# Patient Record
Sex: MALE | Race: WHITE | NOT HISPANIC OR LATINO | Employment: UNEMPLOYED | URBAN - METROPOLITAN AREA
[De-identification: names, ages, dates, MRNs, and addresses within clinical notes are randomized per-mention and may not be internally consistent; named-entity substitution may affect disease eponyms.]

---

## 2017-01-04 ENCOUNTER — GENERIC CONVERSION - ENCOUNTER (OUTPATIENT)
Dept: OTHER | Facility: OTHER | Age: 57
End: 2017-01-04

## 2017-01-05 ENCOUNTER — GENERIC CONVERSION - ENCOUNTER (OUTPATIENT)
Dept: OTHER | Facility: OTHER | Age: 57
End: 2017-01-05

## 2017-01-17 ENCOUNTER — ALLSCRIPTS OFFICE VISIT (OUTPATIENT)
Dept: OTHER | Facility: OTHER | Age: 57
End: 2017-01-17

## 2017-01-30 ENCOUNTER — ALLSCRIPTS OFFICE VISIT (OUTPATIENT)
Dept: OTHER | Facility: OTHER | Age: 57
End: 2017-01-30

## 2017-02-08 ENCOUNTER — ALLSCRIPTS OFFICE VISIT (OUTPATIENT)
Dept: OTHER | Facility: OTHER | Age: 57
End: 2017-02-08

## 2017-02-28 ENCOUNTER — GENERIC CONVERSION - ENCOUNTER (OUTPATIENT)
Dept: OTHER | Facility: OTHER | Age: 57
End: 2017-02-28

## 2017-03-08 ENCOUNTER — GENERIC CONVERSION - ENCOUNTER (OUTPATIENT)
Dept: OTHER | Facility: OTHER | Age: 57
End: 2017-03-08

## 2017-03-08 PROBLEM — M47.816 SPONDYLOSIS OF LUMBAR REGION WITHOUT MYELOPATHY OR RADICULOPATHY: Status: ACTIVE | Noted: 2017-03-08

## 2017-04-17 ENCOUNTER — GENERIC CONVERSION - ENCOUNTER (OUTPATIENT)
Dept: OTHER | Facility: OTHER | Age: 57
End: 2017-04-17

## 2017-06-02 ENCOUNTER — GENERIC CONVERSION - ENCOUNTER (OUTPATIENT)
Dept: OTHER | Facility: OTHER | Age: 57
End: 2017-06-02

## 2017-06-05 ENCOUNTER — GENERIC CONVERSION - ENCOUNTER (OUTPATIENT)
Dept: OTHER | Facility: OTHER | Age: 57
End: 2017-06-05

## 2017-06-07 LAB
BASOPHILS # BLD AUTO: 0 %
BASOPHILS # BLD AUTO: 0 X10E3/UL (ref 0–0.2)
DEPRECATED RDW RBC AUTO: 14.3 % (ref 12.3–15.4)
EOSINOPHIL # BLD AUTO: 0.2 X10E3/UL (ref 0–0.4)
EOSINOPHIL # BLD AUTO: 3 %
HCT VFR BLD AUTO: 43.9 % (ref 37.5–51)
HGB BLD-MCNC: 15.4 G/DL (ref 12.6–17.7)
IMM.GRANULOCYTES (CD4/8) (HISTORICAL): 0 X10E3/UL (ref 0–0.1)
IMM.GRANULOCYTES (CD4/8) (HISTORICAL): 1 %
LYMPHOCYTES # BLD AUTO: 1 X10E3/UL (ref 0.7–3.1)
LYMPHOCYTES # BLD AUTO: 17 %
MCH RBC QN AUTO: 29.2 PG (ref 26.6–33)
MCHC RBC AUTO-ENTMCNC: 35.1 G/DL (ref 31.5–35.7)
MCV RBC AUTO: 83 FL (ref 79–97)
MONOCYTES # BLD AUTO: 0.4 X10E3/UL (ref 0.1–0.9)
MONOCYTES (HISTORICAL): 7 %
NEUTROPHILS # BLD AUTO: 4.4 X10E3/UL (ref 1.4–7)
NEUTROPHILS # BLD AUTO: 72 %
PLATELET # BLD AUTO: 183 X10E3/UL (ref 150–379)
RBC (HISTORICAL): 5.27 X10E6/UL (ref 4.14–5.8)
WBC # BLD AUTO: 6.1 X10E3/UL (ref 3.4–10.8)

## 2017-06-08 ENCOUNTER — ALLSCRIPTS OFFICE VISIT (OUTPATIENT)
Dept: OTHER | Facility: OTHER | Age: 57
End: 2017-06-08

## 2017-06-08 LAB
A/G RATIO (HISTORICAL): 2.4 (ref 1.2–2.2)
ALBUMIN SERPL BCP-MCNC: 4.6 G/DL (ref 3.5–5.5)
ALP SERPL-CCNC: 73 IU/L (ref 39–117)
ALT SERPL W P-5'-P-CCNC: 24 IU/L (ref 0–44)
AST SERPL W P-5'-P-CCNC: 20 IU/L (ref 0–40)
BILIRUB SERPL-MCNC: 0.6 MG/DL (ref 0–1.2)
BUN SERPL-MCNC: 12 MG/DL (ref 6–24)
BUN/CREA RATIO (HISTORICAL): 13 (ref 9–20)
CALCIUM SERPL-MCNC: 9.3 MG/DL (ref 8.7–10.2)
CHLORIDE SERPL-SCNC: 98 MMOL/L (ref 96–106)
CHOLEST SERPL-MCNC: 184 MG/DL (ref 100–199)
CHOLEST/HDLC SERPL: 3.2 RATIO UNITS (ref 0–5)
CO2 SERPL-SCNC: 26 MMOL/L (ref 18–29)
CREAT SERPL-MCNC: 0.93 MG/DL (ref 0.76–1.27)
EGFR AFRICAN AMERICAN (HISTORICAL): 106 ML/MIN/1.73
EGFR-AMERICAN CALC (HISTORICAL): 91 ML/MIN/1.73
GLUCOSE SERPL-MCNC: 110 MG/DL (ref 65–99)
HDLC SERPL-MCNC: 58 MG/DL
LDLC SERPL CALC-MCNC: 114 MG/DL (ref 0–99)
POTASSIUM SERPL-SCNC: 4.5 MMOL/L (ref 3.5–5.2)
SODIUM SERPL-SCNC: 138 MMOL/L (ref 134–144)
TOT. GLOBULIN, SERUM (HISTORICAL): 1.9 G/DL (ref 1.5–4.5)
TOTAL PROTEIN (HISTORICAL): 6.5 G/DL (ref 6–8.5)
TRIGL SERPL-MCNC: 62 MG/DL (ref 0–149)
VLDLC SERPL CALC-MCNC: 12 MG/DL (ref 5–40)

## 2017-06-09 ENCOUNTER — ALLSCRIPTS OFFICE VISIT (OUTPATIENT)
Dept: OTHER | Facility: OTHER | Age: 57
End: 2017-06-09

## 2017-06-19 ENCOUNTER — ALLSCRIPTS OFFICE VISIT (OUTPATIENT)
Dept: OTHER | Facility: OTHER | Age: 57
End: 2017-06-19

## 2017-10-02 ENCOUNTER — GENERIC CONVERSION - ENCOUNTER (OUTPATIENT)
Dept: OTHER | Facility: OTHER | Age: 57
End: 2017-10-02

## 2017-10-26 ENCOUNTER — ALLSCRIPTS OFFICE VISIT (OUTPATIENT)
Dept: OTHER | Facility: OTHER | Age: 57
End: 2017-10-26

## 2017-10-27 NOTE — PROGRESS NOTES
Assessment  1  Chronic pain syndrome (338 4) (G89 4)   2  Chronic low back pain (724 2,338 29) (M54 5,G89 29)   3  Lumbar disc herniation with radiculopathy (722 10) (M51 16)   4  Lumbar spondylosis (721 3) (M47 816)   5  Myofascial pain syndrome (729 1) (M79 1)    Plan  Chronic low back pain, Chronic pain syndrome, Lumbar disc herniation with  radiculopathy, Lumbar spondylosis, Myofascial pain syndrome    · Nerve Block Transforaminal Epidural Steroid Injection Lumbar; Status:Active; Requested  NRX:[id number];    Perform:[facility]; YJ:[id number];ZNWJFKP; For:Chronic low back pain, Chronic pain syndrome, Lumbar disc herniation with radiculopathy, Lumbar spondylosis, Myofascial pain syndrome; Ordered By:Altaf Buckley;   · Follow-up Visit in 4 Weeks Evaluation and Treatment  Follow-up  Status: Hold For -  Scheduling  Requested for: 19NEF0663   Ordered; For: Chronic low back pain, Chronic pain syndrome, Lumbar disc herniation with radiculopathy, Lumbar spondylosis, Myofascial pain syndrome; Ordered By: Philip Mccoy Performed:  Due: 20IDW2912    Discussion/Summary    My impressions and treatment recommendations were discussed in detail with the patient, who verbalized understanding and had no further questions  patient reported nearly 9 months worth of relief following the previous right L4 and right L5 transforaminal epidural steroid injection on December 28, 2016  He is complaining of low back pain with radiation into his right lower extremity what appears to be the right L4 and right L5 distribution currently  He would like to undergo repeat injection and since this pain is similar to what it had been recently, I felt a reasonable to repeat the right L4 and right L5 transforaminal epidural steroid injection at this time in the context of a lumbar disc herniation with radiculopathy  procedures, its risks, and benefits were explained in detail to the patient   Risks include but are not limited to bleeding, infection, hematoma formation, abscess formation, weakness, headache, failure the pain to improve, nerve irritation or damage, and potential worsening of the pain  The patient verbalized understanding and wished to proceed with the procedure  did ask the patient to continue gabapentin 600 mg 3 times daily since it is helpful for his overall pain  Side effects were reviewed with the patient  is planned with the patient in 4 weeks time or sooner as warranted  Discharge instructions were provided  I personally saw and examined the patient and I agree with the above discussed plan of care  The patient has the current Goals: Decreased pain and improved level of functioning  Patient is able to Self-Care  Educational resources provided: The patient was provided a handout which provided information regarding the procedure  Chief Complaint  1  Back Pain    History of Present Illness  Mr Jalen Andre is a pleasant 77-year-old  male who presents to [facility] for interval re-evaluation of the above-stated pain complaints  The patient has a past medical and chronic pain history as outlined in the impression section below  He was last seen on June 19, 2017 at which time he was prescribed gabapentin 600 mg 3 times daily  Of note, the patient last underwent a right L4 and right L5 transforaminal epidural steroid injection on December 28, 2016  today's office visit, the patient's pain score is 8/10 on the verbal numerical pain rating scale  The patient states that his pain is worse in the morning, evening, and night  His pain is constant in nature and he reports the quality of his pain as cramping, pressure like, and shooting  â He reports that the pain from when he had the December 28, 2016 right L4 and right L5 transforaminal epidural steroid injection performed has returned   He had 9 months worth of relief following the previous right L4 and right L5 transforaminal epidural steroid injection  He would like to undergo a repeat transforaminal epidural steroid injection at this time  He also reports that gabapentin 600 mg 3 times daily is giving him significant relief and he would like to continue this medication  than as stated above, the patient denies any interval changes in medications, medical condition, mental condition, symptoms, or allergies since the last office visit  Krissy Lwo presents with complaints of gradual onset of constant episodes of severe bilateral lower back pain, radiating to the right lower leg  On a scale of 1 to 10, the patient rates the pain as 8  Symptoms are not improved by NSAIDs  Symptoms are made worse by standing, sitting, bending and walking down stairs  Symptoms are worsening  Review of Systems    Constitutional: no fever,-- no recent weight gain-- and-- no recent weight loss  Eyes: no double vision-- and-- no blurry vision  Cardiovascular: no chest pain,-- no palpitations-- and-- no lower extremity edema  Respiratory: shortness of breath, but-- no wheezing  Musculoskeletal: difficulty walking,-- joint stiffness,-- pain in extremity Right hip-- and-- decreased range of motion, but-- no muscle weakness,-- no joint swelling-- and-- no limb swelling  Neurological: memory loss, but-- no dizziness,-- no difficulty swallowing,-- no loss of consciousness-- and-- no seizures  Gastrointestinal: no nausea,-- no vomiting,-- no constipation-- and-- no diarrhea  Genitourinary: no difficulty initiating urine stream,-- no genital pain-- and-- no frequent urination  Integumentary: no complaints of skin rash  Psychiatric: no depression  Endocrine: no excessive thirst,-- no adrenal disease,-- no hypothyroidism-- and-- no hyperthyroidism  Hematologic/Lymphatic: no tendency for easy bruising-- and-- no tendency for easy bleeding  ROS reviewed  Active Problems  1  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)   2   Adult-onset obesity (278 00) (E66 9)   3  Anxiety (300 00) (F41 9)   4  Blood glucose elevated (790 29) (R73 9)   5  BMI 29 0-29 9,adult (V85 25) (Z68 29)   6  BMI 30 0-30 9,adult (V85 30) (Z68 30)   7  BMI 31 0-31 9,adult (V85 31) (Z68 31)   8  Chronic low back pain (724 2,338 29) (M54 5,G89 29)   9  Chronic pain syndrome (338 4) (G89 4)   10  Colon cancer screening (V76 51) (Z12 11)   11  Elevated PSA (790 93) (R97 20)   12  Enlarged prostate on rectal examination (600 00) (N40 0)   13  Gastroesophageal reflux disease without esophagitis (530 81) (K21 9)   14  History of colon polyps (V12 72) (Z86 010)   15  Lumbar disc herniation with radiculopathy (722 10) (M51 16)   16  Lumbar herniated disc (722 10) (M51 26)   17  Lumbar spondylosis (721 3) (M47 816)   18  Myofascial pain syndrome (729 1) (M79 1)   19  Neck pain (723 1) (M54 2)   20  Need for influenza vaccination (V04 81) (Z23)   21  Overweight (278 02) (E66 3)   22  Papilledema (377 00) (H47 10)   23  Prostate mass (602 9) (N42 9)   24  Screening for lipid disorders (V77 91) (P19 368)    Past Medical History  1  History of Abdominal pain, suprapubic (789 09) (R10 2)   2  History of Anxiety (300 00) (F41 9)   3  History of Burning with urination (788 1) (R30 0)   4  History of esophageal reflux (V12 79) (Z87 19)   5  History of Internal hemorrhoids (455 0) (K64 8)    Surgical History  1  Denied: History Of Prior Surgery    Family History  Mother    1  Denied: Family history of Crohn's disease without complication, unspecified   gastrointestinal tract location   2  Family history of hypertension (V17 49) (Z82 49)   3  Denied: Family history of liver disease   4  Denied: Family history of Malignant neoplasm of colon, unspecified part of colon  Father    5  Denied: Family history of Crohn's disease without complication, unspecified   gastrointestinal tract location   6  Family history of cataracts (V19 19) (Z45 974)   7  Denied: Family history of liver disease   8   Family history of skin cancer (V16 8) (Z80 8)   9  Denied: Family history of Malignant neoplasm of colon, unspecified part of colon  Maternal Grandmother    10  Family history of malignant neoplasm of stomach (V16 0) (Z80 0)    Social History   · Former smoker (V15 82) (X22 072)   · Non drinker / no alcohol use    Current Meds   1  Aspirin EC 81 MG Oral Tablet Delayed Release; TAKE 1 TABLET DAILY; Therapy: 76Pes2495 to (Alverta Player)  Requested for: 27Hts0908; Last   Rx:09Aug2017 Ordered   2  Citalopram Hydrobromide 40 MG Oral Tablet; Take 1 tablet daily; Therapy: 83UJJ4710 to (Evaluate:26Nxk8251)  Requested for: 48XUE4237; Last   Rx:00Uad9588 Ordered   3  Flomax 0 4 MG Oral Capsule; take 1 capsule daily; Therapy: (Recorded:08Jun2017) to Recorded   4  Gabapentin 600 MG Oral Tablet; TAKE 1 TABLET 3 times daily; Therapy: 44VRG1748 to (WCEPSCKK:73FHP0041)  Requested for: 43Wtk8951; Last   Rx:54Iof2516 Ordered   5  Omeprazole 20 MG Oral Capsule Delayed Release; take 1 capsule by mouth once daily; Therapy: 34JYT3391 to (Evaluate:21Jun2017)  Requested for: 75EWG5634; Last   Rx:23Mar2017 Ordered   6  RaNITidine HCl - 300 MG Oral Tablet; TAKE 1 TABLET AT BEDTIME; Therapy: 82SJF7638 to (Evaluate:20Nov2017)  Requested for: 24Apr2017; Last   Rx:24Apr2017 Ordered   7  Timolol 0 5 % SOLN;   Therapy: (Recorded:01Mar2016) to Recorded    Allergies  1  Ibuprofen 200 TABS   2  Ibuprofen TABS    Vitals  Vital Signs    Recorded: 21YXW2282 08:06AM   Heart Rate 76   Systolic 058   Diastolic 66   Height 5 ft 10 in   Weight 219 lb    BMI Calculated 31 42   BSA Calculated 2 17   Pain Scale 8     Physical Exam    Constitutional   General appearance: Well developed, well nourished, alert, in no distress, non-toxic and no overt pain behavior  Eyes   Sclera: anicteric   HEENT   Hearing grossly intact  Pulmonary   Respiratory effort: Even and unlabored  Cardiovascular   Examination of extremities: No edema or pitting edema present  Skin   Skin and subcutaneous tissue: Normal without rashes or lesions, well hydrated  Psychiatric   Mood and affect: Mood and affect appropriate  Neurologic   Cranial nerves: Cranial nerves II-XII grossly intact  Musculoskeletal   Gait and station: Normal     Lumbar/Sacral Spine examination demonstrates Lumbosacral Spine:   Appearance: Normal  Spinal alignment exhibits normal lordosis  Tenderness: None except at lumbar spine  Lumbosacral Spine Sensory: intact to light touch and pinprick in the lower extremities  ROM Lumbosacral Spine: Lumbar facet loading is mildly positive bilaterally  Flexion was not restricted-- and-- was painless  Extension was not restricted-- and-- was painless  Left lateral flexion was not restricted-- and-- was painful  Right lateral flexion was not restricted-- and-- was painful  Rotation to the left was not restricted-- and-- was painless  Rotation to the right was not restricted-- and-- was painless  ROM Hips: Full   Foot and ankle strength was normal bilaterally  Knee strength was normal bilaterally  Hip strength was normal bilaterally        Signatures   Electronically signed by : YUNIOR Huff ; Oct 26 2017  8:24AM EST                       (Author)

## 2017-11-16 ENCOUNTER — HOSPITAL ENCOUNTER (OUTPATIENT)
Facility: AMBULARY SURGERY CENTER | Age: 57
Setting detail: OUTPATIENT SURGERY
Discharge: HOME | End: 2017-11-16
Attending: ANESTHESIOLOGY | Admitting: ANESTHESIOLOGY
Payer: COMMERCIAL

## 2017-11-16 ENCOUNTER — HOSPITAL ENCOUNTER (OUTPATIENT)
Dept: RADIOLOGY | Facility: HOSPITAL | Age: 57
Setting detail: OUTPATIENT SURGERY
Discharge: HOME | End: 2017-11-16
Payer: COMMERCIAL

## 2017-11-16 VITALS
DIASTOLIC BLOOD PRESSURE: 74 MMHG | RESPIRATION RATE: 18 BRPM | SYSTOLIC BLOOD PRESSURE: 133 MMHG | TEMPERATURE: 98.9 F | HEART RATE: 83 BPM | OXYGEN SATURATION: 97 %

## 2017-11-16 PROCEDURE — 72100 X-RAY EXAM L-S SPINE 2/3 VWS: CPT

## 2017-11-16 RX ORDER — LIDOCAINE WITH 8.4% SOD BICARB 0.9%(10ML)
SYRINGE (ML) INJECTION AS NEEDED
Status: DISCONTINUED | OUTPATIENT
Start: 2017-11-16 | End: 2017-11-16 | Stop reason: HOSPADM

## 2017-11-16 RX ORDER — BUPIVACAINE HYDROCHLORIDE 2.5 MG/ML
INJECTION, SOLUTION EPIDURAL; INFILTRATION; INTRACAUDAL AS NEEDED
Status: DISCONTINUED | OUTPATIENT
Start: 2017-11-16 | End: 2017-11-16 | Stop reason: HOSPADM

## 2017-11-16 RX ORDER — METHYLPREDNISOLONE ACETATE 80 MG/ML
INJECTION, SUSPENSION INTRA-ARTICULAR; INTRALESIONAL; INTRAMUSCULAR; SOFT TISSUE AS NEEDED
Status: DISCONTINUED | OUTPATIENT
Start: 2017-11-16 | End: 2017-11-16 | Stop reason: HOSPADM

## 2017-11-16 NOTE — OP NOTE
ATTENDING PHYSICIAN:  Monalisa Elizabeth MD     PROCEDURE:  1  Right L4 transforaminal epidural steroid injection under fluoroscopic guidance  2  Right L5 transforaminal epidural steroid injection under fluoroscopic guidance  PRE-PROCEDURE DIAGNOSIS:  Low back pain and right lower extremity radiculopathy  POST-PROCEDURE DIAGNOSIS:  Low back pain and right lower extremity radiculopathy  ANESTHESIA:  Local     ESTIMATED BLOOD LOSS:  Minimal     COMPLICATIONS:  None  LOCATION:  [facility]  CONSENT:  Today's procedure, its potential benefits as well as its risks and potential side effects were reviewed  Discussed risks of the procedure including bleeding, infection, nerve irritation or damage, reactions to the medications, weakness, headache, failure of the pain to improve, and potential worsening of the pain were explained to the patient who verbalized understanding and who wished to proceed  Written informed consent was thereby obtained  DESCRIPTION OF THE PROCEDURE:  After written informed consent was obtained, the patient was taken to the fluoroscopy suite and placed in the prone position  Anatomical landmarks were identified by way of fluoroscopy in multiple views  The skin of the lumbar region was prepped using antiseptic and draped in the usual sterile fashion  Strict aseptic technique was utilized  The skin and subcutaneous tissues at the needle entry site were infiltrated with a total of 5 mL of 1% preservative-free lidocaine using a 25-gauge 1-1/2-inch needle  22-gauge needles were then incrementally advanced under fluoroscopic guidance in the oblique view into the neural foramina as mentioned above  Proper placement into each of the neural foramen was confirmed with fluoroscopy in both the lateral and AP views   After negative aspiration for CSF or heme, contrast was injected, which delineated the nerve roots and the epidural space under fluoroscopy in the AP view  There was only a transient pressure paresthesia that resolved immediately upon injection  After negative aspiration, a 2 mL of a 4 mL injectate consisting of 3 mL of preservative-free 0 25% bupivacaine and 1 mL of Depo-Medrol 80 mg/mL was slowly injected into each of the needles as delineated above  The patient tolerated the procedure well and all needles were removed intact  Hemostasis was maintained  There were no apparent paresthesias or complications  The skin was wiped clean and a Band-Aid was placed as appropriate  The patient was monitored for an appropriate period of time and remained hemodynamically stable following the procedure  The patient was ultimately discharged to home with supervision in good condition and instructed to call the office in approximately 7-10 days with an update or sooner as warranted  Discharge instructions were provided  I was present for and participated in all key and critical portions of this procedure      Tayler Viramontes MD  11/16/2017  2:59 PM

## 2017-11-30 ENCOUNTER — GENERIC CONVERSION - ENCOUNTER (OUTPATIENT)
Dept: OTHER | Facility: OTHER | Age: 57
End: 2017-11-30

## 2017-12-14 ENCOUNTER — GENERIC CONVERSION - ENCOUNTER (OUTPATIENT)
Dept: OTHER | Facility: OTHER | Age: 57
End: 2017-12-14

## 2018-01-10 NOTE — RESULT NOTES
Message   Recorded as Task   Date: 01/04/2017 01:21 PM, Created By: Toni Adam   Task Name: Follow Up   Assigned To: [facility] [place] [facility],Team   Regarding Patient: Derinda Primrose, Status: Active   Comment:    Ida Londono - 04 Jan 2017 1:21 PM     TASK CREATED  L/m to return call  S/P RIGHT L4 AND L5 TFESI done 12/28/16  by Ida Moreno - 04 Jan 2017 1:41 PM     TASK EDITED  Patient l/m stating that he received 40-50%  relief from his rpocedure  His pain varies from 5-6 depending on activity  S/p RIGHT L4 AND L5 TFESI done 12/28/16   Altaf Buckley - 04 Jan 2017 2:17 PM     TASK REPLIED TO: Previously Assigned To [place]  Thanks          Signatures   Electronically signed by : Desiree Lambert, ; Jan 5 2017 11:02AM EST                       (Author)

## 2018-01-10 NOTE — PROGRESS NOTES
Assessment    1  Encounter for preventive health examination (V70 0) (Z00 00)   2  Adult-onset obesity (278 00) (E66 9)   3  Anxiety (300 00) (F41 9)   4  Gastroesophageal reflux disease without esophagitis (530 81) (K21 9)   5  BMI 31 0-31 9,adult (V85 31) (Z68 31)    Plan  Adult-onset obesity    · Begin or continue regular aerobic exercise  Gradually work up to at least {count1}  sessions of {dur1} of exercise a week ; Status:Complete;   Done: 94EKI9701   · You need to quit smoking ; Status:Complete;   Done: 79UUU2180    Discussion/Summary  Impression: health maintenance visit  Currently, he eats a poor diet and has an inadequate exercise regimen  Prostate cancer screening: the risks and benefits of prostate cancer screening were discussed, prostate cancer screening is current and Follows with Dr Javed Meek  PSA done recently according to patient  Testicular cancer screening: the risks and benefits of testicular cancer screening were discussed  Colorectal cancer screening: the risks and benefits of colorectal cancer screening were discussed and colorectal cancer screening is current  Screening lab work includes glucose and lipid profile  He was advised to be evaluated by a dentist  Advice and education were given regarding nutrition, aerobic exercise and weight loss  Patient discussion: discussed with the patient  Adult-onset Obesity: Strongly advised lifestyle modifications with better dietary choices as well as initiation of aerobic exercise at least 30 minutes a day, 3-5 times a week  Anxiety: Continue current regimen and follow up with family guidance  GERD: We will do a trial without PPI and just take the H2 blocker  Patient is in agreement with this plan  If patient was symptoms, he will re-start PPI  Spoke with GI, Dr Baron Daughters  He agrees with the plan also  The patient was counseled regarding risk factor reductions, impressions     Possible side effects of new medications were reviewed with the patient/guardian today  The treatment plan was reviewed with the patient/guardian  The patient/guardian understands and agrees with the treatment plan     Self Referrals: No      Chief Complaint  Annual physical      History of Present Illness  HM, Adult Male: The patient is being seen for a health maintenance evaluation  The last health maintenance visit was 1 year(s) ago  Social History: Household members include domestic partner  Work status: not currently employed  General Health: The patient's health since the last visit is described as fair  He does not have regular dental visits  The patient brushes occasionally, doesn't floss his teeth and Brushes teeth times a week  He complains of vision problems  Vision care includes wearing glasses  The patient complains of Reports decreased peripheral vision, was recently seen by ophthalmologist (Dr Alethea Montilla? In [place], [place])  He has hearing loss  Hearing is slightly decreased   left ear (chronic)  Lifestyle:  He does not have a healthy diet  Diet problems: too high in calories, insufficient in fruit, insufficient in vegetables, insufficient in protein, needs elimination of junk food, high in fat, high in sugar and Eats 2 dozens donuts in a week  He has weight concerns  Weight control issues: overweight  He does not exercise regularly  He uses tobacco  The patient is a former cigarette smoker and quit smoking cigarettes: 1997  He denies alcohol use  He denies drug use  Reproductive health:  the patient is not sexually active  Screening: cancer screening reviewed and current  HPI: 64year old male comes to the office for his well-visit  Patient sees Dr Gianna Lemus (Urologist) for his BPH  States that a biopsy was performed this year, and reports normal findings  Patient is concerned of a 20lb weight gain in the past year  Has an upcoming appointment with a nutritionist      For patient's depression, reports that it's well controlled   Has an upcoming appointment with family guidance  Review of Systems    Constitutional: no fever, not feeling poorly and no chills  Eyes: no eyesight problems  ENT: no sore throat and no nasal discharge  Cardiovascular: no chest pain and no palpitations  Respiratory: no shortness of breath and no cough  Gastrointestinal: no abdominal pain, no nausea, no vomiting, no constipation and no diarrhea  Musculoskeletal: myalgias and Chronic back pain  Integumentary: no rashes  Neurological: no headache, no numbness and no tingling  Psychiatric: anxiety and Treated with medication  Active Problems    1  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)   2  Adult-onset obesity (278 00) (E66 9)   3  Anxiety (300 00) (F41 9)   4  Blood glucose elevated (790 29) (R73 9)   5  BMI 29 0-29 9,adult (V85 25) (Z68 29)   6  BMI 30 0-30 9,adult (V85 30) (Z68 30)   7  Chronic low back pain (724 2,338 29) (M54 5,G89 29)   8  Chronic pain syndrome (338 4) (G89 4)   9  Colon cancer screening (V76 51) (Z12 11)   10  Elevated PSA (790 93) (R97 20)   11  Enlarged prostate on rectal examination (600 00) (N40 0)   12  Gastroesophageal reflux disease without esophagitis (530 81) (K21 9)   13  History of colon polyps (V12 72) (Z86 010)   14  Lumbar disc herniation with radiculopathy (722 10) (M51 16)   15  Lumbar herniated disc (722 10) (M51 26)   16  Lumbar spondylosis (721 3) (M47 816)   17  Myofascial pain syndrome (729 1) (M79 1)   18  Neck pain (723 1) (M54 2)   19  Need for influenza vaccination (V04 81) (Z23)   20  Overweight (278 02) (E66 3)   21  Papilledema (377 00) (H47 10)   22  Prostate mass (602 9) (N42 9)   23   Screening for lipid disorders (V77 91) (Z13 220)    Past Medical History    · History of Abdominal pain, suprapubic (789 09) (R10 2)   · History of Anxiety (300 00) (F41 9)   · History of Burning with urination (788 1) (R30 0)   · History of esophageal reflux (V12 79) (Z87 19)   · History of Internal hemorrhoids (455  0) (K64 8)    Surgical History    · Denied: History Of Prior Surgery    Family History  Mother    · Denied: Family history of Crohn's disease without complication, unspecified  gastrointestinal tract location   · Family history of hypertension (V17 49) (Z82 49)   · Denied: Family history of liver disease   · Denied: Family history of Malignant neoplasm of colon, unspecified part of colon  Father    · Denied: Family history of Crohn's disease without complication, unspecified  gastrointestinal tract location   · Family history of cataracts (V19 19) (Z83 518)   · Denied: Family history of liver disease   · Family history of skin cancer (V16 8) (Z80 8)   · Denied: Family history of Malignant neoplasm of colon, unspecified part of colon  Maternal Grandmother    · Family history of malignant neoplasm of stomach (V16 0) (Z80 0)    Social History    · Former smoker (V15 82) (E63 279)   · Non drinker / no alcohol use    Current Meds   1  Aspirin EC 81 MG Oral Tablet Delayed Release; TAKE 1 TABLET DAILY; Therapy: 89Ymz7271 to (Evaluate:48Pzn4308)  Requested for: 74Svc4063; Last   Rx:15Hcv7692 Ordered   2  Citalopram Hydrobromide 40 MG Oral Tablet; Take 1 tablet daily; Therapy: 00FNH3129 to (Evaluate:60Esz6600)  Requested for: 64PST5616; Last   Rx:79Ovi3763 Ordered   3  Flomax 0 4 MG Oral Capsule; take 1 capsule daily; Therapy: (Recorded:08Jun2017) to Recorded   4  Gabapentin 600 MG Oral Tablet; TAKE 1 TABLET 3 TIMES DAILY; Therapy: 56KSW2397 to (Josefina Larios)  Requested for: 93CUO6391; Last   Rx:05Jun2017 Ordered   5  Omeprazole 20 MG Oral Capsule Delayed Release; take 1 capsule by mouth once daily; Therapy: 96WPY6403 to (Evaluate:21Jun2017)  Requested for: 26EWK3431; Last   Rx:23Mar2017 Ordered   6  RaNITidine HCl - 300 MG Oral Tablet; TAKE 1 TABLET AT BEDTIME; Therapy: 64GST4901 to (Evaluate:20Nov2017)  Requested for: 66Jya6496; Last   Rx:24Apr2017 Ordered   7   Timolol 0 5 % SOLN;   Therapy: (Recorded:01Mar2016) to Recorded    Allergies    1  Ibuprofen 200 TABS   2  Ibuprofen TABS    Vitals   Recorded: 24OAS2911 02:51PM   Temperature 98 2 F   Heart Rate 85   Respiration 18   Systolic 280   Diastolic 68   Height 5 ft 10 in   Weight 220 lb 3 oz   BMI Calculated 31 59   BSA Calculated 2 17   O2 Saturation 98     Physical Exam    Constitutional   General appearance: Abnormal   obese  Head and Face   Head and face: Normal     Eyes   Conjunctiva and lids: No erythema, swelling or discharge  Pupils and irises: Equal, round, reactive to light  Ears, Nose, Mouth, and Throat   External inspection of ears and nose: Normal     Nasal mucosa, septum, and turbinates: Normal without edema or erythema  Lips, teeth, and gums: Normal, good dentition  Oropharynx: Normal with no erythema, edema, exudate or lesions  Pulmonary   Respiratory effort: No increased work of breathing or signs of respiratory distress  Auscultation of lungs: Clear to auscultation  Cardiovascular   Auscultation of heart: Normal rate and rhythm, normal S1 and S2, no murmurs  Abdomen   Abdomen: Non-tender, no masses  Musculoskeletal   Range of motion: Normal     Stability: Normal     Muscle strength/tone: Normal     Neurologic   Reflexes: 2+ and symmetric  Psychiatric   Orientation to person, place and time: Normal     Mood and affect: Normal        Results/Data  PHQ-9 Adult Depression Screening 68NWI7895 03:50PM User, [facility]     Test Name Result Flag Reference   PHQ-9 Adult Depression Score 14     Over the last two weeks, how often have you been bothered by any of the following problems?   Little interest or pleasure in doing things: More than half the days - 2  Feeling down, depressed, or hopeless: Several days - 1  Trouble falling or staying asleep, or sleeping too much: More than half the days - 2  Feeling tired or having little energy: More than half the days - 2  Poor appetite or over eating: More than half the days - 2  Feeling bad about yourself - or that you are a failure or have let yourself or your family down: Several days - 1  Trouble concentrating on things, such as reading the newspaper or watching television: More than half the days - 2  Moving or speaking so slowly that other people could have noticed  Or the opposite -  being so fidgety or restless that you have been moving around a lot more than usual: More than half the days - 2  Thoughts that you would be better off dead, or of hurting yourself in some way: Not at all - 0   PHQ-9 Adult Depression Screening Positive     PHQ-9 Difficulty Level Somewhat difficult     PHQ-9 Severity Moderate Depression         Health Management  Colon cancer screening   COLONOSCOPY; every 10 years; Last 10Atm0368; Next Due: 94Vry4541; Active    Future Appointments    Date/Time Provider Specialty Site   06/19/2017 11:30 AM YUNIOR Cage   Pain Management [address]     Signatures   Electronically signed by : YUNIOR Salter ; Jun 9 2017  4:08PM EST                       (Author)    Electronically signed by : YUNIOR Valladares ; Jul 25 2017 12:24PM EST

## 2018-01-10 NOTE — MISCELLANEOUS
Message   Recorded as Task   Date: 03/08/2017 08:09 AM, Created By: Emery Valencia   Task Name: Miscellaneous   Assigned To: Emery Valencia   Regarding Patient: Nicholas Montanez, Status: Active   CommentFlorecita Mata - 08 Mar 2017 8:09 AM     TASK CREATED  Pt called to cx procedure for today  He is not having any pain! Aurelio Montejo - 08 Mar 2017 9:28 AM     TASK REPLIED TO: Previously Assigned To [place]  Thanks  Active Problems    1  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)   2  Anxiety (300 00) (F41 9)   3  Blood glucose elevated (790 29) (R73 9)   4  BMI 29 0-29 9,adult (V85 25) (Z68 29)   5  BMI 30 0-30 9,adult (V85 30) (Z68 30)   6  Chronic low back pain (724 2,338 29) (M54 5,G89 29)   7  Chronic pain syndrome (338 4) (G89 4)   8  Colon cancer screening (V76 51) (Z12 11)   9  Elevated PSA (790 93) (R97 20)   10  Enlarged prostate on rectal examination (600 00) (N40 0)   11  Gastroesophageal reflux disease without esophagitis (530 81) (K21 9)   12  History of colon polyps (V12 72) (Z86 010)   13  Lumbar disc herniation with radiculopathy (722 10) (M51 16)   14  Lumbar herniated disc (722 10) (M51 26)   15  Lumbar spondylosis (721 3) (M47 816)   16  Myofascial pain syndrome (729 1) (M79 1)   17  Neck pain (723 1) (M54 2)   18  Need for influenza vaccination (V04 81) (Z23)   19  Overweight (278 02) (E66 3)   20  Papilledema (377 00) (H47 10)   21  Prostate mass (602 9) (N42 9)   22  Screening for lipid disorders (V77 91) (Z13 220)    Current Meds   1  Aspirin EC 81 MG Oral Tablet Delayed Release; TAKE 1 TABLET DAILY; Therapy: 63Gpw2548 to (Evaluate:20Yft1362)  Requested for: 82Bsi5135; Last   Rx:81Onb6953 Ordered   2  Citalopram Hydrobromide 40 MG Oral Tablet; TAKE 1 TABLET DAILY; Therapy: 73VRV1837 to (Evaluate:69Zhe6466)  Requested for: 83OLZ7153; Last   Rx:18Jan2017 Ordered   3  Flomax 0 4 MG Oral Capsule (Tamsulosin HCl); Therapy: (Jason Monroe) to Recorded   4  Gabapentin 600 MG Oral Tablet; TAKE 1 TABLET 3 TIMES DAILY; Therapy: 00RSS2793 to (Christiano Verdin)  Requested for: 35AEK4499; Last   Rx:37Ikg0267 Ordered   5  Omeprazole 20 MG Oral Capsule Delayed Release; take 1 capsule by mouth once daily; Therapy: 61JOJ2687 to (Evaluate:87Zhv5399)  Requested for: 51QYP6028; Last   Rx:93Siq9354 Ordered   6  RaNITidine HCl - 300 MG Oral Tablet; TAKE 1 TABLET AT BEDTIME; Therapy: 99YPL8484 to (Evaluate:19Apr2017)  Requested for: 56UPE5790; Last   Rx:70Nfe3852 Ordered   7  Timolol 0 5 % SOLN;   Therapy: (Recorded:01Mar2016) to Recorded    Allergies    1  Ibuprofen 200 TABS   2  Ibuprofen TABS    Signatures   Electronically signed by :  Pinky Camarillo, ; Mar  8 2017 10:59AM EST                       (Author)

## 2018-01-11 NOTE — MISCELLANEOUS
Message   Recorded as Task   Date: 06/05/2017 09:38 AM, Created By: Cliff Lomax   Task Name: Miscellaneous   Assigned To: [facility] Clinical,Team   Regarding Patient: Lesa Brothers, Status: Active   CommentJulian Jones - 05 Jun 2017 9:38 AM     TASK CREATED  Pt called for a refill for his Gabapentin 600mg 3 x a day  Pt has no refills left  Please call [phone]  [facility] - 05 Jun 2017 9:47 AM     TASK EDITED  Pt has an sovs 6/16 w/Dr Ina Shabazz - 05 Jun 2017 9:58 AM     TASK REPLIED TO: Previously Assigned To Haydee Jackson - 05 Jun 2017 10:35 AM     TASK EDITED  left detailed message, pt to call if any questions  Active Problems    1  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)   2  Adult-onset obesity (278 00) (E66 9)   3  Anxiety (300 00) (F41 9)   4  Blood glucose elevated (790 29) (R73 9)   5  BMI 29 0-29 9,adult (V85 25) (Z68 29)   6  BMI 30 0-30 9,adult (V85 30) (Z68 30)   7  Chronic low back pain (724 2,338 29) (M54 5,G89 29)   8  Chronic pain syndrome (338 4) (G89 4)   9  Colon cancer screening (V76 51) (Z12 11)   10  Elevated PSA (790 93) (R97 20)   11  Enlarged prostate on rectal examination (600 00) (N40 0)   12  Gastroesophageal reflux disease without esophagitis (530 81) (K21 9)   13  History of colon polyps (V12 72) (Z86 010)   14  Lumbar disc herniation with radiculopathy (722 10) (M51 16)   15  Lumbar herniated disc (722 10) (M51 26)   16  Lumbar spondylosis (721 3) (M47 816)   17  Myofascial pain syndrome (729 1) (M79 1)   18  Neck pain (723 1) (M54 2)   19  Need for influenza vaccination (V04 81) (Z23)   20  Overweight (278 02) (E66 3)   21  Papilledema (377 00) (H47 10)   22  Prostate mass (602 9) (N42 9)   23  Screening for lipid disorders (V77 91) (Z13 220)    Current Meds   1  Aspirin EC 81 MG Oral Tablet Delayed Release; TAKE 1 TABLET DAILY;    Therapy: 36Vxv4370 to (Evaluate:24Zvp5970)  Requested for: 99Fwn5275; Last   Rx:42Yuq2398 Ordered   2  Citalopram Hydrobromide 40 MG Oral Tablet; Take 1 tablet daily; Therapy: 73KCC9830 to (Evaluate:28Wwz4063)  Requested for: 36SXD0878; Last   Rx:90Agn3165 Ordered   3  Flomax 0 4 MG Oral Capsule (Tamsulosin HCl); Therapy: (Livia Recio) to Recorded   4  Gabapentin 600 MG Oral Tablet; TAKE 1 TABLET 3 TIMES DAILY; Therapy: 67ULF3861 to (Elizabeth Isidro)  Requested for: 65TJR4385; Last   Rx:05Jun2017 Ordered   5  Omeprazole 20 MG Oral Capsule Delayed Release; take 1 capsule by mouth once daily; Therapy: 58HGV1036 to (Evaluate:21Jun2017)  Requested for: 73XDN7922; Last   Rx:23Mar2017 Ordered   6  RaNITidine HCl - 300 MG Oral Tablet; TAKE 1 TABLET AT BEDTIME; Therapy: 84IMV6880 to (Evaluate:20Nov2017)  Requested for: 24Apr2017; Last   Rx:24Apr2017 Ordered   7  Timolol 0 5 % SOLN;   Therapy: (Recorded:01Mar2016) to Recorded    Allergies    1  Ibuprofen 200 TABS   2   Ibuprofen TABS    Signatures   Electronically signed by : Tadeo Hancock RN; Jun 5 2017 10:35AM EST                       (Author)

## 2018-01-11 NOTE — MISCELLANEOUS
Message   Recorded as Task   Date: 12/05/2016 10:51 AM, Created By: Donny Gonsalez   Task Name: Med Renewal Request   Ass[facility] To: [facility] [facility] [address] [facility] Clinical,Team   Regarding Patient: Keith Marino, Status: Active   Comment:    Vero Ryan - 05 Dec 2016 10:51 AM     TASK CREATED  Caller: Self; Renew Medication; [phone] (Home)  [place] 12/5/2016 @ [facility]  requesting refill of gabapentin 600 mg    Per pt, + improvement w/ increase in gabapentin  Noted anxiety and feeling restless - worst in the afternoon  Pt confirmed citalopram 20 mg q am 1-2 hrs after gabapentin  Pt expressed frustration with insurance issues delaying / prohibiting injection  Acknowledged pt's concerns  will d/w Dr James Arvizu and cb to advise  Confirmed Pharmacy per allscrirossy Wiley - 05 Dec 2016 11:06 AM     TASK REPLIED TO: Previously Assigned To [address]  Currently working on insurance authorization for procedure  I refilled the patient's gabapentin  Vero Ryan - 05 Dec 2016 11:47 AM     TASK EDITED  s/w pt, advised of above  pt verbalized understanding and appreciation  Active Problems    1  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)   2  Anxiety (300 00) (F41 9)   3  Blood glucose elevated (790 29) (R73 9)   4  BMI 29 0-29 9,adult (V85 25) (Z68 29)   5  Chronic low back pain (724 2,338 29) (M54 5,G89 29)   6  Chronic pain syndrome (338 4) (G89 4)   7  Colon cancer screening (V76 51) (Z12 11)   8  Dry nose (478 19) (J34 89)   9  Elevated PSA (790 93) (R97 20)   10  Encounter for routine history and physical exam for male (V70 0) (Z00 00)   11  Encounter to discuss test results (V65 49) (Z71 89)   12  Enlarged prostate on rectal examination (600 00) (N40 0)   13  Gastroesophageal reflux disease without esophagitis (530 81) (K21 9)   14  History of colon polyps (V12 72) (Z86 010)   15  Lumbar disc herniation with radiculopathy (722 10) (M51 16)   16  Lumbar herniated disc (722 10) (M51 26)   17   Myofascial pain syndrome (729 1) (M79 1)   18  Neck pain (723 1) (M54 2)   19  Overweight (278 02) (E66 3)   20  Papilledema (377 00) (H47 10)   21  Prostate mass (602 9) (N42 9)   22  Screening for lipid disorders (V77 91) (Z13 220)    Current Meds   1  Aspirin EC 81 MG Oral Tablet Delayed Release; TAKE 1 TABLET DAILY; Therapy: 53Qta0773 to (Evaluate:92Hkm7375)  Requested for: 32Vcg6103; Last   Rx:72Hoq1265 Ordered   2  Aspirin Low Dose 81 MG TABS; TAKE 1 TABLET DAILY; Therapy: 29SIK2797 to (Evaluate:24Feb2017)  Requested for: 15FXF8874; Last   Rx:01Mar2016 Ordered   3  Citalopram Hydrobromide 20 MG Oral Tablet; TAKE 1 TABLET DAILY; Therapy: 84JMA1587 to (Evaluate:21Feb2017)  Requested for: 72Nvo3308; Last   Rx:05Qes5209 Ordered   4  Flomax 0 4 MG Oral Capsule (Tamsulosin HCl); Therapy: ([facility]) to Recorded   5  Gabapentin 600 MG Oral Tablet; TAKE 1 TABLET 3 TIMES DAILY; Therapy: 18GNJ0752 to (Evaluate:04Jan2017)  Requested for: 00RQM4291; Last   Rx:42Zjj6049 Ordered   6  Omeprazole 20 MG Oral Capsule Delayed Release; take 1 capsule daily; Therapy: 28HPQ5227 to (Evaluate:01Jan2017)  Requested for: 89UHZ1003; Last   Rx:05Jun2016 Ordered   7  Omeprazole Magnesium 20 MG TBEC; TAKE 1 TABLET DAILY; Therapy: (Recorded:05Apr2016) to Recorded   8  RaNITidine HCl - 300 MG Oral Tablet; TAKE 1 TABLET AT BEDTIME; Therapy: 31JPI3925 to (Evaluate:19Apr2017)  Requested for: 59EOG8196; Last   Rx:24Zah9314 Ordered   9  Timolol 0 5 % SOLN;   Therapy: (Recorded:01Mar2016) to Recorded    Allergies    1  Ibuprofen 200 TABS   2   Ibuprofen TABS    Signatures   Electronically signed by : Kassie Anderson, ; Dec  5 2016 11:48AM EST                       (Author)

## 2018-01-12 VITALS
OXYGEN SATURATION: 98 % | SYSTOLIC BLOOD PRESSURE: 122 MMHG | WEIGHT: 205.56 LBS | HEART RATE: 84 BPM | BODY MASS INDEX: 29.43 KG/M2 | HEIGHT: 70 IN | DIASTOLIC BLOOD PRESSURE: 70 MMHG | RESPIRATION RATE: 18 BRPM

## 2018-01-12 VITALS
WEIGHT: 219.25 LBS | OXYGEN SATURATION: 98 % | BODY MASS INDEX: 31.39 KG/M2 | TEMPERATURE: 98.4 F | RESPIRATION RATE: 18 BRPM | SYSTOLIC BLOOD PRESSURE: 116 MMHG | HEIGHT: 70 IN | HEART RATE: 92 BPM | DIASTOLIC BLOOD PRESSURE: 78 MMHG

## 2018-01-12 NOTE — MISCELLANEOUS
Message   Recorded as Task   Date: 02/28/2017 11:27 AM, Created By: Francenia Sandhoff   Task Name: Med Renewal Request   Assigned To: [address] [facility] [facility] Clinical,Team   Regarding Patient: Gael Paredes, Status: Active   Comment:    Haydee Flowers - 28 Feb 2017 11:27 AM     TASK CREATED  Caller: Self; Renew Medication; [phone] (Home)  Pt lmom requesting a refill of gabapentin 600mg one tab tid to be [facility]  Pt has 2 days supply left  Haydee Flowers - 28 Feb 2017 11:28 AM     TASK EDITED   Malou Venegas - 28 Feb 2017 11:46 AM     TASK REPLIED TO: Previously Assigned To Malou Venegas  e-rx sent for the gabapentin   Rocio Girard - 28 Feb 2017 11:56 AM     TASK EDITED  Pt aware  Active Problems    1  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)   2  Anxiety (300 00) (F41 9)   3  Blood glucose elevated (790 29) (R73 9)   4  BMI 29 0-29 9,adult (V85 25) (Z68 29)   5  BMI 30 0-30 9,adult (V85 30) (Z68 30)   6  Chronic low back pain (724 2,338 29) (M54 5,G89 29)   7  Chronic pain syndrome (338 4) (G89 4)   8  Colon cancer screening (V76 51) (Z12 11)   9  Elevated PSA (790 93) (R97 20)   10  Enlarged prostate on rectal examination (600 00) (N40 0)   11  Gastroesophageal reflux disease without esophagitis (530 81) (K21 9)   12  History of colon polyps (V12 72) (Z86 010)   13  Lumbar disc herniation with radiculopathy (722 10) (M51 16)   14  Lumbar herniated disc (722 10) (M51 26)   15  Lumbar spondylosis (721 3) (M47 816)   16  Myofascial pain syndrome (729 1) (M79 1)   17  Neck pain (723 1) (M54 2)   18  Need for influenza vaccination (V04 81) (Z23)   19  Overweight (278 02) (E66 3)   20  Papilledema (377 00) (H47 10)   21  Prostate mass (602 9) (N42 9)   22  Screening for lipid disorders (V77 91) (Z13 220)    Current Meds   1  Aspirin EC 81 MG Oral Tablet Delayed Release; TAKE 1 TABLET DAILY;    Therapy: 18Ahz6084 to (Evaluate:52Kgd9506)  Requested for: 66Hsp2098; Last Rx: 85BZF2381 Ordered   2  Citalopram Hydrobromide 40 MG Oral Tablet; TAKE 1 TABLET DAILY; Therapy: 13KNF0877 to (Evaluate:78Qeu1215)  Requested for: 52UEM9763; Last   Rx:18Jan2017 Ordered   3  Flomax 0 4 MG Oral Capsule (Tamsulosin HCl); Therapy: (Cheyenne Francis) to Recorded   4  Gabapentin 600 MG Oral Tablet; TAKE 1 TABLET 3 TIMES DAILY; Therapy: 53OVF4723 to (Jerica Diaz)  Requested for: 58FBF0979; Last   Rx:21Kjg5595 Ordered   5  Omeprazole 20 MG Oral Capsule Delayed Release; take 1 capsule by mouth once daily; Therapy: 18CTH1287 to (Evaluate:19Unq6162)  Requested for: 03NZL3405; Last   Rx:82Unv3688 Ordered   6  RaNITidine HCl - 300 MG Oral Tablet; TAKE 1 TABLET AT BEDTIME; Therapy: 27RRE1605 to (Evaluate:19Apr2017)  Requested for: 94ATP7535; Last   Rx:54Tnt4590 Ordered   7  Timolol 0 5 % SOLN;   Therapy: (Recorded:01Mar2016) to Recorded    Allergies    1  Ibuprofen 200 TABS   2   Ibuprofen TABS    Signatures   Electronically signed by : Cynthia Pham, ; Feb 28 2017 11:57AM EST                       (Author)

## 2018-01-12 NOTE — RESULT NOTES
Message   Recorded as Task   Date: 10/02/2017 12:59 PM, Created By: Landry Dempsey   Task Name: Miscellaneous   Assigned To: [facility] [address] Clinical,Team   Regarding Patient: Nery Ivy, Status: Active   CommentFlora  - 02 Oct 2017 12:59 PM     TASK CREATED  received answering service message: Reason: Quique Lambert   Pt's Dr: Dari Cowan       For: OFFICE     2nd Call: NO        From: [employer]     Phone: [phone]   Ext:    Make/Cxl Appt: MAKE APPT        Pt Name: SAME         Pt : 1960   Date of Appt: ,    /  /   Time of Appt:     Message: ALT C# [phone] / NEEDS EMERGS APPT FOR SEVERE PAIN   Joycelyn Foley - 02 Oct 2017 2:41 PM     TASK EDITED  S/w the pt  and he stated on Saturday he experienced a sharp pain behind his right knee cap and it kind of shot up into his waist line  It was a pretty bad pain that lasted several minutes  But today he stated he does feel better with little pain  Looks like the pt  was last seen on   Encouraged him to make a f/u sovs c/ AS for a reeval  AS to be made awre and pt  will Cb to schedule  [facility] [place]  - 02 Oct 2017 3:11 PM     TASK REPLIED TO: Previously Assigned To [address]  If it was a one time event, just ask the patient to see if it reoccurs  I don't necessarily need to see him for this issue unless it bothers him again  Joycelyn Foley - 02 Oct 2017 3:51 PM     TASK EDITED  Pt  to make f/u sovs because of increasing s/s of pain          Signatures   Electronically signed by : Yecenia Moody, ; Oct  2 2017  3:51PM EST                       (Author)

## 2018-01-13 VITALS
WEIGHT: 220.19 LBS | BODY MASS INDEX: 31.52 KG/M2 | OXYGEN SATURATION: 98 % | SYSTOLIC BLOOD PRESSURE: 114 MMHG | DIASTOLIC BLOOD PRESSURE: 66 MMHG | HEIGHT: 70 IN | HEART RATE: 78 BPM | TEMPERATURE: 98 F

## 2018-01-13 VITALS
HEART RATE: 76 BPM | DIASTOLIC BLOOD PRESSURE: 66 MMHG | BODY MASS INDEX: 31.35 KG/M2 | SYSTOLIC BLOOD PRESSURE: 130 MMHG | WEIGHT: 219 LBS | HEIGHT: 70 IN

## 2018-01-13 NOTE — PROGRESS NOTES
Assessment    1  Colon cancer screening (V76 51) (Z12 11)   2  Encounter for routine history and physical exam for male (V70 0) (Z00 00)   3  Screening for lipid disorders (V77 91) (Z13 220)   4  Overweight (278 02) (E66 3)   5  Encounter for preventive health examination (V70 0) (Z00 00)    Plan   Colon cancer screening, FamHx: Family history of malignant neoplasm of stomach,  Gastroesophageal reflux disease without esophagitis, PMH: Internal hemorrhoids    · 1 - Storm Melton MD (Gastroenterology) Physician Referral  Consult  Status: Complete   Done: 60FSX8009  Care Summary provided  : Yes  Encounter for routine history and physical exam for male    · Aspirin Low Dose 81 MG Oral Tablet; TAKE 1 TABLET DAILY  Encounter for routine history and physical exam for male, Screening for lipid disorders    · (1) CBC/PLT/DIFF; Status:Active; Requested for:01Mar2016;    · (1) COMPREHENSIVE METABOLIC PANEL; Status:Active; Requested for:01Mar2016;    · (1) LIPID PANEL, FASTING; Status:Active; Requested for:01Mar2016; Overweight    · We recommend that you bring your body mass index down to 26 ; Status:Complete;    Done: 01SUG3138    1 SL NUTRITION COUNSELING SON OUTPATIENT REFERRAL MNT Physician Referral  Consult  Status: Need Information - Financial Authorization  Requested for: [id number]  Ordered; For: Overweight;  Ordered By: Natali Power  Performed:   Due: 17AWM8513     Discussion/Summary    Will send labs and for colonoscopy and f/u 1 month  Chief Complaint  CPE 55 yo needs referral for colonoscopy  pt states he has gerd      History of Present Illness  HPI: Mr Dee Mckeon is here to establish care  He has no current complaints  Review of Systems    Constitutional: no fever and no chills  Eyes: seeing ophthalmologist for increased ocular pressure, but as noted in HPI    ENT: no nasal discharge  Cardiovascular: no chest pain and no palpitations  Respiratory: no shortness of breath and no cough  Gastrointestinal: no abdominal pain, no nausea, no constipation, no diarrhea and no blood in stools  Genitourinary: no dysuria  Musculoskeletal: no arthralgias and no myalgias  Integumentary: no rashes and no skin lesions  Neurological: no headache, no numbness and no dizziness  Psychiatric: no anxiety and no depression  Past Medical History    · History of Internal hemorrhoids (455 0) (K64 8)    Surgical History    · Denied: History Of Prior Surgery    Family History    · Family history of hypertension (V17 49) (Z82 49)    · Family history of cataracts (V19 19) (Z83 518)   · Family history of skin cancer (V16 8) (Z80 8)    · Family history of malignant neoplasm of stomach (V16 0) (Z80 0)    Social History    · Former smoker (V15 82) (A44 127)    Current Meds   1  Omeprazole Magnesium 20 MG TBEC; take 2 tablet twice daily; Therapy: (Recorded:01Mar2016) to Recorded   2  Timolol 0 5 % SOLN;   Therapy: (Recorded:01Mar2016) to Recorded    Allergies    1  Ibuprofen 200 TABS    Vitals   Recorded: 40APD2959 12:58PM   Temperature 98 5 F   Heart Rate 99   Respiration 18   Systolic 399   Diastolic 88   Height 5 ft 10 in   Weight 207 lb    BMI Calculated 29 7   BSA Calculated 2 12   O2 Saturation 97     Physical Exam    Constitutional   General appearance: No acute distress, well appearing and well nourished  Head and Face   Head and face: Normal     Palpation of the face and sinuses: No sinus tenderness  Eyes   Conjunctiva and lids: No erythema, swelling or discharge  Pupils and irises: Equal, round, reactive to light  Ears, Nose, Mouth, and Throat   External inspection of ears and nose: Normal     Otoscopic examination: Tympanic membranes translucent with normal light reflex  Canals patent without erythema  Hearing: Normal     Nasal mucosa, septum, and turbinates: Normal without edema or erythema  Lips, teeth, and gums: Normal, good dentition      Oropharynx: Normal with no erythema, edema, exudate or lesions  Neck   Neck: Supple, symmetric, trachea midline, no masses  Thyroid: Normal, no thyromegaly  Pulmonary   Respiratory effort: No increased work of breathing or signs of respiratory distress  Auscultation of lungs: Clear to auscultation  Cardiovascular   Auscultation of heart: Normal rate and rhythm, normal S1 and S2, no murmurs  Pedal pulses: 2+ bilaterally  Peripheral vascular exam: Normal     Examination of extremities for edema and/or varicosities: Normal     Chest   Chest: Normal     Abdomen   Abdomen: Non-tender, no masses  Liver and spleen: No hepatomegaly or splenomegaly  Examination for hernias: No hernias appreciated  Anus, perineum, and rectum: Normal sphincter tone, no masses, no prolapse  Genitourinary   Scrotal contents: Abnormal     Penis: Normal, no lesions  Digital rectal exam of prostate: Normal size, no masses  Lymphatic   Palpation of lymph nodes in neck: No lymphadenopathy  Palpation of lymph nodes in axillae: No lymphadenopathy  Musculoskeletal   Gait and station: Normal     Inspection/palpation of digits and nails: Normal without clubbing or cyanosis  Inspection/palpation of joints, bones, and muscles: Normal     Range of motion: Normal     Stability: Normal     Muscle strength/tone: Normal     Skin   Skin and subcutaneous tissue: Normal without rashes or lesions  Palpation of skin and subcutaneous tissue: Normal turgor  Neurologic   Cranial nerves: Cranial nerves 2-12 intact  Reflexes: 2+ and symmetric  Sensation: No sensory loss      Psychiatric   Judgment and insight: Normal     Mood and affect: Normal        Results/Data  PHQ-2 Adult Depression Screening 17VAP1092 01:05PM User, [employer]     Test Name Result Flag Reference   PHQ-2 Adult Depression Score 0     Q1: 0, Q2: 0   PHQ-2 Adult Depression Screening Negative       eCalcs - Health Calculators 32OWB9591 01:05PM User, [employer]     Test Name Result Flag Reference SBIRT Screen - Tobacco Screening Result Negative         Attending Note  Attending Note: Attending Note: I discussed the case with the Resident and reviewed the Resident's note and I agree with the Resident management plan as it was presented to me  Future Appointments    Date/Time Provider Specialty Site   03/30/2016 01:20 PM YUNIOR Claudio  Gastroenterology Adult [address]     Signatures   Electronically signed by : YUNIOR Castro ; Mar  2 2016  2:51PM EST                       (Author)    Electronically signed by :  TIFFANIE Figueroa ; Mar  8 2016  8:44PM EST                       (Author)

## 2018-01-14 VITALS
OXYGEN SATURATION: 98 % | HEART RATE: 85 BPM | BODY MASS INDEX: 31.52 KG/M2 | TEMPERATURE: 98.2 F | RESPIRATION RATE: 18 BRPM | SYSTOLIC BLOOD PRESSURE: 110 MMHG | HEIGHT: 70 IN | WEIGHT: 220.19 LBS | DIASTOLIC BLOOD PRESSURE: 68 MMHG

## 2018-01-14 VITALS
OXYGEN SATURATION: 97 % | BODY MASS INDEX: 29.86 KG/M2 | SYSTOLIC BLOOD PRESSURE: 124 MMHG | HEART RATE: 76 BPM | TEMPERATURE: 97.6 F | WEIGHT: 208.56 LBS | HEIGHT: 70 IN | DIASTOLIC BLOOD PRESSURE: 70 MMHG

## 2018-01-14 VITALS
HEART RATE: 82 BPM | HEIGHT: 70 IN | WEIGHT: 212.56 LBS | OXYGEN SATURATION: 98 % | BODY MASS INDEX: 30.43 KG/M2 | DIASTOLIC BLOOD PRESSURE: 70 MMHG | SYSTOLIC BLOOD PRESSURE: 118 MMHG | RESPIRATION RATE: 18 BRPM

## 2018-01-16 NOTE — RESULT NOTES
Message   Recorded as Task   Date: 11/30/2017 08:24 AM, Created By: Nilo Ham   Task Name: Follow Up   Assigned To: [facility] [place] [facility],Team   Regarding Patient: Joseph Claudio, Status: Active   CommentLake Berger - 30 Nov 4738 1:32 AM     TASK CREATED  S/P RIGHT L4 L5 TFESI ON 11/16/2017 W/ DR BHATTI - NO F/U SCHEDULED   Elvia Canela - 30 Nov 5368 97:65 AM     TASK EDITED  Patient reports 100% relief on right side as in previous conversations  Patient is right by the office and will go in to make a f/u appointment to re eval the left sided pain  Gila Cierra - 30 Nov 2017 12:48 PM     TASK REPLIED TO: Previously Assigned To [place]  Thanks          Signatures   Electronically signed by : Rupali Aleman, ; Nov 30 2017  1:15PM EST                       (Author)

## 2018-01-16 NOTE — MISCELLANEOUS
Message   Recorded as Task   Date: 11/27/2017 03:32 PM, Created By: Chris Mcmanus   Task Name: Miscellaneous   Assigned To: [facility] clerical,Team   Regarding Patient: Caren Perry, Status: In Progress   Comment:    Caroline Oliveira - 27 Nov 2017 3:32 PM     TASK CREATED  Pt called stating he had a procedure 11 days ago and did not get a follow-up call to see how he is doing  He said he now has no pain on the right side  He said he had about 100% relief on right side  He is asking if the left side should be done now  Pls call pt at [phone]  Rodolfo Harkinsyshia - 27 Nov 2017 3:40 PM     TASK EDITED  Pt had R L4,L5 TFESI done on 11/16  Requesting left side injection  lmom to inquire about left side pain  Danial Friend - 27 Nov 2017 3:40 PM     TASK IN PROGRESS   Anastasia Lockett - 27 Nov 2017 3:44 PM     TASK EDITED  [address] Call center- patient returned call   c/b [phone]   Mile Harkins - 27 Nov 2017 3:55 PM     TASK EDITED  lmom  Phone room can put pt thru to [address] [place] [address] - 27 Nov 2017 3:55 PM     TASK IN [address] [place] [address] - 27 Nov 2017 4:08 PM     TASK EDITED  S/w pt who reports bad "tugging" on left side of back  States pain is left center and around hip  Does not shoot down leg  Pt wondering if an injection might be appropriate for left side  Please advise  [address] - 27 Nov 2017 4:19 PM     TASK REPLIED TO: Previously Assigned To [address]  Can we bring patient in to re-evaluate symptoms? 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)   2  Adult-onset obesity (278 00) (E66 9)   3  Anxiety (300 00) (F41 9)   4  Blood glucose elevated (790 29) (R73 9)   5  BMI 29 0-29 9,adult (V85 25) (Z68 29)   6  BMI 30 0-30 9,adult (V85 30) (Z68 30)   7  BMI 31 0-31 9,adult (V85 31) (Z68 31)   8  Chronic low back pain (724 2,338 29) (M54 5,G89 29)   9  Chronic pain syndrome (338 4) (G89 4)   10  Colon cancer screening (V76 51) (Z12 11)   11  Elevated PSA (790 93) (R97 20)   12  Enlarged prostate on rectal examination (600 00) (N40 0)   13  Gastroesophageal reflux disease without esophagitis (530 81) (K21 9)   14  History of colon polyps (V12 72) (Z86 010)   15  Lumbar disc herniation with radiculopathy (722 10) (M51 16)   16  Lumbar herniated disc (722 10) (M51 26)   17  Lumbar spondylosis (721 3) (M47 816)   18  Myofascial pain syndrome (729 1) (M79 1)   19  Neck pain (723 1) (M54 2)   20  Need for influenza vaccination (V04 81) (Z23)   21  Overweight (278 02) (E66 3)   22  Papilledema (377 00) (H47 10)   23  Prostate mass (602 9) (N42 9)   24  Screening for lipid disorders (V77 91) (Z13 220)    Current Meds   1  Aspirin EC 81 MG Oral Tablet Delayed Release; TAKE 1 TABLET DAILY; Therapy: 91Wps8486 to (Kristian West)  Requested for: 76Gar1185; Last   Rx:18Oqw9135 Ordered   2  Citalopram Hydrobromide 40 MG Oral Tablet; Take 1 tablet daily; Therapy: 68PJM8623 to (Evaluate:47Yfl8121)  Requested for: 91MWR6567; Last   Rx:19Phd2881 Ordered   3  Flomax 0 4 MG Oral Capsule (Tamsulosin HCl); take 1 capsule daily; Therapy: (Recorded:08Jun2017) to Recorded   4  Gabapentin 600 MG Oral Tablet; TAKE 1 TABLET 3 times daily; Therapy: 05FXH2542 to ([facility]:71LND8675)  Requested for: 67Swx0681; Last   Rx:56Dfc6838 Ordered   5  Omeprazole 20 MG Oral Capsule Delayed Release; take 1 capsule by mouth once daily; Therapy: 49YOZ2340 to (Evaluate:21Jun2017)  Requested for: 34PSH9145; Last   Rx:23Mar2017 Ordered   6  RaNITidine HCl - 300 MG Oral Tablet;  Take 1 tablet by mouth at bedtime; Therapy: 64TXE7774 to (Evaluate:04Jun2018)  Requested for: 05WAZ4526; Last   Rx:06Nov2017 Ordered   7  Timolol 0 5 % SOLN;   Therapy: (Recorded:01Mar2016) to Recorded    Allergies    1  Ibuprofen 200 TABS   2   Ibuprofen TABS    Signatures   Electronically signed by : Reagan Nolan, ; Nov 30 2017  3:53PM EST                       (Author)

## 2018-01-17 NOTE — MISCELLANEOUS
Message   Recorded as Task   Date: 09/21/2016 02:58 PM, Created By: Shiv Langley   Task Name: Med Renewal Request   Assigned To: [address]   Regarding Patient: Perfecto Pittman, Status: Active   Comment:    Haydee Flowers - 21 Sep 2016 2:58 PM     TASK CREATED  Caller: Self; Renew Medication; [phone] (Home)  Pt called requesting a refill of gabapentin 300mg tid [facility]  Pt also inquiring if he can  a copy of his CTScan results to take with him to social security, he currently has a claim and is trying to collect social security  [address] - 21 Sep 2016 3:24 PM     TASK REPLIED TO: Previously Assigned To [address] [facility], [facility],Team  Gabapentin sent to pharmacy  Please mail patient a copy of [facility] CT scan he is looking for  Haydee Flowers - 21 Sep 2016 3:34 PM     TASK EDITED            Pt aware of refill and a copy of the [place] will be mailed out today  Pt appreciative   Active Problems    1  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)   2  Anxiety (300 00) (F41 9)   3  Blood glucose elevated (790 29) (R73 9)   4  BMI 29 0-29 9,adult (V85 25) (Z68 29)   5  Chronic low back pain (724 2,338 29) (M54 5,G89 29)   6  Chronic pain syndrome (338 4) (G89 4)   7  Colon cancer screening (V76 51) (Z12 11)   8  Dry nose (478 19) (J34 89)   9  Elevated PSA (790 93) (R97 2)   10  Encounter for routine history and physical exam for male (V70 0) (Z00 00)   11  Encounter to discuss test results (V65 49) (Z71 89)   12  Enlarged prostate on rectal examination (600 00) (N40 0)   13  Gastroesophageal reflux disease without esophagitis (530 81) (K21 9)   14  History of colon polyps (V12 72) (Z86 010)   15  Lumbar disc herniation with radiculopathy (722 10) (M51 16)   16  Lumbar herniated disc (722 10) (M51 26)   17  Myofascial pain syndrome (729 1) (M79 1)   18  Neck pain (723 1) (M54 2)   19  Overweight (278 02) (E66 3)   20  Papilledema (377 00) (H47 10)   21  Prostate mass (602 9) (N42 9)   22  Screening for lipid disorders (V77 91) (Z44 220)    Current Meds   1  Aspirin EC 81 MG Oral Tablet Delayed Release; TAKE 1 TABLET DAILY; Therapy: 02Zwm9151 to (Evaluate:21Feb2017)  Requested for: 02Que4738; Last   Rx:64Ugv4342 Ordered   2  Aspirin Low Dose 81 MG TABS; TAKE 1 TABLET DAILY; Therapy: 51GOE0474 to (Evaluate:24Feb2017)  Requested for: 11ZIA9365; Last   Rx:01Mar2016 Ordered   3  Citalopram Hydrobromide 20 MG Oral Tablet; TAKE 1 TABLET DAILY; Therapy: 01KCW8677 to (Evaluate:21Feb2017)  Requested for: 83Lsl7713; Last   Rx:15Dxh4019 Ordered   4  Flomax 0 4 MG Oral Capsule (Tamsulosin HCl); Therapy: (Joseph Solders) to Recorded   5  Gabapentin 300 MG Oral Capsule; TAKE 1 CAPSULE 3 TIMES DAILY; Therapy: 42KPR8624 to (Evaluate:21Oct2016)  Requested for: 76OTD1288; Last   Rx:21Sep2016 Ordered   6  Omeprazole 20 MG Oral Capsule Delayed Release; take 1 capsule daily; Therapy: 70VJW2888 to (Evaluate:01Jan2017)  Requested for: 61MCZ0430; Last   Rx:05Jun2016 Ordered   7  Omeprazole Magnesium 20 MG TBEC; TAKE 1 TABLET DAILY; Therapy: (Recorded:05Apr2016) to Recorded   8  Ranitidine HCl - 300 MG Oral Tablet; TAKE 1 TABLET AT BEDTIME; Therapy: 12IUJ4560 to (Evaluate:19Apr2017)  Requested for: 21Sep2016; Last   Rx:21Sep2016; Status: ACTIVE - Retrospective By Protocol Authorization Ordered   9  Timolol 0 5 % SOLN;   Therapy: (Recorded:01Mar2016) to Recorded    Allergies    1  Ibuprofen 200 TABS   2   Ibuprofen TABS    Signatures   Electronically signed by : Jazmin Rhodes RN; Sep 21 2016  3:36PM EST                       (Author)

## 2018-01-17 NOTE — MISCELLANEOUS
Message   Recorded as Task   Date: 01/04/2017 10:59 AM, Created By: Tenisha Henry   Task Name: Med Renewal Request   Assigned To: [address] [facility] [facility] Clinical,Team   Regarding Patient: Jerry Maynard, Status: Active   Comment:    Haydee Flowers - 04 Jan 2017 10:59 AM     TASK CREATED  Caller: Self; Renew Medication; [phone] (Home)  Pt lmom requesting a refill of gabapentin 600mg tid [facility]  Haydee Flowers - 04 Jan 2017 11:01 AM     TASK EDITED   [address] - 04 Jan 2017 11:30 AM     TASK REPLIED [facility]: [facility]  Haydee Flowers - 04 Jan 2017 12:15 PM     TASK EDITED  Pt aware  Active Problems    1  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)   2  Anxiety (300 00) (F41 9)   3  Blood glucose elevated (790 29) (R73 9)   4  BMI 29 0-29 9,adult (V85 25) (Z68 29)   5  Chronic low back pain (724 2,338 29) (M54 5,G89 29)   6  Chronic pain syndrome (338 4) (G89 4)   7  Colon cancer screening (V76 51) (Z12 11)   8  Dry nose (478 19) (J34 89)   9  Elevated PSA (790 93) (R97 20)   10  Encounter for routine history and physical exam for male (V70 0) (Z00 00)   11  Encounter to discuss test results (V65 49) (Z71 89)   12  Enlarged prostate on rectal examination (600 00) (N40 0)   13  Gastroesophageal reflux disease without esophagitis (530 81) (K21 9)   14  History of colon polyps (V12 72) (Z86 010)   15  Lumbar disc herniation with radiculopathy (722 10) (M51 16)   16  Lumbar herniated disc (722 10) (M51 26)   17  Myofascial pain syndrome (729 1) (M79 1)   18  Neck pain (723 1) (M54 2)   19  Overweight (278 02) (E66 3)   20  Papilledema (377 00) (H47 10)   21  Prostate mass (602 9) (N42 9)   22  Screening for lipid disorders (V77 91) (Z13 220)    Current Meds   1  Aspirin EC 81 MG Oral Tablet Delayed Release; TAKE 1 TABLET DAILY; Therapy: 28Hls4309 to (Evaluate:02Khm8253)  Requested for: 14Hmn5316; Last   Rx:47Kny3516 Ordered   2   Aspirin Low Dose 81 MG TABS; TAKE 1 TABLET DAILY; Therapy: 56FHR6519 to (Evaluate:97Njh6279)  Requested for: 93CXR4327; Last   Rx:01Mar2016 Ordered   3  Citalopram Hydrobromide 20 MG Oral Tablet; TAKE 1 TABLET DAILY; Therapy: 43WDR2724 to (Evaluate:11Fnx8099)  Requested for: 36Dsl6429; Last   Rx:96Mfc6198 Ordered   4  Flomax 0 4 MG Oral Capsule (Tamsulosin HCl); Therapy: (Jesus Trejo) to Recorded   5  Gabapentin 600 MG Oral Tablet; TAKE 1 TABLET 3 TIMES DAILY; Therapy: 19PHY5531 to (Evaluate:66Fbo5957)  Requested for: 23LOF1038; Last   Rx:04Jan2017 Ordered   6  Omeprazole 20 MG Oral Capsule Delayed Release; take 1 capsule by mouth once daily; Therapy: 77BZW8015 to (Evaluate:48Nej4408)  Requested for: 40SDJ9043; Last   Rx:78Yjb2745 Ordered   7  Omeprazole Magnesium 20 MG TBEC; TAKE 1 TABLET DAILY; Therapy: (Recorded:05Apr2016) to Recorded   8  RaNITidine HCl - 300 MG Oral Tablet; TAKE 1 TABLET AT BEDTIME; Therapy: 88PVS4590 to (Evaluate:19Apr2017)  Requested for: 63OLA4178; Last   Rx:78Gox3226 Ordered   9  Timolol 0 5 % SOLN;   Therapy: (Recorded:01Mar2016) to Recorded    Allergies    1  Ibuprofen 200 TABS   2   Ibuprofen TABS    Signatures   Electronically signed by : Gopal Moody RN; Jan 4 2017 12:15PM EST                       (Author)

## 2018-01-18 NOTE — RESULT NOTES
Message   Please inform patient that biopsies were negative for H  pylori  Patient had a normal colonoscopy otherwise  Please put in for 10 year colonoscopy recall  Please have the patient follow up in the office as needed  Verified Results  (1) TISSUE EXAM 25Apr2016 11:18AM Fe Hoyos     Test Name Result Flag Reference   LAB AP CASE REPORT (Report)     Surgical Pathology Report             Case: [id number]                   Authorizing Provider: Helio Casanova MD      Collected:      04/25/2016 1118        Ordering Location:   [facility]  Received:      04/25/2016 [address] [place] [address]                                     Pathologist:      Melonie Holguin MD                                 Specimen:  Stomach, bxs stomach- gastritis   LAB AP FINAL DIAGNOSIS      A  Antrum (biopsy):  - Mild chronic inactive oxyntic and antral gastritis  - No H pylori identified on immunohistochemistry stain  - No intestinal metaplasia or dysplasia   LAB AP SURGICAL ADDITIONAL INFORMATION (Report)     These tests were developed and their performance characteristics   determined by [facility] or [facility]  They may not be cleared or approved by the [place]  Food and   Drug Administration  The FDA has determined that such clearance or   approval is not necessary  These tests are used for clinical purposes  They should not be regarded as investigational or for research  This   laboratory has been approved by CLIA 88, designated as a high-complexity   laboratory and is qualified to perform these tests  LAB AP GROSS DESCRIPTION (Report)     A  The specimen is received in formalin, labeled with the patient's name   and medical record number, and is designated biopsy stomach gastritis  The specimen consists of multiple tan red soft tissue fragments measuring   in aggregate 2 6 x 0 4 x 0 1 cm  Entirely submitted  One cassette      Note: The estimated total formalin fixation time based upon information   provided by the submitting clinician and the standard processing schedule   is 17 25 hours  MAC   LAB AP CLINICAL INFORMATION      EGD: 1  Esophagus and GEJ- normal GE junction no evidence of Tracey's esophagus no evidence of esophagitis  #2  Stomach- mild gastritis, biopsied  Normal retroflexion  #3   Duodenum- normal duodenal bulb and second portion     COLONOSCOPY 25Apr2016 12:00AM Marci Mingo     Test Name Result Flag Reference   Colonoscopy 04/25/2016        Summary / No summary entered :      No summary entered   Documents attached :      EPIC OP NOTE - Marci Aguila; Enc: 25Apr2016 - Appointment - Sathish Stoddard - (Gastroenterology Adult) (Additional Information Document)    Signatures   Electronically signed by : YUNIOR Forman ; May  9 2016  2:04PM EST                       (Author)

## 2018-01-18 NOTE — MISCELLANEOUS
Message   Recorded as Task   Date: 11/01/2016 01:10 PM, Created By: Daniel De Anda   Task Name: Follow Up   Assigned To: [facility] 14 [facility] Clinical,Team   Regarding Patient: Janine Deshpande, Status: Active   Comment:    Haydee Flowers - 01 Nov 2016 1:10 PM     TASK CREATED  Caller: Self; General Medical Question; [phone] (Home)  Pt lmom inquiring if he can take tylenol while taking gabapentin due to experiencing right hip pain  Returned pt's call, states that approx 3-4 days ago he started to experience right hip pain that radiates down his right leg and into right foot  Describes the pain as throbbing  Advised pt that he could take tylenol for the pain  Pt also scheduled to see Debi Brock on 11/2/16  Jonathan Garrison - 01 Nov 2016 1:35 PM     TASK REPLIED TO: Previously Assigned To [address] [place] [address] [facility]  Active Problems    1  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)   2  Anxiety (300 00) (F41 9)   3  Blood glucose elevated (790 29) (R73 9)   4  BMI 29 0-29 9,adult (V85 25) (Z68 29)   5  Chronic low back pain (724 2,338 29) (M54 5,G89 29)   6  Chronic pain syndrome (338 4) (G89 4)   7  Colon cancer screening (V76 51) (Z12 11)   8  Dry nose (478 19) (J34 89)   9  Elevated PSA (790 93) (R97 20)   10  Encounter for routine history and physical exam for male (V70 0) (Z00 00)   11  Encounter to discuss test results (V65 49) (Z71 89)   12  Enlarged prostate on rectal examination (600 00) (N40 0)   13  Gastroesophageal reflux disease without esophagitis (530 81) (K21 9)   14  History of colon polyps (V12 72) (Z86 010)   15  Lumbar disc herniation with radiculopathy (722 10) (M51 16)   16  Lumbar herniated disc (722 10) (M51 26)   17  Myofascial pain syndrome (729 1) (M79 1)   18  Neck pain (723 1) (M54 2)   19  Overweight (278 02) (E66 3)   20  Papilledema (377 00) (H47 10)   21  Prostate mass (602 9) (N42 9)   22  Screening for lipid disorders (V77 91) (Z13 220)    Current Meds   1  Aspirin EC 81 MG Oral Tablet Delayed Release; TAKE 1 TABLET DAILY; Therapy: 23Uax6570 to (Evaluate:87Mwu4115)  Requested for: 47Iwi5763; Last   Rx:07Svd5998 Ordered   2  Aspirin Low Dose 81 MG TABS; TAKE 1 TABLET DAILY; Therapy: 42DUU2413 to (Evaluate:81Qfq3899)  Requested for: 35XTL4259; Last   Rx:01Mar2016 Ordered   3  Citalopram Hydrobromide 20 MG Oral Tablet; TAKE 1 TABLET DAILY; Therapy: 50FXG1257 to (Evaluate:51Oob9084)  Requested for: 14Oqz1435; Last   Rx:59Ecz9283 Ordered   4  Flomax 0 4 MG Oral Capsule (Tamsulosin HCl); Therapy: (Quang Trinidad) to Recorded   5  Gabapentin 600 MG Oral Tablet; TAKE 1 TABLET 3 TIMES DAILY; Therapy: 68ZGD1899 to (Nohemy Ramsey)  Requested for: 39FPJ8987; Last   Rx:10Oct2016 Ordered   6  Omeprazole 20 MG Oral Capsule Delayed Release; take 1 capsule daily; Therapy: 48NHB6024 to (Evaluate:01Jan2017)  Requested for: 02TRI2589; Last   Rx:05Jun2016 Ordered   7  Omeprazole Magnesium 20 MG TBEC; TAKE 1 TABLET DAILY; Therapy: (Recorded:05Apr2016) to Recorded   8  RaNITidine HCl - 300 MG Oral Tablet; TAKE 1 TABLET AT BEDTIME; Therapy: 50MHN7751 to (Evaluate:19Apr2017)  Requested for: 21TAT1269; Last   Rx:33Rmb9538 Ordered   9  Timolol 0 5 % SOLN;   Therapy: (Recorded:01Mar2016) to Recorded    Allergies    1  Ibuprofen 200 TABS   2   Ibuprofen TABS    Signatures   Electronically signed by : Yehuda Sullivan RN; Nov 1 2016  2:36PM EST                       (Author)

## 2018-01-23 NOTE — MISCELLANEOUS
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

## 2018-03-08 DIAGNOSIS — K21.9 GASTROESOPHAGEAL REFLUX DISEASE WITHOUT ESOPHAGITIS: Primary | ICD-10-CM

## 2018-03-08 RX ORDER — OMEPRAZOLE 20 MG/1
CAPSULE, DELAYED RELEASE ORAL
Qty: 30 CAPSULE | Refills: 3 | Status: SHIPPED | OUTPATIENT
Start: 2018-03-08 | End: 2018-03-23 | Stop reason: SDUPTHER

## 2018-03-23 ENCOUNTER — OFFICE VISIT (OUTPATIENT)
Dept: FAMILY MEDICINE CLINIC | Facility: CLINIC | Age: 58
End: 2018-03-23
Payer: COMMERCIAL

## 2018-03-23 VITALS
TEMPERATURE: 98.6 F | SYSTOLIC BLOOD PRESSURE: 110 MMHG | BODY MASS INDEX: 30.8 KG/M2 | HEIGHT: 71 IN | WEIGHT: 220 LBS | OXYGEN SATURATION: 96 % | HEART RATE: 97 BPM | DIASTOLIC BLOOD PRESSURE: 78 MMHG

## 2018-03-23 DIAGNOSIS — K21.9 GASTROESOPHAGEAL REFLUX DISEASE WITHOUT ESOPHAGITIS: Primary | ICD-10-CM

## 2018-03-23 DIAGNOSIS — R41.3 MEMORY LOSS: ICD-10-CM

## 2018-03-23 DIAGNOSIS — E66.9 ADULT-ONSET OBESITY: ICD-10-CM

## 2018-03-23 DIAGNOSIS — H40.9 GLAUCOMA, UNSPECIFIED GLAUCOMA TYPE, UNSPECIFIED LATERALITY: ICD-10-CM

## 2018-03-23 DIAGNOSIS — M51.16 LUMBAR DISC HERNIATION WITH RADICULOPATHY: ICD-10-CM

## 2018-03-23 DIAGNOSIS — R53.82 CHRONIC FATIGUE: ICD-10-CM

## 2018-03-23 DIAGNOSIS — F41.9 ANXIETY: ICD-10-CM

## 2018-03-23 PROCEDURE — 99214 OFFICE O/P EST MOD 30 MIN: CPT | Performed by: FAMILY MEDICINE

## 2018-03-23 RX ORDER — OMEPRAZOLE 20 MG/1
20 CAPSULE, DELAYED RELEASE ORAL DAILY
Qty: 30 CAPSULE | Refills: 3 | Status: SHIPPED | OUTPATIENT
Start: 2018-03-23 | End: 2018-07-26 | Stop reason: SDUPTHER

## 2018-03-23 RX ORDER — GABAPENTIN 300 MG/1
600 CAPSULE ORAL 3 TIMES DAILY
Qty: 90 CAPSULE | Refills: 3 | Status: SHIPPED | OUTPATIENT
Start: 2018-03-23 | End: 2018-08-14

## 2018-03-23 RX ORDER — RANITIDINE 300 MG/1
300 TABLET ORAL
Qty: 30 TABLET | Refills: 3 | Status: SHIPPED | OUTPATIENT
Start: 2018-03-23 | End: 2018-07-26 | Stop reason: SDUPTHER

## 2018-03-23 RX ORDER — CITALOPRAM 10 MG/1
40 TABLET ORAL DAILY
Qty: 30 TABLET | Refills: 3 | Status: SHIPPED | OUTPATIENT
Start: 2018-03-23 | End: 2018-04-11 | Stop reason: SDUPTHER

## 2018-03-23 NOTE — PROGRESS NOTES
Assessment/Plan:     Diagnoses and all orders for this visit:    Gastroesophageal reflux disease without esophagitis  -     ranitidine (ZANTAC) 300 MG tablet; Take 1 tablet (300 mg total) by mouth daily at bedtime  -     omeprazole (PriLOSEC) 20 mg delayed release capsule; Take 1 capsule (20 mg total) by mouth daily    Adult-onset obesity  -     Lipid panel    Chronic fatigue  -     Basic metabolic panel  -     CBC and differential  -     TSH, 3rd generation with T4 reflex    Memory loss  -     Ambulatory referral to Neurology; Future    Glaucoma, unspecified glaucoma type, unspecified laterality    BMI 30 0-30 9,adult    Lumbar disc herniation with radiculopathy  -     gabapentin (NEURONTIN) 300 mg capsule; Take 2 capsules (600 mg total) by mouth 3 (three) times a day    Anxiety  -     citalopram (CeleXA) 10 mg tablet; Take 4 tablets (40 mg total) by mouth daily for 30 days          Neurology referral provided today for assessment of progressive memory loss and pain complaints of hands bilaterally  Phalen's and Durkan's test negative  Upper extremity exam wnl  I do no suspect a median nerve neuropathy  There was no midline cervical tenderness on exam either today  Subjective:      Patient ID: Talon Marcelo is a 62 y o  male  HPI    Zeke Naylor is a 62year old male that comes to the office to follow up on medication refills for GERD, BPH, Anxiety and has concerns of progressive memory loss  Glaucoma: Drops are helping  Taking twice a day  GERD: Taking omeprazole every morning and ranitidine at night  Tolerating well  Does help his symptoms  BPH: Taking tamsulosin  Helping his symptoms  Good urinary steam  Nocturia only once a night at 4:30-5am, used to be much more frequent  Reports short term memory loss believes it began a year ago  Believes it is getting progressively worse  Reports occasional headaches  Next eye appointment is pending  No loss in balance reported   Also get slight numbing pain in hands and drops items from his hands on occasion  Denies neck pain  Also complaining of fatigue, worsening in quality recently  The following portions of the patient's history were reviewed and updated as appropriate: allergies, current medications, past family history, past medical history, past social history, past surgical history and problem list     Review of Systems   Constitutional: Positive for fatigue  Negative for chills and fever  HENT: Negative for congestion, rhinorrhea and sore throat  Eyes: Negative for visual disturbance  Respiratory: Negative for cough, shortness of breath and wheezing  Cardiovascular: Negative for chest pain, palpitations and leg swelling  Gastrointestinal: Negative for abdominal pain, constipation, diarrhea, nausea and vomiting  Genitourinary: Negative for dysuria  Musculoskeletal: Positive for back pain (Chronic)  Negative for neck pain  Skin: Negative for rash  Neurological: Positive for numbness and headaches  Negative for weakness  Psychiatric/Behavioral: Positive for decreased concentration  Negative for confusion and suicidal ideas  Objective:      /78   Pulse 97   Temp 98 6 °F (37 °C) (Tympanic)   Ht 5' 11" (1 803 m)   Wt 99 8 kg (220 lb)   SpO2 96%   BMI 30 68 kg/m²          Physical Exam   Constitutional: He is oriented to person, place, and time  He appears well-developed and well-nourished  No distress  Obese   HENT:   Head: Normocephalic and atraumatic  Right Ear: External ear normal    Left Ear: External ear normal    Eyes: Conjunctivae and EOM are normal  Pupils are equal, round, and reactive to light  Right eye exhibits no discharge  Left eye exhibits no discharge  Neck: Neck supple  Cardiovascular: Normal rate, regular rhythm and normal heart sounds  No murmur heard  Pulmonary/Chest: Effort normal and breath sounds normal  No respiratory distress  He has no wheezes  Abdominal: Soft   Bowel sounds are normal  There is no tenderness  Musculoskeletal: Normal range of motion  He exhibits no edema or tenderness  No midline neck tenderness to palpation   Neurological: He is alert and oriented to person, place, and time  5/5 strength of upper arms bilaterally  Sensation intact and equal in upper extremities bilaterally to light touch  Normal  strength  Phalen's and Durkan's test negative bilaterally at the wrist  Normal range of motion of upper extremities bilaterally   Skin: Skin is warm and dry  He is not diaphoretic  Psychiatric: He has a normal mood and affect

## 2018-04-09 ENCOUNTER — OFFICE VISIT (OUTPATIENT)
Dept: PAIN MEDICINE | Facility: CLINIC | Age: 58
End: 2018-04-09
Payer: COMMERCIAL

## 2018-04-09 VITALS
HEART RATE: 81 BPM | SYSTOLIC BLOOD PRESSURE: 124 MMHG | RESPIRATION RATE: 18 BRPM | HEIGHT: 71 IN | DIASTOLIC BLOOD PRESSURE: 58 MMHG | WEIGHT: 219.2 LBS | BODY MASS INDEX: 30.69 KG/M2 | TEMPERATURE: 98.8 F

## 2018-04-09 DIAGNOSIS — G89.4 CHRONIC PAIN SYNDROME: Primary | ICD-10-CM

## 2018-04-09 DIAGNOSIS — M54.42 CHRONIC LEFT-SIDED LOW BACK PAIN WITH LEFT-SIDED SCIATICA: ICD-10-CM

## 2018-04-09 DIAGNOSIS — G89.29 CHRONIC LEFT-SIDED LOW BACK PAIN WITH LEFT-SIDED SCIATICA: ICD-10-CM

## 2018-04-09 DIAGNOSIS — M51.16 INTERVERTEBRAL DISC DISORDER WITH RADICULOPATHY OF LUMBAR REGION: ICD-10-CM

## 2018-04-09 PROCEDURE — 99214 OFFICE O/P EST MOD 30 MIN: CPT | Performed by: ANESTHESIOLOGY

## 2018-04-09 RX ORDER — CITALOPRAM 40 MG/1
TABLET ORAL
Refills: 0 | Status: ON HOLD | COMMUNITY
Start: 2018-04-04 | End: 2018-05-10 | Stop reason: DRUGHIGH

## 2018-04-09 NOTE — LETTER
April 9, 2018     [address]  [address]    Patient: Bhanu Phillips   YOB: 1960   Date of Visit: 4/9/2018       Dear Dr Nancy Castro: Thank you for referring Sandro Li to me for evaluation  Below are my notes for this consultation  If you have questions, please do not hesitate to call me  I look forward to following your patient along with you  Sincerely,        Minus MD Noah        CC: Kita Balderrama MD  Minus MD Noah  4/9/2018  4:26 PM  Sign at close encounter  Pain Medicine Follow-Up Note    Assessment:  1  Chronic pain syndrome    2  Chronic left-sided low back pain with left-sided sciatica    3  Intervertebral disc disorder with radiculopathy of lumbar region        Plan:  My impressions and treatment recommendations were discussed in detail with the patient who verbalized understanding and had no further questions  The given that the patient has signs and symptoms consistent with low back pain and left lower extremity radiculopathy in what appears to be the left L4 and left L5 distribution in the context of a intervertebral disc disorder, discussed the rationale of undergoing a left L4 and left L5 transforaminal epidural steroid injection since this could be potentially therapeutic  The procedures, its risks, and benefits were explained in detail to the patient  Risks include but are not limited to bleeding, infection, hematoma formation, abscess formation, weakness, headache, failure the pain to improve, nerve irritation or damage, and potential worsening of the pain  The patient verbalized understanding and wished to proceed with the procedure  The patient also reports that he has been having mental fogginess as well as short-term memory loss  He states that this started about 9 months ago  At that time, it appears that the citalopram was started around the time that the short-term memory loss was occurring    I did discuss with the patient that Citalopram may be causing his short term memory loss and to speak to his primary care physician about weaning off of it  The patient verbalized understanding and stated that he would trial coming off of the citalopram     If this Citalopram does not alleviate the side effects that he is experiencing, I did discuss with him that I could wean him off of the gabapentin to see if that is the culprit  The patient reports that he would like to trial coming off the Citalopram first     He also reports that he will be seeing a neurologist regarding his short-term memory loss  Follow-up is planned with the patient in 4 weeks time or sooner as warranted  Discharge instructions were provided  I personally saw and examined the patient and I agree with the above discussed plan of care  History of Present Illness:    Wang Cullen is a 62 y o  male who presents to [facility] for interval re-evaluation of the above stated pain complaints  The patient has a past medical and chronic pain history as outlined in the assessment section  He was last seen on November 16, 2017 at which time he underwent a right L4 and right L5 transforaminal epidural steroid injection  At today's office visit, the patient's pain score is 7/10 on the verbal numerical pain rating scale  The patient states that his pain is primarily left-sided and in the left lower extremity at today's visit  He states that is the same pain that was on the right side, but is now currently on the left  He states that his pain is worse in the morning and night and constant in nature  He reports the quality of his pain as pins and needles and pulling    The patient reports that he would like to undergo a left-sided injection at today's visit  He states that he would like the same injection that I performed on the right side to be performed on the left    It does appear that the patient has a central disc herniation that is likely causing the pain on both sides of his low back and bilateral lower extremities  Other than as stated above, the patient denies any interval changes in medications, medical condition, mental condition, symptoms, or allergies since the last office visit  Review of Systems:    Review of Systems   Respiratory: Positive for shortness of breath  Cardiovascular: Negative for chest pain  Gastrointestinal: Negative for constipation, diarrhea, nausea and vomiting  Musculoskeletal: Positive for gait problem (difficulty walking) and joint swelling (joint stiffness)  Negative for arthralgias and myalgias  Skin: Negative for rash  Neurological: Positive for dizziness  Negative for seizures and weakness  All other systems reviewed and are negative  Patient Active Problem List   Diagnosis    Chronic pain syndrome    Chronic low back pain    Intervertebral disc disorder with radiculopathy of lumbar region    Spondylosis of lumbar region without myelopathy or radiculopathy    Abnormal chromosomal analysis    Adult-onset obesity    Anxiety    Elevated PSA    Enlarged prostate on rectal examination    Gastroesophageal reflux disease without esophagitis    Lumbar disc herniation with radiculopathy    Myofascial pain syndrome    Chronic fatigue    Memory loss    Glaucoma       Past Medical History:   Diagnosis Date    Anxiety     Disc degeneration, lumbar     Esophageal reflux     GERD (gastroesophageal reflux disease)     Hiatal hernia     Increased pressure in the eye     both eyes; denies glaucoma     Internal hemorrhoids     Personal history of colonic polyps        Past Surgical History:   Procedure Laterality Date    COLONOSCOPY N/A 4/25/2016    Procedure: COLONOSCOPY;  Surgeon: Zainab Hernandez MD;  Location: [facility];   Service:     EPIDURAL BLOCK INJECTION      EPIDURAL BLOCK INJECTION Right 12/28/2016    Procedure: BLOCK / INJECTION EPIDURAL STEROID TRANSFORAMINAL  L4-5;  Surgeon: Maria Antonia Martini MD;  Location: [facility] MAIN [facility];  Service:     EPIDURAL BLOCK INJECTION Right 11/16/2017    Procedure: BLOCK / INJECTION EPIDURAL STEROID TRANSFORAMINAL L4-5 RIGHT;  Surgeon: Maria Antonia Martini MD;  Location: [facility] MAIN OR;  Service: Pain Management     ESOPHAGOGASTRODUODENOSCOPY N/A 4/25/2016    Procedure: ESOPHAGOGASTRODUODENOSCOPY (EGD); Surgeon: Adelia Akers MD;  Location: [facility]; Service:     NO PAST SURGERIES      denied history of prior surgery per allscripts    NOSE SURGERY         Family History   Problem Relation Age of Onset    Stomach cancer Maternal Grandmother     Hypertension Mother     Cataracts Father     Skin cancer Father        Social History     Occupational History    Not on file  Social History Main Topics    Smoking status: Former Smoker     Packs/day: 0 50     Years: 19 00     Types: Cigarettes     Quit date: 1998    Smokeless tobacco: Never Used    Alcohol use Yes      Comment: currently rare drinker--prior to 2011 was a daily drinker of 2 beers daily, non drinker/ no alcohol use    Drug use: Yes     Types: Marijuana, LSD, Hashish, Cocaine      Comment: prior to [address]; used lightly and recreationally on and off from [id number]    Sexual activity: Not on file         Current Outpatient Prescriptions:     aspirin 81 MG tablet, Take 81 mg by mouth daily  , Disp: , Rfl:     citalopram (CeleXA) 10 mg tablet, Take 4 tablets (40 mg total) by mouth daily for 30 days, Disp: 30 tablet, Rfl: 3    citalopram (CeleXA) 40 mg tablet, , Disp: , Rfl: 0    gabapentin (NEURONTIN) 300 mg capsule, Take 2 capsules (600 mg total) by mouth 3 (three) times a day, Disp: 90 capsule, Rfl: 3    Naproxen Sodium (ALEVE PO), Take 1 tablet by mouth daily  , Disp: , Rfl:     omeprazole (PriLOSEC) 20 mg delayed release capsule, Take 1 capsule (20 mg total) by mouth daily, Disp: 30 capsule, Rfl: 3    ranitidine (ZANTAC) 300 MG tablet, Take 1 tablet (300 mg total) by mouth daily at bedtime, Disp: 30 tablet, Rfl: 3    tamsulosin (FLOMAX) 0 4 mg, Take 0 4 mg by mouth daily with dinner, Disp: , Rfl:     timolol (BETIMOL) 0 5 % ophthalmic solution, Administer 1 drop to both eyes 2 (two) times a day , Disp: , Rfl:     Allergies   Allergen Reactions    Ibuprofen Other (See Comments)     Weight loss       Physical Exam:    /58 (BP Location: Left arm, Patient Position: Sitting, Cuff Size: Standard)   Pulse 81   Temp 98 8 °F (37 1 °C) (Oral)   Resp 18   Ht 5' 11" (1 803 m)   Wt 99 4 kg (219 lb 3 2 oz)   BMI 30 57 kg/m²      Constitutional:normal, well developed, well nourished, alert, in no distress and non-toxic and no overt pain behavior    Eyes:anicteric  HEENT:grossly intact  Neck:supple, symmetric, trachea midline and no masses   Pulmonary:even and unlabored  Cardiovascular:No edema or pitting edema present  Skin:Normal without rashes or lesions and well hydrated  Psychiatric:Mood and affect appropriate  Neurologic:Cranial Nerves II-XII grossly intact  Musculoskeletal:normal

## 2018-04-09 NOTE — PROGRESS NOTES
Pain Medicine Follow-Up Note    Assessment:  1  Chronic pain syndrome    2  Chronic left-sided low back pain with left-sided sciatica    3  Intervertebral disc disorder with radiculopathy of lumbar region        Plan:  My impressions and treatment recommendations were discussed in detail with the patient who verbalized understanding and had no further questions  The given that the patient has signs and symptoms consistent with low back pain and left lower extremity radiculopathy in what appears to be the left L4 and left L5 distribution in the context of a intervertebral disc disorder, discussed the rationale of undergoing a left L4 and left L5 transforaminal epidural steroid injection since this could be potentially therapeutic  The procedures, its risks, and benefits were explained in detail to the patient  Risks include but are not limited to bleeding, infection, hematoma formation, abscess formation, weakness, headache, failure the pain to improve, nerve irritation or damage, and potential worsening of the pain  The patient verbalized understanding and wished to proceed with the procedure  The patient also reports that he has been having mental fogginess as well as short-term memory loss  He states that this started about 9 months ago  At that time, it appears that the citalopram was started around the time that the short-term memory loss was occurring  I did discuss with the patient that Citalopram may be causing his short term memory loss and to speak to his primary care physician about weaning off of it  The patient verbalized understanding and stated that he would trial coming off of the citalopram     If this Citalopram does not alleviate the side effects that he is experiencing, I did discuss with him that I could wean him off of the gabapentin to see if that is the culprit    The patient reports that he would like to trial coming off the Citalopram first     He also reports that he will be seeing a neurologist regarding his short-term memory loss  Follow-up is planned with the patient in 4 weeks time or sooner as warranted  Discharge instructions were provided  I personally saw and examined the patient and I agree with the above discussed plan of care  History of Present Illness:    Albert Clark is a 62 y o  male who presents to [facility] for interval re-evaluation of the above stated pain complaints  The patient has a past medical and chronic pain history as outlined in the assessment section  He was last seen on November 16, 2017 at which time he underwent a right L4 and right L5 transforaminal epidural steroid injection  At today's office visit, the patient's pain score is 7/10 on the verbal numerical pain rating scale  The patient states that his pain is primarily left-sided and in the left lower extremity at today's visit  He states that is the same pain that was on the right side, but is now currently on the left  He states that his pain is worse in the morning and night and constant in nature  He reports the quality of his pain as pins and needles and pulling    The patient reports that he would like to undergo a left-sided injection at today's visit  He states that he would like the same injection that I performed on the right side to be performed on the left  It does appear that the patient has a central disc herniation that is likely causing the pain on both sides of his low back and bilateral lower extremities  Other than as stated above, the patient denies any interval changes in medications, medical condition, mental condition, symptoms, or allergies since the last office visit  Review of Systems:    Review of Systems   Respiratory: Positive for shortness of breath  Cardiovascular: Negative for chest pain  Gastrointestinal: Negative for constipation, diarrhea, nausea and vomiting     Musculoskeletal: Positive for gait problem (difficulty walking) and joint swelling (joint stiffness)  Negative for arthralgias and myalgias  Skin: Negative for rash  Neurological: Positive for dizziness  Negative for seizures and weakness  All other systems reviewed and are negative  Patient Active Problem List   Diagnosis    Chronic pain syndrome    Chronic low back pain    Intervertebral disc disorder with radiculopathy of lumbar region    Spondylosis of lumbar region without myelopathy or radiculopathy    Abnormal chromosomal analysis    Adult-onset obesity    Anxiety    Elevated PSA    Enlarged prostate on rectal examination    Gastroesophageal reflux disease without esophagitis    Lumbar disc herniation with radiculopathy    Myofascial pain syndrome    Chronic fatigue    Memory loss    Glaucoma       Past Medical History:   Diagnosis Date    Anxiety     Disc degeneration, lumbar     Esophageal reflux     GERD (gastroesophageal reflux disease)     Hiatal hernia     Increased pressure in the eye     both eyes; denies glaucoma     Internal hemorrhoids     Personal history of colonic polyps        Past Surgical History:   Procedure Laterality Date    COLONOSCOPY N/A 4/25/2016    Procedure: COLONOSCOPY;  Surgeon: Katherine Estrella MD;  Location: [facility] GI LAB; Service:     EPIDURAL BLOCK INJECTION      EPIDURAL BLOCK INJECTION Right 12/28/2016    Procedure: BLOCK / INJECTION EPIDURAL STEROID TRANSFORAMINAL  L4-5;  Surgeon: Jax Curry MD;  Location: [facility] MAIN OR;  Service:     EPIDURAL BLOCK INJECTION Right 11/16/2017    Procedure: BLOCK / INJECTION EPIDURAL STEROID TRANSFORAMINAL L4-5 RIGHT;  Surgeon: Jax Curry MD;  Location: [facility] MAIN OR;  Service: Pain Management     ESOPHAGOGASTRODUODENOSCOPY N/A 4/25/2016    Procedure: ESOPHAGOGASTRODUODENOSCOPY (EGD); Surgeon: Katherine Estrella MD;  Location: [facility] GI LAB;   Service:     NO PAST SURGERIES      denied history of prior surgery per allscripts    NOSE SURGERY Family History   Problem Relation Age of Onset    Stomach cancer Maternal Grandmother     Hypertension Mother     Cataracts Father     Skin cancer Father        Social History     Occupational History    Not on file  Social History Main Topics    Smoking status: Former Smoker     Packs/day: 0 50     Years: 19 00     Types: Cigarettes     Quit date: 1998    Smokeless tobacco: Never Used    Alcohol use Yes      Comment: currently rare drinker--prior to 2011 was a daily drinker of 2 beers daily, non drinker/ no alcohol use    Drug use: Yes     Types: Marijuana, LSD, Hashish, Cocaine      Comment: prior to [address] [place] [address]; used lightly and recreationally on and off from 1466-3028    Sexual activity: Not on file         Current Outpatient Prescriptions:     aspirin 81 MG tablet, Take 81 mg by mouth daily  , Disp: , Rfl:     citalopram (CeleXA) 10 mg tablet, Take 4 tablets (40 mg total) by mouth daily for 30 days, Disp: 30 tablet, Rfl: 3    citalopram (CeleXA) 40 mg tablet, , Disp: , Rfl: 0    gabapentin (NEURONTIN) 300 mg capsule, Take 2 capsules (600 mg total) by mouth 3 (three) times a day, Disp: 90 capsule, Rfl: 3    Naproxen Sodium (ALEVE PO), Take 1 tablet by mouth daily  , Disp: , Rfl:     omeprazole (PriLOSEC) 20 mg delayed release capsule, Take 1 capsule (20 mg total) by mouth daily, Disp: 30 capsule, Rfl: 3    ranitidine (ZANTAC) 300 MG tablet, Take 1 tablet (300 mg total) by mouth daily at bedtime, Disp: 30 tablet, Rfl: 3    tamsulosin (FLOMAX) 0 4 mg, Take 0 4 mg by mouth daily with dinner, Disp: , Rfl:     timolol (BETIMOL) 0 5 % ophthalmic solution, Administer 1 drop to both eyes 2 (two) times a day , Disp: , Rfl:     Allergies   Allergen Reactions    Ibuprofen Other (See Comments)     Weight loss       Physical Exam:    /58 (BP Location: Left arm, Patient Position: Sitting, Cuff Size: Standard)   Pulse 81   Temp 98 8 °F (37 1 °C) (Oral)   Resp 18   Ht 5' 11" (1 803 m)   Wt 99 4 kg (219 lb 3 2 oz)   BMI 30 57 kg/m²     Constitutional:normal, well developed, well nourished, alert, in no distress and non-toxic and no overt pain behavior    Eyes:anicteric  HEENT:grossly intact  Neck:supple, symmetric, trachea midline and no masses   Pulmonary:even and unlabored  Cardiovascular:No edema or pitting edema present  Skin:Normal without rashes or lesions and well hydrated  Psychiatric:Mood and affect appropriate  Neurologic:Cranial Nerves II-XII grossly intact  Musculoskeletal:normal

## 2018-04-10 ENCOUNTER — TELEPHONE (OUTPATIENT)
Dept: PAIN MEDICINE | Facility: CLINIC | Age: 58
End: 2018-04-10

## 2018-04-10 ENCOUNTER — TELEPHONE (OUTPATIENT)
Dept: FAMILY MEDICINE CLINIC | Facility: CLINIC | Age: 58
End: 2018-04-10

## 2018-04-10 PROBLEM — M54.50 LOW BACK PAIN: Status: ACTIVE | Noted: 2018-04-10

## 2018-04-10 PROBLEM — M51.16 NEURITIS OR RADICULITIS DUE TO RUPTURE OF LUMBAR INTERVERTEBRAL DISC: Status: ACTIVE | Noted: 2018-04-10

## 2018-04-10 NOTE — TELEPHONE ENCOUNTER
Scheduled pt for Lt L4 L5 Tfesi ([id number])  Went over pre-procedure instructions below:    Pt denies prescription blood thinners  Pt is taking aspirin  Nothing to eat or drink 1 hr prior to procedure  Need to arrange transportation  Proper clothing for procedure  If ill or put on antibiotics please call office to reschedule

## 2018-04-10 NOTE — TELEPHONE ENCOUNTER
Pt called again, said dr Mariel Ring wants to reduce the celexa and try to wean off bc thinks 40mg is too high and cauding side effects combined with gabapentin

## 2018-04-10 NOTE — TELEPHONE ENCOUNTER
Pt would like a call back,the specialist he saw would like pt to start reducing the med he takes for anxiety,citalopram

## 2018-04-11 DIAGNOSIS — F41.9 ANXIETY: Primary | ICD-10-CM

## 2018-04-11 RX ORDER — CITALOPRAM 10 MG/1
30 TABLET ORAL DAILY
Qty: 270 TABLET | Refills: 0 | Status: SHIPPED | OUTPATIENT
Start: 2018-04-11 | End: 2018-08-14

## 2018-04-11 NOTE — TELEPHONE ENCOUNTER
Patient called back  We will be tapering Celexa down by 10mg to assess for improvement in symptoms  At the same time, I advised Lance Restrepo that he needs full mental health evaluation, as recommended previously, especially since we will need medication adjustment

## 2018-04-20 ENCOUNTER — TELEPHONE (OUTPATIENT)
Dept: FAMILY MEDICINE CLINIC | Facility: CLINIC | Age: 58
End: 2018-04-20

## 2018-04-24 NOTE — TELEPHONE ENCOUNTER
PT CALLED TO SPEAK TO YOU ABOUT CITALOPRAM,IS TAKING 30 MG TO WEAN OFF,ASKS HOW LONG HE WILL TAKE THIS DOSE? WOULD LIKE A CALL BACK 
Tried calling Aby Medeiros again, still no answer  Third attempt completed  Closing task 
Tried calling Ant Bustamante again  No answer  Will try later 
Tried calling Paloma Duran back, there was no answer  If he calls back, can tell him to decrease a tab every week starting next week  He also should seek evaluation with [facility] as advised on prior appointment/phone call 
No

## 2018-05-07 ENCOUNTER — OFFICE VISIT (OUTPATIENT)
Dept: FAMILY MEDICINE CLINIC | Facility: CLINIC | Age: 58
End: 2018-05-07
Payer: COMMERCIAL

## 2018-05-07 VITALS
OXYGEN SATURATION: 98 % | BODY MASS INDEX: 30.66 KG/M2 | HEART RATE: 90 BPM | SYSTOLIC BLOOD PRESSURE: 124 MMHG | HEIGHT: 71 IN | WEIGHT: 219 LBS | TEMPERATURE: 97.6 F | DIASTOLIC BLOOD PRESSURE: 74 MMHG

## 2018-05-07 DIAGNOSIS — M54.42 CHRONIC LEFT-SIDED LOW BACK PAIN WITH LEFT-SIDED SCIATICA: ICD-10-CM

## 2018-05-07 DIAGNOSIS — F41.9 ANXIETY: Primary | ICD-10-CM

## 2018-05-07 DIAGNOSIS — G89.29 CHRONIC LEFT-SIDED LOW BACK PAIN WITH LEFT-SIDED SCIATICA: ICD-10-CM

## 2018-05-07 DIAGNOSIS — R59.0 LYMPHADENOPATHY OF RIGHT CERVICAL REGION: ICD-10-CM

## 2018-05-07 PROCEDURE — 99213 OFFICE O/P EST LOW 20 MIN: CPT | Performed by: FAMILY MEDICINE

## 2018-05-07 RX ORDER — TIMOLOL MALEATE 5 MG/ML
SOLUTION/ DROPS OPHTHALMIC
Refills: 0 | COMMUNITY
Start: 2018-04-16 | End: 2020-06-12 | Stop reason: SDUPTHER

## 2018-05-07 NOTE — PROGRESS NOTES
Assessment/Plan:     Diagnoses and all orders for this visit:    Anxiety    Lymphadenopathy of right cervical region    Chronic left-sided low back pain with left-sided sciatica    Other orders  -     timolol (TIMOPTIC) 0 5 % ophthalmic solution; INSTILL 1 DROP INTO BOTH EYES TWO TIMES A DAY        Cervical Adenopathy: Billy Mejía had mildly tender cervical adenopathy  His symptoms have improved over the course of two weeks  Recommended continued observation  Was counseled on signs and side effects to monitor for and to come back for re-evaluation if they appear/no improvement  Also recommended salt-water gargles  Anxiety, Mood Disorder: Strongly advised to follow up with [facility] services for full evaluation and adjustment of medications more tolerable with gabapentin as previously advised  He stated that he was busy previously and will do so shortly  In the meantime, he wants to continue Celexa 10mg daily, which I was agreeable to since his side effects have improved  Subjective:      Patient ID: Abbott Goodpasture is a 62 y o  male  HPI     Billy Mejía is a 62year old male that comes to the office to follow up on anxiety, "lump in throat "      Anxiety, Mood Disorder: We have been weaning Jerry down from Celexa due to potential medication interaction with Gabapentin  He will be undergoing epidural injections with Dr Braden Mckeon shortly  Gabapentin dosage may be reduced if treatment successful  In the meantime, Billy Mejía states that he wants to continue 10mg Celexa daily  He has not followed up with [facility] as requested on two prior occasions for further evaluation and medication adjustment  Denies suicidal or homicidal ideation  Lump in throat: Started two weeks ago, feels a lump in the right side of his neck, associated with some discomfort when swallowing and a sore throat  No currently cold-like symptoms  Further denies shortness of breath, change in voice   Hasn't tried anything for sore throat symptoms  The following portions of the patient's history were reviewed and updated as appropriate: allergies, current medications, past family history, past medical history, past social history, past surgical history and problem list     Review of Systems   Constitutional: Negative for chills and fever  HENT: Negative for congestion, ear pain, rhinorrhea and sore throat  Eyes: Negative for visual disturbance  Respiratory: Negative for cough and shortness of breath  Cardiovascular: Negative for chest pain and palpitations  Gastrointestinal: Negative for abdominal pain, constipation, diarrhea, nausea and vomiting  Genitourinary: Negative for dysuria  Skin: Negative for rash  Neurological: Negative for headaches  Hematological: Positive for adenopathy (swollen right side)  Objective:      /74   Pulse 90   Temp 97 6 °F (36 4 °C) (Tympanic)   Ht 5' 11" (1 803 m)   Wt 99 3 kg (219 lb)   SpO2 98%   BMI 30 54 kg/m²        Physical Exam   Constitutional: He is oriented to person, place, and time  He appears well-developed and well-nourished  No distress  HENT:   Head: Normocephalic and atraumatic  Right Ear: External ear normal    Left Ear: External ear normal    Eyes: Conjunctivae and EOM are normal  Pupils are equal, round, and reactive to light  Right eye exhibits no discharge  Left eye exhibits no discharge  Neck: Neck supple  Cardiovascular: Normal rate, regular rhythm and normal heart sounds  No murmur heard  Pulmonary/Chest: Effort normal and breath sounds normal  No respiratory distress  He has no wheezes  Abdominal: Soft  Bowel sounds are normal  There is no tenderness  Musculoskeletal: Normal range of motion  He exhibits no edema or tenderness  Lymphadenopathy:     He has cervical adenopathy (right side, mildly tender to palpation)  Neurological: He is alert and oriented to person, place, and time  Skin: Skin is warm and dry  He is not diaphoretic  Psychiatric: He has a normal mood and affect

## 2018-05-10 ENCOUNTER — HOSPITAL ENCOUNTER (OUTPATIENT)
Dept: RADIOLOGY | Facility: HOSPITAL | Age: 58
Setting detail: OUTPATIENT SURGERY
Discharge: HOME | End: 2018-05-10
Payer: COMMERCIAL

## 2018-05-10 ENCOUNTER — HOSPITAL ENCOUNTER (OUTPATIENT)
Facility: AMBULARY SURGERY CENTER | Age: 58
Setting detail: OUTPATIENT SURGERY
Discharge: HOME | End: 2018-05-10
Attending: ANESTHESIOLOGY | Admitting: ANESTHESIOLOGY
Payer: COMMERCIAL

## 2018-05-10 VITALS
OXYGEN SATURATION: 96 % | RESPIRATION RATE: 16 BRPM | DIASTOLIC BLOOD PRESSURE: 74 MMHG | SYSTOLIC BLOOD PRESSURE: 141 MMHG | HEART RATE: 80 BPM | TEMPERATURE: 97.6 F

## 2018-05-10 DIAGNOSIS — Z92.241 S/P EPIDURAL STEROID INJECTION: ICD-10-CM

## 2018-05-10 PROCEDURE — 72020 X-RAY EXAM OF SPINE 1 VIEW: CPT

## 2018-05-10 PROCEDURE — 64483 NJX AA&/STRD TFRM EPI L/S 1: CPT | Performed by: ANESTHESIOLOGY

## 2018-05-10 RX ORDER — BUPIVACAINE HYDROCHLORIDE 2.5 MG/ML
INJECTION, SOLUTION EPIDURAL; INFILTRATION; INTRACAUDAL AS NEEDED
Status: DISCONTINUED | OUTPATIENT
Start: 2018-05-10 | End: 2018-05-10 | Stop reason: HOSPADM

## 2018-05-10 RX ORDER — LIDOCAINE WITH 8.4% SOD BICARB 0.9%(10ML)
SYRINGE (ML) INJECTION AS NEEDED
Status: DISCONTINUED | OUTPATIENT
Start: 2018-05-10 | End: 2018-05-10 | Stop reason: HOSPADM

## 2018-05-10 RX ORDER — METHYLPREDNISOLONE ACETATE 80 MG/ML
INJECTION, SUSPENSION INTRA-ARTICULAR; INTRALESIONAL; INTRAMUSCULAR; SOFT TISSUE AS NEEDED
Status: DISCONTINUED | OUTPATIENT
Start: 2018-05-10 | End: 2018-05-10 | Stop reason: HOSPADM

## 2018-05-10 NOTE — H&P
History of Present Illness: The patient is a 62 y o  male who presents with complaints of low back pain and left lower extremity radiculopathy    Patient Active Problem List   Diagnosis    Chronic pain syndrome    Chronic low back pain    Intervertebral disc disorder with radiculopathy of lumbar region    Spondylosis of lumbar region without myelopathy or radiculopathy    Abnormal chromosomal analysis    Adult-onset obesity    Anxiety    Elevated PSA    Enlarged prostate on rectal examination    Gastroesophageal reflux disease without esophagitis    Lumbar disc herniation with radiculopathy    Myofascial pain syndrome    Chronic fatigue    Memory loss    Glaucoma    Neuritis or radiculitis due to rupture of lumbar intervertebral disc    Low back pain       Past Medical History:   Diagnosis Date    Anxiety     Disc degeneration, lumbar     Esophageal reflux     GERD (gastroesophageal reflux disease)     Hiatal hernia     Increased pressure in the eye     both eyes; denies glaucoma     Internal hemorrhoids     Personal history of colonic polyps        Past Surgical History:   Procedure Laterality Date    COLONOSCOPY N/A 4/25/2016    Procedure: COLONOSCOPY;  Surgeon: Deshawn Michael MD;  Location: [facility] GI LAB; Service:     EPIDURAL BLOCK INJECTION      EPIDURAL BLOCK INJECTION Right 12/28/2016    Procedure: BLOCK / INJECTION EPIDURAL STEROID TRANSFORAMINAL  L4-5;  Surgeon: Felton Garcia MD;  Location: [facility] MAIN OR;  Service:     EPIDURAL BLOCK INJECTION Right 11/16/2017    Procedure: BLOCK / INJECTION EPIDURAL STEROID TRANSFORAMINAL L4-5 RIGHT;  Surgeon: Felton Garcia MD;  Location: [facility] MAIN OR;  Service: Pain Management     ESOPHAGOGASTRODUODENOSCOPY N/A 4/25/2016    Procedure: ESOPHAGOGASTRODUODENOSCOPY (EGD); Surgeon: Deshawn Michael MD;  Location: [facility] GI LAB;   Service:     NO PAST SURGERIES      denied history of prior surgery per allscripts    NOSE SURGERY         No current facility-administered medications for this encounter  Allergies   Allergen Reactions    Ibuprofen Other (See Comments)     Weight loss       Physical Exam:   Vitals:    05/10/18 1323   BP: 131/70   Pulse: 91   Resp: 16   Temp: 97 6 °F (36 4 °C)   SpO2: 96%     General: Awake, Alert, Oriented x 3, Mood and affect appropriate  Respiratory: Respirations even and unlabored  Cardiovascular: Peripheral pulses intact; no edema  Musculoskeletal Exam:  Tenderness in the low back region    ASA Score:  2    Assessment:  Low back pain and left lower extremity radiculopathy    Plan:  Proceed with left L4 and L5 transforaminal epidural steroid injection

## 2018-05-10 NOTE — DISCHARGE INSTRUCTIONS
Epidural Steroid Injection   WHAT YOU NEED TO KNOW:   An epidural steroid injection (KATHLEEN) is a procedure to inject steroid medicine into the epidural space  The epidural space is between your spinal cord and vertebrae  Steroids reduce inflammation and fluid buildup in your spine that may be causing pain  You may be given pain medicine along with the steroids  ACTIVITY  · Do not drive or operate machinery today  · No strenuous activity today - bending, lifting, etc   · You may resume normal activites starting tomorrow - start slowly and as tolerated  · You may shower today, but no tub baths or hot tubs  · You may have numbness for several hours from the local anesthetic  Please use caution and common sense, especially with weight-bearing activities  CARE OF THE INJECTION SITE  · If you have soreness or pain, apply ice to the area today (20 minutes on/20 minutes off)  · Starting tomorrow, you may use warm, moist heat or ice if needed  · You may have an increase or change in your discomfort for 36-48 hours after your treatment  · Apply ice and continue with any pain medication you have been prescribed  · Notify [facility] if you have any of the following: redness, drainage, swelling, headache, stiff neck or fever above 100°F     SPECIAL INSTRUCTIONS  · Our office will contact you in approximately 7 days for a progress report  MEDICATIONS  · Continue to take all routine medications  · Our office may have instructed you to hold some medications  If you have a problem specifically related to your procedure, please call our office at [phone]  Problems not related to your procedure should be directed to your primary care physician

## 2018-05-10 NOTE — OP NOTE
ATTENDING PHYSICIAN:  Toñito Loya MD     PROCEDURE:  1  Left L4 transforaminal epidural steroid injection under fluoroscopic guidance  2  Left L5 transforaminal epidural steroid injection under fluoroscopic guidance  PRE-PROCEDURE DIAGNOSIS:  Low back pain and left lower extremity radiculopathy  POST-PROCEDURE DIAGNOSIS:  Low back pain and left lower extremity radiculopathy  ANESTHESIA:  Local     ESTIMATED BLOOD LOSS:  Minimal     COMPLICATIONS:  None  LOCATION:  [place] [facility]  CONSENT:  Today's procedure, its potential benefits as well as its risks and potential side effects were reviewed  Discussed risks of the procedure including bleeding, infection, nerve irritation or damage, reactions to the medications, weakness, headache, failure of the pain to improve, and potential worsening of the pain were explained to the patient who verbalized understanding and who wished to proceed  Written informed consent was thereby obtained  DESCRIPTION OF THE PROCEDURE:  After written informed consent was obtained, the patient was taken to the fluoroscopy suite and placed in the prone position  Anatomical landmarks were identified by way of fluoroscopy in multiple views  The skin of the lumbar region was prepped using antiseptic and draped in the usual sterile fashion  Strict aseptic technique was utilized  The skin and subcutaneous tissues at the needle entry site were infiltrated with a total of 5 mL of 1% preservative-free lidocaine using a 25-gauge 1-1/2-inch needle  22-gauge needles were then incrementally advanced under fluoroscopic guidance in the oblique view into the neural foramina as mentioned above  Proper placement into each of the neural foramen was confirmed with fluoroscopy in both the lateral and AP views  After negative aspiration for CSF or heme, contrast was injected, which delineated the nerve roots and the epidural space under fluoroscopy in the AP view  There was only a transient pressure paresthesia that resolved immediately upon injection  After negative aspiration, a 2 mL of a 4 mL injectate consisting of 3 mL of preservative-free 0 25% bupivacaine and 1 mL of Depo-Medrol 80 mg/mL was slowly injected into each of the needles as delineated above  The patient tolerated the procedure well and all needles were removed intact  Hemostasis was maintained  There were no apparent paresthesias or complications  The skin was wiped clean and a Band-Aid was placed as appropriate  The patient was monitored for an appropriate period of time and remained hemodynamically stable following the procedure  The patient was ultimately discharged to home with supervision in good condition and instructed to call the office in approximately 7-10 days with an update or sooner as warranted  Discharge instructions were provided  I was present for and participated in all key and critical portions of this procedure      Allie Reyes MD  5/10/2018  2:09 PM

## 2018-05-17 ENCOUNTER — TELEPHONE (OUTPATIENT)
Dept: PAIN MEDICINE | Facility: CLINIC | Age: 58
End: 2018-05-17

## 2018-05-17 NOTE — TELEPHONE ENCOUNTER
S/p Lt L4 L5 Tfesi on 5/10/18 by Dr Yecenia Moody at [address] procedure # ([id number]) no follow up scheduled  Lm on  w/ CB# needed % of relief and rate of pain from (0-10)

## 2018-05-19 ENCOUNTER — APPOINTMENT (EMERGENCY)
Dept: RADIOLOGY | Facility: HOSPITAL | Age: 58
End: 2018-05-19
Payer: COMMERCIAL

## 2018-05-19 ENCOUNTER — HOSPITAL ENCOUNTER (EMERGENCY)
Facility: HOSPITAL | Age: 58
Discharge: HOME | End: 2018-05-19
Attending: EMERGENCY MEDICINE | Admitting: EMERGENCY MEDICINE
Payer: COMMERCIAL

## 2018-05-19 VITALS
SYSTOLIC BLOOD PRESSURE: 146 MMHG | DIASTOLIC BLOOD PRESSURE: 86 MMHG | TEMPERATURE: 96.8 F | WEIGHT: 216 LBS | RESPIRATION RATE: 17 BRPM | OXYGEN SATURATION: 99 % | BODY MASS INDEX: 30.13 KG/M2 | HEART RATE: 70 BPM

## 2018-05-19 DIAGNOSIS — T50.905A MEDICATION ADVERSE EFFECT: ICD-10-CM

## 2018-05-19 DIAGNOSIS — R41.3 MEMORY IMPAIRMENT: Primary | ICD-10-CM

## 2018-05-19 LAB
ALBUMIN SERPL BCP-MCNC: 3.8 G/DL (ref 3.5–5)
ALP SERPL-CCNC: 88 U/L (ref 46–116)
ALT SERPL W P-5'-P-CCNC: 28 U/L (ref 12–78)
ANION GAP SERPL CALCULATED.3IONS-SCNC: 6 MMOL/L (ref 4–13)
APTT PPP: 25 SECONDS (ref 24–33)
AST SERPL W P-5'-P-CCNC: 13 U/L (ref 5–45)
BASOPHILS # BLD AUTO: 0.03 THOUSANDS/ΜL (ref 0–0.1)
BASOPHILS NFR BLD AUTO: 0 % (ref 0–1)
BILIRUB SERPL-MCNC: 0.5 MG/DL (ref 0.2–1)
BUN SERPL-MCNC: 12 MG/DL (ref 5–25)
CALCIUM SERPL-MCNC: 8.7 MG/DL (ref 8.3–10.1)
CHLORIDE SERPL-SCNC: 101 MMOL/L (ref 100–108)
CO2 SERPL-SCNC: 30 MMOL/L (ref 21–32)
CREAT SERPL-MCNC: 0.87 MG/DL (ref 0.6–1.3)
EOSINOPHIL # BLD AUTO: 0.08 THOUSAND/ΜL (ref 0–0.61)
EOSINOPHIL NFR BLD AUTO: 1 % (ref 0–6)
ERYTHROCYTE [DISTWIDTH] IN BLOOD BY AUTOMATED COUNT: 13.6 % (ref 11.6–15.1)
GFR SERPL CREATININE-BSD FRML MDRD: 96 ML/MIN/1.73SQ M
GLUCOSE SERPL-MCNC: 116 MG/DL (ref 65–140)
HCT VFR BLD AUTO: 44.1 % (ref 36.5–49.3)
HGB BLD-MCNC: 14.8 G/DL (ref 12–17)
IMM GRANULOCYTES # BLD AUTO: 0.04 THOUSAND/UL (ref 0–0.2)
IMM GRANULOCYTES NFR BLD AUTO: 0 % (ref 0–2)
INR PPP: 1 (ref 0.86–1.16)
LYMPHOCYTES # BLD AUTO: 0.98 THOUSANDS/ΜL (ref 0.6–4.47)
LYMPHOCYTES NFR BLD AUTO: 11 % (ref 14–44)
MCH RBC QN AUTO: 29.2 PG (ref 26.8–34.3)
MCHC RBC AUTO-ENTMCNC: 33.6 G/DL (ref 31.4–37.4)
MCV RBC AUTO: 87 FL (ref 82–98)
MONOCYTES # BLD AUTO: 0.6 THOUSAND/ΜL (ref 0.17–1.22)
MONOCYTES NFR BLD AUTO: 7 % (ref 4–12)
NEUTROPHILS # BLD AUTO: 7.16 THOUSANDS/ΜL (ref 1.85–7.62)
NEUTS SEG NFR BLD AUTO: 81 % (ref 43–75)
NRBC BLD AUTO-RTO: 0 /100 WBCS
PLATELET # BLD AUTO: 211 THOUSANDS/UL (ref 149–390)
PMV BLD AUTO: 9 FL (ref 8.9–12.7)
POTASSIUM SERPL-SCNC: 4.1 MMOL/L (ref 3.5–5.3)
PROT SERPL-MCNC: 7.1 G/DL (ref 6.4–8.2)
PROTHROMBIN TIME: 10.5 SECONDS (ref 9.4–11.7)
RBC # BLD AUTO: 5.07 MILLION/UL (ref 3.88–5.62)
SODIUM SERPL-SCNC: 137 MMOL/L (ref 136–145)
TROPONIN I SERPL-MCNC: <0.02 NG/ML
WBC # BLD AUTO: 8.89 THOUSAND/UL (ref 4.31–10.16)

## 2018-05-19 PROCEDURE — 96360 HYDRATION IV INFUSION INIT: CPT

## 2018-05-19 PROCEDURE — 96361 HYDRATE IV INFUSION ADD-ON: CPT

## 2018-05-19 PROCEDURE — 93005 ELECTROCARDIOGRAM TRACING: CPT

## 2018-05-19 PROCEDURE — 99285 EMERGENCY DEPT VISIT HI MDM: CPT

## 2018-05-19 PROCEDURE — 36415 COLL VENOUS BLD VENIPUNCTURE: CPT | Performed by: EMERGENCY MEDICINE

## 2018-05-19 PROCEDURE — 85610 PROTHROMBIN TIME: CPT | Performed by: EMERGENCY MEDICINE

## 2018-05-19 PROCEDURE — 85025 COMPLETE CBC W/AUTO DIFF WBC: CPT | Performed by: EMERGENCY MEDICINE

## 2018-05-19 PROCEDURE — 70450 CT HEAD/BRAIN W/O DYE: CPT

## 2018-05-19 PROCEDURE — 80053 COMPREHEN METABOLIC PANEL: CPT | Performed by: EMERGENCY MEDICINE

## 2018-05-19 PROCEDURE — 84484 ASSAY OF TROPONIN QUANT: CPT | Performed by: EMERGENCY MEDICINE

## 2018-05-19 PROCEDURE — 85730 THROMBOPLASTIN TIME PARTIAL: CPT | Performed by: EMERGENCY MEDICINE

## 2018-05-19 RX ADMIN — SODIUM CHLORIDE 1000 ML: 0.9 INJECTION, SOLUTION INTRAVENOUS at 12:23

## 2018-05-19 NOTE — ED PROVIDER NOTES
History  Chief Complaint   Patient presents with    Memory Loss     afraid he may have had a mini stroke, woke up this morning and could not remember what day it was or driving here  no motor or speech impairments  no droops     Patient has a history of chronic pain syndrome and anxiety  He recently has been having episodes of short term memory loss  He has been under [address], and he has been weaning both gabapentin and Celexa as a possible side effect of these meds  Patient states he was well when he 1st woke up this morning at his usual time but as he was preparing breakfast he realized he did not remember his ADLs, whether not he took a shower, which day it was, or even what he was preparing  He does not remember who called the ambulance nor the ride in  Patient denies any recent fever he has had no head injury and no intoxicants  He denies any weakness numbness and has no neurological deficits  He has a mild posterior headache  Prior to Admission Medications   Prescriptions Last Dose Informant Patient Reported? Taking?   aspirin 81 MG tablet   Yes No   Sig: Take 81 mg by mouth daily  citalopram (CeleXA) 10 mg tablet   No No   Sig: Take 3 tablets (30 mg total) by mouth daily for 90 days   gabapentin (NEURONTIN) 300 mg capsule   No No   Sig: Take 2 capsules (600 mg total) by mouth 3 (three) times a day   omeprazole (PriLOSEC) 20 mg delayed release capsule   No No   Sig: Take 1 capsule (20 mg total) by mouth daily   ranitidine (ZANTAC) 300 MG tablet   No No   Sig: Take 1 tablet (300 mg total) by mouth daily at bedtime   tamsulosin (FLOMAX) 0 4 mg  Self Yes No   Sig: Take 0 4 mg by mouth daily with dinner   timolol (BETIMOL) 0 5 % ophthalmic solution   Yes No   Sig: Administer 1 drop to both eyes 2 (two) times a day     timolol (TIMOPTIC) 0 5 % ophthalmic solution   Yes No   Sig: INSTILL 1 DROP INTO BOTH EYES TWO TIMES A DAY      Facility-Administered Medications: None       Past Medical History:   Diagnosis Date    Anxiety     Disc degeneration, lumbar     Esophageal reflux     GERD (gastroesophageal reflux disease)     Hiatal hernia     Increased pressure in the eye     both eyes; denies glaucoma     Internal hemorrhoids     Personal history of colonic polyps        Past Surgical History:   Procedure Laterality Date    COLONOSCOPY N/A 4/25/2016    Procedure: COLONOSCOPY;  Surgeon: Leatha Amos MD;  Location: [facility] GI LAB; Service:     EPIDURAL BLOCK INJECTION      EPIDURAL BLOCK INJECTION Right 12/28/2016    Procedure: BLOCK / INJECTION EPIDURAL STEROID TRANSFORAMINAL  L4-5;  Surgeon: Cesar Renee MD;  Location: [facility] MAIN OR;  Service:     EPIDURAL BLOCK INJECTION Right 11/16/2017    Procedure: BLOCK / INJECTION EPIDURAL STEROID TRANSFORAMINAL L4-5 RIGHT;  Surgeon: Cesar Renee MD;  Location: [facility] MAIN OR;  Service: Pain Management     EPIDURAL BLOCK INJECTION Left 5/10/2018    Procedure: Lt L4 L5 Tfesi (48924,46010); Surgeon: Cesar Renee MD;  Location: [facility] MAIN OR;  Service: Pain Management     ESOPHAGOGASTRODUODENOSCOPY N/A 4/25/2016    Procedure: ESOPHAGOGASTRODUODENOSCOPY (EGD); Surgeon: Leatha Amos MD;  Location: [facility] GI LAB; Service:     NO PAST SURGERIES      denied history of prior surgery per allscripts    NOSE SURGERY         Family History   Problem Relation Age of Onset    Stomach cancer Maternal Grandmother     Hypertension Mother     Cataracts Father     Skin cancer Father      I have reviewed and agree with the history as documented  Social History   Substance Use Topics    Smoking status: Former Smoker     Packs/day: 0 50     Years: 19 00     Types: Cigarettes     Quit date: 1998    Smokeless tobacco: Never Used    Alcohol use Yes      Comment: occ beer        Review of Systems   Constitutional: Negative for fever  HENT: Negative for congestion  Respiratory: Negative for cough and shortness of breath      Cardiovascular: Negative for chest pain  Gastrointestinal: Negative for abdominal pain and vomiting  Musculoskeletal: Positive for back pain  Negative for neck pain  Skin: Negative for rash and wound  Neurological: Positive for headaches  Negative for dizziness, tremors, seizures, syncope, speech difficulty, light-headedness and numbness  Hematological: Does not bruise/bleed easily  Psychiatric/Behavioral: Positive for confusion  Negative for hallucinations and sleep disturbance  The patient is nervous/anxious  All other systems reviewed and are negative  Physical Exam  Physical Exam   Constitutional: He appears well-developed and well-nourished  HENT:   Head: Normocephalic and atraumatic  Right Ear: External ear normal    Left Ear: External ear normal    Mouth/Throat: Oropharynx is clear and moist  No oropharyngeal exudate  Eyes: Conjunctivae and EOM are normal  Pupils are equal, round, and reactive to light  Neck: Normal range of motion  Neck supple  No JVD present  Cardiovascular: Normal rate, regular rhythm, normal heart sounds and intact distal pulses  Pulmonary/Chest: Effort normal and breath sounds normal  No respiratory distress  Abdominal: Soft  Bowel sounds are normal  There is no tenderness  Musculoskeletal: Normal range of motion  He exhibits no edema or tenderness  Lymphadenopathy:     He has no cervical adenopathy  Neurological: He is alert  He displays normal reflexes  No cranial nerve deficit or sensory deficit  He exhibits normal muscle tone  Coordination normal    Patient is not oriented to the day of the week nor the month  He thinks it is 2018  Patient knows the president and can subtract serial sevens mentally   Skin: Skin is warm and dry  Psychiatric: He has a normal mood and affect  His behavior is normal    Nursing note and vitals reviewed        Vital Signs  ED Triage Vitals [05/19/18 1142]   Temperature Pulse Respirations Blood Pressure SpO2   (!) 96 8 °F (36 °C) 79 (!) 24 146/86 99 %      Temp Source Heart Rate Source Patient Position - Orthostatic VS BP Location FiO2 (%)   Tympanic Monitor Sitting Right arm --      Pain Score       2           Vitals:    05/19/18 1142 05/19/18 1245 05/19/18 1300   BP: 146/86     Pulse: 79 72 70   Patient Position - Orthostatic VS: Sitting         Visual Acuity      ED Medications  Medications   sodium chloride 0 9 % bolus 1,000 mL (1,000 mL Intravenous New Bag 5/19/18 1223)       Diagnostic Studies  Results Reviewed     Procedure Component Value Units Date/Time    Troponin I [[id number]]  (Normal) Collected:  05/19/18 1219    Lab Status:  Final result Specimen:  Blood from Arm, Right Updated:  05/19/18 1439     Troponin I <0 02 ng/mL     Narrative:         [employer] Chemistry analyzer 99% cutoff is > 0 04 ng/mL in network labs    o cTnI 99% cutoff is useful only when applied to patients in the clinical setting of myocardial ischemia  o cTnI 99% cutoff should be interpreted in the context of clinical history, ECG findings and possibly cardiac imaging to establish correct diagnosis  o cTnI 99% cutoff may be suggestive but clearly not indicative of a coronary event without the clinical setting of myocardial ischemia      Comprehensive metabolic panel [[id number]] Collected:  05/19/18 1219    Lab Status:  Final result Specimen:  Blood from Arm, Right Updated:  05/19/18 1247     Sodium 137 mmol/L      Potassium 4 1 mmol/L      Chloride 101 mmol/L      CO2 30 mmol/L      Anion Gap 6 mmol/L      BUN 12 mg/dL      Creatinine 0 87 mg/dL      Glucose 116 mg/dL      Calcium 8 7 mg/dL      AST 13 U/L      ALT 28 U/L      Alkaline Phosphatase 88 U/L      Total Protein 7 1 g/dL      Albumin 3 8 g/dL      Total Bilirubin 0 50 mg/dL      eGFR 96 ml/min/1 73sq m     Narrative:         National Kidney Disease Education Program recommendations are as follows:  GFR calculation is accurate only with a steady state creatinine  Chronic Kidney disease less than 60 ml/min/1 73 sq  meters  Kidney failure less than 15 ml/min/1 73 sq  meters  Protime-INR [[id number]]  (Normal) Collected:  05/19/18 1219    Lab Status:  Final result Specimen:  Blood from Arm, Right Updated:  05/19/18 1244     Protime 10 5 seconds      INR 1 00    APTT [[id number]]  (Normal) Collected:  05/19/18 1219    Lab Status:  Final result Specimen:  Blood from Arm, Right Updated:  05/19/18 1244     PTT 25 seconds     CBC and differential [[id number]]  (Abnormal) Collected:  05/19/18 1219    Lab Status:  Final result Specimen:  Blood from Arm, Right Updated:  05/19/18 1231     WBC 8 89 Thousand/uL      RBC 5 07 Million/uL      Hemoglobin 14 8 g/dL      Hematocrit 44 1 %      MCV 87 fL      MCH 29 2 pg      MCHC 33 6 g/dL      RDW 13 6 %      MPV 9 0 fL      Platelets 686 Thousands/uL      nRBC 0 /100 WBCs      Neutrophils Relative 81 (H) %      Immat GRANS % 0 %      Lymphocytes Relative 11 (L) %      Monocytes Relative 7 %      Eosinophils Relative 1 %      Basophils Relative 0 %      Neutrophils Absolute 7 16 Thousands/µL      Immature Grans Absolute 0 04 Thousand/uL      Lymphocytes Absolute 0 98 Thousands/µL      Monocytes Absolute 0 60 Thousand/µL      Eosinophils Absolute 0 08 Thousand/µL      Basophils Absolute 0 03 Thousands/µL     UA w Reflex to Microscopic [[id number]]     Lab Status:  No result Specimen:  Urine     Rapid drug screen, urine [[id number]]     Lab Status:  No result Specimen:  Urine                  CT head without contrast   Final Result by Vita Mclaughlin MD (05/19 1316)      No acute intracranial abnormality                    Workstation performed: [id number]                    Procedures  Procedures       Phone Contacts  ED Phone Contact    ED Course                               MDM  CritCare Time    Disposition  Final diagnoses:   Memory impairment   Medication adverse effect     Time reflects when diagnosis was documented in both MDM as applicable and the Disposition within this note     Time User Action Codes Description Comment    5/19/2018  3:04 PM Nitish Citizen Add [R41 3] Memory impairment     5/19/2018  3:04 PM Nitish Citizen Add [T88  7XXA] Medication adverse effect       ED Disposition     ED Disposition Condition Comment    Discharge  Bhanu Phillips discharge to home/self care  Condition at discharge: Stable        Follow-up Information     Follow up With Specialties Details Why Contact Info    Clemente Tijerina MD Family Medicine Schedule an appointment as soon as possible for a visit in 1 day  [address]   [phone]            Patient's Medications   Discharge Prescriptions    No medications on file     No discharge procedures on file      ED Provider  Electronically Signed by           Kellee Nath MD  05/19/18 3217

## 2018-05-19 NOTE — DISCHARGE INSTRUCTIONS
Amnesia   AMBULATORY CARE:   Amnesia  is a loss of memory  You may not be able to remember information or experiences from the recent or distant past  You may not be able to learn and remember new information  Amnesia may occur for only a short time, such as after a concussion or use of certain medicines  Amnesia may last for a long time or be permanent, such as after a severe brain injury  Contact your healthcare provider if:   · Your amnesia gets worse  · You develop new symptoms  · You have questions or concerns about your condition or care  Treatment:  You may need treatment for any conditions that have caused your amnesia  Your healthcare provider may recommend memory training to improve your memory or learn new ways of remembering things  You may need to learn ways of coping with amnesia if it is permanent  You may need to use tools to keep track of daily activities and remind you of events  You may also need help from others to do daily activities  Follow up with your healthcare provider as directed: Your healthcare provider may refer you to a psychologist or neuropsychologist  Write down your questions so you remember to ask them during your visits  © 2017 [address] Information is for End User's use only and may not be sold, redistributed or otherwise used for commercial purposes  All illustrations and images included in CareNotes® are the copyrighted property of A D A M [employer] Inc  or Seth Alonso  The above information is an  only  It is not intended as medical advice for individual conditions or treatments  Talk to your doctor, nurse or pharmacist before following any medical regimen to see if it is safe and effective for you

## 2018-05-19 NOTE — ED NOTES
Pt taken to CT scan  Pt unable to tell me his age or current month  sts he has pain in L knee upon raising L leg on holding for 5 secs  Pt also sts he has pain in R side of head "like a headache"  Pt keeps repeating "I don't remember what happened this mornining, driving here" pt does not remember having the ekg or doing the neuro exam with Dr Juanita Barrios  Pt able to tell me where he is, who the president is and his   Upon return to room, pt placed on cardiac monitor        Kathia Lopez RN  [phone]

## 2018-05-20 ENCOUNTER — TELEPHONE (OUTPATIENT)
Dept: PAIN MEDICINE | Facility: CLINIC | Age: 58
End: 2018-05-20

## 2018-05-21 ENCOUNTER — TELEPHONE (OUTPATIENT)
Dept: PAIN MEDICINE | Facility: CLINIC | Age: 58
End: 2018-05-21

## 2018-05-21 NOTE — TELEPHONE ENCOUNTER
Aware  Thanks  Please follow up with patient today to see how he is doing  May need to continue weaning off of the gabapentin

## 2018-05-21 NOTE — TELEPHONE ENCOUNTER
Called 5/19/18  Pt seen in ED that day with amnesia  Negative w/u for CVA  ED physician advised pt to wean off of gabapentin which he is taking 600mg tid  Pt would like to wean off and instructions given to pt and significant other to wean down to 600mg bid for this weekend and not to stop abruptly as this could cause significant side effects including withdrawal seizures  Advised that your office will follow up with pt Monday for further weaning instructions

## 2018-05-21 NOTE — TELEPHONE ENCOUNTER
I would like him to wean completely off of the gabapentin  He should be taking gabapentin 600 mg BID for the next 3 days and then gabapentin 600 mg daily for the following 3 days before stopping the gabapentin entirely  Please inform his PCP that I am weaning him off the gabapentin as well if he wants to stop the celexa weaning to see whether the gabapentin or the celexa was causing his symptoms

## 2018-05-21 NOTE — TELEPHONE ENCOUNTER
Spoke with pt  He states that he spoke with the nurse today in regards to the episode he had over the weekend  Pt states that that from 745am until 3pm Saturday 5/19/18 he does not remember what happened to him  That the er stated that he was having a reaction to the gabapentin  Pt states that his pain level is 0/10

## 2018-05-21 NOTE — TELEPHONE ENCOUNTER
S/W patient and wife, states that he has been doing a little better over the weekend, but feels very tired  Pt states he cannot remember anything that happened over the weekend such as driving to the ER, talking with the doctors in the ER or the scripture his  read  Pt states "I feel like I am in a box, I can hear what is going on around me, but I can't understand what they are saying"  Pt states he did take the Gabapentin 600 mg on Saturday/Sunday  Pt states, "I am still taking Celexa 10 mg as instructed to me at my office visit on 5/7/18 with Neva Meyer MD"  They have been weaning down my Celexa due to a possible interaction with Gabapentin  Advised will make AS aware and will call back with recommendations

## 2018-05-21 NOTE — TELEPHONE ENCOUNTER
S/W patient reviewed instructions on how to wean off  gabapentin  Pt verbalized understanding  This message is also being forwarded to MD Ghassan Smyth calling Dr Amber Costa office 3 times, no answer  Dr Adriana Beyer, please review Dr Russell Arroyo note on 5/21/18 regarding weaning off gabapentin

## 2018-05-21 NOTE — TELEPHONE ENCOUNTER
Late Entry:     Message # [id number] 2018 05:10p [RW]   TO: [facility] Name: Pavan Oh   PHONE: [phone]   ----------------------------------------------------------------------   Dr:MAGY   Pt Name:LEAH WOODY   :1960   Emerg?:Y   Msg:NEEDS ON CALL FOR SHORT TERM AMNESIA FROM HIS MEDS       (Message Delivered)   ------------ F O L L O W - U P------------- :   2018 05:14p RW   CONNECTED TO ROGELIO CELL

## 2018-05-22 ENCOUNTER — VBI (OUTPATIENT)
Dept: FAMILY MEDICINE CLINIC | Facility: CLINIC | Age: 58
End: 2018-05-22

## 2018-05-22 LAB
ATRIAL RATE: 78 BPM
P AXIS: 62 DEGREES
PR INTERVAL: 144 MS
QRS AXIS: 31 DEGREES
QRSD INTERVAL: 88 MS
QT INTERVAL: 358 MS
QTC INTERVAL: 408 MS
T WAVE AXIS: 47 DEGREES
VENTRICULAR RATE: 78 BPM

## 2018-05-22 PROCEDURE — 93010 ELECTROCARDIOGRAM REPORT: CPT | Performed by: INTERNAL MEDICINE

## 2018-05-22 NOTE — TELEPHONE ENCOUNTER
Will keep Jerry on the 10mg Celexa daily as he has mental health issues until he can seek full evaluation and treatment adjustment from a mental health provider such as [facility], as discussed in detail with Samy Key at my previous appointment  We did also discuss at that time to continue Celexa at that dose even if Gabapentin as getting weaned  Thanks

## 2018-05-22 NOTE — TELEPHONE ENCOUNTER
Pt was seen in [address] on 5/19/18  CC: Memory Loss  DX: Memory impairment; medication adverse effect  Left Message  Informed Pt with on call dr, office hours, and phone number

## 2018-05-24 ENCOUNTER — TELEPHONE (OUTPATIENT)
Dept: PAIN MEDICINE | Facility: MEDICAL CENTER | Age: 58
End: 2018-05-24

## 2018-05-24 NOTE — TELEPHONE ENCOUNTER
RN attempted to reach pt on home and mobile numbers with [employer] with c/b number office hours and location provided

## 2018-05-24 NOTE — TELEPHONE ENCOUNTER
Pt left message in voicemail this morning, asking if Dr Jaky Larson will be prescribing something different for the pain after he is completely weaned off the Gabapentin this weekend  Pt left a call back number of [phone]

## 2018-05-24 NOTE — TELEPHONE ENCOUNTER
I would like to wait and see if his mental status improves after he is off the gabapentin before prescribing anything else  If no change in the mental status, the gabapentin was not causing that effect and I can put him back on it since it is working significantly for his pain

## 2018-05-25 ENCOUNTER — TELEPHONE (OUTPATIENT)
Dept: FAMILY MEDICINE CLINIC | Facility: CLINIC | Age: 58
End: 2018-05-25

## 2018-05-25 NOTE — TELEPHONE ENCOUNTER
S/w the patient and he stated he continues to wean himself off of the Gabapentin and the celexa  By Sunday he stated he will be weaned off of both  He is afraid that both of those medications might be interacting with each other  He attempted to contact Dr Shirley Fowler but he is unavailable d/t being on vacation but the nurse at the office was made aware and he stated that the physician will email AS with this information  He stated  If he has  pain over the weekend then he will take aleve or tylenol  Just FYI  The patient also stated if he does hear back from the physician then he will notify you again

## 2018-05-25 NOTE — TELEPHONE ENCOUNTER
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 Pt stated "Put yourself in my situation "  Pt stated he is being weaned off to quick and "if they wean me off too quick I may have a seizure  Then I am coming after someone  He should be calling me back personally to explain this  My health is in your hands  What if the pain hits me big time?"  Pt stated he has been taking 1,800 mg of Gabapentin since Dec 2016 and is being weaned off too quickly  If something happens someone is getting sued  I am really perturbed  Have him give me a call back to explain it "  Pt apologized to RN  Please advise

## 2018-05-25 NOTE — TELEPHONE ENCOUNTER
As I told him before at his office visit, I am not sure whether the gabapentin was giving him the mental confusion or if it was the celexa  We would have to stop one completely in order to determine which one it was that was causing his mental issues  The gabapentin was planned on being stopped, so we are weaning him off of it  I am amenable to restarting the gabapentin at a lower dose (like 300 mg TID) to see if that works for him without the mental side effects  The other option is to trial duloxetine, but he will need to be completely off of the celexa to go on that particular medication

## 2018-05-25 NOTE — TELEPHONE ENCOUNTER
S/W pt  Advised pt of the same  Pt stated he is currently taking 10 mg of Celexa  Pt stated his PCP left him a message yesterday and he needs to call the PCP back today  Pt stated "I did what he wanted me to do  Why am I making this decision if I should stay on the gabapentin?"  Pt asking if there is an alternative to the Gabapentin? Dr Greta Jacques thought the Gabapentin did it  Did Dr Prakash Lockwood read Dr Christine Abreu note? I will go up to that office  He needs to call me when he has a few minutes  He is the doctor not me, let him make that decision "  Pt stated to the RN " I am not yelling at you  It is confusing me  I need to talk to him "  Please advise

## 2018-05-25 NOTE — TELEPHONE ENCOUNTER
Patient called back and left message yesterday at 3:10pm stating he was returning Shira's call   Patient's call back # is [phone]

## 2018-05-25 NOTE — TELEPHONE ENCOUNTER
Does he want to go back on the gabapentin? If so, I would have him go off the psychiatric medication that his PCP is prescribing

## 2018-05-25 NOTE — TELEPHONE ENCOUNTER
He is not the one making the decision  I am  If he is able to get off the celexa, I will switch him to duloxetine  If he is not, I am willing to restart him on gabapentin 300 mg TID  From what it sounds like, he is still having significant memory difficulties, so we still do not have the cause of his mental issues  I still would prefer him weaning off the gabapentin entirely for a few days to see if his memory issues subside or resolve

## 2018-05-25 NOTE — TELEPHONE ENCOUNTER
S/W pt  Advised pt of the same  Pt stated he is waiting to hear from his PCP about the celexa  Pt stated he is taking one gabapentin today and tomorrow and Saturday will be the last dose  Pt stated he is taking 10 mg of celexa today and tomorrow  Pt stated he doesn't remember anything for 7 hrs  Pt asked why he has to make the decision? Pt stated "I am frustrated "   Pt stated "I don't like what is going on here "  Pt disconnected the phone call

## 2018-05-25 NOTE — TELEPHONE ENCOUNTER
Pt called regarding weaning off celexa  He knows dr Oriana Benitez is not here  He wants someone else to talk to dr Greyson Marquez and figure out what he is supposed to be taking for his pain  See previous messages from angelica's office        **  Spoke with patient on the phone  Patient reports he is confused about which medications he should be taking  Patient took 600 gabapentin yesterday, and none today  States he took Celexa 10 milligrams today  Patient understands he needs to see Dr Pj Jeffries next week  Patient can stop taking his Celexa at this point as he was weaning off after altered mental status in the ED last week  He can take 300 milligrams gabapentin today, and stopped taking it fully tomorrow  He can use Tylenol, Aleve, or Advil over the weekend for pain until he sees Dr Lady Bonilla  Patient still appears somewhat confused as he stated he was 48years old today, when he is treat fully 62years old  Patient was speaking with friend in the background, who several times had clarify for him due to his memory issues  Patient needs to continue seeing Psychiatry as well  Patient was explained that Dr Daril Hodgkins will not be back until June 7th, and it would be best to continue seeing the same physician     Zackary Luis, DO

## 2018-05-29 RX ORDER — GABAPENTIN 600 MG/1
TABLET ORAL
Refills: 0 | COMMUNITY
Start: 2018-05-17 | End: 2018-08-14

## 2018-05-30 ENCOUNTER — OFFICE VISIT (OUTPATIENT)
Dept: NEUROLOGY | Facility: CLINIC | Age: 58
End: 2018-05-30
Payer: COMMERCIAL

## 2018-05-30 VITALS
HEART RATE: 75 BPM | SYSTOLIC BLOOD PRESSURE: 108 MMHG | HEIGHT: 71 IN | WEIGHT: 215 LBS | DIASTOLIC BLOOD PRESSURE: 72 MMHG | BODY MASS INDEX: 30.1 KG/M2

## 2018-05-30 DIAGNOSIS — R41.3 EPISODE OF MEMORY LOSS: ICD-10-CM

## 2018-05-30 DIAGNOSIS — R41.3 SHORT-TERM MEMORY LOSS: ICD-10-CM

## 2018-05-30 DIAGNOSIS — R41.3 MEMORY LOSS: ICD-10-CM

## 2018-05-30 DIAGNOSIS — G45.4 TGA (TRANSIENT GLOBAL AMNESIA): Primary | ICD-10-CM

## 2018-05-30 PROCEDURE — 99244 OFF/OP CNSLTJ NEW/EST MOD 40: CPT | Performed by: PSYCHIATRY & NEUROLOGY

## 2018-05-30 NOTE — PROGRESS NOTES
Outpatient Neurology History and Physical  Navid Mojica  [id number]  45 y o   1960          Consult: Yes    Tahira Horner MD      Chief Complaint   Patient presents with    Neurologic Problem     MEMORY ISSUES           History Obtained from: patient and friend (patient is caregiver of the friend)    HPI:     Mr Jarod Mesa is a 61 yo M with PMH of neuropathy, memory disturbances presents for further evaluation  He says that patient was placed on neurontin and it was increased to 600mg tid  It is helping with his pain  On May 19th, patient was argumentative, forgetful and then went into shower  After 20 min, he was walking in hallway, couldn't understand simple conversation  He then asked what day is it  He then kept repeating same thing over and over  He thought president was [employer]  He was not oriented to month or day  Memory disturbance lasted from 10:30 until 5pm  He kept persevering over same questions  He had CT brain which was negative  Lab work was essentially normal  When he got home, he felt pain around both ears  Patient was told that he's on too high of gabapentin dose and it may have caused loss of memory  Patient has been having short term memory loss for quite some time, close to a year  He easily forgets things being told to him  He may go shopping bringing home items that were not asked  He has been on celexa since 2016 as well  He is being tapered off of celexa but hasn't seemed to make much difference  He has h/o suffering from PTSD  There is no significant h/o dementia in his family  His maternal grandmother may have had dementia       Past Medical History:   Diagnosis Date    Amnesia memory loss     Anxiety     Chronic fatigue     Disc degeneration, lumbar     Esophageal reflux     GERD (gastroesophageal reflux disease)     Glaucoma     Hiatal hernia     Increased pressure in the eye     both eyes; denies glaucoma     Internal hemorrhoids     Lumbar disc herniation with radiculopathy     Memory loss     Personal history of colonic polyps                Current Outpatient Prescriptions on File Prior to Visit   Medication Sig Dispense Refill    aspirin 81 MG tablet Take 81 mg by mouth daily   omeprazole (PriLOSEC) 20 mg delayed release capsule Take 1 capsule (20 mg total) by mouth daily 30 capsule 3    ranitidine (ZANTAC) 300 MG tablet Take 1 tablet (300 mg total) by mouth daily at bedtime 30 tablet 3    tamsulosin (FLOMAX) 0 4 mg Take 0 4 mg by mouth daily with dinner      timolol (TIMOPTIC) 0 5 % ophthalmic solution INSTILL 1 DROP INTO BOTH EYES TWO TIMES A DAY  0    citalopram (CeleXA) 10 mg tablet Take 3 tablets (30 mg total) by mouth daily for 90 days 270 tablet 0    gabapentin (NEURONTIN) 300 mg capsule Take 2 capsules (600 mg total) by mouth 3 (three) times a day 90 capsule 3    timolol (BETIMOL) 0 5 % ophthalmic solution Administer 1 drop to both eyes 2 (two) times a day  No current facility-administered medications on file prior to visit  Allergies   Allergen Reactions    Gabapentin Other (See Comments)     OUT OF BODY PSYCHOSIS, MEMORY LOSS, AMNESIA    Other Cough     HAYFEVER, SEASONAL, ALFALFA         Family History   Problem Relation Age of Onset    Stomach cancer Maternal Grandmother     Hypertension Mother     Diabetes Mother     Cataracts Father     Skin cancer Father     Stroke Father      2X    Aneurysm Father     No Known Problems Sister     No Known Problems Brother                 Past Surgical History:   Procedure Laterality Date    COLONOSCOPY N/A 4/25/2016    Procedure: COLONOSCOPY;  Surgeon: Griselda Kaye MD;  Location: [facility] GI LAB;   Service:     DENTAL SURGERY      ALL TEETH PULLED    EPIDURAL BLOCK INJECTION      EPIDURAL BLOCK INJECTION Right 12/28/2016    Procedure: BLOCK / INJECTION EPIDURAL STEROID TRANSFORAMINAL  L4-5;  Surgeon: Raymond Sol MD;  Location: [facility] MAIN OR;  Service:    [address] [facility] INJECTION Right 11/16/2017    Procedure: BLOCK / INJECTION EPIDURAL STEROID TRANSFORAMINAL L4-5 RIGHT;  Surgeon: Abimael Kaufman MD;  Location: [place] [address] MAIN OR;  Service: Pain Management     EPIDURAL BLOCK INJECTION Left 5/10/2018    Procedure: Lt L4 L5 Tfesi (40543,75213); Surgeon: Abimael Kaufman MD;  Location: [facility] MAIN OR;  Service: Pain Management     ESOPHAGOGASTRODUODENOSCOPY N/A 4/25/2016    Procedure: ESOPHAGOGASTRODUODENOSCOPY (EGD); Surgeon: Lili Hernadez MD;  Location: [facility] GI LAB; Service:     NOSE SURGERY             Social History     Social History    Marital status: Single     Spouse name: N/A    Number of children: N/A    Years of education: N/A     Occupational History    Not on file       Social History Main Topics    Smoking status: Former Smoker     Packs/day: 0 50     Years: 19 00     Types: Cigarettes     Quit date: 1998    Smokeless tobacco: Never Used    Alcohol use Yes      Comment: occ beer    Drug use: No    Sexual activity: Not on file     Other Topics Concern    Not on file     Social History Narrative    No narrative on file       Review of Systems  Refer to positive review of systems in HPI  Constitutional- No fever  Eyes- No visual change  ENT- Hearing normal  CV- No chest pain  Resp- No Shortness of breath  GI- No diarrhea  - Bladder normal  MS- No Arthritis   Skin- No rash  Psych- No depression  Endo- No DM  Heme- No nodes    PHYSICAL EXAM:    Vitals:    05/30/18 1443   BP: 108/72   BP Location: Left arm   Patient Position: Sitting   Pulse: 75   Weight: 97 5 kg (215 lb)   Height: 5' 11" (1 803 m)         Appearance: No Acute Distress  Ophthalmoscopic: Disc Flat, Normal fundus  Carotid/Heart/Peripheral Vascular: No Bruits, RRR  Orientation: Awake, Alert, and Oriented x 3  Mental status:  Memory: Registation 3/3 Recall 2/3  Able to complete series of 7 all the way to 2  No difficulty spelling world backwards  MMSE: 27/30  Attention: Normal  Knowledge: Appropriate  Language: No aphasia  Speech: No dysarthria  Cranial Nerves:  2 No Visual Defect on Confrontation; Pupils round, equal, reactive to light  3,4,6 Extraocular Movements Intact; no nystagmus  5 Facial Sensation Intact  7 No facial asymmetry  8 Intact hearing  9,10 Palate symmetric, normal gag  11 Good shoulder shrug  12 Tongue Midline  Gait: Stable, No ataxia, can perform tandem walking  Coordination: No ataxia with finger to nose testing and heel to shin testing  Sensory: Intact, Symmetric to Pinprick, Light Touch, Vibration, and Joint Position  Muscle Tone: Normal  Muscle exam  Arm Right Left Leg Right Left   Deltoid 5/5 5/5 Iliopsoas 5/5 5/5   Biceps 5/5 5/5 Quads 5/5 5/5   Triceps 5/5 5/5 Hamstrings 5/5 5/5   Wrist Extension 5/5 5/5 Ankle Dorsi Flexion 5/5 5/5   Wrist Flexion 5/5 5/5 Ankle Plantar Flexion 5/5 5/5   Interossei 5/5 5/5 Ankle Eversion 5/5 5/5   APB 5/5 5/5 Ankle Inversion 5/5 5/5       Reflexes   RJ BJ TJ KJ AJ Plantars Rankin's   Right 2+ 2+ 2+ 2+ 2+ Downgoing Not present   Left 2+ 2+ 2+ 2+ 2+ Downgoing Not present         Personal review of              Assessment/Plan:     1  TGA (transient global amnesia)  MRI brain NeuroQuant wo and w contrast    Vitamin B12    Vitamin D 25 hydroxy    RPR    TSH, 3rd generation    Anti-microsomal antibody    Vitamin B12    Vitamin D 25 hydroxy    RPR    TSH, 3rd generation    Anti-microsomal antibody    EEG Awake and asleep    VAS carotid complete study   2  Memory loss  Ambulatory referral to Neurology    MRI brain NeuroQuant wo and w contrast    Vitamin B12    Vitamin D 25 hydroxy    RPR    TSH, 3rd generation    Anti-microsomal antibody    Vitamin B12    Vitamin D 25 hydroxy    RPR    TSH, 3rd generation    Anti-microsomal antibody    Ambulatory referral to Neuropsychology   3   Episode of memory loss  MRI brain NeuroQuant wo and w contrast    Vitamin B12    Vitamin D 25 hydroxy    RPR    TSH, 3rd generation    Anti-microsomal antibody    Vitamin B12    Vitamin D 25 hydroxy    RPR    TSH, 3rd generation    Anti-microsomal antibody    EEG Awake and asleep   4  Short-term memory loss  Ambulatory referral to Neuropsychology           Patient's clinical history is suggestive of TGA for the event that took place on May 19th  Will get MRI brain for further evaluation  Will get EEG as part of TGA work up  Will get carotid doppler   Will look for reversible causes of memory loss  Will refer him for neuropsych testing  I would advise at least lowering dose of gabapentin as it can affect memory  Counseling Documentation:  The patient and/or patient's family were  counseled regarding diagnostic results  Instructions for management,risk factor reductions,prognosis of disease were discussed  Patient and family were educated regarding impressions,risks and benefits of treatment options,importance of compliance with treatment  Total time of encounter: 60 min   More than 50% of time was spent in counseling and coordination of care of patient  [facility]  [facility] [address]  [address]

## 2018-06-05 ENCOUNTER — HOSPITAL ENCOUNTER (OUTPATIENT)
Dept: RADIOLOGY | Facility: HOSPITAL | Age: 58
Discharge: HOME | End: 2018-06-05
Attending: PSYCHIATRY & NEUROLOGY
Payer: COMMERCIAL

## 2018-06-05 DIAGNOSIS — G45.4 TGA (TRANSIENT GLOBAL AMNESIA): ICD-10-CM

## 2018-06-05 PROCEDURE — 93880 EXTRACRANIAL BILAT STUDY: CPT

## 2018-06-06 PROCEDURE — 93880 EXTRACRANIAL BILAT STUDY: CPT | Performed by: SURGERY

## 2018-06-11 ENCOUNTER — HOSPITAL ENCOUNTER (OUTPATIENT)
Dept: RADIOLOGY | Facility: HOSPITAL | Age: 58
Discharge: HOME | End: 2018-06-11
Attending: PSYCHIATRY & NEUROLOGY
Payer: COMMERCIAL

## 2018-06-11 DIAGNOSIS — G45.4 TGA (TRANSIENT GLOBAL AMNESIA): ICD-10-CM

## 2018-06-11 DIAGNOSIS — R41.3 MEMORY LOSS: ICD-10-CM

## 2018-06-11 DIAGNOSIS — R41.3 EPISODE OF MEMORY LOSS: ICD-10-CM

## 2018-06-11 PROCEDURE — A9585 GADOBUTROL INJECTION: HCPCS | Performed by: PSYCHIATRY & NEUROLOGY

## 2018-06-11 PROCEDURE — 70553 MRI BRAIN STEM W/O & W/DYE: CPT

## 2018-06-11 RX ADMIN — GADOBUTROL 10 ML: 604.72 INJECTION INTRAVENOUS at 15:47

## 2018-06-14 ENCOUNTER — DOCUMENTATION (OUTPATIENT)
Dept: NEUROLOGY | Facility: CLINIC | Age: 58
End: 2018-06-14

## 2018-06-14 NOTE — PROGRESS NOTES
I left a voicemail for the patient that I would call him if I found out about another location that is closer than [facility]  Otherwise, we will hold off for now until Tiffanie Hines advises that we can start sending patients to them again

## 2018-06-14 NOTE — PROGRESS NOTES
The patient is unable to get into [facility] for a neuropsych eval  I spoke to [facility] late yesterday afternoon and apparently the doctor that performs them is leaving so they are trying to recruit someone else and at least get the people that are already scheduled through September done  However, they are not taking any new cases, and the only other facility that she knows about is [facility] in [place]  Ant Larryadal does not want to travel that far

## 2018-06-14 NOTE — PROGRESS NOTES
Unfortunately we don't know of other options as she can only go to [facility]

## 2018-06-18 ENCOUNTER — OFFICE VISIT (OUTPATIENT)
Dept: PODIATRY | Facility: CLINIC | Age: 58
End: 2018-06-18
Payer: COMMERCIAL

## 2018-06-18 VITALS
HEIGHT: 71 IN | SYSTOLIC BLOOD PRESSURE: 122 MMHG | BODY MASS INDEX: 30.1 KG/M2 | WEIGHT: 215 LBS | DIASTOLIC BLOOD PRESSURE: 73 MMHG

## 2018-06-18 DIAGNOSIS — M20.42 HAMMER TOES OF BOTH FEET: ICD-10-CM

## 2018-06-18 DIAGNOSIS — M79.672 PAIN IN BOTH FEET: ICD-10-CM

## 2018-06-18 DIAGNOSIS — M79.671 PAIN IN BOTH FEET: ICD-10-CM

## 2018-06-18 DIAGNOSIS — Q66.52 CONGENITAL PES PLANUS OF LEFT FOOT: ICD-10-CM

## 2018-06-18 DIAGNOSIS — M20.41 HAMMER TOES OF BOTH FEET: ICD-10-CM

## 2018-06-18 DIAGNOSIS — M21.969 ACQUIRED DEFORMITY OF FOOT, UNSPECIFIED LATERALITY: Primary | ICD-10-CM

## 2018-06-18 DIAGNOSIS — Q66.51 CONGENITAL PES PLANUS OF RIGHT FOOT: ICD-10-CM

## 2018-06-18 PROCEDURE — L3000 FT INSERT UCB BERKELEY SHELL: HCPCS | Performed by: PODIATRIST

## 2018-06-18 PROCEDURE — 99242 OFF/OP CONSLTJ NEW/EST SF 20: CPT | Performed by: PODIATRIST

## 2018-06-18 NOTE — PROGRESS NOTES
Assessment/Plan:  Metatarsalgia  Hammertoe formation  Pes planus  Pain  Plan  Foot exam performed  All nails debrided  Patient will benefit from orthotics  His feet have been casted for custom molded foot orthotics  Patient needs referral to Orthopedics for radiculopathy workup    No problem-specific Assessment & Plan notes found for this encounter  There are no diagnoses linked to this encounter  Subjective:     Past Medical History:   Diagnosis Date    Amnesia memory loss     Anxiety     Chronic fatigue     Disc degeneration, lumbar     Esophageal reflux     GERD (gastroesophageal reflux disease)     Glaucoma     Hiatal hernia     Increased pressure in the eye     both eyes; denies glaucoma     Internal hemorrhoids     Lumbar disc herniation with radiculopathy     Memory loss     Personal history of colonic polyps        Past Surgical History:   Procedure Laterality Date    COLONOSCOPY N/A 4/25/2016    Procedure: COLONOSCOPY;  Surgeon: Griselda Kaye MD;  Location: [facility] GI LAB; Service:     DENTAL SURGERY      ALL TEETH PULLED    EPIDURAL BLOCK INJECTION      EPIDURAL BLOCK INJECTION Right 12/28/2016    Procedure: BLOCK / INJECTION EPIDURAL STEROID TRANSFORAMINAL  L4-5;  Surgeon: Raymond Sol MD;  Location: [facility] MAIN OR;  Service:     EPIDURAL BLOCK INJECTION Right 11/16/2017    Procedure: BLOCK / INJECTION EPIDURAL STEROID TRANSFORAMINAL L4-5 RIGHT;  Surgeon: Raymond Sol MD;  Location: [facility] MAIN OR;  Service: Pain Management     EPIDURAL BLOCK INJECTION Left 5/10/2018    Procedure: Lt L4 L5 Tfesi (42524,[id number]); Surgeon: Raymond Sol MD;  Location: [facility] MAIN OR;  Service: Pain Management     ESOPHAGOGASTRODUODENOSCOPY N/A 4/25/2016    Procedure: ESOPHAGOGASTRODUODENOSCOPY (EGD); Surgeon: Griselda Kaye MD;  Location: [facility] GI LAB;   Service:     NOSE SURGERY         Allergies   Allergen Reactions    Gabapentin Other (See Comments)     OUT OF BODY PSYCHOSIS, MEMORY LOSS, AMNESIA    Other Cough     HAYFEVER, SEASONAL, ALFALFA         Current Outpatient Prescriptions:     aspirin 81 MG tablet, Take 81 mg by mouth daily  , Disp: , Rfl:     omeprazole (PriLOSEC) 20 mg delayed release capsule, Take 1 capsule (20 mg total) by mouth daily, Disp: 30 capsule, Rfl: 3    ranitidine (ZANTAC) 300 MG tablet, Take 1 tablet (300 mg total) by mouth daily at bedtime, Disp: 30 tablet, Rfl: 3    tamsulosin (FLOMAX) 0 4 mg, Take 0 4 mg by mouth daily with dinner, Disp: , Rfl:     timolol (BETIMOL) 0 5 % ophthalmic solution, Administer 1 drop to both eyes 2 (two) times a day , Disp: , Rfl:     timolol (TIMOPTIC) 0 5 % ophthalmic solution, INSTILL 1 DROP INTO BOTH EYES TWO TIMES A DAY, Disp: , Rfl: 0    citalopram (CeleXA) 10 mg tablet, Take 3 tablets (30 mg total) by mouth daily for 90 days, Disp: 270 tablet, Rfl: 0    gabapentin (NEURONTIN) 300 mg capsule, Take 2 capsules (600 mg total) by mouth 3 (three) times a day, Disp: 90 capsule, Rfl: 3    gabapentin (NEURONTIN) 600 MG tablet, , Disp: , Rfl: 0    Patient Active Problem List   Diagnosis    Chronic pain syndrome    Chronic low back pain    Intervertebral disc disorder with radiculopathy of lumbar region    Spondylosis of lumbar region without myelopathy or radiculopathy    Abnormal chromosomal analysis    Adult-onset obesity    Anxiety    Elevated PSA    Enlarged prostate on rectal examination    Gastroesophageal reflux disease without esophagitis    Lumbar disc herniation with radiculopathy    Myofascial pain syndrome    Chronic fatigue    Memory loss    Glaucoma    Neuritis or radiculitis due to rupture of lumbar intervertebral disc    Low back pain          Patient ID: Connor Zazueta is a 62 y o  male      HPI    The following portions of the patient's history were reviewed and updated as appropriate: allergies, current medications, past family history, past medical history, past social history, past surgical history and problem list     Review of Systems      Objective: Foot Exam    General  General Appearance: appears stated age and healthy   Orientation: alert and oriented to person, place, and time   Affect: appropriate   Gait: antalgic       Right Foot/Ankle     Inspection and Palpation  Ecchymosis: none  Tenderness: bony tenderness and metatarsals   Swelling: metatarsals   Arch: pes planus  Hammertoes: fourth toe and fifth toe  Hallux valgus: no  Hallux limitus: yes  Skin Exam: callus; Neurovascular  Dorsalis pedis: 2+  Posterior tibial: 2+  Saphenous nerve sensation: diminished  Tibial nerve sensation: diminished  Superficial peroneal nerve sensation: diminished  Deep peroneal nerve sensation: diminished  Sural nerve sensation: diminished  Achilles reflex: 1+    Muscle Strength  Ankle dorsiflexion: 4+    Tests  Lateral squeeze: positive     Comments  4th and 5th toes bilateral are in a abducto varus position  Pinch callus formation of 4th and 5th toes  Left Foot/Ankle      Inspection and Palpation  Ecchymosis: none  Tenderness: bony tenderness and metatarsals   Swelling: metatarsals   Arch: pes planus  Hammertoes: fourth toe and fifth toe  Hallux valgus: no  Hallux limitus: yes  Skin Exam: callus; Neurovascular  Dorsalis pedis: 2+  Posterior tibial: 2+  Saphenous nerve sensation: diminished  Tibial nerve sensation: diminished  Superficial peroneal nerve sensation: diminished  Deep peroneal nerve sensation: diminished  Sural nerve sensation: diminished  Achilles reflex: 1+    Muscle Strength  Ankle dorsiflexion: 4+    Tests  Too many toes: positive     Comments  Patient is maximally pronated in stance and gait  There is transverse deviation of the DIPJ 4th toe bilateral   Physical Exam   Cardiovascular:   Pulses:       Dorsalis pedis pulses are 2+ on the right side, and 2+ on the left side  Posterior tibial pulses are 2+ on the right side, and 2+ on the left side     Musculoskeletal: Right foot: There is bony tenderness  Left foot: There is bony tenderness  Feet:   Right Foot:   Skin Integrity: Positive for callus  Left Foot:   Skin Integrity: Positive for callus  Neurological:   Reflex Scores:       Achilles reflexes are 1+ on the right side and 1+ on the left side

## 2018-06-20 ENCOUNTER — HOSPITAL ENCOUNTER (OUTPATIENT)
Dept: NEUROLOGY | Facility: HOSPITAL | Age: 58
Discharge: HOME | End: 2018-06-20
Attending: PSYCHIATRY & NEUROLOGY
Payer: COMMERCIAL

## 2018-06-20 ENCOUNTER — TRANSCRIBE ORDERS (OUTPATIENT)
Dept: ADMINISTRATIVE | Facility: HOSPITAL | Age: 58
End: 2018-06-20

## 2018-06-20 DIAGNOSIS — R41.3 EPISODE OF MEMORY LOSS: ICD-10-CM

## 2018-06-20 DIAGNOSIS — G45.4 TGA (TRANSIENT GLOBAL AMNESIA): ICD-10-CM

## 2018-06-20 PROCEDURE — 95819 EEG AWAKE AND ASLEEP: CPT

## 2018-06-20 PROCEDURE — 95819 EEG AWAKE AND ASLEEP: CPT | Performed by: PSYCHIATRY & NEUROLOGY

## 2018-06-27 LAB
25(OH)D3+25(OH)D2 SERPL-MCNC: 24.6 NG/ML (ref 30–100)
RPR SER QL: NON REACTIVE
THYROPEROXIDASE AB SERPL-ACNC: 11 IU/ML (ref 0–34)
TSH SERPL DL<=0.005 MIU/L-ACNC: 4.42 UIU/ML (ref 0.45–4.5)
VIT B12 SERPL-MCNC: 436 PG/ML (ref 232–1245)

## 2018-06-28 ENCOUNTER — OFFICE VISIT (OUTPATIENT)
Dept: NEUROLOGY | Facility: CLINIC | Age: 58
End: 2018-06-28
Payer: COMMERCIAL

## 2018-06-28 VITALS
SYSTOLIC BLOOD PRESSURE: 112 MMHG | HEART RATE: 83 BPM | DIASTOLIC BLOOD PRESSURE: 82 MMHG | WEIGHT: 217 LBS | BODY MASS INDEX: 30.38 KG/M2 | HEIGHT: 71 IN

## 2018-06-28 DIAGNOSIS — R94.01 ABNORMAL EEG: ICD-10-CM

## 2018-06-28 DIAGNOSIS — R41.3 MEMORY DISTURBANCE: Primary | ICD-10-CM

## 2018-06-28 DIAGNOSIS — F43.10 PTSD (POST-TRAUMATIC STRESS DISORDER): ICD-10-CM

## 2018-06-28 DIAGNOSIS — E55.9 VITAMIN D DEFICIENCY: ICD-10-CM

## 2018-06-28 DIAGNOSIS — R53.83 FATIGUE, UNSPECIFIED TYPE: ICD-10-CM

## 2018-06-28 PROCEDURE — 99214 OFFICE O/P EST MOD 30 MIN: CPT | Performed by: PSYCHIATRY & NEUROLOGY

## 2018-06-28 RX ORDER — MELATONIN
2000 DAILY
Qty: 60 TABLET | Refills: 6 | Status: SHIPPED | OUTPATIENT
Start: 2018-06-28 | End: 2019-01-29 | Stop reason: SDUPTHER

## 2018-06-28 RX ORDER — LANOLIN ALCOHOL/MO/W.PET/CERES
1000 CREAM (GRAM) TOPICAL DAILY
Qty: 30 TABLET | Refills: 6 | Status: SHIPPED | OUTPATIENT
Start: 2018-06-28 | End: 2019-01-10 | Stop reason: SDUPTHER

## 2018-06-28 NOTE — PROGRESS NOTES
Outpatient Neurology History and Physical  Navid Mojica  [id number]  89 y o   1960          Consult: Yes    Tahira Horner MD      Chief Complaint   Patient presents with    Neurologic Problem     TGA           History Obtained from: patient and friend (patient is caregiver of the friend)    HPI:     Mr Jarod Mesa is a 63 yo M with PMH of neuropathy, memory disturbances presents as follow up  He says that patient was placed on neurontin and it was increased to 600mg tid  It is helping with his pain  Per my previous history, on May 19th, patient was argumentative, forgetful and then went into shower  After 20 min, he was walking in hallway, couldn't understand simple conversation  He then asked what day is it  He then kept repeating same thing over and over  He thought president was [employer]  He was not oriented to month or day  Memory disturbance lasted from 10:30 until 5pm  He kept persevering over same questions  He had CT brain which was negative  Lab work was essentially normal    At last visit we had ordered MRI brain neuro quant which is normal  His EEG did however reveal 2 separate independent temporal sharp to clinically correlate  Since last visit, he was taken off of both gabapentin and celexa and now he does feel better overall but tired  He doesn't feel anxious any more  He however felt high on gabapentin which he feels is lacking  Of note, patient is not good at giving history  He's all over in terms of telling how he's really feeling  He was ordered to have neuropsych testing as well but he can't go to [facility] and we do not know of any other facility in [place] that does the test    He has h/o suffering from PTSD  There is no significant h/o dementia in his family  His maternal grandmother may have had dementia       Past Medical History:   Diagnosis Date    Amnesia memory loss     Anxiety     Chronic fatigue     Disc degeneration, lumbar     Esophageal reflux     GERD (gastroesophageal reflux disease)     Glaucoma     Hiatal hernia     Increased pressure in the eye     both eyes; denies glaucoma     Internal hemorrhoids     Lumbar disc herniation with radiculopathy     Memory loss     Personal history of colonic polyps                Current Outpatient Prescriptions on File Prior to Visit   Medication Sig Dispense Refill    aspirin 81 MG tablet Take 81 mg by mouth daily   omeprazole (PriLOSEC) 20 mg delayed release capsule Take 1 capsule (20 mg total) by mouth daily 30 capsule 3    ranitidine (ZANTAC) 300 MG tablet Take 1 tablet (300 mg total) by mouth daily at bedtime 30 tablet 3    tamsulosin (FLOMAX) 0 4 mg Take 0 4 mg by mouth daily at bedtime        timolol (BETIMOL) 0 5 % ophthalmic solution Administer 1 drop to both eyes 2 (two) times a day   citalopram (CeleXA) 10 mg tablet Take 3 tablets (30 mg total) by mouth daily for 90 days 270 tablet 0    gabapentin (NEURONTIN) 300 mg capsule Take 2 capsules (600 mg total) by mouth 3 (three) times a day 90 capsule 3    gabapentin (NEURONTIN) 600 MG tablet   0    timolol (TIMOPTIC) 0 5 % ophthalmic solution INSTILL 1 DROP INTO BOTH EYES TWO TIMES A DAY  0     No current facility-administered medications on file prior to visit  Allergies   Allergen Reactions    Gabapentin Other (See Comments)     OUT OF BODY PSYCHOSIS, MEMORY LOSS, AMNESIA    Other Cough     HAYFEVER, SEASONAL, ALFALFA         Family History   Problem Relation Age of Onset    Stomach cancer Maternal Grandmother     Hypertension Mother     Diabetes Mother     Cataracts Father     Skin cancer Father     Stroke Father         2X    Aneurysm Father     No Known Problems Sister     No Known Problems Brother                 Past Surgical History:   Procedure Laterality Date    COLONOSCOPY N/A 4/25/2016    Procedure: COLONOSCOPY;  Surgeon: Griselda Kaye MD;  Location: [place] [address] GI LAB;   Service:     DENTAL SURGERY      ALL TEETH PULLED    EPIDURAL BLOCK INJECTION      EPIDURAL BLOCK INJECTION Right 12/28/2016    Procedure: BLOCK / INJECTION EPIDURAL STEROID TRANSFORAMINAL  L4-5;  Surgeon: Toñito Loya MD;  Location: [place] [address] MAIN OR;  Service:     EPIDURAL BLOCK INJECTION Right 11/16/2017    Procedure: BLOCK / INJECTION EPIDURAL STEROID TRANSFORAMINAL L4-5 RIGHT;  Surgeon: Toñito Loya MD;  Location: [place] [address] MAIN OR;  Service: Pain Management     EPIDURAL BLOCK INJECTION Left 5/10/2018    Procedure: Lt L4 L5 Tfesi ([id number]); Surgeon: Toñito Loya MD;  Location: [facility] MAIN OR;  Service: Pain Management     ESOPHAGOGASTRODUODENOSCOPY N/A 4/25/2016    Procedure: ESOPHAGOGASTRODUODENOSCOPY (EGD); Surgeon: Helio Casanova MD;  Location: [facility] GI LAB; Service:     NOSE SURGERY             Social History     Social History    Marital status: Single     Spouse name: N/A    Number of children: N/A    Years of education: N/A     Occupational History    Not on file       Social History Main Topics    Smoking status: Former Smoker     Packs/day: 0 50     Years: 19 00     Types: Cigarettes     Quit date: 1998    Smokeless tobacco: Never Used    Alcohol use Yes      Comment: occ beer    Drug use: No    Sexual activity: Not on file     Other Topics Concern    Not on file     Social History Narrative    No narrative on file       Review of Systems  Refer to positive review of systems in HPI  Constitutional- No fever  Eyes- No visual change  ENT- Hearing normal  CV- No chest pain  Resp- No Shortness of breath  GI- No diarrhea  - Bladder normal  MS- No Arthritis   Skin- No rash  Psych- +anxiety, ptsd   Endo- No DM  Heme- No nodes    PHYSICAL EXAM:    Vitals:    06/28/18 0958   BP: 112/82   BP Location: Left arm   Patient Position: Sitting   Pulse: 83   Weight: 98 4 kg (217 lb)   Height: 5' 11" (1 803 m)         Appearance: No Acute Distress  Ophthalmoscopic: Disc Flat, Normal fundus  Carotid/Heart/Peripheral Vascular: No Bruits, RRR  Orientation: Awake, Alert, and Oriented x 3  Mental status:  Memory: Registation 3/3 Recall 2/3  Able to complete series of 7 all the way to 2  No difficulty spelling world backwards  MMSE: 27/30  Attention: Normal  Knowledge: Appropriate  Language: No aphasia  Speech: No dysarthria  Cranial Nerves:  2 No Visual Defect on Confrontation; Pupils round, equal, reactive to light  3,4,6 Extraocular Movements Intact; no nystagmus  5 Facial Sensation Intact  7 No facial asymmetry  8 Intact hearing  9,10 Palate symmetric, normal gag  11 Good shoulder shrug  12 Tongue Midline  Gait: Stable, No ataxia, can perform tandem walking  Coordination: No ataxia with finger to nose testing and heel to shin testing  Sensory: Intact, Symmetric to Pinprick, Light Touch, Vibration, and Joint Position  Muscle Tone: Normal  Muscle exam  Arm Right Left Leg Right Left   Deltoid 5/5 5/5 Iliopsoas 5/5 5/5   Biceps 5/5 5/5 Quads 5/5 5/5   Triceps 5/5 5/5 Hamstrings 5/5 5/5   Wrist Extension 5/5 5/5 Ankle Dorsi Flexion 5/5 5/5   Wrist Flexion 5/5 5/5 Ankle Plantar Flexion 5/5 5/5   Interossei 5/5 5/5 Ankle Eversion 5/5 5/5   APB 5/5 5/5 Ankle Inversion 5/5 5/5       Reflexes   RJ BJ TJ KJ AJ Plantars Rankin's   Right 2+ 2+ 2+ 2+ 2+ Downgoing Not present   Left 2+ 2+ 2+ 2+ 2+ Downgoing Not present         Personal review of              Assessment/Plan:     1  Memory disturbance     2  PTSD (post-traumatic stress disorder)     3  Abnormal EEG     4  Vitamin D deficiency  cholecalciferol (VITAMIN D3) 1,000 units tablet   5  Fatigue, unspecified type  cyanocobalamin (VITAMIN B-12) 1,000 mcg tablet         *There is possibility that on May 19th he may have had a complex partial seizure but it's unclear  I would ideally want to put him on seizure preventive medication but patient has just gotten off of gabapentin and celexa, he's worried about possible side effects     What I recommend to his psychiatrist is to choose a psychiatric medications that is also an anti seizure medication such as tegretol, lamictal, depakote so we can use it to treat him for both  His neuropsych testing is pending as he can't travel to [facility]  I did advise him to take vitamin D and B12 supplements  Counseling Documentation:  The patient and/or patient's family were  counseled regarding diagnostic results  Instructions for management,risk factor reductions,prognosis of disease were discussed  Patient and family were educated regarding impressions,risks and benefits of treatment options,importance of compliance with treatment  Total time of encounter: 30 min   More than 50% of time was spent in counseling and coordination of care of patient  [facility]  [address]  [facility] [address]

## 2018-07-16 DIAGNOSIS — K21.9 GASTROESOPHAGEAL REFLUX DISEASE WITHOUT ESOPHAGITIS: ICD-10-CM

## 2018-07-16 RX ORDER — RANITIDINE 300 MG/1
TABLET ORAL
Qty: 30 TABLET | Refills: 3 | OUTPATIENT
Start: 2018-07-16

## 2018-07-26 DIAGNOSIS — Z76.0 MEDICATION REFILL: Primary | ICD-10-CM

## 2018-07-26 DIAGNOSIS — K21.9 GASTROESOPHAGEAL REFLUX DISEASE WITHOUT ESOPHAGITIS: ICD-10-CM

## 2018-07-26 RX ORDER — OMEPRAZOLE 20 MG/1
CAPSULE, DELAYED RELEASE ORAL
Qty: 30 CAPSULE | Refills: 3 | Status: SHIPPED | OUTPATIENT
Start: 2018-07-26 | End: 2018-10-31 | Stop reason: SDUPTHER

## 2018-07-26 RX ORDER — RANITIDINE 300 MG/1
TABLET ORAL
Qty: 30 TABLET | Refills: 3 | Status: SHIPPED | OUTPATIENT
Start: 2018-07-26 | End: 2018-11-11 | Stop reason: SDUPTHER

## 2018-07-30 DIAGNOSIS — Z76.0 MEDICATION REFILL: ICD-10-CM

## 2018-07-30 RX ORDER — ASPIRIN 81 MG/1
TABLET ORAL
Qty: 30 TABLET | Refills: 5 | OUTPATIENT
Start: 2018-07-30

## 2018-08-06 ENCOUNTER — OFFICE VISIT (OUTPATIENT)
Dept: PODIATRY | Facility: CLINIC | Age: 58
End: 2018-08-06
Payer: COMMERCIAL

## 2018-08-06 VITALS — HEIGHT: 71 IN | RESPIRATION RATE: 17 BRPM | WEIGHT: 217 LBS | BODY MASS INDEX: 30.38 KG/M2

## 2018-08-06 DIAGNOSIS — M79.672 PAIN IN BOTH FEET: ICD-10-CM

## 2018-08-06 DIAGNOSIS — M79.671 PAIN IN BOTH FEET: ICD-10-CM

## 2018-08-06 DIAGNOSIS — M21.969 ACQUIRED DEFORMITY OF FOOT, UNSPECIFIED LATERALITY: ICD-10-CM

## 2018-08-06 DIAGNOSIS — M20.42 HAMMER TOES OF BOTH FEET: Primary | ICD-10-CM

## 2018-08-06 DIAGNOSIS — M20.41 HAMMER TOES OF BOTH FEET: Primary | ICD-10-CM

## 2018-08-06 PROCEDURE — 99213 OFFICE O/P EST LOW 20 MIN: CPT | Performed by: PODIATRIST

## 2018-08-06 NOTE — PROGRESS NOTES
Procedures   Foot Exam         Assessment/Plan:  Metatarsalgia  Hammertoe formation  Pes planus  Pain      Plan  Foot exam performed  All nails debrided  Patient will benefit from orthotics  Patient needs referral to Orthopedics for radiculopathy workup     No problem-specific Assessment & Plan notes found for this encounter          There are no diagnoses linked to this encounter        Subjective:   Patient only has mild pain in his feet with ambulation  He has known radiculopathy  Past Medical History:   Diagnosis Date    Amnesia memory loss      Anxiety      Chronic fatigue      Disc degeneration, lumbar      Esophageal reflux      GERD (gastroesophageal reflux disease)      Glaucoma      Hiatal hernia      Increased pressure in the eye       both eyes; denies glaucoma     Internal hemorrhoids      Lumbar disc herniation with radiculopathy      Memory loss      Personal history of colonic polyps                 Past Surgical History:   Procedure Laterality Date    COLONOSCOPY N/A 4/25/2016     Procedure: COLONOSCOPY;  Surgeon: Melany Velásquez MD;  Location: [facility] GI LAB; Service:     DENTAL SURGERY         ALL TEETH PULLED    EPIDURAL BLOCK INJECTION        EPIDURAL BLOCK INJECTION Right 12/28/2016     Procedure: BLOCK / INJECTION EPIDURAL STEROID TRANSFORAMINAL  L4-5;  Surgeon: Arvind Lopez MD;  Location: [facility] MAIN OR;  Service:     EPIDURAL BLOCK INJECTION Right 11/16/2017     Procedure: BLOCK / INJECTION EPIDURAL STEROID TRANSFORAMINAL L4-5 RIGHT;  Surgeon: Arvind Lopez MD;  Location: [facility] MAIN OR;  Service: Pain Management     EPIDURAL BLOCK INJECTION Left 5/10/2018     Procedure: Lt L4 L5 Tfesi ([id number]); Surgeon: Arvind Lopez MD;  Location: [facility] MAIN OR;  Service: Pain Management     ESOPHAGOGASTRODUODENOSCOPY N/A 4/25/2016     Procedure: ESOPHAGOGASTRODUODENOSCOPY (EGD); Surgeon: Melany Velásquez MD;  Location: [facility] GI LAB;   Service:     NOSE SURGERY                 Allergies   Allergen Reactions    Gabapentin Other (See Comments)       OUT OF BODY PSYCHOSIS, MEMORY LOSS, AMNESIA    Other Cough       HAYFEVER, SEASONAL, ALFALFA            Current Outpatient Prescriptions:     aspirin 81 MG tablet, Take 81 mg by mouth daily  , Disp: , Rfl:     omeprazole (PriLOSEC) 20 mg delayed release capsule, Take 1 capsule (20 mg total) by mouth daily, Disp: 30 capsule, Rfl: 3    ranitidine (ZANTAC) 300 MG tablet, Take 1 tablet (300 mg total) by mouth daily at bedtime, Disp: 30 tablet, Rfl: 3    tamsulosin (FLOMAX) 0 4 mg, Take 0 4 mg by mouth daily with dinner, Disp: , Rfl:     timolol (BETIMOL) 0 5 % ophthalmic solution, Administer 1 drop to both eyes 2 (two) times a day , Disp: , Rfl:     timolol (TIMOPTIC) 0 5 % ophthalmic solution, INSTILL 1 DROP INTO BOTH EYES TWO TIMES A DAY, Disp: , Rfl: 0    citalopram (CeleXA) 10 mg tablet, Take 3 tablets (30 mg total) by mouth daily for 90 days, Disp: 270 tablet, Rfl: 0    gabapentin (NEURONTIN) 300 mg capsule, Take 2 capsules (600 mg total) by mouth 3 (three) times a day, Disp: 90 capsule, Rfl: 3    gabapentin (NEURONTIN) 600 MG tablet, , Disp: , Rfl: 0         Patient Active Problem List   Diagnosis    Chronic pain syndrome    Chronic low back pain    Intervertebral disc disorder with radiculopathy of lumbar region    Spondylosis of lumbar region without myelopathy or radiculopathy    Abnormal chromosomal analysis    Adult-onset obesity    Anxiety    Elevated PSA    Enlarged prostate on rectal examination    Gastroesophageal reflux disease without esophagitis    Lumbar disc herniation with radiculopathy    Myofascial pain syndrome    Chronic fatigue    Memory loss    Glaucoma    Neuritis or radiculitis due to rupture of lumbar intervertebral disc    Low back pain             Patient ID: Joyceann Ing is a 62 y o  male      Objective:      Foot Exam     General  General Appearance: appears stated age and healthy   Orientation: alert and oriented to person, place, and time   Affect: appropriate   Gait: antalgic         Right Foot/Ankle      Inspection and Palpation  Ecchymosis: none  Tenderness: bony tenderness and metatarsals   Swelling: metatarsals   Arch: pes planus  Hammertoes: fourth toe and fifth toe  Hallux valgus: no  Hallux limitus: yes  Skin Exam: callus;      Neurovascular  Dorsalis pedis: 2+  Posterior tibial: 2+  Saphenous nerve sensation: diminished  Tibial nerve sensation: diminished  Superficial peroneal nerve sensation: diminished  Deep peroneal nerve sensation: diminished  Sural nerve sensation: diminished  Achilles reflex: 1+     Muscle Strength  Ankle dorsiflexion: 4+     Tests  Lateral squeeze: positive      Comments  4th and 5th toes bilateral are in a abducto varus position  Pinch callus formation of 4th and 5th toes      Left Foot/Ankle       Inspection and Palpation  Ecchymosis: none  Tenderness: bony tenderness and metatarsals   Swelling: metatarsals   Arch: pes planus  Hammertoes: fourth toe and fifth toe  Hallux valgus: no  Hallux limitus: yes  Skin Exam: callus;      Neurovascular  Dorsalis pedis: 2+  Posterior tibial: 2+  Saphenous nerve sensation: diminished  Tibial nerve sensation: diminished  Superficial peroneal nerve sensation: diminished  Deep peroneal nerve sensation: diminished  Sural nerve sensation: diminished  Achilles reflex: 1+     Muscle Strength  Ankle dorsiflexion: 4+     Tests  Too many toes: positive      Comments  Patient is maximally pronated in stance and gait  There is transverse deviation of the DIPJ 4th toe bilateral   Physical Exam   Cardiovascular:   Pulses:       Dorsalis pedis pulses are 2+ on the right side, and 2+ on the left side  Posterior tibial pulses are 2+ on the right side, and 2+ on the left side  Musculoskeletal:        Right foot: There is bony tenderness  Left foot: There is bony tenderness     Feet:   Right Foot:   Skin Integrity: Positive for callus  Left Foot:   Skin Integrity: Positive for callus     Neurological:   Reflex Scores:       Achilles reflexes are 1+ on the right side and 1+ on the left side

## 2018-08-14 ENCOUNTER — OFFICE VISIT (OUTPATIENT)
Dept: FAMILY MEDICINE CLINIC | Facility: CLINIC | Age: 58
End: 2018-08-14
Payer: COMMERCIAL

## 2018-08-14 VITALS
SYSTOLIC BLOOD PRESSURE: 112 MMHG | TEMPERATURE: 95.2 F | HEART RATE: 76 BPM | OXYGEN SATURATION: 93 % | WEIGHT: 222 LBS | BODY MASS INDEX: 30.96 KG/M2 | DIASTOLIC BLOOD PRESSURE: 58 MMHG | RESPIRATION RATE: 20 BRPM

## 2018-08-14 DIAGNOSIS — Z13.1 SCREENING FOR DIABETES MELLITUS: Primary | ICD-10-CM

## 2018-08-14 DIAGNOSIS — G62.9 PERIPHERAL POLYNEUROPATHY: ICD-10-CM

## 2018-08-14 DIAGNOSIS — Z13.220 SCREENING FOR HYPERLIPIDEMIA: ICD-10-CM

## 2018-08-14 DIAGNOSIS — Z13.228 SCREENING FOR METABOLIC DISORDER: ICD-10-CM

## 2018-08-14 PROCEDURE — 3725F SCREEN DEPRESSION PERFORMED: CPT | Performed by: FAMILY MEDICINE

## 2018-08-14 PROCEDURE — 99213 OFFICE O/P EST LOW 20 MIN: CPT | Performed by: FAMILY MEDICINE

## 2018-08-16 NOTE — PROGRESS NOTES
Sawyer Ca is a 62 y o  male who presents for follow up of chronic conditions and for diabetes screening  Current symptoms include: paresthesia of the feet  Patient denies foot ulcerations, increased appetite, nausea, polydipsia, polyuria, vomiting and weight loss  Evaluation to date has been: fasting blood sugar and fasting lipid panel  Home sugars: patient does not check sugars  Current treatments: none  He is establishing care with me as his new PCP  He states that due to his neuropathic symptoms, he is concerned that he may have diabetes  He states that diabetes does run in his family  His A1c in may of 2016 was normal at 5 3%  Prior metabolic panels have also shown normal to slightly elevated fasting blood glucose values  He also requests to check his cholesterol at this point  He follows up with several specialists regularly  He sees GI for his hiatal hernia and reflux symptoms  He follows neurology for his neuropathy, for PTSD, and for nonspecific memory issues and forgetfulness  He also follows podiatry for his hammertoe and flat feet  Despite complaining of continued neuropathy, he has been weaned off his gabapentin due to side effects, however he will not elaborate on what those side effects were  He otherwise denies any complaints  He feels well overall and denies CP, SOB, HA, N/V/D, palpitations, lightheadedness, dizziness, fevers, chills, abdominal pain, or /GI symptoms  The following portions of the patient's history were reviewed and updated as appropriate: allergies, current medications, past family history, past medical history, past social history, past surgical history and problem list     Review of Systems  Pertinent items are noted in HPI       Objective       Physical Exam   Constitutional: He is oriented to person, place, and time  He appears well-developed and well-nourished  No distress  HENT:   Head: Normocephalic     Right Ear: External ear normal  Left Ear: External ear normal    Mouth/Throat: Oropharynx is clear and moist  No oropharyngeal exudate  Eyes: Conjunctivae and EOM are normal  Pupils are equal, round, and reactive to light  Right eye exhibits no discharge  Left eye exhibits no discharge  No scleral icterus  Cardiovascular: Normal rate, regular rhythm, normal heart sounds and intact distal pulses  Pulmonary/Chest: Effort normal and breath sounds normal  No respiratory distress  He has no wheezes  Abdominal: Soft  Bowel sounds are normal  He exhibits no distension  There is no tenderness  Musculoskeletal: He exhibits no edema, tenderness or deformity  Neurological: He is alert and oriented to person, place, and time  No sensory deficit  Skin: Skin is warm and dry  Capillary refill takes less than 2 seconds  He is not diaphoretic  No erythema  No pallor  Psychiatric: He has a normal mood and affect  His behavior is normal  Judgment and thought content normal          Laboratory:  Lab Results   Component Value Date    HGBA1C 5 3 05/04/2016       Assessment/Plan     Screening for diabetes Mellitus  Peripheral Polyneuropathy  Screening for Hyperlipidemia  Screening for metabolic disorder    · Discussed general issues about diabetes pathophysiology and management  · Suggested low cholesterol diet  · Encouraged aerobic exercise  · Labs: fasting lipid panel, hemoglobin X7F and metabolic panel  · Follow up in 3 months or as needed  Will call patient with any abnormal lab results and address these abnormalities as indicated  All patient questions & concerns were addressed  The patient agrees with his treatment plan  RTO 3 mo or curt Crouch DO  08/16/18  3:11 PM    Some portions of this record may have been generated with voice recognition software  There may be translation, syntax, or grammatical errors   Occasional wrong word or "sound-a-like" substitutions may have occurred due to the inherent limitations of the voice recognition software  Read the chart carefully and recognize, using context, where substations may have occurred   If you have any questions, please contact the dictating provider for clarification or correction, as needed

## 2018-08-22 LAB
ALBUMIN SERPL-MCNC: 4.4 G/DL (ref 3.5–5.5)
ALBUMIN/GLOB SERPL: 2.1 {RATIO} (ref 1.2–2.2)
ALP SERPL-CCNC: 72 IU/L (ref 39–117)
ALT SERPL-CCNC: 36 IU/L (ref 0–44)
AST SERPL-CCNC: 23 IU/L (ref 0–40)
BILIRUB SERPL-MCNC: 0.4 MG/DL (ref 0–1.2)
BUN SERPL-MCNC: 13 MG/DL (ref 6–24)
BUN/CREAT SERPL: 14 (ref 9–20)
CALCIUM SERPL-MCNC: 9 MG/DL (ref 8.7–10.2)
CHLORIDE SERPL-SCNC: 103 MMOL/L (ref 96–106)
CHOLEST SERPL-MCNC: 172 MG/DL (ref 100–199)
CO2 SERPL-SCNC: 22 MMOL/L (ref 20–29)
CREAT SERPL-MCNC: 0.9 MG/DL (ref 0.76–1.27)
EST. AVERAGE GLUCOSE BLD GHB EST-MCNC: 105 MG/DL
GLOBULIN SER-MCNC: 2.1 G/DL (ref 1.5–4.5)
GLUCOSE SERPL-MCNC: 118 MG/DL (ref 65–99)
HBA1C MFR BLD: 5.3 % (ref 4.8–5.6)
HDLC SERPL-MCNC: 52 MG/DL
LDLC SERPL CALC-MCNC: 108 MG/DL (ref 0–99)
LDLC/HDLC SERPL: 2.1 RATIO (ref 0–3.6)
POTASSIUM SERPL-SCNC: 4.4 MMOL/L (ref 3.5–5.2)
PROT SERPL-MCNC: 6.5 G/DL (ref 6–8.5)
SL AMB EGFR AFRICAN AMERICAN: 108 ML/MIN/1.73
SL AMB EGFR NON AFRICAN AMERICAN: 94 ML/MIN/1.73
SL AMB VLDL CHOLESTEROL CALC: 12 MG/DL (ref 5–40)
SODIUM SERPL-SCNC: 142 MMOL/L (ref 134–144)
TRIGL SERPL-MCNC: 61 MG/DL (ref 0–149)

## 2018-09-17 ENCOUNTER — OFFICE VISIT (OUTPATIENT)
Dept: PODIATRY | Facility: CLINIC | Age: 58
End: 2018-09-17
Payer: COMMERCIAL

## 2018-09-17 VITALS — WEIGHT: 222 LBS | BODY MASS INDEX: 31.08 KG/M2 | HEIGHT: 71 IN | HEART RATE: 76 BPM | RESPIRATION RATE: 16 BRPM

## 2018-09-17 DIAGNOSIS — B35.9 DERMATOPHYTOSIS: ICD-10-CM

## 2018-09-17 DIAGNOSIS — M79.671 PAIN IN BOTH FEET: Primary | ICD-10-CM

## 2018-09-17 DIAGNOSIS — M21.969 ACQUIRED DEFORMITY OF FOOT, UNSPECIFIED LATERALITY: ICD-10-CM

## 2018-09-17 DIAGNOSIS — M20.42 HAMMER TOES OF BOTH FEET: ICD-10-CM

## 2018-09-17 DIAGNOSIS — M20.41 HAMMER TOES OF BOTH FEET: ICD-10-CM

## 2018-09-17 DIAGNOSIS — M79.672 PAIN IN BOTH FEET: Primary | ICD-10-CM

## 2018-09-17 PROCEDURE — 99213 OFFICE O/P EST LOW 20 MIN: CPT | Performed by: PODIATRIST

## 2018-09-17 RX ORDER — KETOCONAZOLE 20 MG/G
CREAM TOPICAL DAILY
Qty: 60 G | Refills: 5 | Status: SHIPPED | OUTPATIENT
Start: 2018-09-17 | End: 2019-01-28 | Stop reason: SDUPTHER

## 2018-09-17 NOTE — PROGRESS NOTES
Procedures   Foot Exam    Right Foot/Ankle     Comments  Chronic moccasin tinea pedis noted bilateral heel                Assessment/Plan:  Metatarsalgia   Hammertoe formation   Pes planus   Pain      Plan   Foot exam performed   All nails debrided   Patient will benefit from orthotics     Patient needs referral to Orthopedics for radiculopathy workup     No problem-specific Assessment & Plan notes found for this encounter          There are no diagnoses linked to this encounter        Subjective:   Patient only has mild pain in his feet with ambulation    He has known radiculopathy           Past Medical History:   Diagnosis Date    Amnesia memory loss      Anxiety      Chronic fatigue      Disc degeneration, lumbar      Esophageal reflux      GERD (gastroesophageal reflux disease)      Glaucoma      Hiatal hernia      Increased pressure in the eye       both eyes; denies glaucoma     Internal hemorrhoids      Lumbar disc herniation with radiculopathy      Memory loss      Personal history of colonic polyps                     Past Surgical History:   Procedure Laterality Date    COLONOSCOPY N/A 4/25/2016     Procedure: COLONOSCOPY;  Surgeon: Rebeka García MD;  Location: [facility] GI LAB;  Service:     DENTAL SURGERY         ALL TEETH PULLED    EPIDURAL BLOCK INJECTION        EPIDURAL BLOCK INJECTION Right 12/28/2016     Procedure: BLOCK / INJECTION EPIDURAL STEROID TRANSFORAMINAL  L4-5;  Surgeon: Bryan Hurtado MD;  Location: [facility] MAIN OR;  Service:     EPIDURAL BLOCK INJECTION Right 11/16/2017     Procedure: BLOCK / INJECTION EPIDURAL STEROID TRANSFORAMINAL L4-5 RIGHT;  Surgeon: Bryan Hurtado MD;  Location: [facility] MAIN OR;  Service: Pain Management     EPIDURAL BLOCK INJECTION Left 5/10/2018     Procedure: Lt L4 L5 Tfesi (10524,17279);  Surgeon: Bryan Hurtado MD;  Location: [facility] MAIN OR;  Service: Pain Management     ESOPHAGOGASTRODUODENOSCOPY N/A 4/25/2016     Procedure: ESOPHAGOGASTRODUODENOSCOPY (EGD);  Surgeon: Leatha Amos MD;  Location: [facility] GI LAB;  Service:     NOSE SURGERY                       Allergies   Allergen Reactions    Gabapentin Other (See Comments)       OUT OF BODY PSYCHOSIS, MEMORY LOSS, AMNESIA    Other Cough       HAYFEVER, SEASONAL, ALFALFA            Current Outpatient Prescriptions:     aspirin 81 MG tablet, Take 81 mg by mouth daily  , Disp: , Rfl:     omeprazole (PriLOSEC) 20 mg delayed release capsule, Take 1 capsule (20 mg total) by mouth daily, Disp: 30 capsule, Rfl: 3    ranitidine (ZANTAC) 300 MG tablet, Take 1 tablet (300 mg total) by mouth daily at bedtime, Disp: 30 tablet, Rfl: 3    tamsulosin (FLOMAX) 0 4 mg, Take 0 4 mg by mouth daily with dinner, Disp: , Rfl:     timolol (BETIMOL) 0 5 % ophthalmic solution, Administer 1 drop to both eyes 2 (two) times a day , Disp: , Rfl:     timolol (TIMOPTIC) 0 5 % ophthalmic solution, INSTILL 1 DROP INTO BOTH EYES TWO TIMES A DAY, Disp: , Rfl: 0    citalopram (CeleXA) 10 mg tablet, Take 3 tablets (30 mg total) by mouth daily for 90 days, Disp: 270 tablet, Rfl: 0    gabapentin (NEURONTIN) 300 mg capsule, Take 2 capsules (600 mg total) by mouth 3 (three) times a day, Disp: 90 capsule, Rfl: 3    gabapentin (NEURONTIN) 600 MG tablet, , Disp: , Rfl: 0           Patient Active Problem List   Diagnosis    Chronic pain syndrome    Chronic low back pain    Intervertebral disc disorder with radiculopathy of lumbar region    Spondylosis of lumbar region without myelopathy or radiculopathy    Abnormal chromosomal analysis    Adult-onset obesity    Anxiety    Elevated PSA    Enlarged prostate on rectal examination    Gastroesophageal reflux disease without esophagitis    Lumbar disc herniation with radiculopathy    Myofascial pain syndrome    Chronic fatigue    Memory loss    Glaucoma    Neuritis or radiculitis due to rupture of lumbar intervertebral disc    Low back pain             Patient ID: Jerry Lopez is a 62 y  o  male      Objective:      Foot Exam     General  General Appearance: appears stated age and healthy   Orientation: alert and oriented to person, place, and time   Affect: appropriate   Gait: antalgic         Right Foot/Ankle      Inspection and Palpation  Ecchymosis: none  Tenderness: bony tenderness and metatarsals   Swelling: metatarsals   Arch: pes planus  Hammertoes: fourth toe and fifth toe  Hallux valgus: no  Hallux limitus: yes  Skin Exam: callus;      Neurovascular  Dorsalis pedis: 2+  Posterior tibial: 2+  Saphenous nerve sensation: diminished  Tibial nerve sensation: diminished  Superficial peroneal nerve sensation: diminished  Deep peroneal nerve sensation: diminished  Sural nerve sensation: diminished  Achilles reflex: 1+     Muscle Strength  Ankle dorsiflexion: 4+     Tests  Lateral squeeze: positive      Comments  4th and 5th toes bilateral are in a abducto varus position  Pinch callus formation of 4th and 5th toes      Left Foot/Ankle       Inspection and Palpation  Ecchymosis: none  Tenderness: bony tenderness and metatarsals   Swelling: metatarsals   Arch: pes planus  Hammertoes: fourth toe and fifth toe  Hallux valgus: no  Hallux limitus: yes  Skin Exam: callus;      Neurovascular  Dorsalis pedis: 2+  Posterior tibial: 2+  Saphenous nerve sensation: diminished  Tibial nerve sensation: diminished  Superficial peroneal nerve sensation: diminished  Deep peroneal nerve sensation: diminished  Sural nerve sensation: diminished  Achilles reflex: 1+     Muscle Strength  Ankle dorsiflexion: 4+     Tests  Too many toes: positive      Comments  Patient is maximally pronated in stance and gait   There is transverse deviation of the DIPJ 4th toe bilateral   Physical Exam   Cardiovascular:   Pulses:       Dorsalis pedis pulses are 2+ on the right side, and 2+ on the left side         Posterior tibial pulses are 2+ on the right side, and 2+ on the left side     Musculoskeletal:      Right foot: There is bony tenderness         Left foot: There is bony tenderness  Feet:   Right Foot:   Skin Integrity: Positive for callus  Left Foot:   Skin Integrity: Positive for callus     Neurological:   Reflex Scores:       Achilles reflexes are 1+ on the right side and 1+ on the left side

## 2018-09-26 ENCOUNTER — OFFICE VISIT (OUTPATIENT)
Dept: NEUROLOGY | Facility: CLINIC | Age: 58
End: 2018-09-26
Payer: COMMERCIAL

## 2018-09-26 VITALS
SYSTOLIC BLOOD PRESSURE: 120 MMHG | HEIGHT: 71 IN | BODY MASS INDEX: 30.24 KG/M2 | HEART RATE: 91 BPM | DIASTOLIC BLOOD PRESSURE: 80 MMHG | WEIGHT: 216 LBS

## 2018-09-26 DIAGNOSIS — F43.10 PTSD (POST-TRAUMATIC STRESS DISORDER): Primary | ICD-10-CM

## 2018-09-26 DIAGNOSIS — R94.01 ABNORMAL EEG: ICD-10-CM

## 2018-09-26 PROCEDURE — 99214 OFFICE O/P EST MOD 30 MIN: CPT | Performed by: PSYCHIATRY & NEUROLOGY

## 2018-09-26 NOTE — PROGRESS NOTES
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)    HPI:     Mr Cintia Lux is a 63 yo M with PMH of neuropathy, memory disturbances presents as follow up  Per my previous history, on May 19th, patient was argumentative, forgetful and then went into shower  After 20 min, he was walking in hallway, couldn't understand simple conversation  He then asked what day is it  He then kept repeating same thing over and over  He thought president was [employer]  He was not oriented to month or day  Memory disturbance lasted from 10:30 until 5pm  He kept persevering over same questions  He had CT brain which was negative  Lab work was essentially normal  We had ordered MRI brain neuro quant which was normal  His EEG did however reveal 2 separate independent temporal sharps to clinically correlate  Patient has tendency to develop adverse reactions to a lot of medications so we did not consider AED but instead had recommended mood stabilizer which is an AED to his psychiatrist    He had previously reported memory disturbance so we had ordered neuropsych testing  as well but he can't go to [facility]  We do not know of any  other facility in [place] that does the test    All of reversible etiology labs ordered previously were all normal except mildly low vit D level for which he takes supplement of 2000units/day  He has h/o suffering from PTSD  Patient 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  She is verbally abusive  He was very upset today about having to wait for his visit  He feared that his patient is now going to yell at him all day for being late         Past Medical History:   Diagnosis Date    Amnesia memory loss     Anxiety     Chronic fatigue     Disc degeneration, lumbar     Esophageal reflux     GERD (gastroesophageal reflux disease)     Glaucoma     Hiatal hernia     Increased pressure in the eye     both eyes; denies glaucoma     Internal hemorrhoids     Lumbar disc herniation with radiculopathy     Memory loss     Personal history of colonic polyps                Current Outpatient Prescriptions on File Prior to Visit   Medication Sig Dispense Refill    aspirin 81 MG tablet Take 1 tablet (81 mg total) by mouth daily 90 tablet 3    cholecalciferol (VITAMIN D3) 1,000 units tablet Take 2 tablets (2,000 Units total) by mouth daily 60 tablet 6    cyanocobalamin (VITAMIN B-12) 1,000 mcg tablet Take 1 tablet (1,000 mcg total) by mouth daily 30 tablet 6    ketoconazole (NIZORAL) 2 % cream Apply topically daily for 30 days 60 g 5    omeprazole (PriLOSEC) 20 mg delayed release capsule take 1 capsule by mouth once daily 30 capsule 3    ranitidine (ZANTAC) 300 MG tablet take 1 tablet by mouth at bedtime 30 tablet 3    tamsulosin (FLOMAX) 0 4 mg Take 0 4 mg by mouth daily at bedtime        timolol (TIMOPTIC) 0 5 % ophthalmic solution INSTILL 1 DROP INTO BOTH EYES TWO TIMES A DAY  0    timolol (BETIMOL) 0 5 % ophthalmic solution Administer 1 drop to both eyes 2 (two) times a day  No current facility-administered medications on file prior to visit          Allergies   Allergen Reactions    Gabapentin Other (See Comments)     OUT OF BODY PSYCHOSIS, MEMORY LOSS, AMNESIA    Other Cough     HAYFEVER, SEASONAL, ALFALFA         Family History   Problem Relation Age of Onset    Stomach cancer Maternal Grandmother     Hypertension Mother     Diabetes Mother     Cataracts Father     Skin cancer Father     Stroke Father         2X    Aneurysm Father     No Known Problems Sister     No Known Problems Brother                 Past Surgical History:   Procedure Laterality Date    COLONOSCOPY N/A 4/25/2016    Procedure: COLONOSCOPY;  Surgeon: Velia Giles MD;  Location: [facility] GI LAB; Service:     DENTAL SURGERY      ALL TEETH PULLED    EPIDURAL BLOCK INJECTION      EPIDURAL BLOCK INJECTION Right 12/28/2016    Procedure: BLOCK / INJECTION EPIDURAL STEROID TRANSFORAMINAL  L4-5;  Surgeon: Merary Patel MD;  Location: [facility] MAIN OR;  Service:     EPIDURAL BLOCK INJECTION Right 11/16/2017    Procedure: BLOCK / INJECTION EPIDURAL STEROID TRANSFORAMINAL L4-5 RIGHT;  Surgeon: Merary Patel MD;  Location: [facility] MAIN OR;  Service: Pain Management     EPIDURAL BLOCK INJECTION Left 5/10/2018    Procedure: Lt L4 L5 Tfesi (86086,92561); Surgeon: Merary Patel MD;  Location: [facility] MAIN OR;  Service: Pain Management     ESOPHAGOGASTRODUODENOSCOPY N/A 4/25/2016    Procedure: ESOPHAGOGASTRODUODENOSCOPY (EGD); Surgeon: Cassidy Figueroa MD;  Location: [facility] GI LAB; Service:     NOSE SURGERY             Social History     Social History    Marital status: Single     Spouse name: N/A    Number of children: N/A    Years of education: N/A     Occupational History    Not on file       Social History Main Topics    Smoking status: Former Smoker     Packs/day: 0 50     Years: 19 00     Types: Cigarettes     Quit date: 1998    Smokeless tobacco: Never Used    Alcohol use No    Drug use: No    Sexual activity: Not on file     Other Topics Concern    Not on file     Social History Narrative    No narrative on file       Review of Systems  Refer to positive review of systems in HPI  Constitutional- No fever  Eyes- No visual change  ENT- Hearing normal  CV- No chest pain  Resp- No Shortness of breath  GI- No diarrhea  - Bladder normal  MS- No Arthritis   Skin- No rash  Psych- +anxiety,+ ptsd   Endo- No DM  Heme- No nodes    PHYSICAL EXAM:    Vitals:    09/26/18 1020   BP: 120/80   BP Location: Right arm   Patient Position: Sitting   Pulse: 91   Weight: 98 kg (216 lb)   Height: 5' 11" (1 803 m)         Appearance: No Acute Distress  Ophthalmoscopic: Disc Flat, Normal fundus  Carotid/Heart/Peripheral Vascular: No Bruits, RRR  Orientation: Awake, Alert, and Oriented x 3  Mental status:  Memory: Registation 3/3 Recall 2/3  Able to complete series of 7 all the way to 2  No difficulty spelling world backwards  MMSE: 27/30  Attention: Normal  Knowledge: Appropriate  Language: No aphasia  Speech: No dysarthria  Cranial Nerves:  2 No Visual Defect on Confrontation; Pupils round, equal, reactive to light  3,4,6 Extraocular Movements Intact; no nystagmus  5 Facial Sensation Intact  7 No facial asymmetry  8 Intact hearing  9,10 Palate symmetric, normal gag  11 Good shoulder shrug  12 Tongue Midline  Gait: Stable, No ataxia, can perform tandem walking  Coordination: No ataxia with finger to nose testing and heel to shin testing  Sensory: Intact, Symmetric to Pinprick, Light Touch, Vibration, and Joint Position  Muscle Tone: Normal  Muscle exam  Arm Right Left Leg Right Left   Deltoid 5/5 5/5 Iliopsoas 5/5 5/5   Biceps 5/5 5/5 Quads 5/5 5/5   Triceps 5/5 5/5 Hamstrings 5/5 5/5   Wrist Extension 5/5 5/5 Ankle Dorsi Flexion 5/5 5/5   Wrist Flexion 5/5 5/5 Ankle Plantar Flexion 5/5 5/5   Interossei 5/5 5/5 Ankle Eversion 5/5 5/5   APB 5/5 5/5 Ankle Inversion 5/5 5/5       Reflexes   RJ BJ TJ KJ AJ Plantars Rankin's   Right 2+ 2+ 2+ 2+ 2+ Downgoing Not present   Left 2+ 2+ 2+ 2+ 2+ Downgoing Not present         Personal review of              Assessment/Plan:     1  PTSD (post-traumatic stress disorder)     2  Abnormal EEG           Patient may have had complex partial seizure on May 19th but it's unclear  I do recommend that his psychiatrist consider a mood stabilizer for his anxiety, PTSD sxs that is also an anti seizure medication such as tegretol, lamictal, or depakote so we can use it to treat him for both  His neuropsych testing is pending as he can't travel to [facility]  I did advise him to take vitamin D and B12 supplements     Since we do not have him actively on medication, he may keep prn follow up  Counseling Documentation:  The patient and/or patient's family were  counseled regarding diagnostic results  Instructions for management,risk factor reductions,prognosis of disease were discussed  Patient and family were educated regarding impressions,risks and benefits of treatment options,importance of compliance with treatment  Total time of encounter: 30 min   More than 50% of time was spent in counseling and coordination of care of patient  YUNIOR Johnson  [facility]  [facility] [address]

## 2018-10-24 ENCOUNTER — TELEPHONE (OUTPATIENT)
Dept: GASTROENTEROLOGY | Facility: CLINIC | Age: 58
End: 2018-10-24

## 2018-10-24 ENCOUNTER — OFFICE VISIT (OUTPATIENT)
Dept: GASTROENTEROLOGY | Facility: CLINIC | Age: 58
End: 2018-10-24
Payer: COMMERCIAL

## 2018-10-24 VITALS
WEIGHT: 219 LBS | HEART RATE: 95 BPM | SYSTOLIC BLOOD PRESSURE: 112 MMHG | DIASTOLIC BLOOD PRESSURE: 78 MMHG | TEMPERATURE: 98.8 F | BODY MASS INDEX: 30.66 KG/M2 | HEIGHT: 71 IN

## 2018-10-24 DIAGNOSIS — R10.9 ABDOMINAL PAIN, UNSPECIFIED ABDOMINAL LOCATION: ICD-10-CM

## 2018-10-24 DIAGNOSIS — K21.9 GASTROESOPHAGEAL REFLUX DISEASE WITHOUT ESOPHAGITIS: Primary | ICD-10-CM

## 2018-10-24 DIAGNOSIS — K21.9 GASTROESOPHAGEAL REFLUX DISEASE WITHOUT ESOPHAGITIS: ICD-10-CM

## 2018-10-24 DIAGNOSIS — R10.9 ABDOMINAL PAIN, UNSPECIFIED ABDOMINAL LOCATION: Primary | ICD-10-CM

## 2018-10-24 PROCEDURE — 99214 OFFICE O/P EST MOD 30 MIN: CPT | Performed by: PHYSICIAN ASSISTANT

## 2018-10-24 NOTE — PROGRESS NOTES
[facility] - Outpatient Follow-up Note  Lilia Schofield 62 y o  male MRN: [id number]  Encounter: [id number]     ASSESSMENT AND PLAN:       GERD  Epigastric pain  -likely symptoms due to flare of GERD along with gastritis  -recommend increasing omeprazole to 40mg qam, continue zantac in the evening    -strongly advised cutting down on coffee consumption  -encouraged weight loss  -plan for RUQ U/S to rule out biliary pathology  -follow-up in 2 months, if persistent symptoms would consider repeat EGD  ______________________________________________________________________     SUBJECTIVE:  Jenna Model is a 61 y/o male who presents for follow-up  He reports an increase in GERD symptoms and epigastric abdominal pain over the last 2-3 months  This started after his gabapentin and celexa were stopped  He reports symptoms of reflux and heartburn throughout the day  He is currently taking omeprazole 20g QAM and zantac at night  He states his symptoms have somewhat improved since they started flaring  He denies nausea, vomiting, dysphagia, odynophagia, weight loss, change in bowel habits  He does report abdominal distension at times  He had an EGD 4/25/16 with mild gastritis negative for hpylori, and colonoscopy with internal hemorrhoids   He states he drinks about 6 cups of coffee daily          REVIEW OF SYSTEMS IS OTHERWISE NEGATIVE         Historical Information         Medical History        Past Medical History:   Diagnosis Date    Amnesia memory loss      Anxiety      Chronic fatigue      Disc degeneration, lumbar      Esophageal reflux      GERD (gastroesophageal reflux disease)      Glaucoma      Hiatal hernia      Increased pressure in the eye       both eyes; denies glaucoma     Internal hemorrhoids      Lumbar disc herniation with radiculopathy      Memory loss      Personal history of colonic polyps           Surgical History         Past Surgical History:   Procedure Laterality Date    COLONOSCOPY N/A 4/25/2016     Procedure: COLONOSCOPY;  Surgeon: Monique Desai MD;  Location: [facility] GI LAB; Service:     DENTAL SURGERY         ALL TEETH PULLED    EPIDURAL BLOCK INJECTION        EPIDURAL BLOCK INJECTION Right 12/28/2016     Procedure: BLOCK / INJECTION EPIDURAL STEROID TRANSFORAMINAL  L4-5;  Surgeon: Turner Arias MD;  Location: [facility] MAIN OR;  Service:     EPIDURAL BLOCK INJECTION Right 11/16/2017     Procedure: BLOCK / INJECTION EPIDURAL STEROID TRANSFORAMINAL L4-5 RIGHT;  Surgeon: Turner Arias MD;  Location: [facility] MAIN OR;  Service: Pain Management     EPIDURAL BLOCK INJECTION Left 5/10/2018     Procedure: Lt L4 L5 Tfesi ([id number]); Surgeon: Turner Arias MD;  Location: [facility] MAIN OR;  Service: Pain Management     ESOPHAGOGASTRODUODENOSCOPY N/A 4/25/2016     Procedure: ESOPHAGOGASTRODUODENOSCOPY (EGD); Surgeon: Monique Desai MD;  Location: [facility] GI LAB;   Service:     NOSE SURGERY             Social History             History   Alcohol Use No          History   Drug Use No           History   Smoking Status    Former Smoker    Packs/day: 0 50    Years: 19 00    Types: Cigarettes    Quit date: 1998   Smokeless Tobacco    Never Used            Family History   Problem Relation Age of Onset    Stomach cancer Maternal Grandmother      Hypertension Mother      Diabetes Mother      Cataracts Father      Skin cancer Father      Stroke Father           2X    Aneurysm Father      No Known Problems Sister      No Known Problems Brother           Meds/Allergies        Current Medications      Current Outpatient Prescriptions:     aspirin 81 MG tablet    cholecalciferol (VITAMIN D3) 1,000 units tablet    cyanocobalamin (VITAMIN B-12) 1,000 mcg tablet    ketoconazole (NIZORAL) 2 % cream    omeprazole (PriLOSEC) 20 mg delayed release capsule    ranitidine (ZANTAC) 300 MG tablet    tamsulosin (FLOMAX) 0 4 mg    timolol (BETIMOL) 0 5 % ophthalmic solution    timolol (TIMOPTIC) 0 5 % ophthalmic solution              Allergies   Allergen Reactions    Gabapentin Other (See Comments)       OUT OF BODY PSYCHOSIS, MEMORY LOSS, AMNESIA    Other Cough       HAYFEVER, SEASONAL, ALFALFA               Objective      There were no vitals taken for this visit  There is no height or weight on file to calculate BMI         PHYSICAL EXAM:       General Appearance:   Alert, cooperative, no distress   HEENT:   Normocephalic, atraumatic, anicteric      Neck:  Supple, symmetrical, trachea midline   Lungs:   Clear to auscultation bilaterally   Heart[de-identified]   Regular rate and rhythm   Abdomen:   Soft, non-tender, mildly distended; normal bowel sounds   Lab Results:         No visits with results within 1 Day(s) from this visit  Latest known visit with results is:   Office Visit on 08/14/2018   Component Date Value    Glucose 08/22/2018 118*    BUN 08/22/2018 13     Creatinine 08/22/2018 0 90     eGFR Non  08/22/2018 94     SL AMB EGFR  AMER* 08/22/2018 108     SL AMB BUN/CREATININE RA* 08/22/2018 14     Sodium 08/22/2018 142     Potassium 08/22/2018 4 4     Chloride 08/22/2018 103     CO2 08/22/2018 22     CALCIUM 08/22/2018 9 0     SL AMB PROTEIN, TOTAL 08/22/2018 6 5     Albumin 08/22/2018 4 4     Globulin, Total 08/22/2018 2 1     SL AMB ALBUMIN/GLOBULIN * 08/22/2018 2 1     TOTAL BILIRUBIN 08/22/2018 0 4     Alk Phos Isoenzymes 08/22/2018 72     SL AMB AST 08/22/2018 23     SL AMB ALT 08/22/2018 36     Cholesterol, Total 08/22/2018 172     Triglycerides 08/22/2018 61     HDL 08/22/2018 52     SL AMB VLDL CHOLESTEROL * 08/22/2018 12     LDL Direct 08/22/2018 108*    LDl/HDL Ratio 08/22/2018 2 1     Hemoglobin A1C 08/22/2018 5 3     Estimated Average Glucose 08/22/2018 105       Radiology Results:   No results found        Orders Placed      US abdomen complete  Medication Changes        None      Medication List      Visit Diagnoses         Abdominal pain, unspecified abdominal location      Gastroesophageal reflux disease without esophagitis      Problem List

## 2018-10-24 NOTE — PROGRESS NOTES
[facility]toinettemall[facility] - Outpatient Follow-up Note  Sachin Saliva 62 y o  male MRN: [id number]  Encounter: [id number]    ASSESSMENT AND PLAN:      GERD  Epigastric pain  -likely symptoms due to flare of GERD along with gastritis  -recommend increasing omeprazole to 40mg qam, continue zantac in the evening    -strongly advised cutting down on coffee consumption  -encouraged weight loss  -plan for RUQ U/S to rule out biliary pathology  -follow-up in 2 months, if persistent symptoms would consider repeat EGD  ______________________________________________________________________    SUBJECTIVE:  Maribel Nur is a 63 y/o male who presents for follow-up  He reports an increase in GERD symptoms and epigastric abdominal pain over the last 2-3 months  This started after his gabapentin and celexa were stopped  He reports symptoms of reflux and heartburn throughout the day  He is currently taking omeprazole 20g QAM and zantac at night  He states his symptoms have somewhat improved since they started flaring  He denies nausea, vomiting, dysphagia, odynophagia, weight loss, change in bowel habits  He does report abdominal distension at times  He had an EGD 4/25/16 with mild gastritis negative for hpylori, and colonoscopy with internal hemorrhoids  He states he drinks about 6 cups of coffee daily  REVIEW OF SYSTEMS IS OTHERWISE NEGATIVE        Historical Information   Past Medical History:   Diagnosis Date    Amnesia memory loss     Anxiety     Chronic fatigue     Disc degeneration, lumbar     Esophageal reflux     GERD (gastroesophageal reflux disease)     Glaucoma     Hiatal hernia     Increased pressure in the eye     both eyes; denies glaucoma     Internal hemorrhoids     Lumbar disc herniation with radiculopathy     Memory loss     Personal history of colonic polyps      Past Surgical History:   Procedure Laterality Date    COLONOSCOPY N/A 4/25/2016    Procedure: COLONOSCOPY;  Surgeon: Morgan Rodriguez MD;  Location: [place] [address] GI LAB; Service:     DENTAL SURGERY      ALL TEETH PULLED    EPIDURAL BLOCK INJECTION      EPIDURAL BLOCK INJECTION Right 12/28/2016    Procedure: BLOCK / INJECTION EPIDURAL STEROID TRANSFORAMINAL  L4-5;  Surgeon: Lissa Martin MD;  Location: [place] [address] MAIN OR;  Service:     EPIDURAL BLOCK INJECTION Right 11/16/2017    Procedure: BLOCK / INJECTION EPIDURAL STEROID TRANSFORAMINAL L4-5 RIGHT;  Surgeon: Lissa Martin MD;  Location: [place] [address] MAIN OR;  Service: Pain Management     EPIDURAL BLOCK INJECTION Left 5/10/2018    Procedure: Lt L4 L5 Tfesi (85589,65824); Surgeon: Lissa Martin MD;  Location: [facility] MAIN OR;  Service: Pain Management     ESOPHAGOGASTRODUODENOSCOPY N/A 4/25/2016    Procedure: ESOPHAGOGASTRODUODENOSCOPY (EGD); Surgeon: Morgan Rodriguez MD;  Location: [facility] GI LAB;   Service:     NOSE SURGERY       Social History   History   Alcohol Use No     History   Drug Use No     History   Smoking Status    Former Smoker    Packs/day: 0 50    Years: 19 00    Types: Cigarettes    Quit date: 1998   Smokeless Tobacco    Never Used     Family History   Problem Relation Age of Onset    Stomach cancer Maternal Grandmother     Hypertension Mother     Diabetes Mother     Cataracts Father     Skin cancer Father     Stroke Father         2X    Aneurysm Father     No Known Problems Sister     No Known Problems Brother        Meds/Allergies       Current Outpatient Prescriptions:     aspirin 81 MG tablet    cholecalciferol (VITAMIN D3) 1,000 units tablet    cyanocobalamin (VITAMIN B-12) 1,000 mcg tablet    ketoconazole (NIZORAL) 2 % cream    omeprazole (PriLOSEC) 20 mg delayed release capsule    ranitidine (ZANTAC) 300 MG tablet    tamsulosin (FLOMAX) 0 4 mg    timolol (BETIMOL) 0 5 % ophthalmic solution    timolol (TIMOPTIC) 0 5 % ophthalmic solution    Allergies   Allergen Reactions    Gabapentin Other (See Comments)     OUT OF BODY PSYCHOSIS, MEMORY LOSS, AMNESIA    Other Cough     HAYFEVER, SEASONAL, ALFALFA           Objective     There were no vitals taken for this visit  There is no height or weight on file to calculate BMI  PHYSICAL EXAM:      General Appearance:   Alert, cooperative, no distress   HEENT:   Normocephalic, atraumatic, anicteric      Neck:  Supple, symmetrical, trachea midline   Lungs:   Clear to auscultation bilaterally   Heart[de-identified]   Regular rate and rhythm   Abdomen:   Soft, non-tender, mildly distended; normal bowel sounds   Lab Results:   No visits with results within 1 Day(s) from this visit  Latest known visit with results is:   Office Visit on 08/14/2018   Component Date Value    Glucose 08/22/2018 118*    BUN 08/22/2018 13     Creatinine 08/22/2018 0 90     eGFR Non  08/22/2018 94     SL AMB EGFR  AMER* 08/22/2018 108     SL AMB BUN/CREATININE RA* 08/22/2018 14     Sodium 08/22/2018 142     Potassium 08/22/2018 4 4     Chloride 08/22/2018 103     CO2 08/22/2018 22     CALCIUM 08/22/2018 9 0     SL AMB PROTEIN, TOTAL 08/22/2018 6 5     Albumin 08/22/2018 4 4     Globulin, Total 08/22/2018 2 1     SL AMB ALBUMIN/GLOBULIN * 08/22/2018 2 1     TOTAL BILIRUBIN 08/22/2018 0 4     Alk Phos Isoenzymes 08/22/2018 72     SL AMB AST 08/22/2018 23     SL AMB ALT 08/22/2018 36     Cholesterol, Total 08/22/2018 172     Triglycerides 08/22/2018 61     HDL 08/22/2018 52     SL AMB VLDL CHOLESTEROL * 08/22/2018 12     LDL Direct 08/22/2018 108*    LDl/HDL Ratio 08/22/2018 2 1     Hemoglobin A1C 08/22/2018 5 3     Estimated Average Glucose 08/22/2018 105      Radiology Results:   No results found

## 2018-10-24 NOTE — LETTER
October 24, 2018     Israel Conroy MD  [facility]  [address]    Patient: Talon Marcelo   YOB: 1960   Date of Visit: 10/24/2018       Dear Dr Leisa Severs: Thank you for referring Carmen Varela to me for evaluation  Below are my notes for this consultation  If you have questions, please do not hesitate to call me  I look forward to following your patient along with you  Sincerely,        Raymundo Quiles PA-C        CC: No Recipients  Raymundo Quiles PA-C  10/24/2018  1:35 PM  Sign at close encounter  [facility] - Outpatient Follow-up Note  Talon Marcelo 62 y o  male MRN: [id number]  Encounter: [id number]    ASSESSMENT AND PLAN:      GERD  Epigastric pain  -likely symptoms due to flare of GERD along with gastritis  -recommend increasing omeprazole to 40mg qam, continue zantac in the evening    -strongly advised cutting down on coffee consumption  -encouraged weight loss  -plan for RUQ U/S to rule out biliary pathology  -follow-up in 2 months, if persistent symptoms would consider repeat EGD  ______________________________________________________________________    SUBJECTIVE:  Carmen Varela is a 61 y/o male who presents for follow-up  He reports an increase in GERD symptoms and epigastric abdominal pain over the last 2-3 months  This started after his gabapentin and celexa were stopped  He reports symptoms of reflux and heartburn throughout the day  He is currently taking omeprazole 20g QAM and zantac at night  He states his symptoms have somewhat improved since they started flaring  He denies nausea, vomiting, dysphagia, odynophagia, weight loss, change in bowel habits  He does report abdominal distension at times  He had an EGD 4/25/16 with mild gastritis negative for hpylori, and colonoscopy with internal hemorrhoids  He states he drinks about 6 cups of coffee daily  REVIEW OF SYSTEMS IS OTHERWISE NEGATIVE        Historical Information   Past Medical History:   Diagnosis Date    Amnesia memory loss     Anxiety     Chronic fatigue     Disc degeneration, lumbar     Esophageal reflux     GERD (gastroesophageal reflux disease)     Glaucoma     Hiatal hernia     Increased pressure in the eye     both eyes; denies glaucoma     Internal hemorrhoids     Lumbar disc herniation with radiculopathy     Memory loss     Personal history of colonic polyps      Past Surgical History:   Procedure Laterality Date    COLONOSCOPY N/A 4/25/2016    Procedure: COLONOSCOPY;  Surgeon: Angie Andrews MD;  Location: [facility] GI LAB; Service:     DENTAL SURGERY      ALL TEETH PULLED    EPIDURAL BLOCK INJECTION      EPIDURAL BLOCK INJECTION Right 12/28/2016    Procedure: BLOCK / INJECTION EPIDURAL STEROID TRANSFORAMINAL  L4-5;  Surgeon: Lissette Cerda MD;  Location: [facility] MAIN OR;  Service:     EPIDURAL BLOCK INJECTION Right 11/16/2017    Procedure: BLOCK / INJECTION EPIDURAL STEROID TRANSFORAMINAL L4-5 RIGHT;  Surgeon: Lissette Cerda MD;  Location: [facility] MAIN OR;  Service: Pain Management     EPIDURAL BLOCK INJECTION Left 5/10/2018    Procedure: Lt L4 L5 Tfesi ([id number]); Surgeon: Lissette Cerda MD;  Location: [facility] MAIN OR;  Service: Pain Management     ESOPHAGOGASTRODUODENOSCOPY N/A 4/25/2016    Procedure: ESOPHAGOGASTRODUODENOSCOPY (EGD); Surgeon: Angie Andrews MD;  Location: [facility] GI LAB;   Service:     NOSE SURGERY       Social History   History   Alcohol Use No     History   Drug Use No     History   Smoking Status    Former Smoker    Packs/day: 0 50    Years: 19 00    Types: Cigarettes    Quit date: 1998   Smokeless Tobacco    Never Used     Family History   Problem Relation Age of Onset    Stomach cancer Maternal Grandmother     Hypertension Mother     Diabetes Mother     Cataracts Father     Skin cancer Father     Stroke Father         2X    Aneurysm Father     No Known Problems Sister     No Known Problems Brother Meds/Allergies       Current Outpatient Prescriptions:     aspirin 81 MG tablet    cholecalciferol (VITAMIN D3) 1,000 units tablet    cyanocobalamin (VITAMIN B-12) 1,000 mcg tablet    ketoconazole (NIZORAL) 2 % cream    omeprazole (PriLOSEC) 20 mg delayed release capsule    ranitidine (ZANTAC) 300 MG tablet    tamsulosin (FLOMAX) 0 4 mg    timolol (BETIMOL) 0 5 % ophthalmic solution    timolol (TIMOPTIC) 0 5 % ophthalmic solution    Allergies   Allergen Reactions    Gabapentin Other (See Comments)     OUT OF BODY PSYCHOSIS, MEMORY LOSS, AMNESIA    Other Cough     HAYFEVER, SEASONAL, ALFALFA           Objective     There were no vitals taken for this visit  There is no height or weight on file to calculate BMI  PHYSICAL EXAM:      General Appearance:   Alert, cooperative, no distress   HEENT:   Normocephalic, atraumatic, anicteric      Neck:  Supple, symmetrical, trachea midline   Lungs:   Clear to auscultation bilaterally   Heart[de-identified]   Regular rate and rhythm   Abdomen:   Soft, non-tender, mildly distended; normal bowel sounds   Lab Results:   No visits with results within 1 Day(s) from this visit     Latest known visit with results is:   Office Visit on 08/14/2018   Component Date Value    Glucose 08/22/2018 118*    BUN 08/22/2018 13     Creatinine 08/22/2018 0 90     eGFR Non  08/22/2018 94     SL AMB EGFR  AMER* 08/22/2018 108     SL AMB BUN/CREATININE RA* 08/22/2018 14     Sodium 08/22/2018 142     Potassium 08/22/2018 4 4     Chloride 08/22/2018 103     CO2 08/22/2018 22     CALCIUM 08/22/2018 9 0     SL AMB PROTEIN, TOTAL 08/22/2018 6 5     Albumin 08/22/2018 4 4     Globulin, Total 08/22/2018 2 1     SL AMB ALBUMIN/GLOBULIN * 08/22/2018 2 1     TOTAL BILIRUBIN 08/22/2018 0 4     Alk Phos Isoenzymes 08/22/2018 72     SL AMB AST 08/22/2018 23     SL AMB ALT 08/22/2018 36     Cholesterol, Total 08/22/2018 172     Triglycerides 08/22/2018 61     HDL 08/22/2018 52     SL AMB VLDL CHOLESTEROL * 08/22/2018 12     LDL Direct 08/22/2018 108*    LDl/HDL Ratio 08/22/2018 2 1     Hemoglobin A1C 08/22/2018 5 3     Estimated Average Glucose 08/22/2018 105      Radiology Results:   No results found

## 2018-10-24 NOTE — LETTER
October 24, 2018     Narendra Whalen MD  [facility]  [address]    Patient: Lauren Rivas   YOB: 1960   Date of Visit: 10/24/2018       Dear Dr Stefan Dubois: Thank you for referring Hansa Mcdonald to me for evaluation  Below are my notes for this consultation  If you have questions, please do not hesitate to call me  I look forward to following your patient along with you  Sincerely,        Eryn Rodriguez PA-C        CC: No Recipients  Eryn Rodriguez PA-C  10/24/2018  1:38 PM  Sign at close encounter  [facility] - Outpatient Follow-up Note  Lauren Rivas 62 y o  male MRN: [id number]  Encounter: [id number]     ASSESSMENT AND PLAN:       GERD  Epigastric pain  -likely symptoms due to flare of GERD along with gastritis  -recommend increasing omeprazole to 40mg qam, continue zantac in the evening    -strongly advised cutting down on coffee consumption  -encouraged weight loss  -plan for RUQ U/S to rule out biliary pathology  -follow-up in 2 months, if persistent symptoms would consider repeat EGD  ______________________________________________________________________     SUBJECTIVE:  Hansa Mcdonald is a 61 y/o male who presents for follow-up  He reports an increase in GERD symptoms and epigastric abdominal pain over the last 2-3 months  This started after his gabapentin and celexa were stopped  He reports symptoms of reflux and heartburn throughout the day  He is currently taking omeprazole 20g QAM and zantac at night  He states his symptoms have somewhat improved since they started flaring  He denies nausea, vomiting, dysphagia, odynophagia, weight loss, change in bowel habits  He does report abdominal distension at times  He had an EGD 4/25/16 with mild gastritis negative for hpylori, and colonoscopy with internal hemorrhoids   He states he drinks about 6 cups of coffee daily          REVIEW OF SYSTEMS IS OTHERWISE NEGATIVE         Historical Information         Medical History        Past Medical History:   Diagnosis Date    Amnesia memory loss      Anxiety      Chronic fatigue      Disc degeneration, lumbar      Esophageal reflux      GERD (gastroesophageal reflux disease)      Glaucoma      Hiatal hernia      Increased pressure in the eye       both eyes; denies glaucoma     Internal hemorrhoids      Lumbar disc herniation with radiculopathy      Memory loss      Personal history of colonic polyps           Surgical History         Past Surgical History:   Procedure Laterality Date    COLONOSCOPY N/A 4/25/2016     Procedure: COLONOSCOPY;  Surgeon: Nicole Parker MD;  Location: [facility] GI LAB; Service:     DENTAL SURGERY         ALL TEETH PULLED    EPIDURAL BLOCK INJECTION        EPIDURAL BLOCK INJECTION Right 12/28/2016     Procedure: BLOCK / INJECTION EPIDURAL STEROID TRANSFORAMINAL  L4-5;  Surgeon: Dariel De Oliveira MD;  Location: [facility] MAIN OR;  Service:     EPIDURAL BLOCK INJECTION Right 11/16/2017     Procedure: BLOCK / INJECTION EPIDURAL STEROID TRANSFORAMINAL L4-5 RIGHT;  Surgeon: Dariel De Oliveira MD;  Location: [facility] MAIN OR;  Service: Pain Management     EPIDURAL BLOCK INJECTION Left 5/10/2018     Procedure: Lt L4 L5 Tfesi ([id number]); Surgeon: Dariel De Oliveira MD;  Location: [facility] MAIN OR;  Service: Pain Management     ESOPHAGOGASTRODUODENOSCOPY N/A 4/25/2016     Procedure: ESOPHAGOGASTRODUODENOSCOPY (EGD); Surgeon: Nicole Parker MD;  Location: [facility] GI LAB;   Service:     NOSE SURGERY             Social History             History   Alcohol Use No          History   Drug Use No           History   Smoking Status    Former Smoker    Packs/day: 0 50    Years: 19 00    Types: Cigarettes    Quit date: 1998   Smokeless Tobacco    Never Used            Family History   Problem Relation Age of Onset    Stomach cancer Maternal Grandmother      Hypertension Mother      Diabetes Mother      Cataracts Father      Skin cancer Father      Stroke Father           2X    Aneurysm Father      No Known Problems Sister      No Known Problems Brother           Meds/Allergies        Current Medications      Current Outpatient Prescriptions:     aspirin 81 MG tablet    cholecalciferol (VITAMIN D3) 1,000 units tablet    cyanocobalamin (VITAMIN B-12) 1,000 mcg tablet    ketoconazole (NIZORAL) 2 % cream    omeprazole (PriLOSEC) 20 mg delayed release capsule    ranitidine (ZANTAC) 300 MG tablet    tamsulosin (FLOMAX) 0 4 mg    timolol (BETIMOL) 0 5 % ophthalmic solution    timolol (TIMOPTIC) 0 5 % ophthalmic solution              Allergies   Allergen Reactions    Gabapentin Other (See Comments)       OUT OF BODY PSYCHOSIS, MEMORY LOSS, AMNESIA    Other Cough       HAYFEVER, SEASONAL, ALFALFA               Objective      There were no vitals taken for this visit  There is no height or weight on file to calculate BMI         PHYSICAL EXAM:       General Appearance:   Alert, cooperative, no distress   HEENT:   Normocephalic, atraumatic, anicteric      Neck:  Supple, symmetrical, trachea midline   Lungs:   Clear to auscultation bilaterally   Heart[de-identified]   Regular rate and rhythm   Abdomen:   Soft, non-tender, mildly distended; normal bowel sounds   Lab Results:         No visits with results within 1 Day(s) from this visit     Latest known visit with results is:   Office Visit on 08/14/2018   Component Date Value    Glucose 08/22/2018 118*    BUN 08/22/2018 13     Creatinine 08/22/2018 0 90     eGFR Non  08/22/2018 94     SL AMB EGFR  AMER* 08/22/2018 108     SL AMB BUN/CREATININE RA* 08/22/2018 14     Sodium 08/22/2018 142     Potassium 08/22/2018 4 4     Chloride 08/22/2018 103     CO2 08/22/2018 22     CALCIUM 08/22/2018 9 0     SL AMB PROTEIN, TOTAL 08/22/2018 6 5     Albumin 08/22/2018 4 4     Globulin, Total 08/22/2018 2 1     SL AMB ALBUMIN/GLOBULIN * 08/22/2018 2 1  TOTAL BILIRUBIN 08/22/2018 0 4     Alk Phos Isoenzymes 08/22/2018 72     SL AMB AST 08/22/2018 23     SL AMB ALT 08/22/2018 36     Cholesterol, Total 08/22/2018 172     Triglycerides 08/22/2018 61     HDL 08/22/2018 52     SL AMB VLDL CHOLESTEROL * 08/22/2018 12     LDL Direct 08/22/2018 108*    LDl/HDL Ratio 08/22/2018 2 1     Hemoglobin A1C 08/22/2018 5 3     Estimated Average Glucose 08/22/2018 105       Radiology Results:   No results found        Orders Placed      US abdomen complete  Medication Changes        None      Medication List      Visit Diagnoses         Abdominal pain, unspecified abdominal location      Gastroesophageal reflux disease without esophagitis      Problem List

## 2018-10-24 NOTE — TELEPHONE ENCOUNTER
Dr Lesli Cano pt    Alfonso Mart from [address] called to advise the pt requires an auth for their US on Friday 10/26

## 2018-10-25 NOTE — TELEPHONE ENCOUNTER
gregoria called back in regarding the auth  Office advised me to tell her they will get started and that shortly and will her back with an update  Douglas Alpers was understanding

## 2018-10-29 NOTE — TELEPHONE ENCOUNTER
Pt called for an update on his Auth for his US which is sched for 10/31/18  Pt would like to be called to let him when it is approved   Pt can be reached at [phone]

## 2018-10-30 NOTE — TELEPHONE ENCOUNTER
I called patient back  I explained to the patient the process for getting a prior authorization and that it does take a few days to obtain this authorization  The patient then proceeded to let me know that he felt it was unacceptable that he had to wait 6 weeks to see Dr Henrietta Chaparro and that when he did come in for his appointment  he was made to see a physicians assistant  He also wanted to know why he was not called when someone cancelled their appointment so he could have that appointment  Patient proceeded to inform me of his income and living situation  Patient did use inappropriate language during this conversation  Patient also stated that he should have just gone to the emergency room and that they would have gladly done the ultrasound and there would have been no wait or no prior authorization that needed to be done  He went on to say that the next time he sees Dr Henrietta Chaparro he will be "laying into him" because of the way all of this is being handled  He also threatened a lawsuit if he were to pass  Patient said that he would be going to speak to the right people and making a huge complaint about this as well  I spoke with Ricardo Calderon to inform her of the situation and she will be contacting the patient directly

## 2018-10-30 NOTE — TELEPHONE ENCOUNTER
Spoke with pt and addressed is concerns  I explained the office protocol and why he was scheduled with a physician assistant  Patient reiterated the points he made with Megan Lozano and I explained that we are here to provide the best care that we can  I offered and he accepted an appointment with Dr Mitchell Talamantes 10/31 at 940am  I told him he will be with Dr Mitchell Talamantes for any follow ups  Patient understood and was satisfied with the outcome

## 2018-10-31 ENCOUNTER — OFFICE VISIT (OUTPATIENT)
Dept: GASTROENTEROLOGY | Facility: CLINIC | Age: 58
End: 2018-10-31
Payer: COMMERCIAL

## 2018-10-31 ENCOUNTER — TRANSCRIBE ORDERS (OUTPATIENT)
Dept: ADMINISTRATIVE | Facility: HOSPITAL | Age: 58
End: 2018-10-31

## 2018-10-31 ENCOUNTER — HOSPITAL ENCOUNTER (OUTPATIENT)
Dept: RADIOLOGY | Facility: HOSPITAL | Age: 58
Discharge: HOME | End: 2018-10-31
Attending: INTERNAL MEDICINE
Payer: COMMERCIAL

## 2018-10-31 VITALS
DIASTOLIC BLOOD PRESSURE: 90 MMHG | HEIGHT: 71 IN | WEIGHT: 221 LBS | SYSTOLIC BLOOD PRESSURE: 136 MMHG | BODY MASS INDEX: 30.94 KG/M2 | TEMPERATURE: 97.8 F | HEART RATE: 83 BPM

## 2018-10-31 DIAGNOSIS — R10.32 LLQ ABDOMINAL PAIN: ICD-10-CM

## 2018-10-31 DIAGNOSIS — K21.9 GASTROESOPHAGEAL REFLUX DISEASE WITHOUT ESOPHAGITIS: Primary | ICD-10-CM

## 2018-10-31 DIAGNOSIS — R10.13 EPIGASTRIC PAIN: ICD-10-CM

## 2018-10-31 PROCEDURE — 74022 RADEX COMPL AQT ABD SERIES: CPT

## 2018-10-31 PROCEDURE — 99213 OFFICE O/P EST LOW 20 MIN: CPT | Performed by: INTERNAL MEDICINE

## 2018-10-31 RX ORDER — OMEPRAZOLE 20 MG/1
40 CAPSULE, DELAYED RELEASE ORAL DAILY
Qty: 30 CAPSULE | Refills: 3 | Status: SHIPPED | OUTPATIENT
Start: 2018-10-31 | End: 2018-11-01 | Stop reason: DRUGHIGH

## 2018-10-31 RX ORDER — SUCRALFATE ORAL 1 G/10ML
1 SUSPENSION ORAL 4 TIMES DAILY
Qty: 420 ML | Refills: 0 | Status: SHIPPED | OUTPATIENT
Start: 2018-10-31 | End: 2020-08-17 | Stop reason: ALTCHOICE

## 2018-10-31 NOTE — LETTER
October 31, 2018     Merrell K[address]er, [address]  [address]    Patient: Magaly Martínez   YOB: 1960   Date of Visit: 10/31/2018       Dear Dr Sho Maharaj: Thank you for referring Melisa Ward to me for evaluation  Below are my notes for this consultation  If you have questions, please do not hesitate to call me  I look forward to following your patient along with you  Sincerely,        Wanda Salinas MD        CC: No Recipients  Wanda Salinas MD  10/31/2018 11:39 AM  Sign at close encounter  [address] [facility]  Magaly Martínez 62 y o  male MRN: [id number]            Assessment & Plan:    49-year-old gentleman last upper endoscopy colonoscopy 2016 were relatively unremarkable with worsening reflux symptoms, nocturnal burning, abdominal bloating distention, epigastric distention with significant left lower quadrant tenderness on examination  1   Abdominal pain with left lower quadrant pain and on examination and abdominal distention:  -differential including diverticulitis is versus colitis  -we will check her urgent CT scan  -we will check an x-ray today  -laboratory studies were normal we will repeat BMP  -can consider IBS as a diagnosis based on results of imaging studies, may have some improvement with dicyclomine  -given his left inguinal hernia, may also consider referral to surgery once he has a CT scan    2  Retrosternal burning discomfort:  Likely due to worsening reflux  -we will increase his Prilosec to 40 mg daily, continue ranitidine in the evening  -trial of Carafate as well  -further recommendations based on his clinical response the above therapies, can consider repeating upper endoscopy and colonoscopy    Patient was clearly asked give us a call if he has any change or worsening symptoms          _____________________________________________________________        CC:   Abdominal discomfort and distention    HPI:  Magaly Martínez is a 62 y o male who is here for abdominal pain and discomfort  As you know this is a 48year-old gentle with a history of acid reflux, chronic pain syndrome, we had seen him in the past he has had an upper endoscopy and colonoscopy which were relatively unremarkable  He had been doing relatively well on a regimen of PPI therapy in the morning, H2 blockers in the evening until this past summer we has had increasing reflux symptoms  Patient tried to continue his current medication regimen he called in September with worsening symptoms of abdominal distension, trouble breathing in certain positions, worse with bending down  Were reports increasing abdominal bloating  He was instructed by the office to increase his PPI therapy 40 mg daily which he has been doing in the morning without any significant improvement in his symptoms  Patient continues to drink 7 or 8 cups of coffee and a daily basis  He reports his reflux is worse despite trying to cut down from 12 cups of coffee to 8 cups of coffee daily  He works at night early in the morning on 04/30 with retrosternal burning discomfort  He also reports regular bowel movements denies any melena or rectal bleeding but has increased bloating  Patient denies any other change in medications or symptoms  ROS:  The remainder of the ROS was negative except for the pertinent positives mentioned in HPI        Allergies: Gabapentin and Other    Medications:   Current Outpatient Prescriptions:     aspirin 81 MG tablet, Take 1 tablet (81 mg total) by mouth daily, Disp: 90 tablet, Rfl: 3    cholecalciferol (VITAMIN D3) 1,000 units tablet, Take 2 tablets (2,000 Units total) by mouth daily, Disp: 60 tablet, Rfl: 6    cyanocobalamin (VITAMIN B-12) 1,000 mcg tablet, Take 1 tablet (1,000 mcg total) by mouth daily, Disp: 30 tablet, Rfl: 6    omeprazole (PriLOSEC) 20 mg delayed release capsule, Take 2 capsules (40 mg total) by mouth daily, Disp: 30 capsule, Rfl: 3   ranitidine (ZANTAC) 300 MG tablet, take 1 tablet by mouth at bedtime, Disp: 30 tablet, Rfl: 3    tamsulosin (FLOMAX) 0 4 mg, Take 0 4 mg by mouth daily at bedtime  , Disp: , Rfl:     timolol (BETIMOL) 0 5 % ophthalmic solution, Administer 1 drop to both eyes 2 (two) times a day , Disp: , Rfl:     timolol (TIMOPTIC) 0 5 % ophthalmic solution, INSTILL 1 DROP INTO BOTH EYES TWO TIMES A DAY, Disp: , Rfl: 0    ketoconazole (NIZORAL) 2 % cream, Apply topically daily for 30 days, Disp: 60 g, Rfl: 5    sucralfate (CARAFATE) 1 g/10 mL suspension, Take 10 mL (1 g total) by mouth 4 (four) times a day, Disp: 420 mL, Rfl: 0    Past Medical History:   Diagnosis Date    Amnesia memory loss     Anxiety     Chronic fatigue     Disc degeneration, lumbar     Esophageal reflux     GERD (gastroesophageal reflux disease)     Glaucoma     Hiatal hernia     Increased pressure in the eye     both eyes; denies glaucoma     Internal hemorrhoids     Lumbar disc herniation with radiculopathy     Memory loss     Personal history of colonic polyps        Past Surgical History:   Procedure Laterality Date    COLONOSCOPY N/A 4/25/2016    Procedure: COLONOSCOPY;  Surgeon: Lavonne Shaw MD;  Location: [place] [address] GI LAB; Service:     DENTAL SURGERY      ALL TEETH PULLED    EPIDURAL BLOCK INJECTION      EPIDURAL BLOCK INJECTION Right 12/28/2016    Procedure: BLOCK / INJECTION EPIDURAL STEROID TRANSFORAMINAL  L4-5;  Surgeon: Dawit Gotti MD;  Location: [place] [address] MAIN OR;  Service:     EPIDURAL BLOCK INJECTION Right 11/16/2017    Procedure: BLOCK / INJECTION EPIDURAL STEROID TRANSFORAMINAL L4-5 RIGHT;  Surgeon: Dawit Gotti MD;  Location: [place] [address] MAIN OR;  Service: Pain Management     EPIDURAL BLOCK INJECTION Left 5/10/2018    Procedure: Lt L4 L5 Tfesi (75185,[id number]);   Surgeon: Dawit Gotti MD;  Location: [facility] MAIN OR;  Service: Pain Management     ESOPHAGOGASTRODUODENOSCOPY N/A 4/25/2016    Procedure: ESOPHAGOGASTRODUODENOSCOPY (EGD); Surgeon: Wanda Salinas MD;  Location: [facility] GI LAB; Service:     NOSE SURGERY         Family History   Problem Relation Age of Onset    Stomach cancer Maternal Grandmother     Hypertension Mother     Diabetes Mother     Cataracts Father     Skin cancer Father     Stroke Father         2X    Aneurysm Father     No Known Problems Sister     No Known Problems Brother         reports that he quit smoking about 20 years ago  His smoking use included Cigarettes  He has a 9 50 pack-year smoking history  He has never used smokeless tobacco  He reports that he does not drink alcohol or use drugs  Physical Exam:    /90 (BP Location: Right arm, Patient Position: Sitting, Cuff Size: Standard)   Pulse 83   Temp 97 8 °F (36 6 °C) (Tympanic)   Ht 5' 11" (1 803 m)   Wt 100 kg (221 lb)   BMI 30 82 kg/m²      Gen: wn/wd, NAD  HEENT: anicteric, MMM, no cervical LAD  CVS: RRR, no m/r/g  CHEST: CTA b/l  ABD: +BS, moderate left lower quadrant tenderness to palpation, no rebound or guarding at this time, no hepatosplenomegaly    Also appears to have a left inguinal hernia  EXT: no c/c/e  NEURO: aaox3  SKIN: NO rashes

## 2018-10-31 NOTE — PROGRESS NOTES
[facility]  Kasia Mcclain 62 y o  male MRN: [id number]            Assessment & Plan:    51-year-old gentleman last upper endoscopy colonoscopy 2016 were relatively unremarkable with worsening reflux symptoms, nocturnal burning, abdominal bloating distention, epigastric distention with significant left lower quadrant tenderness on examination  1   Abdominal pain with left lower quadrant pain and on examination and abdominal distention:  -differential including diverticulitis is versus colitis  -we will check her urgent CT scan  -we will check an x-ray today  -laboratory studies were normal we will repeat BMP  -can consider IBS as a diagnosis based on results of imaging studies, may have some improvement with dicyclomine  -given his left inguinal hernia, may also consider referral to surgery once he has a CT scan    2  Retrosternal burning discomfort:  Likely due to worsening reflux  -we will increase his Prilosec to 40 mg daily, continue ranitidine in the evening  -trial of Carafate as well  -further recommendations based on his clinical response the above therapies, can consider repeating upper endoscopy and colonoscopy    Patient was clearly asked give us a call if he has any change or worsening symptoms  _____________________________________________________________        CC:   Abdominal discomfort and distention    HPI:  Kasia Mcclain is a 62 y o male who is here for abdominal pain and discomfort  As you know this is a 48year-old gentle with a history of acid reflux, chronic pain syndrome, we had seen him in the past he has had an upper endoscopy and colonoscopy which were relatively unremarkable  He had been doing relatively well on a regimen of PPI therapy in the morning, H2 blockers in the evening until this past summer we has had increasing reflux symptoms    Patient tried to continue his current medication regimen he called in September with worsening symptoms of abdominal distension, trouble breathing in certain positions, worse with bending down  Were reports increasing abdominal bloating  He was instructed by the office to increase his PPI therapy 40 mg daily which he has been doing in the morning without any significant improvement in his symptoms  Patient continues to drink 7 or 8 cups of coffee and a daily basis  He reports his reflux is worse despite trying to cut down from 12 cups of coffee to 8 cups of coffee daily  He works at night early in the morning on 04/30 with retrosternal burning discomfort  He also reports regular bowel movements denies any melena or rectal bleeding but has increased bloating  Patient denies any other change in medications or symptoms  ROS:  The remainder of the ROS was negative except for the pertinent positives mentioned in HPI        Allergies: Gabapentin and Other    Medications:   Current Outpatient Prescriptions:     aspirin 81 MG tablet, Take 1 tablet (81 mg total) by mouth daily, Disp: 90 tablet, Rfl: 3    cholecalciferol (VITAMIN D3) 1,000 units tablet, Take 2 tablets (2,000 Units total) by mouth daily, Disp: 60 tablet, Rfl: 6    cyanocobalamin (VITAMIN B-12) 1,000 mcg tablet, Take 1 tablet (1,000 mcg total) by mouth daily, Disp: 30 tablet, Rfl: 6    omeprazole (PriLOSEC) 20 mg delayed release capsule, Take 2 capsules (40 mg total) by mouth daily, Disp: 30 capsule, Rfl: 3    ranitidine (ZANTAC) 300 MG tablet, take 1 tablet by mouth at bedtime, Disp: 30 tablet, Rfl: 3    tamsulosin (FLOMAX) 0 4 mg, Take 0 4 mg by mouth daily at bedtime  , Disp: , Rfl:     timolol (BETIMOL) 0 5 % ophthalmic solution, Administer 1 drop to both eyes 2 (two) times a day , Disp: , Rfl:     timolol (TIMOPTIC) 0 5 % ophthalmic solution, INSTILL 1 DROP INTO BOTH EYES TWO TIMES A DAY, Disp: , Rfl: 0    ketoconazole (NIZORAL) 2 % cream, Apply topically daily for 30 days, Disp: 60 g, Rfl: 5    sucralfate (CARAFATE) 1 g/10 mL suspension, Take 10 mL (1 g total) by mouth 4 (four) times a day, Disp: 420 mL, Rfl: 0    Past Medical History:   Diagnosis Date    Amnesia memory loss     Anxiety     Chronic fatigue     Disc degeneration, lumbar     Esophageal reflux     GERD (gastroesophageal reflux disease)     Glaucoma     Hiatal hernia     Increased pressure in the eye     both eyes; denies glaucoma     Internal hemorrhoids     Lumbar disc herniation with radiculopathy     Memory loss     Personal history of colonic polyps        Past Surgical History:   Procedure Laterality Date    COLONOSCOPY N/A 4/25/2016    Procedure: COLONOSCOPY;  Surgeon: Theresa Wills MD;  Location: [place] [address] GI LAB; Service:     DENTAL SURGERY      ALL TEETH PULLED    EPIDURAL BLOCK INJECTION      EPIDURAL BLOCK INJECTION Right 12/28/2016    Procedure: BLOCK / INJECTION EPIDURAL STEROID TRANSFORAMINAL  L4-5;  Surgeon: Leonidas Torres MD;  Location: [place] [address] MAIN OR;  Service:     EPIDURAL BLOCK INJECTION Right 11/16/2017    Procedure: BLOCK / INJECTION EPIDURAL STEROID TRANSFORAMINAL L4-5 RIGHT;  Surgeon: Leonidas Torres MD;  Location: [place] [address] MAIN OR;  Service: Pain Management     EPIDURAL BLOCK INJECTION Left 5/10/2018    Procedure: Lt L4 L5 Tfesi ([id number]); Surgeon: Leonidas Torres MD;  Location: [facility] MAIN OR;  Service: Pain Management     ESOPHAGOGASTRODUODENOSCOPY N/A 4/25/2016    Procedure: ESOPHAGOGASTRODUODENOSCOPY (EGD); Surgeon: Theresa Wills MD;  Location: [facility] GI LAB; Service:     NOSE SURGERY         Family History   Problem Relation Age of Onset    Stomach cancer Maternal Grandmother     Hypertension Mother     Diabetes Mother     Cataracts Father     Skin cancer Father     Stroke Father         2X    Aneurysm Father     No Known Problems Sister     No Known Problems Brother         reports that he quit smoking about 20 years ago  His smoking use included Cigarettes  He has a 9 50 pack-year smoking history   He has never used smokeless tobacco  He reports that he does not drink alcohol or use drugs  Physical Exam:    /90 (BP Location: Right arm, Patient Position: Sitting, Cuff Size: Standard)   Pulse 83   Temp 97 8 °F (36 6 °C) (Tympanic)   Ht 5' 11" (1 803 m)   Wt 100 kg (221 lb)   BMI 30 82 kg/m²     Gen: wn/wd, NAD  HEENT: anicteric, MMM, no cervical LAD  CVS: RRR, no m/r/g  CHEST: CTA b/l  ABD: +BS, moderate left lower quadrant tenderness to palpation, no rebound or guarding at this time, no hepatosplenomegaly    Also appears to have a left inguinal hernia  EXT: no c/c/e  NEURO: aaox3  SKIN: NO rashes

## 2018-11-01 ENCOUNTER — TELEPHONE (OUTPATIENT)
Dept: GASTROENTEROLOGY | Facility: MEDICAL CENTER | Age: 58
End: 2018-11-01

## 2018-11-01 DIAGNOSIS — R10.13 EPIGASTRIC BURNING SENSATION: Primary | ICD-10-CM

## 2018-11-01 RX ORDER — OMEPRAZOLE 40 MG/1
40 CAPSULE, DELAYED RELEASE ORAL DAILY
Qty: 30 CAPSULE | Refills: 2 | Status: SHIPPED | OUTPATIENT
Start: 2018-11-01 | End: 2019-01-19 | Stop reason: SDUPTHER

## 2018-11-01 NOTE — TELEPHONE ENCOUNTER
Dr Arreguin Beatriz pt called stating the script for his omeprazole needs to be changed  It should be omeprazole 40 mg 1 capsule daily with 30 capsules or omeprazole 20 mg 2x daily with 90 capsules  Please send new script to pharmacy   Pt can be reached at [phone]

## 2018-11-05 ENCOUNTER — TELEPHONE (OUTPATIENT)
Dept: GASTROENTEROLOGY | Facility: AMBULARY SURGERY CENTER | Age: 58
End: 2018-11-05

## 2018-11-05 NOTE — TELEPHONE ENCOUNTER
----- Message from Tyler Yuan MD sent at 11/5/2018  9:33 AM EST -----  Please inform the patient that his x-ray showed some stool in his colon but otherwise unremarkable  Awaiting results of CT scan to determine cause of his abdominal pain

## 2018-11-05 NOTE — TELEPHONE ENCOUNTER
Pt called back he is aware of results  Diana Range, pt requested you call him back tomorrow with an update on his auth for CT scan  He said he's been cancelled 2x for the Ultrasound and would like a call tomorrow regarding CT auth, thank you

## 2018-11-05 NOTE — LETTER
November 5, 2018    [address]  [facility]      Dear Mr Lopez: We have attempted to reach you regarding your results with no response  We ask that you contact our office upon receipt of this letter to receive your results  Thank you in advance for your cooperation and assistance  Sincerely,             [facility]  [phone]

## 2018-11-06 LAB
BUN SERPL-MCNC: 12 MG/DL (ref 6–24)
BUN/CREAT SERPL: 13 (ref 9–20)
CALCIUM SERPL-MCNC: 9.2 MG/DL (ref 8.7–10.2)
CHLORIDE SERPL-SCNC: 103 MMOL/L (ref 96–106)
CO2 SERPL-SCNC: 23 MMOL/L (ref 20–29)
CREAT SERPL-MCNC: 0.9 MG/DL (ref 0.76–1.27)
GLUCOSE SERPL-MCNC: 113 MG/DL (ref 65–99)
POTASSIUM SERPL-SCNC: 4.5 MMOL/L (ref 3.5–5.2)
SL AMB EGFR AFRICAN AMERICAN: 108 ML/MIN/1.73
SL AMB EGFR NON AFRICAN AMERICAN: 94 ML/MIN/1.73
SODIUM SERPL-SCNC: 142 MMOL/L (ref 134–144)

## 2018-11-07 ENCOUNTER — HOSPITAL ENCOUNTER (OUTPATIENT)
Dept: RADIOLOGY | Facility: HOSPITAL | Age: 58
Discharge: HOME | End: 2018-11-07
Attending: INTERNAL MEDICINE
Payer: COMMERCIAL

## 2018-11-07 DIAGNOSIS — R10.32 LLQ ABDOMINAL PAIN: ICD-10-CM

## 2018-11-07 PROCEDURE — 74177 CT ABD & PELVIS W/CONTRAST: CPT

## 2018-11-07 RX ADMIN — IOHEXOL 100 ML: 350 INJECTION, SOLUTION INTRAVENOUS at 11:31

## 2018-11-11 DIAGNOSIS — K21.9 GASTROESOPHAGEAL REFLUX DISEASE WITHOUT ESOPHAGITIS: ICD-10-CM

## 2018-11-11 DIAGNOSIS — R10.32 LLQ ABDOMINAL PAIN: Primary | ICD-10-CM

## 2018-11-11 RX ORDER — DICYCLOMINE HYDROCHLORIDE 10 MG/1
10 CAPSULE ORAL
Qty: 90 CAPSULE | Refills: 3 | Status: SHIPPED | OUTPATIENT
Start: 2018-11-11 | End: 2019-03-30 | Stop reason: SDUPTHER

## 2018-11-12 RX ORDER — RANITIDINE 300 MG/1
TABLET ORAL
Qty: 90 TABLET | Refills: 1 | Status: SHIPPED | OUTPATIENT
Start: 2018-11-12 | End: 2019-04-25 | Stop reason: SDUPTHER

## 2018-11-19 ENCOUNTER — OFFICE VISIT (OUTPATIENT)
Dept: PODIATRY | Facility: CLINIC | Age: 58
End: 2018-11-19
Payer: COMMERCIAL

## 2018-11-19 VITALS
HEART RATE: 84 BPM | WEIGHT: 221 LBS | SYSTOLIC BLOOD PRESSURE: 122 MMHG | HEIGHT: 71 IN | DIASTOLIC BLOOD PRESSURE: 77 MMHG | RESPIRATION RATE: 17 BRPM | BODY MASS INDEX: 30.94 KG/M2

## 2018-11-19 DIAGNOSIS — M20.41 HAMMER TOES OF BOTH FEET: Primary | ICD-10-CM

## 2018-11-19 DIAGNOSIS — M20.42 HAMMER TOES OF BOTH FEET: Primary | ICD-10-CM

## 2018-11-19 DIAGNOSIS — M21.969 ACQUIRED DEFORMITY OF FOOT, UNSPECIFIED LATERALITY: ICD-10-CM

## 2018-11-19 DIAGNOSIS — B35.9 DERMATOPHYTOSIS: ICD-10-CM

## 2018-11-19 DIAGNOSIS — M79.672 PAIN IN BOTH FEET: ICD-10-CM

## 2018-11-19 DIAGNOSIS — M79.671 PAIN IN BOTH FEET: ICD-10-CM

## 2018-11-19 PROCEDURE — 99213 OFFICE O/P EST LOW 20 MIN: CPT | Performed by: PODIATRIST

## 2018-11-19 NOTE — PROGRESS NOTES
Procedures   Foot Exam     Foot Exam     Right Foot/Ankle      Comments  Chronic moccasin tinea pedis noted bilateral heel                 Assessment/Plan:  Metatarsalgia   Hammertoe formation   Pes planus   Pain      Plan   Foot exam performed   All nails debrided   Patient will benefit from orthotics      Patient needs referral to Orthopedics for radiculopathy workup     No problem-specific Assessment & Plan notes found for this encounter          There are no diagnoses linked to this encounter        Subjective:   Patient only has mild pain in his feet with ambulation   He has known radiculopathy           Past Medical History:   Diagnosis Date    Amnesia memory loss      Anxiety      Chronic fatigue      Disc degeneration, lumbar      Esophageal reflux      GERD (gastroesophageal reflux disease)      Glaucoma      Hiatal hernia      Increased pressure in the eye       both eyes; denies glaucoma     Internal hemorrhoids      Lumbar disc herniation with radiculopathy      Memory loss      Personal history of colonic polyps                     Past Surgical History:   Procedure Laterality Date    COLONOSCOPY N/A 4/25/2016     Procedure: COLONOSCOPY;  Surgeon: Caroline Plummer MD;  Location: [facility] GI LAB;  Service:     DENTAL SURGERY         ALL TEETH PULLED    EPIDURAL BLOCK INJECTION        EPIDURAL BLOCK INJECTION Right 12/28/2016     Procedure: BLOCK / INJECTION EPIDURAL STEROID TRANSFORAMINAL  L4-5;  Surgeon: John Bocanegra MD;  Location: [facility] MAIN OR;  Service:     EPIDURAL BLOCK INJECTION Right 11/16/2017     Procedure: BLOCK / INJECTION EPIDURAL STEROID TRANSFORAMINAL L4-5 RIGHT;  Surgeon: John Bocanegra MD;  Location: [facility] MAIN OR;  Service: Pain Management     EPIDURAL BLOCK INJECTION Left 5/10/2018     Procedure: Lt L4 L5 Tfesi ([id number]);  Surgeon: John Bocanegra MD;  Location: [facility] MAIN OR;  Service: Pain Management     ESOPHAGOGASTRODUODENOSCOPY N/A 4/25/2016     Procedure: ESOPHAGOGASTRODUODENOSCOPY (EGD);  Surgeon: Manju Pisano MD;  Location: [facility] GI LAB;  Service:     NOSE SURGERY                       Allergies   Allergen Reactions    Gabapentin Other (See Comments)       OUT OF BODY PSYCHOSIS, MEMORY LOSS, AMNESIA    Other Cough       HAYFEVER, SEASONAL, ALFALFA            Current Outpatient Prescriptions:     aspirin 81 MG tablet, Take 81 mg by mouth daily  , Disp: , Rfl:     omeprazole (PriLOSEC) 20 mg delayed release capsule, Take 1 capsule (20 mg total) by mouth daily, Disp: 30 capsule, Rfl: 3    ranitidine (ZANTAC) 300 MG tablet, Take 1 tablet (300 mg total) by mouth daily at bedtime, Disp: 30 tablet, Rfl: 3    tamsulosin (FLOMAX) 0 4 mg, Take 0 4 mg by mouth daily with dinner, Disp: , Rfl:     timolol (BETIMOL) 0 5 % ophthalmic solution, Administer 1 drop to both eyes 2 (two) times a day , Disp: , Rfl:     timolol (TIMOPTIC) 0 5 % ophthalmic solution, INSTILL 1 DROP INTO BOTH EYES TWO TIMES A DAY, Disp: , Rfl: 0    citalopram (CeleXA) 10 mg tablet, Take 3 tablets (30 mg total) by mouth daily for 90 days, Disp: 270 tablet, Rfl: 0    gabapentin (NEURONTIN) 300 mg capsule, Take 2 capsules (600 mg total) by mouth 3 (three) times a day, Disp: 90 capsule, Rfl: 3    gabapentin (NEURONTIN) 600 MG tablet, , Disp: , Rfl: 0           Patient Active Problem List   Diagnosis    Chronic pain syndrome    Chronic low back pain    Intervertebral disc disorder with radiculopathy of lumbar region    Spondylosis of lumbar region without myelopathy or radiculopathy    Abnormal chromosomal analysis    Adult-onset obesity    Anxiety    Elevated PSA    Enlarged prostate on rectal examination    Gastroesophageal reflux disease without esophagitis    Lumbar disc herniation with radiculopathy    Myofascial pain syndrome    Chronic fatigue    Memory loss    Glaucoma    Neuritis or radiculitis due to rupture of lumbar intervertebral disc    Low back pain             Patient ID: Jerry Lopez is a 58 y  o  male      Objective:      Foot Exam     General  General Appearance: appears stated age and healthy   Orientation: alert and oriented to person, place, and time   Affect: appropriate   Gait: antalgic         Right Foot/Ankle      Inspection and Palpation  Ecchymosis: none  Tenderness: bony tenderness and metatarsals   Swelling: metatarsals   Arch: pes planus  Hammertoes: fourth toe and fifth toe  Hallux valgus: no  Hallux limitus: yes  Skin Exam: callus;      Neurovascular  Dorsalis pedis: 2+  Posterior tibial: 2+  Saphenous nerve sensation: diminished  Tibial nerve sensation: diminished  Superficial peroneal nerve sensation: diminished  Deep peroneal nerve sensation: diminished  Sural nerve sensation: diminished  Achilles reflex: 1+     Muscle Strength  Ankle dorsiflexion: 4+     Tests  Lateral squeeze: positive      Comments  4th and 5th toes bilateral are in a abducto varus position  Pinch callus formation of 4th and 5th toes      Left Foot/Ankle       Inspection and Palpation  Ecchymosis: none  Tenderness: bony tenderness and metatarsals   Swelling: metatarsals   Arch: pes planus  Hammertoes: fourth toe and fifth toe  Hallux valgus: no  Hallux limitus: yes  Skin Exam: callus;      Neurovascular  Dorsalis pedis: 2+  Posterior tibial: 2+  Saphenous nerve sensation: diminished  Tibial nerve sensation: diminished  Superficial peroneal nerve sensation: diminished  Deep peroneal nerve sensation: diminished  Sural nerve sensation: diminished  Achilles reflex: 1+     Muscle Strength  Ankle dorsiflexion: 4+     Tests  Too many toes: positive      Comments  Patient is maximally pronated in stance and gait   There is transverse deviation of the DIPJ 4th toe bilateral   Physical Exam   Cardiovascular:   Pulses:       Dorsalis pedis pulses are 2+ on the right side, and 2+ on the left side         Posterior tibial pulses are 2+ on the right side, and 2+ on the left side     Musculoskeletal:      Right foot: There is bony tenderness         Left foot: There is bony tenderness  Feet:   Right Foot:   Skin Integrity: Positive for callus  Left Foot:   Skin Integrity: Positive for callus     Neurological:   Reflex Scores:       Achilles reflexes are 1+ on the right side and 1+ on the left side

## 2018-12-17 ENCOUNTER — OFFICE VISIT (OUTPATIENT)
Dept: FAMILY MEDICINE CLINIC | Facility: CLINIC | Age: 58
End: 2018-12-17
Payer: COMMERCIAL

## 2018-12-17 VITALS
TEMPERATURE: 98.1 F | DIASTOLIC BLOOD PRESSURE: 70 MMHG | RESPIRATION RATE: 18 BRPM | SYSTOLIC BLOOD PRESSURE: 110 MMHG | OXYGEN SATURATION: 96 % | WEIGHT: 216.5 LBS | HEART RATE: 79 BPM | BODY MASS INDEX: 30.2 KG/M2

## 2018-12-17 DIAGNOSIS — M62.08 DIASTASIS RECTI: ICD-10-CM

## 2018-12-17 DIAGNOSIS — R14.0 ABDOMINAL DISTENTION: Primary | ICD-10-CM

## 2018-12-17 DIAGNOSIS — K76.0 HEPATIC STEATOSIS: ICD-10-CM

## 2018-12-17 PROCEDURE — 99213 OFFICE O/P EST LOW 20 MIN: CPT | Performed by: FAMILY MEDICINE

## 2018-12-17 RX ORDER — OMEPRAZOLE 20 MG/1
CAPSULE, DELAYED RELEASE ORAL
Refills: 0 | COMMUNITY
Start: 2018-11-13 | End: 2019-01-20 | Stop reason: SDUPTHER

## 2018-12-17 RX ORDER — ACETAMINOPHEN/DIPHENHYDRAMINE 500MG-25MG
TABLET ORAL
Refills: 0 | COMMUNITY
Start: 2018-10-11 | End: 2019-09-12 | Stop reason: SDUPTHER

## 2018-12-19 ENCOUNTER — OFFICE VISIT (OUTPATIENT)
Dept: GASTROENTEROLOGY | Facility: CLINIC | Age: 58
End: 2018-12-19
Payer: COMMERCIAL

## 2018-12-19 VITALS
TEMPERATURE: 99.7 F | WEIGHT: 217.4 LBS | HEART RATE: 87 BPM | HEIGHT: 71 IN | BODY MASS INDEX: 30.44 KG/M2 | SYSTOLIC BLOOD PRESSURE: 132 MMHG | DIASTOLIC BLOOD PRESSURE: 84 MMHG

## 2018-12-19 DIAGNOSIS — R10.84 GENERALIZED ABDOMINAL PAIN: ICD-10-CM

## 2018-12-19 DIAGNOSIS — K21.9 GASTROESOPHAGEAL REFLUX DISEASE WITHOUT ESOPHAGITIS: Primary | ICD-10-CM

## 2018-12-19 PROCEDURE — 99213 OFFICE O/P EST LOW 20 MIN: CPT | Performed by: INTERNAL MEDICINE

## 2018-12-19 NOTE — LETTER
December 19, 2018     [address]  [address]    Patient: Nick Barclay   YOB: 1960   Date of Visit: 12/19/2018       Dear Dr Darren Steel: Thank you for referring Sukhjinder Mac to me for evaluation  Below are my notes for this consultation  If you have questions, please do not hesitate to call me  I look forward to following your patient along with you  Sincerely,        Colette Stephen MD        CC: No Recipients  Colette Stephen MD  12/19/2018  4:38 PM  Sign at close encounter  [address] [facility]  Nick Barclay 62 y o  male MRN: [id number]            Assessment & Plan:    79-year-old gentleman here for follow-up of abdominal symptoms  1   Abdominal pain and discomfort:  Likely secondary to intra-abdominal fat  -he had an upper endoscopy and colonoscopy 2 years ago  -bowel movements are regular  -he can continue PPI therapy for reflux  -recommended a low-carbohydrate diet as this may help him reduce his intra-abdominal fat  -patient reports that he eats significant amounts of bread, candy on a daily basis, this would be a good place to reduce  -given his stature his abdominal girth is pushing up on his thorax leading to some of his symptoms of discomfort    2  Acid reflux:  Continue PPI therapy for now as well as H2 blockers  -patient reports that his reflux is worse when he wakes up in the morning to urinate, recommended that he try dose of Carafate at that time        _____________________________________________________________        CC: Follow-up of abdominal discomfort    HPI:  Nick Barclay is a 62 y o male who is here for follow-up of abdominal discomfort  As you know this is a 79-year-old gentleman, we had seen him because of a complaints of abdominal discomfort left lower quadrant pain  He had an urgent CT scan which was normal   The patient was empirically started on dicyclomine for suspected irritable bowel syndrome    The patient did not take this as he was concerned that he has diagnosis was not IB S  Though he did not call us for clarification either  He presents today with General discontent with the miscommunication with him, difference of opinion between his doctors  I spent a great amount of time trying to inform the patient that findings on his CT scan were normal   He has mild fatty liver changes though he has normal LFTs in this is generally not concerning  There is no evidence of hernia on his CT scan  This is likely position  He has no evidence of hiatal hernia on my examination based on his last endoscopy 2 years ago neither on his current CT scan  The patient reports that his reflux is relatively stable  He reports abdominal discomfort difficulty breathing when he sits in her certain position because he feels like his abdomen is pushing up on his chest     I spent an extensive amount of time demonstrates to the patient that his abdomen is distended due to intra-abdominal fat  ROS:  The remainder of the ROS was negative except for the pertinent positives mentioned in HPI        Allergies: Gabapentin and Other    Medications:   Current Outpatient Prescriptions:     aspirin 81 MG tablet, Take 1 tablet (81 mg total) by mouth daily, Disp: 90 tablet, Rfl: 3    cholecalciferol (VITAMIN D3) 1,000 units tablet, Take 2 tablets (2,000 Units total) by mouth daily, Disp: 60 tablet, Rfl: 6    cyanocobalamin (VITAMIN B-12) 1,000 mcg tablet, Take 1 tablet (1,000 mcg total) by mouth daily, Disp: 30 tablet, Rfl: 6    dicyclomine (BENTYL) 10 mg capsule, Take 1 capsule (10 mg total) by mouth 4 (four) times a day (before meals and at bedtime), Disp: 90 capsule, Rfl: 3    omeprazole (PriLOSEC) 40 MG capsule, Take 1 capsule (40 mg total) by mouth daily, Disp: 30 capsule, Rfl: 2    ranitidine (ZANTAC) 300 MG tablet, take 1 tablet by mouth at bedtime, Disp: 90 tablet, Rfl: 1    sucralfate (CARAFATE) 1 g/10 mL suspension, Take 10 mL (1 g total) by mouth 4 (four) times a day, Disp: 420 mL, Rfl: 0    tamsulosin (FLOMAX) 0 4 mg, Take 0 4 mg by mouth daily at bedtime  , Disp: , Rfl:     timolol (BETIMOL) 0 5 % ophthalmic solution, Administer 1 drop to both eyes 2 (two) times a day , Disp: , Rfl:     ketoconazole (NIZORAL) 2 % cream, Apply topically daily for 30 days, Disp: 60 g, Rfl: 5    omeprazole (PriLOSEC) 20 mg delayed release capsule, , Disp: , Rfl: 0    RA ASPIRIN EC ADULT LOW ST 81 MG EC tablet, , Disp: , Rfl: 0    timolol (TIMOPTIC) 0 5 % ophthalmic solution, INSTILL 1 DROP INTO BOTH EYES TWO TIMES A DAY, Disp: , Rfl: 0    Past Medical History:   Diagnosis Date    Amnesia memory loss     Anxiety     Chronic fatigue     Disc degeneration, lumbar     Esophageal reflux     GERD (gastroesophageal reflux disease)     Glaucoma     Hiatal hernia     Increased pressure in the eye     both eyes; denies glaucoma     Internal hemorrhoids     Lumbar disc herniation with radiculopathy     Memory loss     Personal history of colonic polyps        Past Surgical History:   Procedure Laterality Date    COLONOSCOPY N/A 4/25/2016    Procedure: COLONOSCOPY;  Surgeon: Ashlee Sosa MD;  Location: [facility] GI LAB; Service:     DENTAL SURGERY      ALL TEETH PULLED    EPIDURAL BLOCK INJECTION      EPIDURAL BLOCK INJECTION Right 12/28/2016    Procedure: BLOCK / INJECTION EPIDURAL STEROID TRANSFORAMINAL  L4-5;  Surgeon: Branden Horner MD;  Location: [facility] MAIN OR;  Service:     EPIDURAL BLOCK INJECTION Right 11/16/2017    Procedure: BLOCK / INJECTION EPIDURAL STEROID TRANSFORAMINAL L4-5 RIGHT;  Surgeon: Branden Horner MD;  Location: [facility] MAIN OR;  Service: Pain Management     EPIDURAL BLOCK INJECTION Left 5/10/2018    Procedure: Lt L4 L5 Tfesi (36972,54608);   Surgeon: Branden Horner MD;  Location: [facility] MAIN OR;  Service: Pain Management     ESOPHAGOGASTRODUODENOSCOPY N/A 4/25/2016    Procedure: ESOPHAGOGASTRODUODENOSCOPY (EGD); Surgeon: Wilfred Mckeon MD;  Location: [facility] GI LAB; Service:     NOSE SURGERY         Family History   Problem Relation Age of Onset    Stomach cancer Maternal Grandmother     Hypertension Mother     Diabetes Mother     Cataracts Father     Skin cancer Father     Stroke Father         2X    Aneurysm Father     No Known Problems Sister     No Known Problems Brother         reports that he quit smoking about 20 years ago  His smoking use included Cigarettes  He has a 9 50 pack-year smoking history  He has never used smokeless tobacco  He reports that he does not drink alcohol or use drugs        Physical Exam:    /84 (BP Location: Right arm, Patient Position: Sitting, Cuff Size: Standard)   Pulse 87   Temp 99 7 °F (37 6 °C) (Tympanic)   Ht 5' 11" (1 803 m)   Wt 98 6 kg (217 lb 6 4 oz)   BMI 30 32 kg/m²      Gen: wn/wd, NAD  HEENT: anicteric, MMM, no cervical LAD  CVS: RRR, no m/r/g  CHEST: CTA b/l  ABD: +BS, soft, NT,ND, no hepatosplenomegaly, ventral hernia  EXT: no c/c/e  NEURO: aaox3  SKIN: NO rashes

## 2018-12-19 NOTE — PATIENT INSTRUCTIONS
Take carafate in morning for heartburn, continue omeprazole and ranitidine  Try dicyclomine  Work on weight loss, reduce carbohydrate (SUGARS,  Cut back on white breads, pastas, potatoes, candy, soda, juice)   Instead try whole wheat bread/grain, sweet potatoes, water

## 2018-12-19 NOTE — PROGRESS NOTES
[facility]  Lilia Schofield 62 y o  male MRN: [id number]            Assessment & Plan:    68-year-old gentleman here for follow-up of abdominal symptoms  1   Abdominal pain and discomfort:  Likely secondary to intra-abdominal fat  -he had an upper endoscopy and colonoscopy 2 years ago  -bowel movements are regular  -he can continue PPI therapy for reflux  -recommended a low-carbohydrate diet as this may help him reduce his intra-abdominal fat  -patient reports that he eats significant amounts of bread, candy on a daily basis, this would be a good place to reduce  -given his stature his abdominal girth is pushing up on his thorax leading to some of his symptoms of discomfort    2  Acid reflux:  Continue PPI therapy for now as well as H2 blockers  -patient reports that his reflux is worse when he wakes up in the morning to urinate, recommended that he try dose of Carafate at that time        _____________________________________________________________        CC: Follow-up of abdominal discomfort    HPI:  Lilia Schofield is a 62 y o male who is here for follow-up of abdominal discomfort  As you know this is a 68-year-old gentleman, we had seen him because of a complaints of abdominal discomfort left lower quadrant pain  He had an urgent CT scan which was normal   The patient was empirically started on dicyclomine for suspected irritable bowel syndrome  The patient did not take this as he was concerned that he has diagnosis was not IB S  Though he did not call us for clarification either  He presents today with General discontent with the miscommunication with him, difference of opinion between his doctors  I spent a great amount of time trying to inform the patient that findings on his CT scan were normal   He has mild fatty liver changes though he has normal LFTs in this is generally not concerning  There is no evidence of hernia on his CT scan  This is likely position      He has no evidence of hiatal hernia on my examination based on his last endoscopy 2 years ago neither on his current CT scan  The patient reports that his reflux is relatively stable  He reports abdominal discomfort difficulty breathing when he sits in her certain position because he feels like his abdomen is pushing up on his chest     I spent an extensive amount of time demonstrates to the patient that his abdomen is distended due to intra-abdominal fat  ROS:  The remainder of the ROS was negative except for the pertinent positives mentioned in HPI        Allergies: Gabapentin and Other    Medications:   Current Outpatient Prescriptions:     aspirin 81 MG tablet, Take 1 tablet (81 mg total) by mouth daily, Disp: 90 tablet, Rfl: 3    cholecalciferol (VITAMIN D3) 1,000 units tablet, Take 2 tablets (2,000 Units total) by mouth daily, Disp: 60 tablet, Rfl: 6    cyanocobalamin (VITAMIN B-12) 1,000 mcg tablet, Take 1 tablet (1,000 mcg total) by mouth daily, Disp: 30 tablet, Rfl: 6    dicyclomine (BENTYL) 10 mg capsule, Take 1 capsule (10 mg total) by mouth 4 (four) times a day (before meals and at bedtime), Disp: 90 capsule, Rfl: 3    omeprazole (PriLOSEC) 40 MG capsule, Take 1 capsule (40 mg total) by mouth daily, Disp: 30 capsule, Rfl: 2    ranitidine (ZANTAC) 300 MG tablet, take 1 tablet by mouth at bedtime, Disp: 90 tablet, Rfl: 1    sucralfate (CARAFATE) 1 g/10 mL suspension, Take 10 mL (1 g total) by mouth 4 (four) times a day, Disp: 420 mL, Rfl: 0    tamsulosin (FLOMAX) 0 4 mg, Take 0 4 mg by mouth daily at bedtime  , Disp: , Rfl:     timolol (BETIMOL) 0 5 % ophthalmic solution, Administer 1 drop to both eyes 2 (two) times a day , Disp: , Rfl:     ketoconazole (NIZORAL) 2 % cream, Apply topically daily for 30 days, Disp: 60 g, Rfl: 5    omeprazole (PriLOSEC) 20 mg delayed release capsule, , Disp: , Rfl: 0    RA ASPIRIN EC ADULT LOW ST 81 MG EC tablet, , Disp: , Rfl: 0    timolol (TIMOPTIC) 0 5 % ophthalmic solution, INSTILL 1 DROP INTO BOTH EYES TWO TIMES A DAY, Disp: , Rfl: 0    Past Medical History:   Diagnosis Date    Amnesia memory loss     Anxiety     Chronic fatigue     Disc degeneration, lumbar     Esophageal reflux     GERD (gastroesophageal reflux disease)     Glaucoma     Hiatal hernia     Increased pressure in the eye     both eyes; denies glaucoma     Internal hemorrhoids     Lumbar disc herniation with radiculopathy     Memory loss     Personal history of colonic polyps        Past Surgical History:   Procedure Laterality Date    COLONOSCOPY N/A 4/25/2016    Procedure: COLONOSCOPY;  Surgeon: Luis Booth MD;  Location: [facility]; Service:     DENTAL SURGERY      ALL TEETH PULLED    EPIDURAL BLOCK INJECTION      EPIDURAL BLOCK INJECTION Right 12/28/2016    Procedure: BLOCK / INJECTION EPIDURAL STEROID TRANSFORAMINAL  L4-5;  Surgeon: Temi Richards MD;  Location: [facility] MAIN OR;  Service:     EPIDURAL BLOCK INJECTION Right 11/16/2017    Procedure: BLOCK / INJECTION EPIDURAL STEROID TRANSFORAMINAL L4-5 RIGHT;  Surgeon: Temi Richards MD;  Location: [facility] MAIN OR;  Service: Pain Management     EPIDURAL BLOCK INJECTION Left 5/10/2018    Procedure: Lt L4 L5 Tfesi ([id number]); Surgeon: Temi Richards MD;  Location: [facility] MAIN OR;  Service: Pain Management     ESOPHAGOGASTRODUODENOSCOPY N/A 4/25/2016    Procedure: ESOPHAGOGASTRODUODENOSCOPY (EGD); Surgeon: Luis Booth MD;  Location: [facility] GI LAB; Service:     NOSE SURGERY         Family History   Problem Relation Age of Onset    Stomach cancer Maternal Grandmother     Hypertension Mother     Diabetes Mother     Cataracts Father     Skin cancer Father     Stroke Father         2X    Aneurysm Father     No Known Problems Sister     No Known Problems Brother         reports that he quit smoking about 20 years ago  His smoking use included Cigarettes  He has a 9 50 pack-year smoking history   He has never used smokeless tobacco  He reports that he does not drink alcohol or use drugs        Physical Exam:    /84 (BP Location: Right arm, Patient Position: Sitting, Cuff Size: Standard)   Pulse 87   Temp 99 7 °F (37 6 °C) (Tympanic)   Ht 5' 11" (1 803 m)   Wt 98 6 kg (217 lb 6 4 oz)   BMI 30 32 kg/m²     Gen: wn/wd, NAD  HEENT: anicteric, MMM, no cervical LAD  CVS: RRR, no m/r/g  CHEST: CTA b/l  ABD: +BS, soft, NT,ND, no hepatosplenomegaly, ventral hernia  EXT: no c/c/e  NEURO: aaox3  SKIN: NO rashes

## 2018-12-19 NOTE — PROGRESS NOTES
Subjective      Iglesia Yang is a 62 y o  male who presents for evaluation of abdominal distention and pain  Onset was several weeks ago  Symptoms have been unchanged  The pain is described as dull and pressure-like, and is mild in intensity  He has a history of abdominal pain and probably IBS, and has been worked up by GI in the past  He recently had a CT scan of the abdomen done, which was significant only for fatty liver  However, he now states that his abdomen has become increasingly "large" and swollen  He states that when he bends down to tie his shoes, his abdomen "gets in the way" which it has not done in the past  He also notes that he has a large bulge in his abdomen, whenever he is lying recumbent and contracts his abdominal muscles  The bulge itself is not painful  Of note, the patient is a somewhat poor historian and often goes on tangents about unrelated subjects  The patient denies anorexia, arthralagias, belching, chills, constipation, diarrhea, dysuria, fever, flatus, frequency, headache, hematochezia, hematuria, melena, myalgias, nausea, sweats and vomiting  Patient otherwise feels well and has no other complaints  The following portions of the patient's history were reviewed and updated as appropriate: allergies, current medications, past family history, past medical history, past social history, past surgical history and problem list     Review of Systems  Pertinent items are noted in HPI  Objective   /70 (BP Location: Left arm, Patient Position: Sitting, Cuff Size: Large)   Pulse 79   Temp 98 1 °F (36 7 °C) (Tympanic)   Resp 18   Wt 98 2 kg (216 lb 8 oz)   SpO2 96%   BMI 30 20 kg/m²   General appearance: alert and oriented, in no acute distress and cooperative  Neck: no JVD  Lungs: clear to auscultation bilaterally  Heart: regular rate and rhythm, S1, S2 normal and (+) murmur  Abdomen: Soft, nontender  Normoactive bowel sounds   Significant diastasis recti with no tenderness to palpation or evidence of bowel strangulation  Distension noted  (+) mild fluid wave  Extremities: extremities normal, warm and well-perfused; no cyanosis, clubbing, or edema    Assessment/Plan   Abdominal distension, possibly secondary to ascites  Hepatic Steatosis  Diastasis recti     · Due to hepatic steatosis noted on CT, suspect some fluid overload in the abdominal cavity causing the patient's abdominal distention  · Ordered abdominal U/S to assess  · Follow up with GI as previously scheduled, as the patient's abdominal pain and IBS symptoms are likely superimposed on the patient's volume issues  · Reassured the patient that diastasis recti is a generally benign condition and likely unrelated to the patient's current abdominal symptoms  · Explained the etiology of both ascites and diastasis recti to the patient, with all questions answered  All patient questions & concerns were addressed  The patient agrees with his treatment plan  RTO 3 months or prn  Leander Lesches, DO  12/19/18  9:42 AM    Some portions of this record may have been generated with voice recognition software  There may be translation, syntax, or grammatical errors  Occasional wrong word or "sound-a-like" substitutions may have occurred due to the inherent limitations of the voice recognition software  Read the chart carefully and recognize, using context, where substations may have occurred   If you have any questions, please contact the dictating provider for clarification or correction, as needed

## 2019-01-10 DIAGNOSIS — R53.83 FATIGUE, UNSPECIFIED TYPE: ICD-10-CM

## 2019-01-10 RX ORDER — LANOLIN ALCOHOL/MO/W.PET/CERES
1000 CREAM (GRAM) TOPICAL DAILY
Qty: 30 TABLET | Refills: 0 | Status: SHIPPED | OUTPATIENT
Start: 2019-01-10 | End: 2019-01-29 | Stop reason: SDUPTHER

## 2019-01-15 ENCOUNTER — HOSPITAL ENCOUNTER (OUTPATIENT)
Dept: RADIOLOGY | Facility: HOSPITAL | Age: 59
Discharge: HOME | End: 2019-01-15
Payer: COMMERCIAL

## 2019-01-15 DIAGNOSIS — R14.0 ABDOMINAL DISTENTION: ICD-10-CM

## 2019-01-15 DIAGNOSIS — K76.0 HEPATIC STEATOSIS: ICD-10-CM

## 2019-01-15 PROCEDURE — 76700 US EXAM ABDOM COMPLETE: CPT

## 2019-01-19 DIAGNOSIS — R10.13 EPIGASTRIC BURNING SENSATION: ICD-10-CM

## 2019-01-20 RX ORDER — OMEPRAZOLE 40 MG/1
CAPSULE, DELAYED RELEASE ORAL
Qty: 30 CAPSULE | Refills: 2 | Status: SHIPPED | OUTPATIENT
Start: 2019-01-20 | End: 2019-04-14 | Stop reason: SDUPTHER

## 2019-01-28 ENCOUNTER — OFFICE VISIT (OUTPATIENT)
Dept: PODIATRY | Facility: CLINIC | Age: 59
End: 2019-01-28
Payer: COMMERCIAL

## 2019-01-28 VITALS
HEART RATE: 84 BPM | BODY MASS INDEX: 30.38 KG/M2 | SYSTOLIC BLOOD PRESSURE: 114 MMHG | RESPIRATION RATE: 16 BRPM | WEIGHT: 217 LBS | HEIGHT: 71 IN | DIASTOLIC BLOOD PRESSURE: 80 MMHG

## 2019-01-28 DIAGNOSIS — M20.42 HAMMER TOES OF BOTH FEET: ICD-10-CM

## 2019-01-28 DIAGNOSIS — M20.41 HAMMER TOES OF BOTH FEET: ICD-10-CM

## 2019-01-28 DIAGNOSIS — M79.671 PAIN IN BOTH FEET: Primary | ICD-10-CM

## 2019-01-28 DIAGNOSIS — M79.672 PAIN IN BOTH FEET: Primary | ICD-10-CM

## 2019-01-28 DIAGNOSIS — M21.969 ACQUIRED DEFORMITY OF FOOT, UNSPECIFIED LATERALITY: ICD-10-CM

## 2019-01-28 DIAGNOSIS — B35.9 DERMATOPHYTOSIS: ICD-10-CM

## 2019-01-28 PROCEDURE — 99213 OFFICE O/P EST LOW 20 MIN: CPT | Performed by: PODIATRIST

## 2019-01-28 RX ORDER — KETOCONAZOLE 20 MG/G
CREAM TOPICAL DAILY
Qty: 60 G | Refills: 5 | Status: SHIPPED | OUTPATIENT
Start: 2019-01-28 | End: 2021-11-30

## 2019-01-28 NOTE — PROGRESS NOTES
Procedures   Foot Exam        Comments  Chronic moccasin tinea pedis noted bilateral heel                 Assessment/Plan:  Metatarsalgia   Hammertoe formation   Pes planus   Pain      Plan   Foot exam performed   All nails debrided   Patient will benefit from orthotics      Patient needs referral to Orthopedics for radiculopathy workup     No problem-specific Assessment & Plan notes found for this encounter          There are no diagnoses linked to this encounter        Subjective:   Patient only has mild pain in his feet with ambulation   He has known radiculopathy           Past Medical History:   Diagnosis Date    Amnesia memory loss      Anxiety      Chronic fatigue      Disc degeneration, lumbar      Esophageal reflux      GERD (gastroesophageal reflux disease)      Glaucoma      Hiatal hernia      Increased pressure in the eye       both eyes; denies glaucoma     Internal hemorrhoids      Lumbar disc herniation with radiculopathy      Memory loss      Personal history of colonic polyps                     Past Surgical History:   Procedure Laterality Date    COLONOSCOPY N/A 4/25/2016     Procedure: COLONOSCOPY;  Surgeon: Minal Rivera MD;  Location: [facility] GI [facility];  Service:     DENTAL SURGERY         ALL TEETH PULLED    EPIDURAL BLOCK INJECTION        EPIDURAL BLOCK INJECTION Right 12/28/2016     Procedure: BLOCK / INJECTION EPIDURAL STEROID TRANSFORAMINAL  L4-5;  Surgeon: Sirena Sosa MD;  Location: [facility] MAIN OR;  Service:     EPIDURAL BLOCK INJECTION Right 11/16/2017     Procedure: BLOCK / INJECTION EPIDURAL STEROID TRANSFORAMINAL L4-5 RIGHT;  Surgeon: Sirena Sosa MD;  Location: [facility] MAIN OR;  Service: Pain Management     EPIDURAL BLOCK INJECTION Left 5/10/2018     Procedure: Lt L4 L5 Tfesi ([id number]);  Surgeon: Sirena Sosa MD;  Location: [facility] MAIN OR;  Service: Pain Management     ESOPHAGOGASTRODUODENOSCOPY N/A 4/25/2016     Procedure: ESOPHAGOGASTRODUODENOSCOPY (EGD);  Surgeon: Kris Gallegos MD;  Location: [facility] GI LAB;  Service:     NOSE SURGERY                       Allergies   Allergen Reactions    Gabapentin Other (See Comments)       OUT OF BODY PSYCHOSIS, MEMORY LOSS, AMNESIA    Other Cough       HAYFEVER, SEASONAL, ALFALFA            Current Outpatient Prescriptions:     aspirin 81 MG tablet, Take 81 mg by mouth daily  , Disp: , Rfl:     omeprazole (PriLOSEC) 20 mg delayed release capsule, Take 1 capsule (20 mg total) by mouth daily, Disp: 30 capsule, Rfl: 3    ranitidine (ZANTAC) 300 MG tablet, Take 1 tablet (300 mg total) by mouth daily at bedtime, Disp: 30 tablet, Rfl: 3    tamsulosin (FLOMAX) 0 4 mg, Take 0 4 mg by mouth daily with dinner, Disp: , Rfl:     timolol (BETIMOL) 0 5 % ophthalmic solution, Administer 1 drop to both eyes 2 (two) times a day , Disp: , Rfl:     timolol (TIMOPTIC) 0 5 % ophthalmic solution, INSTILL 1 DROP INTO BOTH EYES TWO TIMES A DAY, Disp: , Rfl: 0    citalopram (CeleXA) 10 mg tablet, Take 3 tablets (30 mg total) by mouth daily for 90 days, Disp: 270 tablet, Rfl: 0    gabapentin (NEURONTIN) 300 mg capsule, Take 2 capsules (600 mg total) by mouth 3 (three) times a day, Disp: 90 capsule, Rfl: 3    gabapentin (NEURONTIN) 600 MG tablet, , Disp: , Rfl: 0           Patient Active Problem List   Diagnosis    Chronic pain syndrome    Chronic low back pain    Intervertebral disc disorder with radiculopathy of lumbar region    Spondylosis of lumbar region without myelopathy or radiculopathy    Abnormal chromosomal analysis    Adult-onset obesity    Anxiety    Elevated PSA    Enlarged prostate on rectal examination    Gastroesophageal reflux disease without esophagitis    Lumbar disc herniation with radiculopathy    Myofascial pain syndrome    Chronic fatigue    Memory loss    Glaucoma    Neuritis or radiculitis due to rupture of lumbar intervertebral disc    Low back pain             Patient ID: Jerry Lopez is a 58 y o  male      Objective:      Foot Exam     General  General Appearance: appears stated age and healthy   Orientation: alert and oriented to person, place, and time   Affect: appropriate   Gait: antalgic         Right Foot/Ankle      Inspection and Palpation  Ecchymosis: none  Tenderness: bony tenderness and metatarsals   Swelling: metatarsals   Arch: pes planus  Hammertoes: fourth toe and fifth toe  Hallux valgus: no  Hallux limitus: yes  Skin Exam: callus;      Neurovascular  Dorsalis pedis: 2+  Posterior tibial: 2+  Saphenous nerve sensation: diminished  Tibial nerve sensation: diminished  Superficial peroneal nerve sensation: diminished  Deep peroneal nerve sensation: diminished  Sural nerve sensation: diminished  Achilles reflex: 1+     Muscle Strength  Ankle dorsiflexion: 4+     Tests  Lateral squeeze: positive      Comments  4th and 5th toes bilateral are in a abducto varus position  Pinch callus formation of 4th and 5th toes      Left Foot/Ankle       Inspection and Palpation  Ecchymosis: none  Tenderness: bony tenderness and metatarsals   Swelling: metatarsals   Arch: pes planus  Hammertoes: fourth toe and fifth toe  Hallux valgus: no  Hallux limitus: yes  Skin Exam: callus;      Neurovascular  Dorsalis pedis: 2+  Posterior tibial: 2+  Saphenous nerve sensation: diminished  Tibial nerve sensation: diminished  Superficial peroneal nerve sensation: diminished  Deep peroneal nerve sensation: diminished  Sural nerve sensation: diminished  Achilles reflex: 1+     Muscle Strength  Ankle dorsiflexion: 4+     Tests  Too many toes: positive      Comments  Patient is maximally pronated in stance and gait   There is transverse deviation of the DIPJ 4th toe bilateral   Physical Exam   Cardiovascular:   Pulses:       Dorsalis pedis pulses are 2+ on the right side, and 2+ on the left side         Posterior tibial pulses are 2+ on the right side, and 2+ on the left side     Musculoskeletal:        Right foot: There is bony tenderness         Left foot: There is bony tenderness  Feet:   Right Foot:   Skin Integrity: Positive for callus  Left Foot:   Skin Integrity: Positive for callus     Neurological:   Reflex Scores:       Achilles reflexes are 1+ on the right side and 1+ on the left side

## 2019-01-29 DIAGNOSIS — R53.83 FATIGUE, UNSPECIFIED TYPE: ICD-10-CM

## 2019-01-29 DIAGNOSIS — E55.9 VITAMIN D DEFICIENCY: ICD-10-CM

## 2019-01-29 RX ORDER — LANOLIN ALCOHOL/MO/W.PET/CERES
1000 CREAM (GRAM) TOPICAL DAILY
Qty: 30 TABLET | Refills: 0 | Status: SHIPPED | OUTPATIENT
Start: 2019-01-29 | End: 2019-03-12 | Stop reason: SDUPTHER

## 2019-01-29 RX ORDER — MELATONIN
2000 DAILY
Qty: 60 TABLET | Refills: 0 | Status: SHIPPED | OUTPATIENT
Start: 2019-01-29 | End: 2019-03-12 | Stop reason: SDUPTHER

## 2019-01-29 NOTE — TELEPHONE ENCOUNTER
The patient is requesting a refill on his vitamin B12 and D3 for this time only, and he will obtain additional refills from [facility] as he is no longer coming here

## 2019-01-30 RX ORDER — LANOLIN ALCOHOL/MO/W.PET/CERES
1000 CREAM (GRAM) TOPICAL DAILY
Qty: 30 TABLET | Refills: 0 | OUTPATIENT
Start: 2019-01-30

## 2019-01-30 RX ORDER — MELATONIN
2000 DAILY
Qty: 60 TABLET | OUTPATIENT
Start: 2019-01-30

## 2019-01-30 NOTE — TELEPHONE ENCOUNTER
Though the patient is no longer seeing Neuro, it appears that Dr Alissa Kurtz has already refilled the medications anyway  In the future I don't mind refilling them, but the refill has already been taken care of      Thank you,    Mary Abdul,   01/30/19  2:43 AM

## 2019-01-30 NOTE — TELEPHONE ENCOUNTER
CALLED PT AND RELAYED MESSAGE  HE WILL CHECK WITH THE PHARM TO SEE IF THEY ARE THERE  IF NOT HE WILL CALL US BACK TO REFILL THEM

## 2019-02-18 ENCOUNTER — OFFICE VISIT (OUTPATIENT)
Dept: PAIN MEDICINE | Facility: CLINIC | Age: 59
End: 2019-02-18
Payer: COMMERCIAL

## 2019-02-18 VITALS
DIASTOLIC BLOOD PRESSURE: 76 MMHG | HEART RATE: 88 BPM | WEIGHT: 217.8 LBS | HEIGHT: 71 IN | SYSTOLIC BLOOD PRESSURE: 122 MMHG | BODY MASS INDEX: 30.49 KG/M2 | RESPIRATION RATE: 16 BRPM

## 2019-02-18 DIAGNOSIS — M51.16 INTERVERTEBRAL DISC DISORDER WITH RADICULOPATHY OF LUMBAR REGION: ICD-10-CM

## 2019-02-18 DIAGNOSIS — G89.4 CHRONIC PAIN SYNDROME: Primary | ICD-10-CM

## 2019-02-18 DIAGNOSIS — M54.41 CHRONIC RIGHT-SIDED LOW BACK PAIN WITH RIGHT-SIDED SCIATICA: ICD-10-CM

## 2019-02-18 DIAGNOSIS — G89.29 CHRONIC RIGHT-SIDED LOW BACK PAIN WITH RIGHT-SIDED SCIATICA: ICD-10-CM

## 2019-02-18 PROCEDURE — 99214 OFFICE O/P EST MOD 30 MIN: CPT | Performed by: ANESTHESIOLOGY

## 2019-02-18 NOTE — H&P (VIEW-ONLY)
Pain Medicine Follow-Up Note    Assessment:  1  Chronic pain syndrome    2  Chronic right-sided low back pain with right-sided sciatica    3  Intervertebral disc disorder with radiculopathy of lumbar region        Plan:  Orders Placed This Encounter   Procedures    Ambulatory referral to Physical Therapy     Standing Status:   Future     Standing Expiration Date:   8/18/2019     Referral Priority:   Routine     Referral Type:   Physical Therapy     Referral Reason:   Specialty Services Required     Requested Specialty:   Physical Therapy     Number of Visits Requested:   1     Expiration Date:   2/18/2020     My impressions and treatment recommendations were discussed in detail with the patient who verbalized understanding and had no further questions  The patient is reporting low back pain with radiation into the right lower extremity what appears to be the right L4 and L5 distributions  He states that his pain has returned at this point  Previously, he had a left L4 and L5 transforaminal epidural steroid injection on May 10, 2018 with excellent pain relief for about 9 months duration  He would like to undergo a repeat right L4 and L5 transforaminal epidural steroid injection at this time since this could be potentially therapeutic  The procedures, its risks, and benefits were explained in detail to the patient  Risks include but are not limited to bleeding, infection, hematoma formation, abscess formation, weakness, headache, failure the pain to improve, nerve irritation or damage, and potential worsening of the pain  The patient verbalized understanding and wished to proceed with the procedure  I did also feel reasonable to have the patient undergo a course of physical therapy 2-3 times per week for 4-6 weeks  Follow-up is planned in 4 weeks time or sooner as warranted  Discharge instructions were provided   I personally saw and examined the patient and I agree with the above discussed plan of care     History of Present Illness:    Erin Gurrola is a 62 y o  male who presents to [facility] for interval re-evaluation of the above stated pain complaints  The patient has a past medical and chronic pain history as outlined in the assessment section  He was last seen on May 10, 2018 at which time he underwent a left L4 and L5 transforaminal epidural steroid injection  At today's office visit, the patient's pain score is 7/10 on the verbal numerical pain rating scale  The patient states that his pain is primarily in the low back with radiation into the right lower extremity what appears to be the right L4 and L5 distributions  He describes his pain as worse in the morning, evening, and night  His pain is constant in nature and he reports the quality of his pain as sharp and pressure like    He does report excellent pain relief with transforaminal epidural steroid injections in the past   He would like to undergo a repeat injection at this point in time  Other than as stated above, the patient denies any interval changes in medications, medical condition, mental condition, symptoms, or allergies since the last office visit  Review of Systems:    Review of Systems   Respiratory: Positive for shortness of breath  Cardiovascular: Negative for chest pain  Gastrointestinal: Positive for nausea  Negative for constipation, diarrhea and vomiting  Musculoskeletal: Positive for gait problem and joint swelling  Negative for arthralgias and myalgias  Skin: Negative for rash  Neurological: Positive for seizures  Negative for dizziness and weakness  All other systems reviewed and are negative          Patient Active Problem List   Diagnosis    Chronic pain syndrome    Intervertebral disc disorder with radiculopathy of lumbar region    Spondylosis of lumbar region without myelopathy or radiculopathy    Abnormal chromosomal analysis    Adult-onset obesity    Anxiety  Elevated PSA    Enlarged prostate on rectal examination    Gastroesophageal reflux disease without esophagitis    Myofascial pain syndrome    Chronic fatigue    Episode of memory loss    Glaucoma    Low back pain    Acquired deformity of foot    Hammer toes of both feet    Pain in both feet    Congenital pes planus of right foot    Congenital pes planus of left foot    TGA (transient global amnesia)    Dermatophytosis    Abdominal pain    LLQ abdominal pain       Past Medical History:   Diagnosis Date    Amnesia memory loss     Anxiety     Chronic fatigue     Disc degeneration, lumbar     Esophageal reflux     GERD (gastroesophageal reflux disease)     Glaucoma     Hiatal hernia     Increased pressure in the eye     both eyes; denies glaucoma     Internal hemorrhoids     Lumbar disc herniation with radiculopathy     Memory loss     Personal history of colonic polyps        Past Surgical History:   Procedure Laterality Date    COLONOSCOPY N/A 4/25/2016    Procedure: COLONOSCOPY;  Surgeon: Sally Ivey MD;  Location: [facility]; Service:     DENTAL SURGERY      ALL TEETH PULLED    EPIDURAL BLOCK INJECTION      EPIDURAL BLOCK INJECTION Right 12/28/2016    Procedure: BLOCK / INJECTION EPIDURAL STEROID TRANSFORAMINAL  L4-5;  Surgeon: Michele Tripp MD;  Location: [facility] MAIN OR;  Service:     EPIDURAL BLOCK INJECTION Right 11/16/2017    Procedure: BLOCK / INJECTION EPIDURAL STEROID TRANSFORAMINAL L4-5 RIGHT;  Surgeon: Michele Tripp MD;  Location: [facility] MAIN OR;  Service: Pain Management     EPIDURAL BLOCK INJECTION Left 5/10/2018    Procedure: Lt L4 L5 Tfesi (09168,41811); Surgeon: Michele Tripp MD;  Location: [facility];  Service: Pain Management     ESOPHAGOGASTRODUODENOSCOPY N/A 4/25/2016    Procedure: ESOPHAGOGASTRODUODENOSCOPY (EGD); Surgeon: Sally Ivey MD;  Location: [facility];   Service:     NOSE SURGERY         Family History   Problem Relation Age of Onset    Stomach cancer Maternal Grandmother     Hypertension Mother     Diabetes Mother     Cataracts Father     Skin cancer Father     Stroke Father         2X    Aneurysm Father     No Known Problems Sister     No Known Problems Brother        Social History     Occupational History    Not on file   Tobacco Use    Smoking status: Former Smoker     Packs/day: 0 50     Years: 19 00     Pack years: 9 50     Types: Cigarettes     Last attempt to quit:      Years since quittin 1    Smokeless tobacco: Never Used   Substance and Sexual Activity    Alcohol use: No    Drug use: No    Sexual activity: Not on file         Current Outpatient Medications:     aspirin 81 MG tablet, Take 1 tablet (81 mg total) by mouth daily, Disp: 90 tablet, Rfl: 3    cholecalciferol (VITAMIN D3) 1,000 units tablet, Take 2 tablets (2,000 Units total) by mouth daily, Disp: 60 tablet, Rfl: 0    cyanocobalamin (VITAMIN B-12) 1,000 mcg tablet, Take 1 tablet (1,000 mcg total) by mouth daily, Disp: 30 tablet, Rfl: 0    dicyclomine (BENTYL) 10 mg capsule, Take 1 capsule (10 mg total) by mouth 4 (four) times a day (before meals and at bedtime), Disp: 90 capsule, Rfl: 3    ketoconazole (NIZORAL) 2 % cream, Apply topically daily for 30 days, Disp: 60 g, Rfl: 5    omeprazole (PriLOSEC) 40 MG capsule, take 1 capsule by mouth once daily, Disp: 30 capsule, Rfl: 2    RA ASPIRIN EC ADULT LOW ST 81 MG EC tablet, , Disp: , Rfl: 0    ranitidine (ZANTAC) 300 MG tablet, take 1 tablet by mouth at bedtime, Disp: 90 tablet, Rfl: 1    sucralfate (CARAFATE) 1 g/10 mL suspension, Take 10 mL (1 g total) by mouth 4 (four) times a day, Disp: 420 mL, Rfl: 0    tamsulosin (FLOMAX) 0 4 mg, Take 0 4 mg by mouth daily at bedtime  , Disp: , Rfl:     timolol (BETIMOL) 0 5 % ophthalmic solution, Administer 1 drop to both eyes 2 (two) times a day , Disp: , Rfl:     timolol (TIMOPTIC) 0 5 % ophthalmic solution, INSTILL 1 DROP INTO BOTH EYES TWO TIMES A DAY, Disp: , Rfl: 0    Allergies   Allergen Reactions    Gabapentin Other (See Comments)     OUT OF BODY PSYCHOSIS, MEMORY LOSS, AMNESIA    Other Cough     HAYFEVER, SEASONAL, ALFALFA       Physical Exam:    /76 (BP Location: Right arm, Patient Position: Sitting, Cuff Size: Standard)   Pulse 88   Resp 16   Ht 5' 11" (1 803 m)   Wt 98 8 kg (217 lb 12 8 oz)   BMI 30 38 kg/m²     Constitutional:obese  Eyes:anicteric  HEENT:grossly intact  Neck:supple, symmetric, trachea midline and no masses   Pulmonary:even and unlabored  Cardiovascular:No edema or pitting edema present  Skin:Normal without rashes or lesions and well hydrated  Psychiatric:Mood and affect appropriate  Neurologic:Cranial Nerves II-XII grossly intact  Musculoskeletal:antalgic    Orders Placed This Encounter   Procedures    Ambulatory referral to Physical Therapy

## 2019-02-18 NOTE — PROGRESS NOTES
Pain Medicine Follow-Up Note    Assessment:  1  Chronic pain syndrome    2  Chronic right-sided low back pain with right-sided sciatica    3  Intervertebral disc disorder with radiculopathy of lumbar region        Plan:  Orders Placed This Encounter   Procedures    Ambulatory referral to Physical Therapy     Standing Status:   Future     Standing Expiration Date:   8/18/2019     Referral Priority:   Routine     Referral Type:   Physical Therapy     Referral Reason:   Specialty Services Required     Requested Specialty:   Physical Therapy     Number of Visits Requested:   1     Expiration Date:   2/18/2020     My impressions and treatment recommendations were discussed in detail with the patient who verbalized understanding and had no further questions  The patient is reporting low back pain with radiation into the right lower extremity what appears to be the right L4 and L5 distributions  He states that his pain has returned at this point  Previously, he had a left L4 and L5 transforaminal epidural steroid injection on May 10, 2018 with excellent pain relief for about 9 months duration  He would like to undergo a repeat right L4 and L5 transforaminal epidural steroid injection at this time since this could be potentially therapeutic  The procedures, its risks, and benefits were explained in detail to the patient  Risks include but are not limited to bleeding, infection, hematoma formation, abscess formation, weakness, headache, failure the pain to improve, nerve irritation or damage, and potential worsening of the pain  The patient verbalized understanding and wished to proceed with the procedure  I did also feel reasonable to have the patient undergo a course of physical therapy 2-3 times per week for 4-6 weeks  Follow-up is planned in 4 weeks time or sooner as warranted  Discharge instructions were provided   I personally saw and examined the patient and I agree with the above discussed plan of care     History of Present Illness:    Talon Marcelo is a 62 y o  male who presents to [facility] for interval re-evaluation of the above stated pain complaints  The patient has a past medical and chronic pain history as outlined in the assessment section  He was last seen on May 10, 2018 at which time he underwent a left L4 and L5 transforaminal epidural steroid injection  At today's office visit, the patient's pain score is 7/10 on the verbal numerical pain rating scale  The patient states that his pain is primarily in the low back with radiation into the right lower extremity what appears to be the right L4 and L5 distributions  He describes his pain as worse in the morning, evening, and night  His pain is constant in nature and he reports the quality of his pain as sharp and pressure like    He does report excellent pain relief with transforaminal epidural steroid injections in the past   He would like to undergo a repeat injection at this point in time  Other than as stated above, the patient denies any interval changes in medications, medical condition, mental condition, symptoms, or allergies since the last office visit  Review of Systems:    Review of Systems   Respiratory: Positive for shortness of breath  Cardiovascular: Negative for chest pain  Gastrointestinal: Positive for nausea  Negative for constipation, diarrhea and vomiting  Musculoskeletal: Positive for gait problem and joint swelling  Negative for arthralgias and myalgias  Skin: Negative for rash  Neurological: Positive for seizures  Negative for dizziness and weakness  All other systems reviewed and are negative          Patient Active Problem List   Diagnosis    Chronic pain syndrome    Intervertebral disc disorder with radiculopathy of lumbar region    Spondylosis of lumbar region without myelopathy or radiculopathy    Abnormal chromosomal analysis    Adult-onset obesity    Anxiety  Elevated PSA    Enlarged prostate on rectal examination    Gastroesophageal reflux disease without esophagitis    Myofascial pain syndrome    Chronic fatigue    Episode of memory loss    Glaucoma    Low back pain    Acquired deformity of foot    Hammer toes of both feet    Pain in both feet    Congenital pes planus of right foot    Congenital pes planus of left foot    TGA (transient global amnesia)    Dermatophytosis    Abdominal pain    LLQ abdominal pain       Past Medical History:   Diagnosis Date    Amnesia memory loss     Anxiety     Chronic fatigue     Disc degeneration, lumbar     Esophageal reflux     GERD (gastroesophageal reflux disease)     Glaucoma     Hiatal hernia     Increased pressure in the eye     both eyes; denies glaucoma     Internal hemorrhoids     Lumbar disc herniation with radiculopathy     Memory loss     Personal history of colonic polyps        Past Surgical History:   Procedure Laterality Date    COLONOSCOPY N/A 4/25/2016    Procedure: COLONOSCOPY;  Surgeon: Iraida Pyle MD;  Location: [facility]; Service:     DENTAL SURGERY      ALL TEETH PULLED    EPIDURAL BLOCK INJECTION      EPIDURAL BLOCK INJECTION Right 12/28/2016    Procedure: BLOCK / INJECTION EPIDURAL STEROID TRANSFORAMINAL  L4-5;  Surgeon: Zonia Ayala MD;  Location: [facility] MAIN OR;  Service:     EPIDURAL BLOCK INJECTION Right 11/16/2017    Procedure: BLOCK / INJECTION EPIDURAL STEROID TRANSFORAMINAL L4-5 RIGHT;  Surgeon: Zonia Ayala MD;  Location: [facility] MAIN OR;  Service: Pain Management     EPIDURAL BLOCK INJECTION Left 5/10/2018    Procedure: Lt L4 L5 Tfesi ([id number]); Surgeon: Zonia Ayala MD;  Location: [facility] MAIN OR;  Service: Pain Management     ESOPHAGOGASTRODUODENOSCOPY N/A 4/25/2016    Procedure: ESOPHAGOGASTRODUODENOSCOPY (EGD); Surgeon: Iraida Pyle MD;  Location: [facility];   Service:     NOSE SURGERY         Family History   Problem Relation Age of Onset    Stomach cancer Maternal Grandmother     Hypertension Mother     Diabetes Mother     Cataracts Father     Skin cancer Father     Stroke Father         2X    Aneurysm Father     No Known Problems Sister     No Known Problems Brother        Social History     Occupational History    Not on file   Tobacco Use    Smoking status: Former Smoker     Packs/day: 0 50     Years: 19 00     Pack years: 9 50     Types: Cigarettes     Last attempt to quit:      Years since quittin 1    Smokeless tobacco: Never Used   Substance and Sexual Activity    Alcohol use: No    Drug use: No    Sexual activity: Not on file         Current Outpatient Medications:     aspirin 81 MG tablet, Take 1 tablet (81 mg total) by mouth daily, Disp: 90 tablet, Rfl: 3    cholecalciferol (VITAMIN D3) 1,000 units tablet, Take 2 tablets (2,000 Units total) by mouth daily, Disp: 60 tablet, Rfl: 0    cyanocobalamin (VITAMIN B-12) 1,000 mcg tablet, Take 1 tablet (1,000 mcg total) by mouth daily, Disp: 30 tablet, Rfl: 0    dicyclomine (BENTYL) 10 mg capsule, Take 1 capsule (10 mg total) by mouth 4 (four) times a day (before meals and at bedtime), Disp: 90 capsule, Rfl: 3    ketoconazole (NIZORAL) 2 % cream, Apply topically daily for 30 days, Disp: 60 g, Rfl: 5    omeprazole (PriLOSEC) 40 MG capsule, take 1 capsule by mouth once daily, Disp: 30 capsule, Rfl: 2    RA ASPIRIN EC ADULT LOW ST 81 MG EC tablet, , Disp: , Rfl: 0    ranitidine (ZANTAC) 300 MG tablet, take 1 tablet by mouth at bedtime, Disp: 90 tablet, Rfl: 1    sucralfate (CARAFATE) 1 g/10 mL suspension, Take 10 mL (1 g total) by mouth 4 (four) times a day, Disp: 420 mL, Rfl: 0    tamsulosin (FLOMAX) 0 4 mg, Take 0 4 mg by mouth daily at bedtime  , Disp: , Rfl:     timolol (BETIMOL) 0 5 % ophthalmic solution, Administer 1 drop to both eyes 2 (two) times a day , Disp: , Rfl:     timolol (TIMOPTIC) 0 5 % ophthalmic solution, INSTILL 1 DROP INTO BOTH EYES TWO TIMES A DAY, Disp: , Rfl: 0    Allergies   Allergen Reactions    Gabapentin Other (See Comments)     OUT OF BODY PSYCHOSIS, MEMORY LOSS, AMNESIA    Other Cough     HAYFEVER, SEASONAL, ALFALFA       Physical Exam:    /76 (BP Location: Right arm, Patient Position: Sitting, Cuff Size: Standard)   Pulse 88   Resp 16   Ht 5' 11" (1 803 m)   Wt 98 8 kg (217 lb 12 8 oz)   BMI 30 38 kg/m²     Constitutional:obese  Eyes:anicteric  HEENT:grossly intact  Neck:supple, symmetric, trachea midline and no masses   Pulmonary:even and unlabored  Cardiovascular:No edema or pitting edema present  Skin:Normal without rashes or lesions and well hydrated  Psychiatric:Mood and affect appropriate  Neurologic:Cranial Nerves II-XII grossly intact  Musculoskeletal:antalgic    Orders Placed This Encounter   Procedures    Ambulatory referral to Physical Therapy

## 2019-03-04 ENCOUNTER — TELEPHONE (OUTPATIENT)
Dept: PAIN MEDICINE | Facility: CLINIC | Age: 59
End: 2019-03-04

## 2019-03-04 NOTE — TELEPHONE ENCOUNTER
S/w patient, needs to speak w/a nurse  Pt has not started PT due to weather, etc  Pt understands Dr Chino Blanton is going through protocol for the ins co in order for pt to get the injection pain for, however he thinks the schedule eval this week w/PT is not necessary      Please call pt today to discuss, try home and cell #'s

## 2019-03-04 NOTE — TELEPHONE ENCOUNTER
The physical therapy is to help prolong the effects of the injection  I am doing the injection just to alleviate his pain, but an injection by itself does not prevent the pain from returning  PT will help keep his pain relieved longer than just doing an injection

## 2019-03-04 NOTE — TELEPHONE ENCOUNTER
Patient is calling back returning call from nurse   Please call today [phone], pt is extremely agitated with waiting for call back

## 2019-03-04 NOTE — TELEPHONE ENCOUNTER
FYI AS,     SW patient, made aware of AS recommendations  Patient states, "I have never gone to physical therapy after I had an injection  I have always gone before the injection, due to the insurance requiring it  So, why has it changed that I need to go after the procedure? I normally go about my schedule once I had the injection "    Again, advised of AS recommendations  Patient states he will make a decision on whether he needs to cancel the appointment or keep it

## 2019-03-04 NOTE — TELEPHONE ENCOUNTER
SW patient, who is very upset and wants to know why he has to go to a physical therapy consult for tomorrow 3/5/19 when he is having procedure with AS on 3/7/19  Patient said he wants to know why he would even need physical therapy after he has the injection, because the injection should take care of the pain he is having  Advised will s/w AS and call back with some answers

## 2019-03-07 ENCOUNTER — HOSPITAL ENCOUNTER (OUTPATIENT)
Facility: AMBULARY SURGERY CENTER | Age: 59
Setting detail: OUTPATIENT SURGERY
Discharge: HOME | End: 2019-03-07
Attending: ANESTHESIOLOGY | Admitting: ANESTHESIOLOGY
Payer: COMMERCIAL

## 2019-03-07 ENCOUNTER — APPOINTMENT (OUTPATIENT)
Dept: RADIOLOGY | Facility: HOSPITAL | Age: 59
End: 2019-03-07
Payer: COMMERCIAL

## 2019-03-07 VITALS
OXYGEN SATURATION: 98 % | RESPIRATION RATE: 18 BRPM | HEART RATE: 76 BPM | DIASTOLIC BLOOD PRESSURE: 66 MMHG | TEMPERATURE: 98.2 F | SYSTOLIC BLOOD PRESSURE: 117 MMHG

## 2019-03-07 PROCEDURE — 64484 NJX AA&/STRD TFRM EPI L/S EA: CPT | Performed by: ANESTHESIOLOGY

## 2019-03-07 PROCEDURE — 72020 X-RAY EXAM OF SPINE 1 VIEW: CPT

## 2019-03-07 PROCEDURE — 64483 NJX AA&/STRD TFRM EPI L/S 1: CPT | Performed by: ANESTHESIOLOGY

## 2019-03-07 RX ORDER — BUPIVACAINE HYDROCHLORIDE 2.5 MG/ML
INJECTION, SOLUTION EPIDURAL; INFILTRATION; INTRACAUDAL AS NEEDED
Status: DISCONTINUED | OUTPATIENT
Start: 2019-03-07 | End: 2019-03-07 | Stop reason: HOSPADM

## 2019-03-07 RX ORDER — METHYLPREDNISOLONE ACETATE 80 MG/ML
INJECTION, SUSPENSION INTRA-ARTICULAR; INTRALESIONAL; INTRAMUSCULAR; SOFT TISSUE AS NEEDED
Status: DISCONTINUED | OUTPATIENT
Start: 2019-03-07 | End: 2019-03-07 | Stop reason: HOSPADM

## 2019-03-07 RX ORDER — LIDOCAINE WITH 8.4% SOD BICARB 0.9%(10ML)
SYRINGE (ML) INJECTION AS NEEDED
Status: DISCONTINUED | OUTPATIENT
Start: 2019-03-07 | End: 2019-03-07 | Stop reason: HOSPADM

## 2019-03-07 NOTE — OP NOTE
ATTENDING PHYSICIAN:  Vincent Klinefelter, MD     PROCEDURE:  1  Right L4 transforaminal epidural steroid injection under fluoroscopic guidance  2  Right L5 transforaminal epidural steroid injection under fluoroscopic guidance  PRE-PROCEDURE DIAGNOSIS:  Low back pain and right lower extremity radiculopathy  POST-PROCEDURE DIAGNOSIS:  Low back pain and right lower extremity radiculopathy  ANESTHESIA:  Local     ESTIMATED BLOOD LOSS:  Minimal     COMPLICATIONS:  None  LOCATION:  [address]  CONSENT:  Today's procedure, its potential benefits as well as its risks and potential side effects were reviewed  Discussed risks of the procedure including bleeding, infection, nerve irritation or damage, reactions to the medications, weakness, headache, failure of the pain to improve, and potential worsening of the pain were explained to the patient who verbalized understanding and who wished to proceed  Written informed consent was thereby obtained  DESCRIPTION OF THE PROCEDURE:  After written informed consent was obtained, the patient was taken to the fluoroscopy suite and placed in the prone position  Anatomical landmarks were identified by way of fluoroscopy in multiple views  The skin of the lumbar region was prepped using antiseptic and draped in the usual sterile fashion  Strict aseptic technique was utilized  The skin and subcutaneous tissues at the needle entry site were infiltrated with a total of 5 mL of 1% preservative-free lidocaine using a 25-gauge 1-1/2-inch needle  22-gauge needles were then incrementally advanced under fluoroscopic guidance in the oblique view into the neural foramina as mentioned above  Proper placement into each of the neural foramen was confirmed with fluoroscopy in both the lateral and AP views   After negative aspiration for CSF or heme, contrast was injected, which delineated the nerve roots and the epidural space under fluoroscopy in the AP view  There was only a transient pressure paresthesia that resolved immediately upon injection  After negative aspiration, a 2 mL of a 4 mL injectate consisting of 3 mL of preservative-free 0 25% bupivacaine and 1 mL of Depo-Medrol 80 mg/mL was slowly injected into each of the needles as delineated above  The patient tolerated the procedure well and all needles were removed intact  Hemostasis was maintained  There were no apparent paresthesias or complications  The skin was wiped clean and a Band-Aid was placed as appropriate  The patient was monitored for an appropriate period of time and remained hemodynamically stable following the procedure  The patient was ultimately discharged to home with supervision in good condition and instructed to call the office in approximately 7-10 days with an update or sooner as warranted  Discharge instructions were provided  I was present for and participated in all key and critical portions of this procedure      Parrish Velasquez MD  3/7/2019  2:20 PM

## 2019-03-07 NOTE — DISCHARGE INSTRUCTIONS
Epidural Steroid Injection   WHAT YOU NEED TO KNOW:   An epidural steroid injection (KATHLEEN) is a procedure to inject steroid medicine into the epidural space  The epidural space is between your spinal cord and vertebrae  Steroids reduce inflammation and fluid buildup in your spine that may be causing pain  You may be given pain medicine along with the steroids  ACTIVITY  · Do not drive or operate machinery today  · No strenuous activity today - bending, lifting, etc   · You may resume normal activites starting tomorrow - start slowly and as tolerated  · You may shower today, but no tub baths or hot tubs  · You may have numbness for several hours from the local anesthetic  Please use caution and common sense, especially with weight-bearing activities  CARE OF THE INJECTION SITE  · If you have soreness or pain, apply ice to the area today (20 minutes on/20 minutes off)  · Starting tomorrow, you may use warm, moist heat or ice if needed  · You may have an increase or change in your discomfort for 36-48 hours after your treatment  · Apply ice and continue with any pain medication you have been prescribed  · Notify [facility] if you have any of the following: redness, drainage, swelling, headache, stiff neck or fever above 100°F     SPECIAL INSTRUCTIONS  · Our office will contact you in approximately 7 days for a progress report  MEDICATIONS  · Continue to take all routine medications  · Our office may have instructed you to hold some medications  If you have a problem specifically related to your procedure, please call our office at [phone]  Problems not related to your procedure should be directed to your primary care physician

## 2019-03-12 DIAGNOSIS — R53.83 FATIGUE, UNSPECIFIED TYPE: ICD-10-CM

## 2019-03-12 DIAGNOSIS — E55.9 VITAMIN D DEFICIENCY: ICD-10-CM

## 2019-03-12 RX ORDER — MELATONIN
2000 DAILY
Qty: 60 TABLET | Refills: 0 | Status: SHIPPED | OUTPATIENT
Start: 2019-03-12 | End: 2019-04-01 | Stop reason: SDUPTHER

## 2019-03-12 RX ORDER — LANOLIN ALCOHOL/MO/W.PET/CERES
1000 CREAM (GRAM) TOPICAL DAILY
Qty: 90 TABLET | Refills: 1 | Status: CANCELLED | OUTPATIENT
Start: 2019-03-12

## 2019-03-12 RX ORDER — LANOLIN ALCOHOL/MO/W.PET/CERES
1000 CREAM (GRAM) TOPICAL DAILY
Qty: 30 TABLET | Refills: 0 | Status: SHIPPED | OUTPATIENT
Start: 2019-03-12 | End: 2019-04-01 | Stop reason: SDUPTHER

## 2019-03-12 RX ORDER — MELATONIN
2000 DAILY
Qty: 180 TABLET | Refills: 1 | Status: CANCELLED | OUTPATIENT
Start: 2019-03-12

## 2019-03-12 NOTE — TELEPHONE ENCOUNTER
Which medications? I am on night float now, so I just got into the hospital  I don't mind doing these refills now, but I do not see any pended medications attached to this task      Please let me know,    Thanks,    True Patch, DO  03/12/19  6:14 PM

## 2019-03-12 NOTE — TELEPHONE ENCOUNTER
The patient called his PCP, [facility], to request a refill on vitamin D3 and B12  However, they are not able to process the refills for 72 hours  He is asking if you would give one more 30 day supply on each

## 2019-03-12 NOTE — TELEPHONE ENCOUNTER
DR Beltran Kang - PT WAS GETTING THESE MEDICATIONS THRU NEUROLOGY  HE IS NO LONGER GOING TO NEUROLOGY  HE WANTS US TO  REFILL THESE NOW  HE NEEDS TO HAVE THESE TODAY  HE SAID  IF YOU HAVE ANY QUESTIONS, HE SAID TO CALL HIM  PT IS AWARE OF THE 72 HOUR REFILL POLICY  HE SAID "HE  WANTS TO PICK THEM  UP BY CLOSE OF BUSINESS ON WED"

## 2019-03-30 DIAGNOSIS — R10.32 LLQ ABDOMINAL PAIN: ICD-10-CM

## 2019-03-30 RX ORDER — DICYCLOMINE HYDROCHLORIDE 10 MG/1
10 CAPSULE ORAL
Qty: 120 CAPSULE | Refills: 3 | Status: SHIPPED | OUTPATIENT
Start: 2019-03-30 | End: 2019-08-06 | Stop reason: SDUPTHER

## 2019-04-01 DIAGNOSIS — E55.9 VITAMIN D DEFICIENCY: ICD-10-CM

## 2019-04-01 DIAGNOSIS — R53.83 FATIGUE, UNSPECIFIED TYPE: ICD-10-CM

## 2019-04-01 RX ORDER — LANOLIN ALCOHOL/MO/W.PET/CERES
1000 CREAM (GRAM) TOPICAL DAILY
Qty: 30 TABLET | Refills: 3 | Status: SHIPPED | OUTPATIENT
Start: 2019-04-01 | End: 2019-08-06 | Stop reason: SDUPTHER

## 2019-04-01 RX ORDER — MELATONIN
2000 DAILY
Qty: 60 TABLET | Refills: 3 | Status: SHIPPED | OUTPATIENT
Start: 2019-04-01 | End: 2019-07-26 | Stop reason: SDUPTHER

## 2019-04-08 ENCOUNTER — OFFICE VISIT (OUTPATIENT)
Dept: PODIATRY | Facility: CLINIC | Age: 59
End: 2019-04-08
Payer: COMMERCIAL

## 2019-04-08 VITALS
WEIGHT: 217 LBS | RESPIRATION RATE: 17 BRPM | HEIGHT: 71 IN | BODY MASS INDEX: 30.38 KG/M2 | HEART RATE: 84 BPM | DIASTOLIC BLOOD PRESSURE: 76 MMHG | SYSTOLIC BLOOD PRESSURE: 114 MMHG

## 2019-04-08 DIAGNOSIS — M20.41 HAMMER TOES OF BOTH FEET: ICD-10-CM

## 2019-04-08 DIAGNOSIS — B35.9 DERMATOPHYTOSIS: ICD-10-CM

## 2019-04-08 DIAGNOSIS — M21.969 ACQUIRED DEFORMITY OF FOOT, UNSPECIFIED LATERALITY: Primary | ICD-10-CM

## 2019-04-08 DIAGNOSIS — M79.671 PAIN IN BOTH FEET: ICD-10-CM

## 2019-04-08 DIAGNOSIS — M20.42 HAMMER TOES OF BOTH FEET: ICD-10-CM

## 2019-04-08 DIAGNOSIS — M79.672 PAIN IN BOTH FEET: ICD-10-CM

## 2019-04-08 PROCEDURE — 99213 OFFICE O/P EST LOW 20 MIN: CPT | Performed by: PODIATRIST

## 2019-04-09 ENCOUNTER — OFFICE VISIT (OUTPATIENT)
Dept: PAIN MEDICINE | Facility: CLINIC | Age: 59
End: 2019-04-09
Payer: COMMERCIAL

## 2019-04-09 VITALS
SYSTOLIC BLOOD PRESSURE: 120 MMHG | BODY MASS INDEX: 30.64 KG/M2 | HEIGHT: 70 IN | DIASTOLIC BLOOD PRESSURE: 82 MMHG | WEIGHT: 214 LBS | HEART RATE: 85 BPM

## 2019-04-09 DIAGNOSIS — M51.16 LUMBAR DISC HERNIATION WITH RADICULOPATHY: ICD-10-CM

## 2019-04-09 DIAGNOSIS — M47.816 SPONDYLOSIS OF LUMBAR REGION WITHOUT MYELOPATHY OR RADICULOPATHY: ICD-10-CM

## 2019-04-09 DIAGNOSIS — G89.4 CHRONIC PAIN SYNDROME: Primary | ICD-10-CM

## 2019-04-09 PROCEDURE — 99213 OFFICE O/P EST LOW 20 MIN: CPT | Performed by: ANESTHESIOLOGY

## 2019-04-14 DIAGNOSIS — R10.13 EPIGASTRIC BURNING SENSATION: ICD-10-CM

## 2019-04-15 RX ORDER — OMEPRAZOLE 40 MG/1
CAPSULE, DELAYED RELEASE ORAL
Qty: 30 CAPSULE | Refills: 2 | Status: SHIPPED | OUTPATIENT
Start: 2019-04-15 | End: 2019-07-05 | Stop reason: SDUPTHER

## 2019-04-25 DIAGNOSIS — K21.9 GASTROESOPHAGEAL REFLUX DISEASE WITHOUT ESOPHAGITIS: ICD-10-CM

## 2019-04-25 RX ORDER — RANITIDINE 300 MG/1
TABLET ORAL
Qty: 90 TABLET | Refills: 1 | Status: SHIPPED | OUTPATIENT
Start: 2019-04-25 | End: 2019-10-08 | Stop reason: SDUPTHER

## 2019-05-13 ENCOUNTER — OFFICE VISIT (OUTPATIENT)
Dept: FAMILY MEDICINE CLINIC | Facility: CLINIC | Age: 59
End: 2019-05-13
Payer: COMMERCIAL

## 2019-05-13 ENCOUNTER — TELEPHONE (OUTPATIENT)
Dept: FAMILY MEDICINE CLINIC | Facility: CLINIC | Age: 59
End: 2019-05-13

## 2019-05-13 VITALS
SYSTOLIC BLOOD PRESSURE: 118 MMHG | DIASTOLIC BLOOD PRESSURE: 70 MMHG | WEIGHT: 208.06 LBS | OXYGEN SATURATION: 95 % | BODY MASS INDEX: 29.85 KG/M2 | TEMPERATURE: 97.8 F | HEART RATE: 103 BPM

## 2019-05-13 DIAGNOSIS — J06.9 VIRAL URI: Primary | ICD-10-CM

## 2019-05-13 DIAGNOSIS — J30.1 ALLERGIC RHINITIS DUE TO POLLEN, UNSPECIFIED SEASONALITY: ICD-10-CM

## 2019-05-13 DIAGNOSIS — H10.13 ALLERGIC CONJUNCTIVITIS OF BOTH EYES: ICD-10-CM

## 2019-05-13 PROBLEM — R10.9 ABDOMINAL PAIN: Status: RESOLVED | Noted: 2018-10-24 | Resolved: 2019-05-13

## 2019-05-13 PROBLEM — M79.672 PAIN IN BOTH FEET: Status: RESOLVED | Noted: 2018-06-18 | Resolved: 2019-05-13

## 2019-05-13 PROBLEM — M79.671 PAIN IN BOTH FEET: Status: RESOLVED | Noted: 2018-06-18 | Resolved: 2019-05-13

## 2019-05-13 PROCEDURE — 99213 OFFICE O/P EST LOW 20 MIN: CPT | Performed by: FAMILY MEDICINE

## 2019-05-13 RX ORDER — FLUTICASONE PROPIONATE 50 MCG
1 SPRAY, SUSPENSION (ML) NASAL DAILY
Qty: 16 G | Refills: 3 | Status: SHIPPED | OUTPATIENT
Start: 2019-05-13 | End: 2020-01-03

## 2019-05-13 RX ORDER — OLOPATADINE HYDROCHLORIDE 2 MG/ML
1 SOLUTION/ DROPS OPHTHALMIC DAILY
Qty: 2.5 ML | Refills: 3 | Status: SHIPPED | OUTPATIENT
Start: 2019-05-13 | End: 2019-10-02 | Stop reason: ALTCHOICE

## 2019-05-14 ENCOUNTER — TELEPHONE (OUTPATIENT)
Dept: FAMILY MEDICINE CLINIC | Facility: CLINIC | Age: 59
End: 2019-05-14

## 2019-05-14 DIAGNOSIS — J30.9 ALLERGIC RHINITIS, UNSPECIFIED SEASONALITY, UNSPECIFIED TRIGGER: Primary | ICD-10-CM

## 2019-05-14 RX ORDER — LORATADINE 10 MG/1
10 TABLET ORAL DAILY
Qty: 90 TABLET | Refills: 3 | Status: SHIPPED | OUTPATIENT
Start: 2019-05-14 | End: 2019-06-21

## 2019-05-16 ENCOUNTER — APPOINTMENT (OUTPATIENT)
Dept: PHYSICAL THERAPY | Facility: CLINIC | Age: 59
End: 2019-05-16
Payer: COMMERCIAL

## 2019-05-29 ENCOUNTER — APPOINTMENT (OUTPATIENT)
Dept: PHYSICAL THERAPY | Facility: CLINIC | Age: 59
End: 2019-05-29
Payer: COMMERCIAL

## 2019-06-03 ENCOUNTER — OFFICE VISIT (OUTPATIENT)
Dept: PODIATRY | Facility: CLINIC | Age: 59
End: 2019-06-03
Payer: COMMERCIAL

## 2019-06-03 VITALS
WEIGHT: 208 LBS | BODY MASS INDEX: 29.78 KG/M2 | SYSTOLIC BLOOD PRESSURE: 129 MMHG | HEIGHT: 70 IN | DIASTOLIC BLOOD PRESSURE: 84 MMHG | HEART RATE: 86 BPM

## 2019-06-03 DIAGNOSIS — M21.969 ACQUIRED DEFORMITY OF FOOT, UNSPECIFIED LATERALITY: Primary | ICD-10-CM

## 2019-06-03 DIAGNOSIS — M20.42 HAMMER TOES OF BOTH FEET: ICD-10-CM

## 2019-06-03 DIAGNOSIS — M20.41 HAMMER TOES OF BOTH FEET: ICD-10-CM

## 2019-06-03 DIAGNOSIS — B35.9 DERMATOPHYTOSIS: ICD-10-CM

## 2019-06-03 PROCEDURE — 99213 OFFICE O/P EST LOW 20 MIN: CPT | Performed by: PODIATRIST

## 2019-06-04 ENCOUNTER — EVALUATION (OUTPATIENT)
Dept: PHYSICAL THERAPY | Facility: CLINIC | Age: 59
End: 2019-06-04
Payer: COMMERCIAL

## 2019-06-04 DIAGNOSIS — M51.16 LUMBAR DISC HERNIATION WITH RADICULOPATHY: ICD-10-CM

## 2019-06-04 DIAGNOSIS — M54.5 CHRONIC LOW BACK PAIN, UNSPECIFIED BACK PAIN LATERALITY, WITH SCIATICA PRESENCE UNSPECIFIED: Primary | ICD-10-CM

## 2019-06-04 DIAGNOSIS — G89.29 CHRONIC LOW BACK PAIN, UNSPECIFIED BACK PAIN LATERALITY, WITH SCIATICA PRESENCE UNSPECIFIED: Primary | ICD-10-CM

## 2019-06-04 PROCEDURE — 97162 PT EVAL MOD COMPLEX 30 MIN: CPT | Performed by: PHYSICAL THERAPIST

## 2019-06-06 ENCOUNTER — APPOINTMENT (OUTPATIENT)
Dept: PHYSICAL THERAPY | Facility: CLINIC | Age: 59
End: 2019-06-06
Payer: COMMERCIAL

## 2019-06-10 ENCOUNTER — OFFICE VISIT (OUTPATIENT)
Dept: PHYSICAL THERAPY | Facility: CLINIC | Age: 59
End: 2019-06-10
Payer: COMMERCIAL

## 2019-06-10 DIAGNOSIS — M54.5 CHRONIC LOW BACK PAIN, UNSPECIFIED BACK PAIN LATERALITY, WITH SCIATICA PRESENCE UNSPECIFIED: Primary | ICD-10-CM

## 2019-06-10 DIAGNOSIS — G89.29 CHRONIC LOW BACK PAIN, UNSPECIFIED BACK PAIN LATERALITY, WITH SCIATICA PRESENCE UNSPECIFIED: Primary | ICD-10-CM

## 2019-06-10 DIAGNOSIS — M51.16 LUMBAR DISC HERNIATION WITH RADICULOPATHY: ICD-10-CM

## 2019-06-10 PROCEDURE — 97110 THERAPEUTIC EXERCISES: CPT | Performed by: PHYSICAL THERAPIST

## 2019-06-10 PROCEDURE — 97112 NEUROMUSCULAR REEDUCATION: CPT | Performed by: PHYSICAL THERAPIST

## 2019-06-17 ENCOUNTER — OFFICE VISIT (OUTPATIENT)
Dept: PHYSICAL THERAPY | Facility: CLINIC | Age: 59
End: 2019-06-17
Payer: COMMERCIAL

## 2019-06-17 DIAGNOSIS — M54.5 CHRONIC LOW BACK PAIN, UNSPECIFIED BACK PAIN LATERALITY, WITH SCIATICA PRESENCE UNSPECIFIED: ICD-10-CM

## 2019-06-17 DIAGNOSIS — G89.29 CHRONIC LOW BACK PAIN, UNSPECIFIED BACK PAIN LATERALITY, WITH SCIATICA PRESENCE UNSPECIFIED: ICD-10-CM

## 2019-06-17 DIAGNOSIS — M51.16 LUMBAR DISC HERNIATION WITH RADICULOPATHY: Primary | ICD-10-CM

## 2019-06-17 PROCEDURE — 97110 THERAPEUTIC EXERCISES: CPT

## 2019-06-17 PROCEDURE — 97112 NEUROMUSCULAR REEDUCATION: CPT

## 2019-06-20 ENCOUNTER — APPOINTMENT (OUTPATIENT)
Dept: PHYSICAL THERAPY | Facility: CLINIC | Age: 59
End: 2019-06-20
Payer: COMMERCIAL

## 2019-06-21 ENCOUNTER — OFFICE VISIT (OUTPATIENT)
Dept: FAMILY MEDICINE CLINIC | Facility: CLINIC | Age: 59
End: 2019-06-21
Payer: COMMERCIAL

## 2019-06-21 VITALS
DIASTOLIC BLOOD PRESSURE: 70 MMHG | RESPIRATION RATE: 20 BRPM | BODY MASS INDEX: 29.2 KG/M2 | HEART RATE: 84 BPM | SYSTOLIC BLOOD PRESSURE: 116 MMHG | OXYGEN SATURATION: 97 % | HEIGHT: 70 IN | WEIGHT: 204 LBS

## 2019-06-21 DIAGNOSIS — R05.3 CHRONIC COUGH: Primary | ICD-10-CM

## 2019-06-21 DIAGNOSIS — R09.89 CHEST CONGESTION: ICD-10-CM

## 2019-06-21 DIAGNOSIS — J30.89 ENVIRONMENTAL AND SEASONAL ALLERGIES: ICD-10-CM

## 2019-06-21 PROCEDURE — 99213 OFFICE O/P EST LOW 20 MIN: CPT | Performed by: FAMILY MEDICINE

## 2019-06-21 RX ORDER — MONTELUKAST SODIUM 10 MG/1
10 TABLET ORAL
Qty: 90 TABLET | Refills: 3 | Status: SHIPPED | OUTPATIENT
Start: 2019-06-21 | End: 2019-12-30 | Stop reason: ALTCHOICE

## 2019-06-21 RX ORDER — GUAIFENESIN 600 MG
1200 TABLET, EXTENDED RELEASE 12 HR ORAL EVERY 12 HOURS PRN
Qty: 60 TABLET | Refills: 1 | Status: SHIPPED | OUTPATIENT
Start: 2019-06-21 | End: 2020-08-17 | Stop reason: ALTCHOICE

## 2019-06-24 ENCOUNTER — OFFICE VISIT (OUTPATIENT)
Dept: PHYSICAL THERAPY | Facility: CLINIC | Age: 59
End: 2019-06-24
Payer: COMMERCIAL

## 2019-06-24 DIAGNOSIS — G89.29 CHRONIC LOW BACK PAIN, UNSPECIFIED BACK PAIN LATERALITY, WITH SCIATICA PRESENCE UNSPECIFIED: ICD-10-CM

## 2019-06-24 DIAGNOSIS — M54.5 CHRONIC LOW BACK PAIN, UNSPECIFIED BACK PAIN LATERALITY, WITH SCIATICA PRESENCE UNSPECIFIED: ICD-10-CM

## 2019-06-24 DIAGNOSIS — M51.16 LUMBAR DISC HERNIATION WITH RADICULOPATHY: Primary | ICD-10-CM

## 2019-06-24 PROCEDURE — 97110 THERAPEUTIC EXERCISES: CPT

## 2019-06-26 ENCOUNTER — OFFICE VISIT (OUTPATIENT)
Dept: NEUROLOGY | Facility: CLINIC | Age: 59
End: 2019-06-26
Payer: COMMERCIAL

## 2019-06-26 VITALS
BODY MASS INDEX: 29.2 KG/M2 | HEART RATE: 98 BPM | HEIGHT: 70 IN | WEIGHT: 204 LBS | DIASTOLIC BLOOD PRESSURE: 72 MMHG | SYSTOLIC BLOOD PRESSURE: 112 MMHG

## 2019-06-26 DIAGNOSIS — R56.9 OBSERVED SEIZURE-LIKE ACTIVITY (HCC): ICD-10-CM

## 2019-06-26 DIAGNOSIS — F43.10 PTSD (POST-TRAUMATIC STRESS DISORDER): ICD-10-CM

## 2019-06-26 DIAGNOSIS — G31.84 MILD COGNITIVE IMPAIRMENT: Primary | ICD-10-CM

## 2019-06-26 PROCEDURE — 99214 OFFICE O/P EST MOD 30 MIN: CPT | Performed by: PSYCHIATRY & NEUROLOGY

## 2019-06-26 RX ORDER — RIVASTIGMINE TARTRATE 3 MG/1
3 CAPSULE ORAL 2 TIMES DAILY
Qty: 60 CAPSULE | Refills: 1 | Status: SHIPPED | OUTPATIENT
Start: 2019-06-26 | End: 2019-07-29 | Stop reason: DRUGHIGH

## 2019-06-26 RX ORDER — RIVASTIGMINE TARTRATE 1.5 MG/1
1.5 CAPSULE ORAL 2 TIMES DAILY
Qty: 60 CAPSULE | Refills: 0 | Status: SHIPPED | OUTPATIENT
Start: 2019-06-26 | End: 2019-07-27 | Stop reason: SDUPTHER

## 2019-06-27 ENCOUNTER — APPOINTMENT (OUTPATIENT)
Dept: PHYSICAL THERAPY | Facility: CLINIC | Age: 59
End: 2019-06-27
Payer: COMMERCIAL

## 2019-07-05 DIAGNOSIS — R10.13 EPIGASTRIC BURNING SENSATION: ICD-10-CM

## 2019-07-08 RX ORDER — OMEPRAZOLE 40 MG/1
CAPSULE, DELAYED RELEASE ORAL
Qty: 30 CAPSULE | Refills: 2 | Status: SHIPPED | OUTPATIENT
Start: 2019-07-08 | End: 2019-10-02 | Stop reason: SDUPTHER

## 2019-07-10 ENCOUNTER — TELEPHONE (OUTPATIENT)
Dept: NEUROLOGY | Facility: CLINIC | Age: 59
End: 2019-07-10

## 2019-07-26 DIAGNOSIS — E55.9 VITAMIN D DEFICIENCY: ICD-10-CM

## 2019-07-26 RX ORDER — AVOBENZONE, HOMOSALATE, OCTISALATE, OCTOCRYLENE 30; 100; 50; 25 MG/ML; MG/ML; MG/ML; MG/ML
SPRAY TOPICAL
Qty: 60 TABLET | Refills: 3 | Status: SHIPPED | OUTPATIENT
Start: 2019-07-26 | End: 2019-11-18

## 2019-07-27 DIAGNOSIS — G31.84 MILD COGNITIVE IMPAIRMENT: ICD-10-CM

## 2019-07-29 RX ORDER — RIVASTIGMINE TARTRATE 1.5 MG/1
CAPSULE ORAL
Qty: 60 CAPSULE | Refills: 0 | Status: SHIPPED | OUTPATIENT
Start: 2019-07-29 | End: 2019-09-20 | Stop reason: DRUGHIGH

## 2019-08-06 DIAGNOSIS — R10.32 LLQ ABDOMINAL PAIN: ICD-10-CM

## 2019-08-06 DIAGNOSIS — R53.83 FATIGUE, UNSPECIFIED TYPE: ICD-10-CM

## 2019-08-06 RX ORDER — DICYCLOMINE HYDROCHLORIDE 10 MG/1
10 CAPSULE ORAL
Qty: 120 CAPSULE | Refills: 11 | Status: SHIPPED | OUTPATIENT
Start: 2019-08-06 | End: 2020-04-16 | Stop reason: SDUPTHER

## 2019-08-06 RX ORDER — LANOLIN ALCOHOL/MO/W.PET/CERES
CREAM (GRAM) TOPICAL
Qty: 30 TABLET | Refills: 0 | Status: SHIPPED | OUTPATIENT
Start: 2019-08-06 | End: 2019-09-05 | Stop reason: SDUPTHER

## 2019-08-12 ENCOUNTER — OFFICE VISIT (OUTPATIENT)
Dept: PODIATRY | Facility: CLINIC | Age: 59
End: 2019-08-12
Payer: COMMERCIAL

## 2019-08-12 VITALS
HEIGHT: 70 IN | WEIGHT: 204 LBS | RESPIRATION RATE: 16 BRPM | SYSTOLIC BLOOD PRESSURE: 109 MMHG | BODY MASS INDEX: 29.2 KG/M2 | DIASTOLIC BLOOD PRESSURE: 73 MMHG

## 2019-08-12 DIAGNOSIS — B35.9 DERMATOPHYTOSIS: ICD-10-CM

## 2019-08-12 DIAGNOSIS — M20.42 HAMMER TOES OF BOTH FEET: ICD-10-CM

## 2019-08-12 DIAGNOSIS — M21.969 ACQUIRED DEFORMITY OF FOOT, UNSPECIFIED LATERALITY: Primary | ICD-10-CM

## 2019-08-12 DIAGNOSIS — Q66.51 CONGENITAL PES PLANUS OF RIGHT FOOT: ICD-10-CM

## 2019-08-12 DIAGNOSIS — Q66.52 CONGENITAL PES PLANUS OF LEFT FOOT: ICD-10-CM

## 2019-08-12 DIAGNOSIS — M20.41 HAMMER TOES OF BOTH FEET: ICD-10-CM

## 2019-08-12 DIAGNOSIS — M79.671 PAIN IN BOTH FEET: ICD-10-CM

## 2019-08-12 DIAGNOSIS — M79.672 PAIN IN BOTH FEET: ICD-10-CM

## 2019-08-12 PROCEDURE — 99213 OFFICE O/P EST LOW 20 MIN: CPT | Performed by: PODIATRIST

## 2019-08-12 NOTE — PROGRESS NOTES
Assessment/Plan:  Metatarsalgia   Hammertoe formation   Pes planus   Pain      Plan   Foot exam performed   All nails debrided   Patient will benefit from orthotics      Patient needs referral to Orthopedics for radiculopathy workup     No problem-specific Assessment & Plan notes found for this encounter          There are no diagnoses linked to this encounter        Subjective:   Patient only has mild pain in his feet with ambulation   He has known radiculopathy           Past Medical History:   Diagnosis Date    Amnesia memory loss      Anxiety      Chronic fatigue      Disc degeneration, lumbar      Esophageal reflux      GERD (gastroesophageal reflux disease)      Glaucoma      Hiatal hernia      Increased pressure in the eye       both eyes; denies glaucoma     Internal hemorrhoids      Lumbar disc herniation with radiculopathy      Memory loss      Personal history of colonic polyps                     Past Surgical History:   Procedure Laterality Date    COLONOSCOPY N/A 4/25/2016     Procedure: COLONOSCOPY;  Surgeon: Deshawn Michael MD;  Location: [place] [address] [facility];  Service:     DENTAL SURGERY         ALL TEETH PULLED    EPIDURAL BLOCK INJECTION        EPIDURAL BLOCK INJECTION Right 12/28/2016     Procedure: BLOCK / INJECTION EPIDURAL STEROID TRANSFORAMINAL  L4-5;  Surgeon: Felton Garcia MD;  Location: [place] [address] MAIN OR;  Service:     EPIDURAL BLOCK INJECTION Right 11/16/2017     Procedure: BLOCK / INJECTION EPIDURAL STEROID TRANSFORAMINAL L4-5 RIGHT;  Surgeon: Felton Garcia MD;  Location: [place] [address] [facility]IN OR;  Service: Pain Management     EPIDURAL BLOCK INJECTION Left 5/10/2018     Procedure: Lt L4 L5 Tfesi ([id number]);  Surgeon: Felton Garcia MD;  Location: [place] [address] [facility]IN OR;  Service: Pain Management     ESOPHAGOGASTRODUODENOSCOPY N/A 4/25/2016     Procedure: ESOPHAGOGASTRODUODENOSCOPY (EGD);  Surgeon: Deshawn Michael MD;  Location: [place] [address] [facility];  Service:     NOSE SURGERY                     Allergies   Allergen Reactions    Gabapentin Other (See Comments)       OUT OF BODY PSYCHOSIS, MEMORY LOSS, AMNESIA    Other Cough       HAYFEVER, SEASONAL, ALFALFA            Current Outpatient Prescriptions:     aspirin 81 MG tablet, Take 81 mg by mouth daily  , Disp: , Rfl:     omeprazole (PriLOSEC) 20 mg delayed release capsule, Take 1 capsule (20 mg total) by mouth daily, Disp: 30 capsule, Rfl: 3    ranitidine (ZANTAC) 300 MG tablet, Take 1 tablet (300 mg total) by mouth daily at bedtime, Disp: 30 tablet, Rfl: 3    tamsulosin (FLOMAX) 0 4 mg, Take 0 4 mg by mouth daily with dinner, Disp: , Rfl:     timolol (BETIMOL) 0 5 % ophthalmic solution, Administer 1 drop to both eyes 2 (two) times a day , Disp: , Rfl:     timolol (TIMOPTIC) 0 5 % ophthalmic solution, INSTILL 1 DROP INTO BOTH EYES TWO TIMES A DAY, Disp: , Rfl: 0    citalopram (CeleXA) 10 mg tablet, Take 3 tablets (30 mg total) by mouth daily for 90 days, Disp: 270 tablet, Rfl: 0    gabapentin (NEURONTIN) 300 mg capsule, Take 2 capsules (600 mg total) by mouth 3 (three) times a day, Disp: 90 capsule, Rfl: 3    gabapentin (NEURONTIN) 600 MG tablet, , Disp: , Rfl: 0           Patient Active Problem List   Diagnosis    Chronic pain syndrome    Chronic low back pain    Intervertebral disc disorder with radiculopathy of lumbar region    Spondylosis of lumbar region without myelopathy or radiculopathy    Abnormal chromosomal analysis    Adult-onset obesity    Anxiety    Elevated PSA    Enlarged prostate on rectal examination    Gastroesophageal reflux disease without esophagitis    Lumbar disc herniation with radiculopathy    Myofascial pain syndrome    Chronic fatigue    Memory loss    Glaucoma    Neuritis or radiculitis due to rupture of lumbar intervertebral disc    Low back pain             Patient ID: Jerry Lopez is a 58 y  o  male      Objective:      Foot Exam     General  General Appearance: appears stated age and healthy Orientation: alert and oriented to person, place, and time   Affect: appropriate   Gait: antalgic         Right Foot/Ankle      Inspection and Palpation  Ecchymosis: none  Tenderness: bony tenderness and metatarsals   Swelling: metatarsals   Arch: pes planus  Hammertoes: fourth toe and fifth toe  Hallux valgus: no  Hallux limitus: yes  Skin Exam: callus;      Neurovascular  Dorsalis pedis: 2+  Posterior tibial: 2+  Saphenous nerve sensation: diminished  Tibial nerve sensation: diminished  Superficial peroneal nerve sensation: diminished  Deep peroneal nerve sensation: diminished  Sural nerve sensation: diminished  Achilles reflex: 1+     Muscle Strength  Ankle dorsiflexion: 4+     Tests  Lateral squeeze: positive      Comments  4th and 5th toes bilateral are in a abducto varus position  Pinch callus formation of 4th and 5th toes      Left Foot/Ankle       Inspection and Palpation  Ecchymosis: none  Tenderness: bony tenderness and metatarsals   Swelling: metatarsals   Arch: pes planus  Hammertoes: fourth toe and fifth toe  Hallux valgus: no  Hallux limitus: yes  Skin Exam: callus;      Neurovascular  Dorsalis pedis: 2+  Posterior tibial: 2+  Saphenous nerve sensation: diminished  Tibial nerve sensation: diminished  Superficial peroneal nerve sensation: diminished  Deep peroneal nerve sensation: diminished  Sural nerve sensation: diminished  Achilles reflex: 1+     Muscle Strength  Ankle dorsiflexion: 4+     Tests  Too many toes: positive      Comments  Patient is maximally pronated in stance and gait   There is transverse deviation of the DIPJ 4th toe bilateral   Physical Exam   Cardiovascular:   Pulses:       Dorsalis pedis pulses are 2+ on the right side, and 2+ on the left side         Posterior tibial pulses are 2+ on the right side, and 2+ on the left side  Musculoskeletal:        Right foot: There is bony tenderness         Left foot: There is bony tenderness     Feet:   Right Foot:   Skin Integrity: Positive for callus  Left Foot:   Skin Integrity: Positive for callus  Neurological:   Reflex Scores:       Achilles reflexes are 1+ on the right side and 1+ on the left side                     Assessment/Plan:  Metatarsalgia   Hammertoe formation   Pes planus   Pain      Plan   Foot exam performed   All nails debrided   Patient will benefit from orthotics      Patient needs referral to Orthopedics for radiculopathy workup     No problem-specific Assessment & Plan notes found for this encounter          There are no diagnoses linked to this encounter        Subjective:   Patient only has mild pain in his feet with ambulation   He has known radiculopathy           Past Medical History:   Diagnosis Date    Amnesia memory loss      Anxiety      Chronic fatigue      Disc degeneration, lumbar      Esophageal reflux      GERD (gastroesophageal reflux disease)      Glaucoma      Hiatal hernia      Increased pressure in the eye       both eyes; denies glaucoma     Internal hemorrhoids      Lumbar disc herniation with radiculopathy      Memory loss      Personal history of colonic polyps                     Past Surgical History:   Procedure Laterality Date    COLONOSCOPY N/A 4/25/2016     Procedure: COLONOSCOPY;  Surgeon: Adelia Akers MD;  Location: [place] [address] [facility];  Service:     DENTAL SURGERY         ALL TEETH PULLED    EPIDURAL BLOCK INJECTION        EPIDURAL BLOCK INJECTION Right 12/28/2016     Procedure: BLOCK / INJECTION EPIDURAL STEROID TRANSFORAMINAL  L4-5;  Surgeon: Maria Antonia Martini MD;  Location: [place] [address] MAIN OR;  Service:     EPIDURAL BLOCK INJECTION Right 11/16/2017     Procedure: BLOCK / INJECTION EPIDURAL STEROID TRANSFORAMINAL L4-5 RIGHT;  Surgeon: Maria Antonia Martini MD;  Location: [place] [address] [facility]IN OR;  Service: Pain Management     EPIDURAL BLOCK INJECTION Left 5/10/2018     Procedure: Lt L4 L5 Tfesi ([id number]);  Surgeon: Maria Antonia Martini MD;  Location: [place] [address] [facility]IN OR;  Service: Pain Management     ESOPHAGOGASTRODUODENOSCOPY N/A 4/25/2016     Procedure: ESOPHAGOGASTRODUODENOSCOPY (EGD);  Surgeon: Kalani Cortes MD;  Location: [place] [address] [facility];  Service:     NOSE SURGERY                       Allergies   Allergen Reactions    Gabapentin Other (See Comments)       OUT OF BODY PSYCHOSIS, MEMORY LOSS, AMNESIA    Other Cough       HAYFEVER, SEASONAL, ALFALFA            Current Outpatient Prescriptions:     aspirin 81 MG tablet, Take 81 mg by mouth daily  , Disp: , Rfl:     omeprazole (PriLOSEC) 20 mg delayed release capsule, Take 1 capsule (20 mg total) by mouth daily, Disp: 30 capsule, Rfl: 3    ranitidine (ZANTAC) 300 MG tablet, Take 1 tablet (300 mg total) by mouth daily at bedtime, Disp: 30 tablet, Rfl: 3    tamsulosin (FLOMAX) 0 4 mg, Take 0 4 mg by mouth daily with dinner, Disp: , Rfl:     timolol (BETIMOL) 0 5 % ophthalmic solution, Administer 1 drop to both eyes 2 (two) times a day , Disp: , Rfl:     timolol (TIMOPTIC) 0 5 % ophthalmic solution, INSTILL 1 DROP INTO BOTH EYES TWO TIMES A DAY, Disp: , Rfl: 0    citalopram (CeleXA) 10 mg tablet, Take 3 tablets (30 mg total) by mouth daily for 90 days, Disp: 270 tablet, Rfl: 0    gabapentin (NEURONTIN) 300 mg capsule, Take 2 capsules (600 mg total) by mouth 3 (three) times a day, Disp: 90 capsule, Rfl: 3    gabapentin (NEURONTIN) 600 MG tablet, , Disp: , Rfl: 0           Patient Active Problem List   Diagnosis    Chronic pain syndrome    Chronic low back pain    Intervertebral disc disorder with radiculopathy of lumbar region    Spondylosis of lumbar region without myelopathy or radiculopathy    Abnormal chromosomal analysis    Adult-onset obesity    Anxiety    Elevated PSA    Enlarged prostate on rectal examination    Gastroesophageal reflux disease without esophagitis    Lumbar disc herniation with radiculopathy    Myofascial pain syndrome    Chronic fatigue    Memory loss    Glaucoma    Neuritis or radiculitis due to rupture of lumbar intervertebral disc    Low back pain             Patient ID: Jerry Lopez is a 58 y  o  male      Objective:      Foot Exam     General  General Appearance: appears stated age and healthy   Orientation: alert and oriented to person, place, and time   Affect: appropriate   Gait: antalgic         Right Foot/Ankle      Inspection and Palpation  Ecchymosis: none  Tenderness: bony tenderness and metatarsals   Swelling: metatarsals   Arch: pes planus  Hammertoes: fourth toe and fifth toe  Hallux valgus: no  Hallux limitus: yes  Skin Exam: callus;      Neurovascular  Dorsalis pedis: 2+  Posterior tibial: 2+  Saphenous nerve sensation: diminished  Tibial nerve sensation: diminished  Superficial peroneal nerve sensation: diminished  Deep peroneal nerve sensation: diminished  Sural nerve sensation: diminished  Achilles reflex: 1+     Muscle Strength  Ankle dorsiflexion: 4+     Tests  Lateral squeeze: positive      Comments  4th and 5th toes bilateral are in a abducto varus position   Pinch callus formation of 4th and 5th toes      Left Foot/Ankle       Inspection and Palpation  Ecchymosis: none  Tenderness: bony tenderness and metatarsals   Swelling: metatarsals   Arch: pes planus  Hammertoes: fourth toe and fifth toe  Hallux valgus: no  Hallux limitus: yes  Skin Exam: callus;      Neurovascular  Dorsalis pedis: 2+  Posterior tibial: 2+  Saphenous nerve sensation: diminished  Tibial nerve sensation: diminished  Superficial peroneal nerve sensation: diminished  Deep peroneal nerve sensation: diminished  Sural nerve sensation: diminished  Achilles reflex: 1+     Muscle Strength  Ankle dorsiflexion: 4+     Tests  Too many toes: positive      Comments  Patient is maximally pronated in stance and gait   There is transverse deviation of the DIPJ 4th toe bilateral   Physical Exam   Cardiovascular:   Pulses:       Dorsalis pedis pulses are 2+ on the right side, and 2+ on the left side         Posterior tibial pulses are 2+ on the right side, and 2+ on the left side  Musculoskeletal:        Right foot: There is bony tenderness         Left foot: There is bony tenderness  Feet:   Right Foot:   Skin Integrity: Positive for callus  Left Foot:   Skin Integrity: Positive for callus     Neurological:   Reflex Scores:       Achilles reflexes are 1+ on the right side and 1+ on the left side

## 2019-08-29 ENCOUNTER — TELEPHONE (OUTPATIENT)
Dept: NEUROLOGY | Facility: CLINIC | Age: 59
End: 2019-08-29

## 2019-08-29 NOTE — TELEPHONE ENCOUNTER
I called patient and left a voicemail to please return my call in regards to an earlier EEG appointment   dates are 9/9, 9/16 or 9/30 at 7:30 am at [facility]

## 2019-09-05 DIAGNOSIS — R53.83 FATIGUE, UNSPECIFIED TYPE: ICD-10-CM

## 2019-09-09 ENCOUNTER — HOSPITAL ENCOUNTER (OUTPATIENT)
Dept: NEUROLOGY | Facility: HOSPITAL | Age: 59
Discharge: HOME | End: 2019-09-09
Attending: PSYCHIATRY & NEUROLOGY
Payer: COMMERCIAL

## 2019-09-09 DIAGNOSIS — R56.9 OBSERVED SEIZURE-LIKE ACTIVITY (HCC): ICD-10-CM

## 2019-09-09 PROCEDURE — 95813 EEG EXTND MNTR 61-119 MIN: CPT

## 2019-09-09 PROCEDURE — 95813 EEG EXTND MNTR 61-119 MIN: CPT | Performed by: PSYCHIATRY & NEUROLOGY

## 2019-09-09 RX ORDER — LANOLIN ALCOHOL/MO/W.PET/CERES
1000 CREAM (GRAM) TOPICAL DAILY
Qty: 30 TABLET | Refills: 3 | Status: SHIPPED | OUTPATIENT
Start: 2019-09-09 | End: 2020-01-02

## 2019-09-12 ENCOUNTER — OFFICE VISIT (OUTPATIENT)
Dept: FAMILY MEDICINE CLINIC | Facility: CLINIC | Age: 59
End: 2019-09-12
Payer: COMMERCIAL

## 2019-09-12 VITALS
DIASTOLIC BLOOD PRESSURE: 72 MMHG | OXYGEN SATURATION: 98 % | WEIGHT: 212 LBS | TEMPERATURE: 97.9 F | BODY MASS INDEX: 30.42 KG/M2 | HEART RATE: 85 BPM | RESPIRATION RATE: 18 BRPM | SYSTOLIC BLOOD PRESSURE: 100 MMHG

## 2019-09-12 DIAGNOSIS — M54.12 CERVICAL RADICULOPATHY: Primary | ICD-10-CM

## 2019-09-12 PROCEDURE — 99213 OFFICE O/P EST LOW 20 MIN: CPT | Performed by: FAMILY MEDICINE

## 2019-09-14 ENCOUNTER — HOSPITAL ENCOUNTER (OUTPATIENT)
Dept: RADIOLOGY | Facility: HOSPITAL | Age: 59
Discharge: HOME | End: 2019-09-14
Payer: COMMERCIAL

## 2019-09-14 ENCOUNTER — TRANSCRIBE ORDERS (OUTPATIENT)
Dept: ADMINISTRATIVE | Facility: HOSPITAL | Age: 59
End: 2019-09-14

## 2019-09-14 DIAGNOSIS — M54.12 CERVICAL RADICULOPATHY: ICD-10-CM

## 2019-09-14 PROCEDURE — 72050 X-RAY EXAM NECK SPINE 4/5VWS: CPT

## 2019-09-14 NOTE — PROGRESS NOTES
Assessment/Plan:    No problem-specific Assessment & Plan notes found for this encounter  Diagnoses and all orders for this visit:    Cervical radiculopathy  -     Cancel: XR spine cervical 2 or 3 vw injury; Future  -     Ambulatory referral to Physical Therapy; Future          Subjective:      Patient ID: Rakesh Pena is a 61 y o  male  51-year-old male who presents with left-sided neck pain that developed spontaneously 3 days prior  Patient reports he woke with stiff neck from sleep which progressed to stiffness and pins and needles sensation down the left arm into the left hand  Denies any weakness or numbness in the arm  No fevers or chills  Has chronic low back pain and degenerative disc disease that was previously treated with epidural injections by Pain Management  Unable to take gabapentin due to adverse reaction  The following portions of the patient's history were reviewed and updated as appropriate: allergies, current medications, past family history, past medical history, past social history, past surgical history and problem list     Review of Systems   Constitutional: Negative for activity change, chills, fatigue and fever  HENT: Negative for congestion, rhinorrhea and sore throat  Eyes: Negative for visual disturbance  Respiratory: Negative for cough, shortness of breath and wheezing  Cardiovascular: Negative for chest pain, palpitations and leg swelling  Gastrointestinal: Negative for abdominal pain, diarrhea, nausea and vomiting  Genitourinary: Negative for dysuria  Musculoskeletal: Negative for arthralgias  Skin: Negative for rash and wound  Neurological: Negative for dizziness, weakness and light-headedness  Psychiatric/Behavioral: Negative for suicidal ideas           Objective:      /72 (BP Location: Right arm, Patient Position: Sitting)   Pulse 85   Temp 97 9 °F (36 6 °C) (Tympanic)   Resp 18   Wt 96 2 kg (212 lb)   SpO2 98%   BMI 30 42 kg/m² Physical Exam   Constitutional: He is oriented to person, place, and time  He appears well-developed and well-nourished  No distress  Neurological: He is alert and oriented to person, place, and time  Skin: He is not diaphoretic  Psychiatric: He has a normal mood and affect  His behavior is normal      Back Exam     Tenderness   Back tenderness location: Tenderness along the left upper trapezius muscle  Range of Motion   The patient has normal back ROM  Comments:  Normal strength in bilateral shoulder abduction, elbow flexion extension, wrist flexion extension and  strength  Sensation and reflexes grossly intact  Radiculopathy symptoms of left upper extremity increased with neck motions

## 2019-09-16 ENCOUNTER — OFFICE VISIT (OUTPATIENT)
Dept: FAMILY MEDICINE CLINIC | Facility: CLINIC | Age: 59
End: 2019-09-16
Payer: COMMERCIAL

## 2019-09-16 VITALS
TEMPERATURE: 97.9 F | SYSTOLIC BLOOD PRESSURE: 124 MMHG | BODY MASS INDEX: 30.28 KG/M2 | OXYGEN SATURATION: 98 % | WEIGHT: 211 LBS | DIASTOLIC BLOOD PRESSURE: 76 MMHG | HEART RATE: 83 BPM

## 2019-09-16 DIAGNOSIS — R73.9 HYPERGLYCEMIA: ICD-10-CM

## 2019-09-16 DIAGNOSIS — S13.9XXD NECK SPRAIN, SUBSEQUENT ENCOUNTER: Primary | ICD-10-CM

## 2019-09-16 DIAGNOSIS — Z13.228 SCREENING FOR METABOLIC DISORDER: ICD-10-CM

## 2019-09-16 DIAGNOSIS — R53.82 CHRONIC FATIGUE: ICD-10-CM

## 2019-09-16 DIAGNOSIS — Z13.1 SCREENING FOR DIABETES MELLITUS: ICD-10-CM

## 2019-09-16 DIAGNOSIS — E78.5 DYSLIPIDEMIA: ICD-10-CM

## 2019-09-16 PROCEDURE — 99213 OFFICE O/P EST LOW 20 MIN: CPT | Performed by: FAMILY MEDICINE

## 2019-09-16 RX ORDER — METHOCARBAMOL 500 MG/1
500 TABLET, FILM COATED ORAL 3 TIMES DAILY PRN
Qty: 30 TABLET | Refills: 1 | Status: SHIPPED | OUTPATIENT
Start: 2019-09-16 | End: 2019-12-05 | Stop reason: SDUPTHER

## 2019-09-16 NOTE — PROGRESS NOTES
Subjective:    68-year-old male comes for follow up for neck pain  He is a poor historian and is often very tangential   He usually accompanies his partner to her appointments for no particular reason  Today, he states he woke up approximately 10 days ago with a stiff neck  The pain is localized to the left side of the neck with radiation down the left arm  His experiencing paresthesias of his fingers  He has been taking Tylenol which he states helps  Pain is rated 8/10 and dull and aching in nature  This is also associated with tension headaches which she describes as a feeling of pulling down the back of his head  He has been previously evaluated in office for this condition, ordered an x-ray of the cervical spine, and referred to physical therapy  He also complains of chronic fatigue which she states has been worsening over the past few months  He attributes this to just getting old  He also mentions that he is being evaluated by a neurologist for dementia like symptoms  On review of charts, it is noted that he is overdue for several maintenance labs  He has a history of dyslipidemia, and hyperglycemia noted on prior metabolic panel  Objective:  /76 (BP Location: Left arm, Patient Position: Sitting, Cuff Size: Adult)   Pulse 83   Temp 97 9 °F (36 6 °C) (Tympanic)   Wt 95 7 kg (211 lb)   SpO2 98%   BMI 30 28 kg/m²   Gen: Alert, oriented, NAD  CVS: RRR (+)S1S2  Pulm: CTAB  Abd: Soft, Nontender, normoactive bowel sounds  MSK: Full ROM of neck, however painful, particularly with right rotation and side bending  Hypertonic paraspinal muscles on the left side  Assessment/Plan    1  Neck sprain, subsequent encounter  - Advised patient to pursue physical therapy as previously instructed  - Reviewed x-ray results with the patient, noting that there were degenerative changes noted, however no acute fractures dislocations or other pathologies    - Offered short course of muscle relaxant for muscle sprain of neck until patient can benefit from PT   - methocarbamol (ROBAXIN) 500 mg tablet; Take 1 tablet (500 mg total) by mouth 3 (three) times a day as needed for muscle spasms  Dispense: 30 tablet; Refill: 1    2  Dyslipidemia  - Lipid Panel with Direct LDL reflex    3  Hyperglycemia  4  Screening for diabetes mellitus  5  Screening for metabolic disorder  - Comprehensive metabolic panel  - HEMOGLOBIN A1C W/ EAG ESTIMATION    6  Chronic fatigue  - TSH, 3rd generation with Free T4 reflex      All patient questions & concerns were addressed  The patient agrees with his treatment plan  RTO 3 mo  Andrea Garcia, DO  09/24/19  11:27 AM    Some portions of this record may have been generated with voice recognition software  There may be translation, syntax, or grammatical errors  Occasional wrong word or "sound-a-like" substitutions may have occurred due to the inherent limitations of the voice recognition software  Read the chart carefully and recognize, using context, where substations may have occurred   If you have any questions, please contact the dictating provider for clarification or correction, as needed

## 2019-09-20 ENCOUNTER — TELEPHONE (OUTPATIENT)
Dept: FAMILY MEDICINE CLINIC | Facility: CLINIC | Age: 59
End: 2019-09-20

## 2019-09-20 DIAGNOSIS — G31.84 MILD COGNITIVE IMPAIRMENT: ICD-10-CM

## 2019-09-20 RX ORDER — RIVASTIGMINE TARTRATE 3 MG/1
CAPSULE ORAL
Qty: 60 CAPSULE | Refills: 1 | Status: SHIPPED | OUTPATIENT
Start: 2019-09-20 | End: 2019-11-15 | Stop reason: SDUPTHER

## 2019-09-20 NOTE — TELEPHONE ENCOUNTER
PT CALLED TO REQUEST THE RESULT OF XRAY,WOULD LIKE TO HEAR BEFORE Monday  BECAUSE HE HAS PHYSICAL THERAPY  CAN LEAVE MESSAGE

## 2019-09-23 ENCOUNTER — EVALUATION (OUTPATIENT)
Dept: PHYSICAL THERAPY | Facility: CLINIC | Age: 59
End: 2019-09-23
Payer: COMMERCIAL

## 2019-09-23 DIAGNOSIS — M54.12 CERVICAL RADICULOPATHY: ICD-10-CM

## 2019-09-23 PROCEDURE — 97161 PT EVAL LOW COMPLEX 20 MIN: CPT

## 2019-09-23 NOTE — PROGRESS NOTES
PT Evaluation     Today's date: 2019  Patient name: Abimael Quintana  : 1960  MRN: [id number]  Referring provider: Gamaliel Akers MD  Dx:   Encounter Diagnosis     ICD-10-CM    1  Cervical radiculopathy M54 12 Ambulatory referral to Physical Therapy                  Assessment  Assessment details: Abimael Quintana is a 61 y o  male who presents with pain and decreased cervical ROM, increased muscle tension left cervical musculature, TTP tspine vertebral segments, patient responded positively to manual cervical traction and retraction in supine   Due to these impairments, patient has difficulty performing ADL's, engaging in social activities, lifting/carrying  Patient's clinical presentation is consistent with their referring diagnosis of Cervical radiculopathy  Plan: Ambulatory referral to Physical Therapy    Patient has been educated in home exercise program and plan of care and posture   Patient would benefit from skilled physical therapy services to address their aforementioned functional limitations and progress towards prior level of function and independence with home exercise program    Impairments: abnormal muscle tone, abnormal or restricted ROM, abnormal movement, activity intolerance, lacks appropriate home exercise program, pain with function and poor posture   Functional limitations: see subjectiveUnderstanding of Dx/Px/POC: good   Prognosis: good    Goals  STG:  + Patient will have pain level 0/10 cervical spine, left shoulder  at rest (2-3 weeks)  + Patient will report a 40% improvement in symptoms (2-3 weeks)   + Patient will be independent in basic HEP (2-3 weeks)  + Patient will demonstrate good posture with verbal cuing   (2-3 weeks)  + Patient will demonstrate an increase in range of motion  cervical spine rotation bilaterally  to  minimally limited (2-3 weeks)  + Patient will report increased ease lifting with left UE due to a reduction in pain (2-3 weeks)    + Patient will demonstrate independence in use of there ex to manage symptoms ( 3 weeks)    LTG:  + Patient will have pain level 3/10 shoulder, cervical spine  with ADL's (4-6 weeks)  + Patient will report a 65% improvement in symptoms (4-6 weeks)   + Patient will increase FOTO score by 10 points  (10 sessions)   + Patient will be independent in comprehensive HEP (4-6 weeks)  + Patient will demonstrate good posture independently  (4-6 weeks)  + Patient will demonstrate an increase in range of motion  cervical spine all motions to  minimally limited  (4-6 weeks)  + Patient will demonstrate increase  MMT of  Left shoulder to  4/5 and painfree for maximum function  (4-6 weeks)    + Patient will report no limitations with turning head or looking up ( 6 weeks)    Plan  Plan details: 2-3 x week, 4-6 weeks: HEP development, stretching as needed per objective findings, strengthening upper quadrant, A/AA/PROM cervical/shoulder motions, joint mobilizations prn cervical and thoracic, posture education, STM/MI as needed to reduce muscle tension per objective findings, muscle reeducation per objective findings, dynamic stabilization, mechanical traction, Marilynn extension program, PLOC discussed and agreed upon with patient        Patient would benefit from: skilled PT  Planned modality interventions: traction  Planned therapy interventions: abdominal trunk stabilization, joint mobilization, manual therapy, ADL retraining, neuromuscular re-education, body mechanics training, patient education, postural training, strengthening, stretching, therapeutic exercise, functional ROM exercises, home exercise program, graded exercise and massage  Other planned therapy interventions: stabilization  Frequency: 2x week  Plan of Care beginning date: 9/23/2019  Plan of Care expiration date: 11/4/2019  Treatment plan discussed with: patient        Subjective Evaluation    History of Present Illness  Mechanism of injury: Friday, 9/6/19:  woke up with stiffness in cervical spine and shoulder left, and muscle tightness in levator scap area and can radiate into left second 2 digits  headache present to top of head  Experiences numbness and tingling into left hand  Also experienced headache  Symptoms are constant  Function:  Avoids carrying with left UE due to left UE feels weak  Not a recurrent problem   Quality of life: good    Pain  Current pain ratin  At best pain ratin  At worst pain ratin  Location: see above  Quality: radiating, tight and pulling  Relieving factors: ice and medications  Progression: improved    Social Support    Employment status: working (caregiver)  Hand dominance: right  Exercise history: none      Diagnostic Tests  X-ray: normal  Treatments  No previous or current treatments  Patient Goals  Patient goals for therapy: decreased pain, independence with ADLs/IADLs, increased motion and increased strength  Patient goal: to get it "back to normal"        Objective     Static Posture     Comments    Posture  Sitting: fair: + forward head rounded shoulders , protracted shoulder blades   Standing: forward head rounded shoulders     Cervical ROM  Active (sitting)  Protraction: major LOM  + pain  Retraction: major LOm + pain  Flexion: mod LOM + pain  Extension: major LOM + pain  Rotation: Right:  Mod LOM + pain left cervical spine Left mod LOM  + pain in area of left upper trap   Side bend: Right:  Major LOM  + pain Left major LOM + pain    Palpation: + TTP in left upper tspine spinous process, left periscap musculature, pec minor, worst in levator scap       Functional reaching:  Overhead: Right: wnl no pain Left: wnl + pain at end range   Behind head: Right: wnl + pain at end range Left: wnl + pain   Behind back: Right: wnl no pain Left: wnl + pulling in anterior shoulder     MMT Upper Extremity  Flexion: Right: 4/5 Left 4/5 + pain   Abduction(C5): Right: 4/5    Left 4/5  + pain   Elbow flexion (C6): Right: 4/5 Left 4/5    Elbow extension (C7): Right: 4/5  Left 4/5   Wrist flexion: Right: 4/5     Left 4/5   Wrist Extension: Right: 4/5  Left 4/5  : Right: 4/5   Left 4/5   Digit abduction (C8): Right: 4/5 Left: 4/5     Dermatomes: grossly wnl bilaterally     Repeated motions:  Static Protraction: increased pain left   Repeated protraction: increased WAR  Repeated retraction:  Supine manually reduced symptoms and improved ROM BAR       Manual cervical traction: reduced symptoms  ULTT: NT    Shoulder ROM: reduced ER bilaterally by 10 %, flexion by 10% + pain at end range bilaterally     Thoracic rotation:NT         Precautions h/o stroke,     Specialty Daily Treatment Diary     Visits: 1       Manual 9/23/19       Man Traction performed        STM/SOR        UT stretch        Manual cervical retraction performed        Mobs: tspine Next visit        Kaitlin SMALLWOOD rotaiton        Total time:                Exercise Diary         Chin tucks Supine: instruct       AROM        UT stretch        Scap retraction with tband ER        Tubing rows        scap isometrics Next visit                Sitting posture  instruct                                                               Total time:                 Modalities        MH        Ice        Total time:            12/2/19: PT services were discharged due to patient has not returned for treatment

## 2019-09-24 DIAGNOSIS — Z00.00 HEALTHCARE MAINTENANCE: Primary | ICD-10-CM

## 2019-09-24 RX ORDER — ACETAMINOPHEN/DIPHENHYDRAMINE 500MG-25MG
TABLET ORAL
Qty: 90 TABLET | Refills: 3 | Status: SHIPPED | OUTPATIENT
Start: 2019-09-24 | End: 2020-03-03 | Stop reason: SDUPTHER

## 2019-09-26 ENCOUNTER — APPOINTMENT (OUTPATIENT)
Dept: PHYSICAL THERAPY | Facility: CLINIC | Age: 59
End: 2019-09-26
Payer: COMMERCIAL

## 2019-09-27 ENCOUNTER — TELEPHONE (OUTPATIENT)
Dept: FAMILY MEDICINE CLINIC | Facility: CLINIC | Age: 59
End: 2019-09-27

## 2019-09-27 NOTE — TELEPHONE ENCOUNTER
Dr Kristine Heck pt needs to have a signature for hearing aides please try to have complete by today as they need to have asap and it just needs a signature

## 2019-09-27 NOTE — TELEPHONE ENCOUNTER
DR Kathleen Pascual - PT DROPPED A FORM  OFF FOR [facility] ON Tuesday  DID YOU RECEIVPT WANTS THIS TO BE FILLED OUT TODAY  HE SAID HE WILL STOP IN TODAY AROUND 4:00 TODAY TO GET IT

## 2019-09-27 NOTE — TELEPHONE ENCOUNTER
Form signed and placed in  basket for pickup at patient's earliest convenience      Thanks,    Mary Crouch DO  09/27/19  2:45 PM

## 2019-10-02 ENCOUNTER — TELEPHONE (OUTPATIENT)
Dept: GASTROENTEROLOGY | Facility: AMBULARY SURGERY CENTER | Age: 59
End: 2019-10-02

## 2019-10-02 ENCOUNTER — OFFICE VISIT (OUTPATIENT)
Dept: NEUROLOGY | Facility: CLINIC | Age: 59
End: 2019-10-02
Payer: COMMERCIAL

## 2019-10-02 VITALS
BODY MASS INDEX: 29.92 KG/M2 | HEIGHT: 70 IN | WEIGHT: 209 LBS | SYSTOLIC BLOOD PRESSURE: 108 MMHG | HEART RATE: 84 BPM | DIASTOLIC BLOOD PRESSURE: 72 MMHG

## 2019-10-02 DIAGNOSIS — F43.10 PTSD (POST-TRAUMATIC STRESS DISORDER): ICD-10-CM

## 2019-10-02 DIAGNOSIS — R10.13 EPIGASTRIC BURNING SENSATION: ICD-10-CM

## 2019-10-02 DIAGNOSIS — G31.84 MCI (MILD COGNITIVE IMPAIRMENT): Primary | ICD-10-CM

## 2019-10-02 PROCEDURE — 99214 OFFICE O/P EST MOD 30 MIN: CPT | Performed by: PSYCHIATRY & NEUROLOGY

## 2019-10-02 RX ORDER — OMEPRAZOLE 40 MG/1
40 CAPSULE, DELAYED RELEASE ORAL DAILY
Qty: 30 CAPSULE | Refills: 2 | Status: SHIPPED | OUTPATIENT
Start: 2019-10-02 | End: 2019-12-23 | Stop reason: SDUPTHER

## 2019-10-02 NOTE — PROGRESS NOTES
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)    HPI:     Mr Lorenzo Samuel is a 62 yo M with PMH of neuropathy, memory disturbances presents as follow up  Per my previous history, on May 19th, 2018 patient was argumentative, forgetful and then went into shower  After 20 min, he was walking in hallway, couldn't understand simple conversation  He then asked what day is it  He then kept repeating same thing over and over  He thought president was [employer]  He was not oriented to month or day  Memory disturbance lasted from 10:30 until 5pm  He kept persevering over same questions  He had CT brain which was negative  Lab work was essentially normal  We had ordered MRI brain neuro quant which was normal  His EEG did however reveal 2 separate independent temporal sharps to clinically correlate  He had a lot of issues with medications earlier so we had deferred an AED  At last visit, we had ordered a repeat study  He finally had it completed last month and it was normal  Patient is placed on exelon for his memory  He says overall he's feeling better  He now refuses to do psychotherapy because of not so good experiences in past      Patient had neuropsych testing arranged at [facility] on Aug 22nd but it was postponed because they wanted EEG results  It's now scheduled for Oct 15th  He had MRI brain neuroquant as well and it was normal in June 2018  All of reversible etiology labs ordered previously were all normal except mildly low vit D level for which he takes supplement of 2000units/day  He has h/o suffering from PTSD  His father and grandfather do have history of dementia       Past Medical History:   Diagnosis Date    Abdominal pain 10/24/2018    Amnesia memory loss     Anxiety     Chronic fatigue     Disc degeneration, lumbar     Esophageal reflux     GERD (gastroesophageal reflux disease)     Glaucoma     Hiatal hernia     Increased pressure in the eye     both eyes; denies glaucoma     Internal hemorrhoids     Lumbar disc herniation with radiculopathy     Memory loss     Pain in both feet 6/18/2018    Personal history of colonic polyps                Current Outpatient Medications on File Prior to Visit   Medication Sig Dispense Refill    aspirin 81 MG tablet Take 1 tablet (81 mg total) by mouth daily 90 tablet 3    dicyclomine (BENTYL) 10 mg capsule Take 1 capsule (10 mg total) by mouth 4 (four) times a day (before meals and at bedtime) 120 capsule 11    fluticasone (FLONASE) 50 mcg/act nasal spray 1 spray into each nostril daily 16 g 3    guaiFENesin (MUCINEX) 600 mg 12 hr tablet Take 2 tablets (1,200 mg total) by mouth every 12 (twelve) hours as needed for cough or congestion (chest congestion & thick secretions) 60 tablet 1    methocarbamol (ROBAXIN) 500 mg tablet Take 1 tablet (500 mg total) by mouth 3 (three) times a day as needed for muscle spasms 30 tablet 1    montelukast (SINGULAIR) 10 mg tablet Take 1 tablet (10 mg total) by mouth daily at bedtime 90 tablet 3    RA ASPIRIN EC ADULT LOW ST 81 MG EC tablet take 1 tablet by mouth once daily 90 tablet 3    RA VITAMIN D-3 1000 units tablet TAKE 2 TABLETS BY MOUTH DAILY 60 tablet 3    ranitidine (ZANTAC) 300 MG tablet take 1 tablet by mouth at bedtime 90 tablet 1    rivastigmine (EXELON) 3 mg capsule take 1 capsule by mouth twice a day  STARTING JULY 27 60 capsule 1    sucralfate (CARAFATE) 1 g/10 mL suspension Take 10 mL (1 g total) by mouth 4 (four) times a day (Patient taking differently: Take 1 g by mouth every 6 (six) hours ) 420 mL 0    tamsulosin (FLOMAX) 0 4 mg Take 0 4 mg by mouth daily at bedtime        timolol (TIMOPTIC) 0 5 % ophthalmic solution INSTILL 1 DROP INTO BOTH EYES TWO TIMES A DAY  0    vitamin B-12 (VITAMIN B-12) 1,000 mcg tablet Take 1 tablet (1,000 mcg total) by mouth daily 30 tablet 3    ketoconazole (NIZORAL) 2 % cream Apply topically daily for 30 days 60 g 5    [DISCONTINUED] olopatadine HCl (PATADAY) 0 2 % opth drops Administer 1 drop to both eyes daily 2 5 mL 3    [DISCONTINUED] omeprazole (PriLOSEC) 40 MG capsule take 1 capsule by mouth once daily 30 capsule 2    [DISCONTINUED] timolol (BETIMOL) 0 5 % ophthalmic solution Administer 1 drop to both eyes 2 (two) times a day  No current facility-administered medications on file prior to visit  Allergies   Allergen Reactions    Gabapentin Other (See Comments)     OUT OF BODY PSYCHOSIS, MEMORY LOSS, AMNESIA    Other Cough     HAYFEVER, SEASONAL, ALFALFA         Family History   Problem Relation Age of Onset    Stomach cancer Maternal Grandmother     Hypertension Mother     Diabetes Mother     Cataracts Father     Skin cancer Father     Stroke Father         2X    Aneurysm Father     No Known Problems Sister     No Known Problems Brother     Throat cancer Brother                 Past Surgical History:   Procedure Laterality Date    COLONOSCOPY N/A 4/25/2016    Procedure: COLONOSCOPY;  Surgeon: Loida Monzon MD;  Location: [facility] GI LAB; Service:     DENTAL SURGERY      ALL TEETH PULLED    EPIDURAL BLOCK INJECTION      EPIDURAL BLOCK INJECTION Right 12/28/2016    Procedure: BLOCK / INJECTION EPIDURAL STEROID TRANSFORAMINAL  L4-5;  Surgeon: Dania Blevins MD;  Location: [facility] MAIN OR;  Service:     EPIDURAL BLOCK INJECTION Right 11/16/2017    Procedure: BLOCK / INJECTION EPIDURAL STEROID TRANSFORAMINAL L4-5 RIGHT;  Surgeon: Dania Blevins MD;  Location: [facility] MAIN OR;  Service: Pain Management     EPIDURAL BLOCK INJECTION Left 5/10/2018    Procedure: Lt L4 L5 Tfesi (23829,29390);   Surgeon: Dania Blevins MD;  Location: [facility] MAIN OR;  Service: Pain Management     EPIDURAL BLOCK INJECTION Right 3/7/2019 Procedure: L4 L5 Transforaminal Epidural Steroid Injection ([id number]); Surgeon: Yoanna Costa MD;  Location: [facility] MAIN OR;  Service: Pain Management     ESOPHAGOGASTRODUODENOSCOPY N/A 2016    Procedure: ESOPHAGOGASTRODUODENOSCOPY (EGD); Surgeon: Chris Pritchett MD;  Location: [facility] GI LAB;   Service:     NOSE SURGERY             Social History     Socioeconomic History    Marital status: Single     Spouse name: Not on file    Number of children: Not on file    Years of education: Not on file    Highest education level: Not on file   Occupational History    Not on file   Social Needs    Financial resource strain: Not on file    Food insecurity:     Worry: Not on file     Inability: Not on file    Transportation needs:     Medical: Not on file     Non-medical: Not on file   Tobacco Use    Smoking status: Former Smoker     Packs/day: 0 50     Years: 19 00     Pack years: 9 50     Types: Cigarettes     Last attempt to quit:      Years since quittin 7    Smokeless tobacco: Never Used   Substance and Sexual Activity    Alcohol use: No    Drug use: No    Sexual activity: Not on file   Lifestyle    Physical activity:     Days per week: Not on file     Minutes per session: Not on file    Stress: Not on file   Relationships    Social connections:     Talks on phone: Not on file     Gets together: Not on file     Attends Religion service: Not on file     Active member of club or organization: Not on file     Attends meetings of clubs or organizations: Not on file     Relationship status: Not on file    Intimate partner violence:     Fear of current or ex partner: Not on file     Emotionally abused: Not on file     Physically abused: Not on file     Forced sexual activity: Not on file   Other Topics Concern    Not on file   Social History Narrative    Not on file       Review of Systems  Refer to positive review of systems in HPI  Constitutional- No fever  Eyes- No visual change  ENT- Hearing normal  CV- No chest pain  Resp- No Shortness of breath  GI- No diarrhea  - Bladder normal  MS- No Arthritis   Skin- No rash  Psych- +anxiety,+ ptsd   Endo- No DM  Heme- No nodes    PHYSICAL EXAM:    Vitals:    10/02/19 1406   BP: 108/72   BP Location: Left arm   Patient Position: Sitting   Cuff Size: Adult   Pulse: 84   Weight: 94 8 kg (209 lb)   Height: 5' 10" (1 778 m)         Appearance: No Acute Distress  Ophthalmoscopic: Disc Flat, Normal fundus  Carotid/Heart/Peripheral Vascular: No Bruits, RRR  Orientation: Awake, Alert, and Oriented x 3  Mental status:  Memory: Registation 3/3 Recall 2/3  Able to complete series of 7  No difficulty spelling world backwards  MMSE: 27/30  Attention: Normal  Knowledge: Appropriate  Language: No aphasia  Speech: No dysarthria  Cranial Nerves:  2 No Visual Defect on Confrontation; Pupils round, equal, reactive to light  3,4,6 Extraocular Movements Intact; no nystagmus  5 Facial Sensation Intact  7 No facial asymmetry  8 Intact hearing  9,10 Palate symmetric, normal gag  11 Good shoulder shrug  12 Tongue Midline  Gait: Stable, No ataxia, can perform tandem walking  Coordination: No ataxia with finger to nose testing and heel to shin testing  Sensory: Intact, Symmetric to Pinprick, Light Touch, Vibration, and Joint Position  Muscle Tone: Normal  Muscle exam  Arm Right Left Leg Right Left   Deltoid 5/5 5/5 Iliopsoas 5/5 5/5   Biceps 5/5 5/5 Quads 5/5 5/5   Triceps 5/5 5/5 Hamstrings 5/5 5/5   Wrist Extension 5/5 5/5 Ankle Dorsi Flexion 5/5 5/5   Wrist Flexion 5/5 5/5 Ankle Plantar Flexion 5/5 5/5   Interossei 5/5 5/5 Ankle Eversion 5/5 5/5   APB 5/5 5/5 Ankle Inversion 5/5 5/5       Reflexes   RJ BJ TJ KJ AJ Plantars Rankin's   Right 2+ 2+ 2+ 2+ 2+ Downgoing Not present   Left 2+ 2+ 2+ 2+ 2+ Downgoing Not present         Personal review of              Assessment/Plan:     1  MCI (mild cognitive impairment)     2   PTSD (post-traumatic stress disorder)         Patient appears better compared to previous visits  His repeat EEG was normal so we are not worried about placing him on AED  There is deficit some underlying psychosocial factors with his h/o PTSD  Will resume exelon 3mg bid  Will optimize dose depending on result of neuropsych testing  Recommend CBT with psychiatry  Resume  vitamin D and B12 supplements  Total time of encounter: 30 min   More than 50% of time was spent in counseling and coordination of care of patient  YUNIOR Horan    [facility]  [address]  [address]

## 2019-10-02 NOTE — TELEPHONE ENCOUNTER
Refill Request sent by Fax from [employer] for    Omeprazole DR 40 MG Capsule  QTY 30   Refill 2    Take 1 capsule by mouth once daily

## 2019-10-08 DIAGNOSIS — K21.9 GASTROESOPHAGEAL REFLUX DISEASE WITHOUT ESOPHAGITIS: ICD-10-CM

## 2019-10-08 RX ORDER — RANITIDINE 300 MG/1
TABLET ORAL
Qty: 90 TABLET | Refills: 1 | Status: SHIPPED | OUTPATIENT
Start: 2019-10-08 | End: 2019-11-21 | Stop reason: ALTCHOICE

## 2019-11-15 DIAGNOSIS — G31.84 MILD COGNITIVE IMPAIRMENT: ICD-10-CM

## 2019-11-15 RX ORDER — RIVASTIGMINE TARTRATE 3 MG/1
CAPSULE ORAL
Qty: 60 CAPSULE | Refills: 0 | Status: SHIPPED | OUTPATIENT
Start: 2019-11-15 | End: 2019-12-15 | Stop reason: SDUPTHER

## 2019-11-18 DIAGNOSIS — E55.9 VITAMIN D DEFICIENCY: ICD-10-CM

## 2019-11-21 ENCOUNTER — TELEPHONE (OUTPATIENT)
Dept: FAMILY MEDICINE CLINIC | Facility: CLINIC | Age: 59
End: 2019-11-21

## 2019-11-21 ENCOUNTER — TELEPHONE (OUTPATIENT)
Dept: GASTROENTEROLOGY | Facility: AMBULARY SURGERY CENTER | Age: 59
End: 2019-11-21

## 2019-11-21 DIAGNOSIS — K21.9 GASTROESOPHAGEAL REFLUX DISEASE WITHOUT ESOPHAGITIS: Primary | ICD-10-CM

## 2019-11-21 RX ORDER — FAMOTIDINE 40 MG/1
40 TABLET, FILM COATED ORAL
Qty: 30 TABLET | Refills: 5 | Status: SHIPPED | OUTPATIENT
Start: 2019-11-21 | End: 2019-12-21 | Stop reason: RX

## 2019-11-21 NOTE — TELEPHONE ENCOUNTER
DR Jacklynn Kawasaki - PT IS CALLING TO LET YOU KNOW THAT HIS RANITIDINE HAS BEEN RECALLED  HE WOULD LIKE SOMETHING ELSE

## 2019-11-21 NOTE — TELEPHONE ENCOUNTER
Patient has reached out to Dr Miller Schooling already he also has both Mylanta ans Carafate as a back up for acute acid indigestion attacks    He will stop the Zantac

## 2019-11-21 NOTE — TELEPHONE ENCOUNTER
Please advise patient that we can use Pepcid 40 mg instead, and that I actually just sent Rx for this to his pharmacy on file    Thank you

## 2019-11-21 NOTE — TELEPHONE ENCOUNTER
Patient is followed by GI and also called that office for alternative to ranitidine  Dr [facility] initially started the patient on double antacid therapy, so it would be most appropriate for them to suggest an alternative, if warranted        Thank you,    Tl Valente,   11/21/19  2:00 PM

## 2019-11-21 NOTE — TELEPHONE ENCOUNTER
Called patient , told him Pepcid 40mg has been sent to the pharmacy    He has any questions feel free to give us a call

## 2019-11-21 NOTE — TELEPHONE ENCOUNTER
Patients GI provider:  Dr Dede Meza     Number to return call: ( [phone]    Reason for call: Pt calling to get an alternate medication for the zantac, would like a call back with suggestion    Scheduled procedure/appointment date if applicable: Apt/procedure N/A

## 2019-11-21 NOTE — TELEPHONE ENCOUNTER
Per Med   Rec , patient is on an H2 blocker and PPI, I would continue Omeprazole without Ranitidine unless otherwise indicated for both; will forward to PCP

## 2019-11-25 ENCOUNTER — OFFICE VISIT (OUTPATIENT)
Dept: PODIATRY | Facility: CLINIC | Age: 59
End: 2019-11-25
Payer: COMMERCIAL

## 2019-11-25 VITALS
RESPIRATION RATE: 17 BRPM | HEART RATE: 81 BPM | DIASTOLIC BLOOD PRESSURE: 77 MMHG | SYSTOLIC BLOOD PRESSURE: 128 MMHG | HEIGHT: 70 IN | WEIGHT: 209 LBS | BODY MASS INDEX: 29.92 KG/M2

## 2019-11-25 DIAGNOSIS — M21.969 ACQUIRED DEFORMITY OF FOOT, UNSPECIFIED LATERALITY: Primary | ICD-10-CM

## 2019-11-25 DIAGNOSIS — M79.672 PAIN IN BOTH FEET: ICD-10-CM

## 2019-11-25 DIAGNOSIS — B35.9 DERMATOPHYTOSIS: ICD-10-CM

## 2019-11-25 DIAGNOSIS — M79.671 PAIN IN BOTH FEET: ICD-10-CM

## 2019-11-25 DIAGNOSIS — M20.41 HAMMER TOES OF BOTH FEET: ICD-10-CM

## 2019-11-25 DIAGNOSIS — M20.42 HAMMER TOES OF BOTH FEET: ICD-10-CM

## 2019-11-25 PROCEDURE — 99213 OFFICE O/P EST LOW 20 MIN: CPT | Performed by: PODIATRIST

## 2019-11-25 NOTE — PROGRESS NOTES
Assessment/Plan:  Metatarsalgia   Hammertoe formation   Pes planus   Pain      Plan   Foot exam performed   All nails debrided   Patient will benefit from orthotics      Patient needs referral to Orthopedics for radiculopathy workup     No problem-specific Assessment & Plan notes found for this encounter          There are no diagnoses linked to this encounter        Subjective:   Patient only has mild pain in his feet with ambulation   He has known radiculopathy           Past Medical History:   Diagnosis Date    Amnesia memory loss      Anxiety      Chronic fatigue      Disc degeneration, lumbar      Esophageal reflux      GERD (gastroesophageal reflux disease)      Glaucoma      Hiatal hernia      Increased pressure in the eye       both eyes; denies glaucoma     Internal hemorrhoids      Lumbar disc herniation with radiculopathy      Memory loss      Personal history of colonic polyps                     Past Surgical History:   Procedure Laterality Date    COLONOSCOPY N/A 4/25/2016     Procedure: COLONOSCOPY;  Surgeon: April Armando MD;  Location: [facility];  Service:     DENTAL SURGERY         ALL TEETH PULLED    EPIDURAL BLOCK INJECTION        EPIDURAL BLOCK INJECTION Right 12/28/2016     Procedure: BLOCK / INJECTION EPIDURAL STEROID TRANSFORAMINAL  L4-5;  Surgeon: Monik Stokes MD;  Location: [facility] MAIN OR;  Service:     EPIDURAL BLOCK INJECTION Right 11/16/2017     Procedure: BLOCK / INJECTION EPIDURAL STEROID TRANSFORAMINAL L4-5 RIGHT;  Surgeon: Monik Stokes MD;  Location: [facility] MAIN OR;  Service: Pain Management     EPIDURAL BLOCK INJECTION Left 5/10/2018     Procedure: Lt L4 L5 Tfesi (20210,30831);  Surgeon: Monik Stokes MD;  Location: [facility] MAIN OR;  Service: Pain Management     ESOPHAGOGASTRODUODENOSCOPY N/A 4/25/2016     Procedure: ESOPHAGOGASTRODUODENOSCOPY (EGD);  Surgeon: April Armando MD;  Location: [facility];  Service:     NOSE SURGERY                     Allergies   Allergen Reactions    Gabapentin Other (See Comments)       OUT OF BODY PSYCHOSIS, MEMORY LOSS, AMNESIA    Other Cough       HAYFEVER, SEASONAL, ALFALFA            Current Outpatient Prescriptions:     aspirin 81 MG tablet, Take 81 mg by mouth daily  , Disp: , Rfl:     omeprazole (PriLOSEC) 20 mg delayed release capsule, Take 1 capsule (20 mg total) by mouth daily, Disp: 30 capsule, Rfl: 3    ranitidine (ZANTAC) 300 MG tablet, Take 1 tablet (300 mg total) by mouth daily at bedtime, Disp: 30 tablet, Rfl: 3    tamsulosin (FLOMAX) 0 4 mg, Take 0 4 mg by mouth daily with dinner, Disp: , Rfl:     timolol (BETIMOL) 0 5 % ophthalmic solution, Administer 1 drop to both eyes 2 (two) times a day , Disp: , Rfl:     timolol (TIMOPTIC) 0 5 % ophthalmic solution, INSTILL 1 DROP INTO BOTH EYES TWO TIMES A DAY, Disp: , Rfl: 0    citalopram (CeleXA) 10 mg tablet, Take 3 tablets (30 mg total) by mouth daily for 90 days, Disp: 270 tablet, Rfl: 0    gabapentin (NEURONTIN) 300 mg capsule, Take 2 capsules (600 mg total) by mouth 3 (three) times a day, Disp: 90 capsule, Rfl: 3    gabapentin (NEURONTIN) 600 MG tablet, , Disp: , Rfl: 0           Patient Active Problem List   Diagnosis    Chronic pain syndrome    Chronic low back pain    Intervertebral disc disorder with radiculopathy of lumbar region    Spondylosis of lumbar region without myelopathy or radiculopathy    Abnormal chromosomal analysis    Adult-onset obesity    Anxiety    Elevated PSA    Enlarged prostate on rectal examination    Gastroesophageal reflux disease without esophagitis    Lumbar disc herniation with radiculopathy    Myofascial pain syndrome    Chronic fatigue    Memory loss    Glaucoma    Neuritis or radiculitis due to rupture of lumbar intervertebral disc    Low back pain             Patient ID: Jerry Lopez is a 58 y  o  male      Objective:      Foot Exam     General  General Appearance: appears stated age and healthy Orientation: alert and oriented to person, place, and time   Affect: appropriate   Gait: antalgic         Right Foot/Ankle      Inspection and Palpation  Ecchymosis: none  Tenderness: bony tenderness and metatarsals   Swelling: metatarsals   Arch: pes planus  Hammertoes: fourth toe and fifth toe  Hallux valgus: no  Hallux limitus: yes  Skin Exam: callus;      Neurovascular  Dorsalis pedis: 2+  Posterior tibial: 2+  Saphenous nerve sensation: diminished  Tibial nerve sensation: diminished  Superficial peroneal nerve sensation: diminished  Deep peroneal nerve sensation: diminished  Sural nerve sensation: diminished  Achilles reflex: 1+     Muscle Strength  Ankle dorsiflexion: 4+     Tests  Lateral squeeze: positive      Comments  4th and 5th toes bilateral are in a abducto varus position  Pinch callus formation of 4th and 5th toes      Left Foot/Ankle       Inspection and Palpation  Ecchymosis: none  Tenderness: bony tenderness and metatarsals   Swelling: metatarsals   Arch: pes planus  Hammertoes: fourth toe and fifth toe  Hallux valgus: no  Hallux limitus: yes  Skin Exam: callus;      Neurovascular  Dorsalis pedis: 2+  Posterior tibial: 2+  Saphenous nerve sensation: diminished  Tibial nerve sensation: diminished  Superficial peroneal nerve sensation: diminished  Deep peroneal nerve sensation: diminished  Sural nerve sensation: diminished  Achilles reflex: 1+     Muscle Strength  Ankle dorsiflexion: 4+     Tests  Too many toes: positive      Comments  Patient is maximally pronated in stance and gait   There is transverse deviation of the DIPJ 4th toe bilateral   Physical Exam   Cardiovascular:   Pulses:       Dorsalis pedis pulses are 2+ on the right side, and 2+ on the left side         Posterior tibial pulses are 2+ on the right side, and 2+ on the left side  Musculoskeletal:        Right foot: There is bony tenderness         Left foot: There is bony tenderness     Feet:   Right Foot:   Skin Integrity: Positive for callus  Left Foot:   Skin Integrity: Positive for callus     Neurological:   Reflex Scores:       Achilles reflexes are 1+ on the right side and 1+ on the left side

## 2019-12-05 ENCOUNTER — TELEPHONE (OUTPATIENT)
Dept: FAMILY MEDICINE CLINIC | Facility: CLINIC | Age: 59
End: 2019-12-05

## 2019-12-05 DIAGNOSIS — S13.9XXD NECK SPRAIN, SUBSEQUENT ENCOUNTER: ICD-10-CM

## 2019-12-05 RX ORDER — METHOCARBAMOL 500 MG/1
500 TABLET, FILM COATED ORAL 3 TIMES DAILY PRN
Qty: 60 TABLET | Refills: 2 | Status: SHIPPED | OUTPATIENT
Start: 2019-12-05 | End: 2020-04-06 | Stop reason: SDUPTHER

## 2019-12-06 LAB
ALBUMIN SERPL-MCNC: 4.4 G/DL (ref 3.5–5.5)
ALBUMIN/GLOB SERPL: 2.2 {RATIO} (ref 1.2–2.2)
ALP SERPL-CCNC: 68 IU/L (ref 39–117)
ALT SERPL-CCNC: 17 IU/L (ref 0–44)
AST SERPL-CCNC: 15 IU/L (ref 0–40)
BILIRUB SERPL-MCNC: 0.5 MG/DL (ref 0–1.2)
BUN SERPL-MCNC: 12 MG/DL (ref 6–24)
BUN/CREAT SERPL: 13 (ref 9–20)
CALCIUM SERPL-MCNC: 9 MG/DL (ref 8.7–10.2)
CHLORIDE SERPL-SCNC: 103 MMOL/L (ref 96–106)
CHOLEST SERPL-MCNC: 171 MG/DL (ref 100–199)
CO2 SERPL-SCNC: 24 MMOL/L (ref 20–29)
CREAT SERPL-MCNC: 0.94 MG/DL (ref 0.76–1.27)
EST. AVERAGE GLUCOSE BLD GHB EST-MCNC: 108 MG/DL
GLOBULIN SER-MCNC: 2 G/DL (ref 1.5–4.5)
GLUCOSE SERPL-MCNC: 112 MG/DL (ref 65–99)
HBA1C MFR BLD: 5.4 % (ref 4.8–5.6)
HDLC SERPL-MCNC: 58 MG/DL
LDLC SERPL CALC-MCNC: 102 MG/DL (ref 0–99)
LDLC/HDLC SERPL: 1.8 RATIO (ref 0–3.6)
POTASSIUM SERPL-SCNC: 4.4 MMOL/L (ref 3.5–5.2)
PROT SERPL-MCNC: 6.4 G/DL (ref 6–8.5)
SL AMB EGFR AFRICAN AMERICAN: 102 ML/MIN/1.73
SL AMB EGFR NON AFRICAN AMERICAN: 88 ML/MIN/1.73
SL AMB T4, FREE (DIRECT): 1.39 NG/DL (ref 0.82–1.77)
SL AMB VLDL CHOLESTEROL CALC: 11 MG/DL (ref 5–40)
SODIUM SERPL-SCNC: 143 MMOL/L (ref 134–144)
TRIGL SERPL-MCNC: 56 MG/DL (ref 0–149)
TSH SERPL DL<=0.005 MIU/L-ACNC: 6.75 UIU/ML (ref 0.45–4.5)

## 2019-12-09 ENCOUNTER — TELEPHONE (OUTPATIENT)
Dept: FAMILY MEDICINE CLINIC | Facility: CLINIC | Age: 59
End: 2019-12-09

## 2019-12-10 NOTE — TELEPHONE ENCOUNTER
Patient has to go through medical records department to obtain copies of his records  I called the patient, but he didn't answer  Left VM letting him know, and gave him the phone number and email that he can use to contact medical records      Thank you,    Isabel Jiménez DO  12/10/19  8:32 AM

## 2019-12-15 DIAGNOSIS — G31.84 MILD COGNITIVE IMPAIRMENT: ICD-10-CM

## 2019-12-16 RX ORDER — RIVASTIGMINE TARTRATE 3 MG/1
CAPSULE ORAL
Qty: 60 CAPSULE | Refills: 0 | Status: SHIPPED | OUTPATIENT
Start: 2019-12-16 | End: 2020-01-13

## 2019-12-21 DIAGNOSIS — K21.9 GASTROESOPHAGEAL REFLUX DISEASE, ESOPHAGITIS PRESENCE NOT SPECIFIED: Primary | ICD-10-CM

## 2019-12-21 RX ORDER — FAMOTIDINE 20 MG/1
40 TABLET, FILM COATED ORAL
Qty: 60 TABLET | Refills: 5 | Status: SHIPPED | OUTPATIENT
Start: 2019-12-21 | End: 2020-04-16 | Stop reason: SDUPTHER

## 2019-12-23 DIAGNOSIS — R10.13 EPIGASTRIC BURNING SENSATION: ICD-10-CM

## 2019-12-23 RX ORDER — OMEPRAZOLE 40 MG/1
CAPSULE, DELAYED RELEASE ORAL
Qty: 30 CAPSULE | Refills: 2 | Status: SHIPPED | OUTPATIENT
Start: 2019-12-23 | End: 2020-04-06 | Stop reason: SDUPTHER

## 2019-12-23 NOTE — TELEPHONE ENCOUNTER
GI Physician: NELY MADRID      Refill Request for Medication: OMEPRAZOLE    Dose: 40MG    Quantity:     Pharmacy: [phone]

## 2019-12-26 ENCOUNTER — TELEPHONE (OUTPATIENT)
Dept: FAMILY MEDICINE CLINIC | Facility: CLINIC | Age: 59
End: 2019-12-26

## 2019-12-26 NOTE — TELEPHONE ENCOUNTER
SELENA WELLS - HOW WAS YOUR GOYO  REFERRAL DONE IN [facility], REF   # [id number], 6 VISITS, EXP  6/22/20

## 2019-12-26 NOTE — TELEPHONE ENCOUNTER
Jovanny was great  Thanks   so enjoying this grandma thing!!  Thanks for the referrals! Happy New Year! !!

## 2019-12-30 ENCOUNTER — TELEPHONE (OUTPATIENT)
Dept: FAMILY MEDICINE CLINIC | Facility: CLINIC | Age: 59
End: 2019-12-30

## 2019-12-30 DIAGNOSIS — J30.89 ENVIRONMENTAL AND SEASONAL ALLERGIES: Primary | ICD-10-CM

## 2019-12-30 RX ORDER — LORATADINE 10 MG/1
10 TABLET ORAL DAILY
Qty: 90 TABLET | Refills: 3 | Status: SHIPPED | OUTPATIENT
Start: 2019-12-30 | End: 2020-01-03 | Stop reason: ALTCHOICE

## 2019-12-30 NOTE — TELEPHONE ENCOUNTER
Pt said he never started the singulair and has been taking loratadine  Would like you to d/c singulair and order loratadine  To [facility]

## 2020-01-01 DIAGNOSIS — R53.83 FATIGUE, UNSPECIFIED TYPE: ICD-10-CM

## 2020-01-02 RX ORDER — LANOLIN ALCOHOL/MO/W.PET/CERES
CREAM (GRAM) TOPICAL
Qty: 90 TABLET | Refills: 1 | Status: SHIPPED | OUTPATIENT
Start: 2020-01-02 | End: 2020-03-30 | Stop reason: SDUPTHER

## 2020-01-03 ENCOUNTER — OFFICE VISIT (OUTPATIENT)
Dept: FAMILY MEDICINE CLINIC | Facility: CLINIC | Age: 60
End: 2020-01-03
Payer: COMMERCIAL

## 2020-01-03 VITALS
HEART RATE: 102 BPM | RESPIRATION RATE: 16 BRPM | BODY MASS INDEX: 30.13 KG/M2 | OXYGEN SATURATION: 95 % | SYSTOLIC BLOOD PRESSURE: 120 MMHG | WEIGHT: 210 LBS | DIASTOLIC BLOOD PRESSURE: 72 MMHG | TEMPERATURE: 98.4 F

## 2020-01-03 DIAGNOSIS — R09.82 POST-NASAL DRIP: Primary | ICD-10-CM

## 2020-01-03 DIAGNOSIS — J30.1 ALLERGIC RHINITIS DUE TO POLLEN, UNSPECIFIED SEASONALITY: ICD-10-CM

## 2020-01-03 DIAGNOSIS — Z23 ENCOUNTER FOR IMMUNIZATION: ICD-10-CM

## 2020-01-03 DIAGNOSIS — Z28.21 INFLUENZA VACCINATION DECLINED: ICD-10-CM

## 2020-01-03 DIAGNOSIS — Z28.21 TETANUS, DIPHTHERIA, AND ACELLULAR PERTUSSIS (TDAP) VACCINATION DECLINED: ICD-10-CM

## 2020-01-03 PROCEDURE — 90472 IMMUNIZATION ADMIN EACH ADD: CPT

## 2020-01-03 PROCEDURE — 90471 IMMUNIZATION ADMIN: CPT

## 2020-01-03 PROCEDURE — 90682 RIV4 VACC RECOMBINANT DNA IM: CPT

## 2020-01-03 PROCEDURE — 99213 OFFICE O/P EST LOW 20 MIN: CPT | Performed by: FAMILY MEDICINE

## 2020-01-03 PROCEDURE — 90715 TDAP VACCINE 7 YRS/> IM: CPT

## 2020-01-03 RX ORDER — BENZONATATE 200 MG/1
200 CAPSULE ORAL 3 TIMES DAILY PRN
Qty: 20 CAPSULE | Refills: 0 | Status: SHIPPED | OUTPATIENT
Start: 2020-01-03 | End: 2020-01-09 | Stop reason: SDUPTHER

## 2020-01-03 RX ORDER — FEXOFENADINE HCL 180 MG/1
180 TABLET ORAL DAILY
Qty: 30 TABLET | Refills: 3 | Status: SHIPPED | OUTPATIENT
Start: 2020-01-03 | End: 2020-03-30 | Stop reason: SDUPTHER

## 2020-01-03 RX ORDER — FLUTICASONE PROPIONATE 50 MCG
2 SPRAY, SUSPENSION (ML) NASAL DAILY
Qty: 16 G | Refills: 3 | Status: SHIPPED | OUTPATIENT
Start: 2020-01-03 | End: 2020-03-30 | Stop reason: SDUPTHER

## 2020-01-03 NOTE — PROGRESS NOTES
I have identified this patient to be Skinny Kessler,  -1960  Concerns: Patient c/o congested lungs, nose and throat and malaise for 2 weeks  Was around sick contact  + irritating cough  No fever, Chest pain, shortness of breath  No foreign travel or unusual exposures    Patient Active Problem List   Diagnosis    Chronic pain syndrome    Intervertebral disc disorder with radiculopathy of lumbar region    Spondylosis of lumbar region without myelopathy or radiculopathy    Abnormal chromosomal analysis    Adult-onset obesity    Anxiety    Elevated PSA    Enlarged prostate on rectal examination    Gastroesophageal reflux disease without esophagitis    Myofascial pain syndrome    Chronic fatigue    Episode of memory loss    Glaucoma    Low back pain    Acquired deformity of foot    Hammer toes of both feet    Congenital pes planus of right foot    Congenital pes planus of left foot    TGA (transient global amnesia)    Dermatophytosis    LLQ abdominal pain    Lumbar disc herniation with radiculopathy    Pain in both feet    Observed seizure-like activity (HCC)    Cervical radiculopathy         Exam:   Alert, No acute distress  /72   Pulse 102   Temp 98 4 °F (36 9 °C)   Resp 16   Wt 95 3 kg (210 lb)   SpO2 95%   BMI 30 13 kg/m²   Nose: congested  Throat: congested with mucus  Tympanic membranes: mild fluid  Neck supple, no concerning nodes  Lungs clear to auscultation  Cor: Regular rate   Skin: warm and dry    Impression:  Viral URI  Rest, fluids  Healthy plant based whole foods diet, avoid excess sugars & processed food  Natural support reviewed  Report high fever, severe malaise or any other concerning symptoms  Follow up as needed    Flonase, Allegra, Tessalon perles, keep trachea warm        Risks and benefits of therapeutic plan discussed, answered all patient questions and concerns and patient expressed understanding and agreement of therapeutic plan

## 2020-01-09 DIAGNOSIS — R09.82 POST-NASAL DRIP: ICD-10-CM

## 2020-01-09 RX ORDER — BENZONATATE 200 MG/1
200 CAPSULE ORAL 3 TIMES DAILY PRN
Qty: 20 CAPSULE | Refills: 0 | Status: SHIPPED | OUTPATIENT
Start: 2020-01-09 | End: 2021-11-30

## 2020-01-09 NOTE — TELEPHONE ENCOUNTER
Patient called stating his cough is not better and would like a refill on attached medication  Patient states he was seen on 1/3/2020 and has enough cough medicine until tomorrow  Please advise  Thank you

## 2020-01-13 DIAGNOSIS — G31.84 MILD COGNITIVE IMPAIRMENT: ICD-10-CM

## 2020-01-13 RX ORDER — RIVASTIGMINE TARTRATE 3 MG/1
CAPSULE ORAL
Qty: 60 CAPSULE | Refills: 0 | Status: SHIPPED | OUTPATIENT
Start: 2020-01-13 | End: 2020-02-06 | Stop reason: SDUPTHER

## 2020-02-03 ENCOUNTER — OFFICE VISIT (OUTPATIENT)
Dept: PODIATRY | Facility: CLINIC | Age: 60
End: 2020-02-03
Payer: COMMERCIAL

## 2020-02-03 VITALS — RESPIRATION RATE: 17 BRPM | BODY MASS INDEX: 30.06 KG/M2 | HEIGHT: 70 IN | WEIGHT: 210 LBS

## 2020-02-03 DIAGNOSIS — M79.671 PAIN IN BOTH FEET: ICD-10-CM

## 2020-02-03 DIAGNOSIS — M20.41 HAMMER TOES OF BOTH FEET: ICD-10-CM

## 2020-02-03 DIAGNOSIS — M20.42 HAMMER TOES OF BOTH FEET: ICD-10-CM

## 2020-02-03 DIAGNOSIS — M21.969 ACQUIRED DEFORMITY OF FOOT, UNSPECIFIED LATERALITY: Primary | ICD-10-CM

## 2020-02-03 DIAGNOSIS — M79.672 PAIN IN BOTH FEET: ICD-10-CM

## 2020-02-03 DIAGNOSIS — B35.9 DERMATOPHYTOSIS: ICD-10-CM

## 2020-02-03 PROCEDURE — 99213 OFFICE O/P EST LOW 20 MIN: CPT | Performed by: PODIATRIST

## 2020-02-03 NOTE — PROGRESS NOTES
Assessment/Plan:  Metatarsalgia   Hammertoe formation   Pes planus   Pain      Plan   Foot exam performed   All nails debrided   Patient will benefit from orthotics      Patient needs referral to Orthopedics for radiculopathy workup   Subjective:   Patient only has mild pain in his feet with ambulation   He has known radiculopathy           Past Medical History:   Diagnosis Date    Amnesia memory loss      Anxiety      Chronic fatigue      Disc degeneration, lumbar      Esophageal reflux      GERD (gastroesophageal reflux disease)      Glaucoma      Hiatal hernia      Increased pressure in the eye       both eyes; denies glaucoma     Internal hemorrhoids      Lumbar disc herniation with radiculopathy      Memory loss      Personal history of colonic polyps                     Past Surgical History:   Procedure Laterality Date    COLONOSCOPY N/A 4/25/2016     Procedure: COLONOSCOPY;  Surgeon: Chris Pritchett MD;  Location: [facility];  Service:     DENTAL SURGERY         ALL TEETH PULLED    EPIDURAL BLOCK INJECTION        EPIDURAL BLOCK INJECTION Right 12/28/2016     Procedure: BLOCK / INJECTION EPIDURAL STEROID TRANSFORAMINAL  L4-5;  Surgeon: Yoanna Costa MD;  Location: [facility];  Service:     EPIDURAL BLOCK INJECTION Right 11/16/2017     Procedure: BLOCK / INJECTION EPIDURAL STEROID TRANSFORAMINAL L4-5 RIGHT;  Surgeon: Yoanna Costa MD;  Location: [facility];  Service: Pain Management     EPIDURAL BLOCK INJECTION Left 5/10/2018     Procedure: Lt L4 L5 Tfesi (54491,21541);  Surgeon: Yoanna Csota MD;  Location: [facility];  Service: Pain Management     ESOPHAGOGASTRODUODENOSCOPY N/A 4/25/2016     Procedure: ESOPHAGOGASTRODUODENOSCOPY (EGD);  Surgeon: Chris Pritchett MD;  Location: [facility];  Service:     NOSE SURGERY                       Allergies   Allergen Reactions    Gabapentin Other (See Comments)       OUT OF BODY PSYCHOSIS, MEMORY LOSS, AMNESIA    Other Cough       HAYFEVER, SEASONAL, ALFALFA            Current Outpatient Prescriptions:     aspirin 81 MG tablet, Take 81 mg by mouth daily  , Disp: , Rfl:     omeprazole (PriLOSEC) 20 mg delayed release capsule, Take 1 capsule (20 mg total) by mouth daily, Disp: 30 capsule, Rfl: 3    ranitidine (ZANTAC) 300 MG tablet, Take 1 tablet (300 mg total) by mouth daily at bedtime, Disp: 30 tablet, Rfl: 3    tamsulosin (FLOMAX) 0 4 mg, Take 0 4 mg by mouth daily with dinner, Disp: , Rfl:     timolol (BETIMOL) 0 5 % ophthalmic solution, Administer 1 drop to both eyes 2 (two) times a day , Disp: , Rfl:     timolol (TIMOPTIC) 0 5 % ophthalmic solution, INSTILL 1 DROP INTO BOTH EYES TWO TIMES A DAY, Disp: , Rfl: 0    citalopram (CeleXA) 10 mg tablet, Take 3 tablets (30 mg total) by mouth daily for 90 days, Disp: 270 tablet, Rfl: 0    gabapentin (NEURONTIN) 300 mg capsule, Take 2 capsules (600 mg total) by mouth 3 (three) times a day, Disp: 90 capsule, Rfl: 3    gabapentin (NEURONTIN) 600 MG tablet, , Disp: , Rfl: 0           Patient Active Problem List   Diagnosis    Chronic pain syndrome    Chronic low back pain    Intervertebral disc disorder with radiculopathy of lumbar region    Spondylosis of lumbar region without myelopathy or radiculopathy    Abnormal chromosomal analysis    Adult-onset obesity    Anxiety    Elevated PSA    Enlarged prostate on rectal examination    Gastroesophageal reflux disease without esophagitis    Lumbar disc herniation with radiculopathy    Myofascial pain syndrome    Chronic fatigue    Memory loss    Glaucoma    Neuritis or radiculitis due to rupture of lumbar intervertebral disc    Low back pain             Patient ID: Jerry Lopez is a 58 y  o  male      Objective:      Foot Exam     General  General Appearance: appears stated age and healthy   Orientation: alert and oriented to person, place, and time   Affect: appropriate   Gait: antalgic         Right Foot/Ankle      Inspection and Palpation  Ecchymosis: none  Tenderness: bony tenderness and metatarsals   Swelling: metatarsals   Arch: pes planus  Hammertoes: fourth toe and fifth toe  Hallux valgus: no  Hallux limitus: yes  Skin Exam: callus;      Neurovascular  Dorsalis pedis: 2+  Posterior tibial: 2+  Saphenous nerve sensation: diminished  Tibial nerve sensation: diminished  Superficial peroneal nerve sensation: diminished  Deep peroneal nerve sensation: diminished  Sural nerve sensation: diminished  Achilles reflex: 1+     Muscle Strength  Ankle dorsiflexion: 4+     Tests  Lateral squeeze: positive      Comments  4th and 5th toes bilateral are in a abducto varus position  Pinch callus formation of 4th and 5th toes      Left Foot/Ankle       Inspection and Palpation  Ecchymosis: none  Tenderness: bony tenderness and metatarsals   Swelling: metatarsals   Arch: pes planus  Hammertoes: fourth toe and fifth toe  Hallux valgus: no  Hallux limitus: yes  Skin Exam: callus;      Neurovascular  Dorsalis pedis: 2+  Posterior tibial: 2+  Saphenous nerve sensation: diminished  Tibial nerve sensation: diminished  Superficial peroneal nerve sensation: diminished  Deep peroneal nerve sensation: diminished  Sural nerve sensation: diminished  Achilles reflex: 1+     Muscle Strength  Ankle dorsiflexion: 4+     Tests  Too many toes: positive      Comments  Patient is maximally pronated in stance and gait   There is transverse deviation of the DIPJ 4th toe bilateral   Physical Exam   Cardiovascular:   Pulses:       Dorsalis pedis pulses are 2+ on the right side, and 2+ on the left side         Posterior tibial pulses are 2+ on the right side, and 2+ on the left side  Musculoskeletal:        Right foot: There is bony tenderness         Left foot: There is bony tenderness  Feet:   Right Foot:   Skin Integrity: Positive for callus  Left Foot:   Skin Integrity: Positive for callus     Neurological:   Reflex Scores:       Achilles reflexes are 1+ on the right side and 1+ on the left side

## 2020-02-06 DIAGNOSIS — G31.84 MILD COGNITIVE IMPAIRMENT: ICD-10-CM

## 2020-02-06 RX ORDER — RIVASTIGMINE TARTRATE 3 MG/1
3 CAPSULE ORAL 2 TIMES DAILY
Qty: 60 CAPSULE | Refills: 2 | Status: SHIPPED | OUTPATIENT
Start: 2020-02-06 | End: 2020-03-03 | Stop reason: SDUPTHER

## 2020-03-03 ENCOUNTER — OFFICE VISIT (OUTPATIENT)
Dept: NEUROLOGY | Facility: CLINIC | Age: 60
End: 2020-03-03
Payer: COMMERCIAL

## 2020-03-03 VITALS
BODY MASS INDEX: 31.21 KG/M2 | DIASTOLIC BLOOD PRESSURE: 77 MMHG | SYSTOLIC BLOOD PRESSURE: 118 MMHG | WEIGHT: 218 LBS | HEIGHT: 70 IN | HEART RATE: 76 BPM

## 2020-03-03 DIAGNOSIS — F43.10 PTSD (POST-TRAUMATIC STRESS DISORDER): ICD-10-CM

## 2020-03-03 DIAGNOSIS — G31.84 MCI (MILD COGNITIVE IMPAIRMENT): Primary | ICD-10-CM

## 2020-03-03 DIAGNOSIS — G31.84 MILD COGNITIVE IMPAIRMENT: ICD-10-CM

## 2020-03-03 PROCEDURE — 1036F TOBACCO NON-USER: CPT | Performed by: PSYCHIATRY & NEUROLOGY

## 2020-03-03 PROCEDURE — 3008F BODY MASS INDEX DOCD: CPT | Performed by: PSYCHIATRY & NEUROLOGY

## 2020-03-03 PROCEDURE — 99214 OFFICE O/P EST MOD 30 MIN: CPT | Performed by: PSYCHIATRY & NEUROLOGY

## 2020-03-03 RX ORDER — RIVASTIGMINE TARTRATE 4.5 MG/1
4.5 CAPSULE ORAL 2 TIMES DAILY
Qty: 60 CAPSULE | Refills: 5 | Status: SHIPPED | OUTPATIENT
Start: 2020-03-03 | End: 2020-06-12 | Stop reason: SDUPTHER

## 2020-03-03 NOTE — PROGRESS NOTES
Return NeuroOutpatient Note        Sapna Greenberg  [id number]  61 y o   1960       Memory Loss (Follow up from Neuropsychologist which was scheduled 10/2019  Patient states he feels he is doing better )        History obtained from:  Patient     HPI/Subjective:    Mr Guy Castillo is a 60 yo M with PMH of neuropathy, memory disturbances presents as follow up  Per my previous history, on May 19th, 2018 patient was argumentative, forgetful and then went into shower  After 20 min, he was walking in hallway, couldn't understand simple conversation  He then asked what day is it  He then kept repeating same thing over and over  He thought president was [employer]  He was not oriented to month or day  Memory disturbance lasted from 10:30 until 5pm  He kept persevering over same questions  He had CT brain which was negative  Lab work was essentially normal  We had ordered MRI brain neuro quant which was normal  His EEG did however reveal 2 separate independent temporal sharps to clinically correlate  He had a lot of issues with medications earlier so we had deferred an AED  At last visit, we had ordered a repeat study  He finally had it completed last month and it was normal  Patient is placed on exelon for his memory  Patient was referred for neuro psych testing  He finally had this completed in Oct of 2019  Report stated reduced mild memory capacity and initial learning for non verbal memory  He overall scored at average level for his age  They wanted to place more emphasis on possible seizures  We have over and over asked him about any spells of disorientation, seizure like episodes and from his history we are not getting any such history  We do not have enough history to place him on long term AED  Patient was advised to go for psychotherapy  He's working on it       He had MRI brain neuroquant as well and it was normal in June 2018       All of reversible etiology labs ordered previously were all normal except mildly low vit D level for which he takes supplement of 2000units/day       He has h/o suffering from PTSD  His father and grandfather do have history of dementia       Past Medical History:   Diagnosis Date    Abdominal pain 10/24/2018    Amnesia memory loss     Anxiety     Chronic fatigue     Disc degeneration, lumbar     Esophageal reflux     GERD (gastroesophageal reflux disease)     Glaucoma     Hiatal hernia     Increased pressure in the eye     both eyes; denies glaucoma     Internal hemorrhoids     Lumbar disc herniation with radiculopathy     Memory loss     Pain in both feet 2018    Personal history of colonic polyps      Social History     Socioeconomic History    Marital status: Single     Spouse name: Not on file    Number of children: Not on file    Years of education: Not on file    Highest education level: Not on file   Occupational History    Not on file   Social Needs    Financial resource strain: Not on file    Food insecurity:     Worry: Not on file     Inability: Not on file    Transportation needs:     Medical: Not on file     Non-medical: Not on file   Tobacco Use    Smoking status: Former Smoker     Packs/day: 0 50     Years: 19 00     Pack years: 9 50     Types: Cigarettes     Last attempt to quit:      Years since quittin 1    Smokeless tobacco: Never Used   Substance and Sexual Activity    Alcohol use: No    Drug use: No    Sexual activity: Not on file   Lifestyle    Physical activity:     Days per week: Not on file     Minutes per session: Not on file    Stress: Not on file   Relationships    Social connections:     Talks on phone: Not on file     Gets together: Not on file     Attends Alevism service: Not on file     Active member of club or organization: Not on file     Attends meetings of clubs or organizations: Not on file     Relationship status: Not on file    Intimate partner violence:     Fear of current or ex partner: Not on file Emotionally abused: Not on file     Physically abused: Not on file     Forced sexual activity: Not on file   Other Topics Concern    Not on file   Social History Narrative    Not on file     Family History   Problem Relation Age of Onset    Stomach cancer Maternal Grandmother     Hypertension Mother     Diabetes Mother     Cataracts Father     Skin cancer Father     Stroke Father         2X    Aneurysm Father     No Known Problems Sister     No Known Problems Brother     Throat cancer Brother      Allergies   Allergen Reactions    Gabapentin Other (See Comments)     OUT OF BODY PSYCHOSIS, MEMORY LOSS, AMNESIA    Other Cough     HAYFEVER, SEASONAL, ALFALFA     Current Outpatient Medications on File Prior to Visit   Medication Sig Dispense Refill    aspirin 81 MG tablet Take 1 tablet (81 mg total) by mouth daily 90 tablet 3    Cholecalciferol (RA VITAMIN D-3) 25 MCG (1000 UT) tablet Take 2 tablets (2,000 Units total) by mouth daily 60 tablet 11    dicyclomine (BENTYL) 10 mg capsule Take 1 capsule (10 mg total) by mouth 4 (four) times a day (before meals and at bedtime) 120 capsule 11    famotidine (PEPCID) 20 mg tablet Take 2 tablets (40 mg total) by mouth daily at bedtime (Patient taking differently: Take 20 mg by mouth daily at bedtime ) 60 tablet 5    fexofenadine (ALLEGRA) 180 MG tablet Take 1 tablet (180 mg total) by mouth daily 30 tablet 3    fluticasone (FLONASE) 50 mcg/act nasal spray 2 sprays into each nostril daily (Patient taking differently: 2 sprays into each nostril daily as needed ) 16 g 3    guaiFENesin (MUCINEX) 600 mg 12 hr tablet Take 2 tablets (1,200 mg total) by mouth every 12 (twelve) hours as needed for cough or congestion (chest congestion & thick secretions) 60 tablet 1    ketoconazole (NIZORAL) 2 % cream Apply topically daily for 30 days 60 g 5    methocarbamol (ROBAXIN) 500 mg tablet Take 1 tablet (500 mg total) by mouth 3 (three) times a day as needed for muscle spasms (Patient taking differently: Take 500 mg by mouth daily at bedtime ) 60 tablet 2    omeprazole (PriLOSEC) 40 MG capsule take 1 capsule by mouth once daily 30 capsule 2    sucralfate (CARAFATE) 1 g/10 mL suspension Take 10 mL (1 g total) by mouth 4 (four) times a day (Patient taking differently: Take 1 g by mouth every 6 (six) hours ) 420 mL 0    tamsulosin (FLOMAX) 0 4 mg Take 0 8 mg by mouth daily at bedtime       timolol (TIMOPTIC) 0 5 % ophthalmic solution INSTILL 1 DROP INTO BOTH EYES TWO TIMES A DAY  0    vitamin B-12 (VITAMIN B-12) 1,000 mcg tablet take 1 tablet by mouth daily 90 tablet 1    [DISCONTINUED] rivastigmine (EXELON) 3 mg capsule Take 1 capsule (3 mg total) by mouth 2 (two) times a day 60 capsule 2    benzonatate (TESSALON) 200 MG capsule Take 1 capsule (200 mg total) by mouth 3 (three) times a day as needed for cough (Patient not taking: Reported on 3/3/2020) 20 capsule 0    [DISCONTINUED] RA ASPIRIN EC ADULT LOW ST 81 MG EC tablet take 1 tablet by mouth once daily 90 tablet 3     No current facility-administered medications on file prior to visit  Review of Systems   Refer to positive review of systems in HPI     Review of Systems    Constitutional- No fever  Eyes- No visual change  ENT- Hearing normal  CV- No chest pain  Resp- No Shortness of breath  GI- No diarrhea  - Bladder normal  MS- No Arthritis   Skin- No rash  Psych- No depression  Endo- No DM  Heme- No nodes    Vitals:    03/03/20 1452   BP: 118/77   BP Location: Right arm   Patient Position: Sitting   Cuff Size: Adult   Pulse: 76   Weight: 98 9 kg (218 lb)   Height: 5' 10" (1 778 m)       PHYSICAL EXAM:  Appearance: No Acute Distress  Ophthalmoscopic: Disc Flat, Normal fundus  Mental status:  Orientation: Awake, Alert, and Orientedx3  Memory: Registation 3/3 Recall 3/3  Attention: normal  Knowledge: good  Language: No aphasia  Speech: No dysarthria  Cranial Nerves:  2 No Visual Defect on Confrontation, Pupils round, equal, reactive to light  3,4,6 Extraocular Movements Intact, no nystagmus  5 Facial Sensation Intact  7 No facial asymmetry  8 Intact hearing  9,10 Palate symmetric, normal gag  11 Good shoulder shrug  12 Tongue Midline  Gait: Stable  Coordination: No ataxia with finger to nose testing, and heel to shin  Sensory: Intact, Symmetric to pinprick, light touch, vibration, and joint position  Muscle Tone: Normal              Muscle exam:  Arm Right Left Leg Right Left   Deltoid 5/5 5/5 Iliopsoas 5/5 5/5   Biceps 5/5 5/5 Quads 5/5 5/5   Triceps 5/5 5/5 Hamstrings 5/5 5/5   Wrist Extension 5/5 5/5 Ankle Dorsi Flexion 5/5 5/5   Wrist Flexion 5/5 5/5 Ankle Plantar Flexion 5/5 5/5   Interossei 5/5 5/5 Ankle Eversion 5/5 5/5   APB 5/5 5/5 Ankle Inversion 5/5 5/5       Reflexes   RJ BJ TJ KJ AJ Plantars Rankin's   Right 2+ 2+ 2+ 2+ 2+ Downgoing Not present   Left 2+ 2+ 2+ 2+ 2+ Downgoing Not present     Personal review of  Labs:                    Diagnoses and all orders for this visit:      1  MCI (mild cognitive impairment)     2  PTSD (post-traumatic stress disorder)     3  Mild cognitive impairment  rivastigmine (EXELON) 4 5 MG capsule         Patient has remained stable  Since we do not have sufficient clinical history to place him on long term AED, so would defer it  Will optimize exelon to 4 5mg bid  Recommend CBT and psychotherapy  Resume vit D and B12 supplements               Total time of encounter:  30 min  More than 50% of the time was used in counseling and/or coordination of care  Extent of counseling and/or coordination of care        MD Shahrzad [facility]  [address]  [address]  [phone]

## 2020-03-16 ENCOUNTER — OFFICE VISIT (OUTPATIENT)
Dept: FAMILY MEDICINE CLINIC | Facility: CLINIC | Age: 60
End: 2020-03-16
Payer: COMMERCIAL

## 2020-03-16 VITALS
OXYGEN SATURATION: 98 % | SYSTOLIC BLOOD PRESSURE: 100 MMHG | WEIGHT: 214 LBS | DIASTOLIC BLOOD PRESSURE: 64 MMHG | HEIGHT: 70 IN | HEART RATE: 78 BPM | BODY MASS INDEX: 30.64 KG/M2

## 2020-03-16 DIAGNOSIS — Z13.228 SCREENING FOR METABOLIC DISORDER: ICD-10-CM

## 2020-03-16 DIAGNOSIS — Z13.220 SCREENING FOR LIPID DISORDERS: ICD-10-CM

## 2020-03-16 DIAGNOSIS — R53.82 CHRONIC FATIGUE: ICD-10-CM

## 2020-03-16 DIAGNOSIS — Z00.00 ROUTINE CHECK-UP: Primary | ICD-10-CM

## 2020-03-16 PROCEDURE — 3008F BODY MASS INDEX DOCD: CPT | Performed by: FAMILY MEDICINE

## 2020-03-16 PROCEDURE — 99213 OFFICE O/P EST LOW 20 MIN: CPT | Performed by: FAMILY MEDICINE

## 2020-03-16 PROCEDURE — 1036F TOBACCO NON-USER: CPT | Performed by: FAMILY MEDICINE

## 2020-03-17 NOTE — PROGRESS NOTES
Subjective:     Patient ID: Melissa Banks is a 61 y o  male  HPI     Pleasant 61year old male presents for routine check up  He is a relatively poor historian  He has a history of myofascial and back pain with radiculopathy, GERD, and psychiatric issues including PTSD and anger management issues  The patient underwent blood work about 3 months ago and is also here to discuss labs  His TSH was elevated but T4 is normal  At the time he was complaining of chronic fatigue, but he feels better now  He denies any other symptoms associated with thyroid issues including but not limited to heat/cold intolerance, brittle hair/nails, weight changes, appetite changes  The patient also had a lipid panel and metabolic panel done, which were only significant for a LDL level of 102  The patient has no real complaints today  He frequently accompanies his partner to her visits and they often have combined visits  He mostly speaks tangentially about the current coronavirus situation and events at his [facility]  The following portions of the patient's history were reviewed and updated as appropriate: allergies, current medications, past family history, past medical history, past social history, past surgical history and problem list     Review of Systems  Pertinent items are noted in HPI  Objective:     Physical Exam   Constitutional: He is oriented to person, place, and time  He appears well-developed and well-nourished  No distress  HENT:   Head: Normocephalic and atraumatic  Eyes: Pupils are equal, round, and reactive to light  Neck: Neck supple  No tracheal deviation present  Cardiovascular: Normal rate, regular rhythm and normal heart sounds  Pulmonary/Chest: Effort normal and breath sounds normal  No respiratory distress  Abdominal: Soft  Bowel sounds are normal    Musculoskeletal: He exhibits no edema, tenderness or deformity  Neurological: He is alert and oriented to person, place, and time  Skin: Skin is warm and dry  He is not diaphoretic  Psychiatric: He has a normal mood and affect  His behavior is normal  His speech is tangential    Vitals reviewed  Assessment/Plan:      Diagnoses and all orders for this visit:    Routine check-up    Screening for lipid disorders  -     Lipid Panel with Direct LDL reflex; Future  -     Lipid Panel with Direct LDL reflex    Chronic fatigue  -     TSH, 3rd generation with Free T4 reflex; Future  -     TSH, 3rd generation with Free T4 reflex    Screening for metabolic disorder  -     Comprehensive metabolic panel; Future  -     Comprehensive metabolic panel        · Discussed general health maintenance issues such as exercise and nutrition counseling  · Labs ordered to be done in 6 months  Recheck lipids, TSH, CMP  · Advised patient to follow up with psychiatrist and/or counselor for his PTSD and anger issues  · Follow up in 6 months  BMI Counseling: Body mass index is 30 71 kg/m²  The BMI is above normal  Nutrition recommendations include encouraging healthy choices of fruits and vegetables, decreasing fast food intake, consuming healthier snacks and moderation in carbohydrate intake  Exercise recommendations include moderate physical activity 150 minutes/week and exercising 3-5 times per week  All patient questions & concerns were addressed  The patient agrees with his treatment plan  RTO delfina or curt Munoz DO  03/17/20  11:21 AM    Some portions of this record may have been generated with voice recognition software  There may be translation, syntax, or grammatical errors  Occasional wrong word or "sound-a-like" substitutions may have occurred due to the inherent limitations of the voice recognition software  Read the chart carefully and recognize, using context, where substations may have occurred   If you have any questions, please contact the dictating provider for clarification or correction, as needed

## 2020-03-30 ENCOUNTER — TELEPHONE (OUTPATIENT)
Dept: FAMILY MEDICINE CLINIC | Facility: CLINIC | Age: 60
End: 2020-03-30

## 2020-03-30 DIAGNOSIS — J30.1 ALLERGIC RHINITIS DUE TO POLLEN, UNSPECIFIED SEASONALITY: ICD-10-CM

## 2020-03-30 DIAGNOSIS — R10.32 LLQ ABDOMINAL PAIN: ICD-10-CM

## 2020-03-30 DIAGNOSIS — R53.83 FATIGUE, UNSPECIFIED TYPE: ICD-10-CM

## 2020-03-30 DIAGNOSIS — K21.9 GASTROESOPHAGEAL REFLUX DISEASE, ESOPHAGITIS PRESENCE NOT SPECIFIED: ICD-10-CM

## 2020-03-30 DIAGNOSIS — E55.9 VITAMIN D DEFICIENCY: ICD-10-CM

## 2020-03-30 DIAGNOSIS — R09.82 POST-NASAL DRIP: ICD-10-CM

## 2020-03-30 DIAGNOSIS — Z76.0 MEDICATION REFILL: ICD-10-CM

## 2020-03-30 RX ORDER — LANOLIN ALCOHOL/MO/W.PET/CERES
1000 CREAM (GRAM) TOPICAL DAILY
Qty: 90 TABLET | Refills: 3 | Status: SHIPPED | OUTPATIENT
Start: 2020-03-30 | End: 2020-06-12 | Stop reason: SDUPTHER

## 2020-03-30 RX ORDER — FLUTICASONE PROPIONATE 50 MCG
2 SPRAY, SUSPENSION (ML) NASAL DAILY
Qty: 16 G | Refills: 3 | Status: SHIPPED | OUTPATIENT
Start: 2020-03-30 | End: 2020-06-12 | Stop reason: SDUPTHER

## 2020-03-30 RX ORDER — FEXOFENADINE HCL 180 MG/1
180 TABLET ORAL DAILY
Qty: 90 TABLET | Refills: 3 | Status: SHIPPED | OUTPATIENT
Start: 2020-03-30 | End: 2020-06-12 | Stop reason: SDUPTHER

## 2020-03-30 NOTE — TELEPHONE ENCOUNTER
Provided refills on the medications that we prescribe  He has additional medications prescribed by GI and by neuro  If he needs refills on those, he should contact the appropriate offices      Thank you,    Fritz Hinkle DO  03/30/20  2:01 PM

## 2020-04-06 ENCOUNTER — TELEPHONE (OUTPATIENT)
Dept: GASTROENTEROLOGY | Facility: CLINIC | Age: 60
End: 2020-04-06

## 2020-04-06 DIAGNOSIS — R10.13 EPIGASTRIC BURNING SENSATION: ICD-10-CM

## 2020-04-06 DIAGNOSIS — S13.9XXD NECK SPRAIN, SUBSEQUENT ENCOUNTER: ICD-10-CM

## 2020-04-06 RX ORDER — METHOCARBAMOL 500 MG/1
500 TABLET, FILM COATED ORAL 3 TIMES DAILY PRN
Qty: 60 TABLET | Refills: 2 | Status: SHIPPED | OUTPATIENT
Start: 2020-04-06 | End: 2020-06-12 | Stop reason: SDUPTHER

## 2020-04-06 RX ORDER — OMEPRAZOLE 40 MG/1
40 CAPSULE, DELAYED RELEASE ORAL DAILY
Qty: 30 CAPSULE | Refills: 2 | Status: SHIPPED | OUTPATIENT
Start: 2020-04-06 | End: 2020-04-16 | Stop reason: SDUPTHER

## 2020-04-16 ENCOUNTER — TELEMEDICINE (OUTPATIENT)
Dept: GASTROENTEROLOGY | Facility: CLINIC | Age: 60
End: 2020-04-16
Payer: COMMERCIAL

## 2020-04-16 DIAGNOSIS — K21.9 GASTROESOPHAGEAL REFLUX DISEASE, ESOPHAGITIS PRESENCE NOT SPECIFIED: ICD-10-CM

## 2020-04-16 DIAGNOSIS — R10.32 LLQ ABDOMINAL PAIN: ICD-10-CM

## 2020-04-16 DIAGNOSIS — R10.13 EPIGASTRIC BURNING SENSATION: ICD-10-CM

## 2020-04-16 PROCEDURE — 99211 OFF/OP EST MAY X REQ PHY/QHP: CPT | Performed by: INTERNAL MEDICINE

## 2020-04-16 RX ORDER — OMEPRAZOLE 40 MG/1
40 CAPSULE, DELAYED RELEASE ORAL DAILY
Qty: 30 CAPSULE | Refills: 11 | Status: SHIPPED | OUTPATIENT
Start: 2020-04-16 | End: 2020-06-12 | Stop reason: SDUPTHER

## 2020-04-16 RX ORDER — FAMOTIDINE 20 MG/1
40 TABLET, FILM COATED ORAL
Qty: 30 TABLET | Refills: 11 | Status: SHIPPED | OUTPATIENT
Start: 2020-04-16 | End: 2020-06-12 | Stop reason: SDUPTHER

## 2020-04-16 RX ORDER — DICYCLOMINE HYDROCHLORIDE 10 MG/1
10 CAPSULE ORAL
Qty: 120 CAPSULE | Refills: 11 | Status: SHIPPED | OUTPATIENT
Start: 2020-04-16 | End: 2020-06-12 | Stop reason: SDUPTHER

## 2020-04-22 DIAGNOSIS — R09.89 CHEST CONGESTION: ICD-10-CM

## 2020-04-22 DIAGNOSIS — R05.3 CHRONIC COUGH: ICD-10-CM

## 2020-04-22 DIAGNOSIS — J30.89 ENVIRONMENTAL AND SEASONAL ALLERGIES: ICD-10-CM

## 2020-04-23 RX ORDER — MONTELUKAST SODIUM 10 MG/1
TABLET ORAL
Qty: 90 TABLET | Refills: 3 | Status: SHIPPED | OUTPATIENT
Start: 2020-04-23 | End: 2020-06-12 | Stop reason: SDUPTHER

## 2020-04-30 ENCOUNTER — TELEPHONE (OUTPATIENT)
Dept: NEUROLOGY | Facility: CLINIC | Age: 60
End: 2020-04-30

## 2020-06-02 LAB
ALBUMIN SERPL-MCNC: 4.2 G/DL (ref 3.8–4.9)
ALBUMIN/GLOB SERPL: 2.3 {RATIO} (ref 1.2–2.2)
ALP SERPL-CCNC: 73 IU/L (ref 39–117)
ALT SERPL-CCNC: 22 IU/L (ref 0–44)
AST SERPL-CCNC: 16 IU/L (ref 0–40)
BILIRUB SERPL-MCNC: 0.3 MG/DL (ref 0–1.2)
BUN SERPL-MCNC: 14 MG/DL (ref 6–24)
BUN/CREAT SERPL: 15 (ref 9–20)
CALCIUM SERPL-MCNC: 9.1 MG/DL (ref 8.7–10.2)
CHLORIDE SERPL-SCNC: 103 MMOL/L (ref 96–106)
CHOLEST SERPL-MCNC: 146 MG/DL (ref 100–199)
CO2 SERPL-SCNC: 25 MMOL/L (ref 20–29)
CREAT SERPL-MCNC: 0.91 MG/DL (ref 0.76–1.27)
GLOBULIN SER-MCNC: 1.8 G/DL (ref 1.5–4.5)
GLUCOSE SERPL-MCNC: 116 MG/DL (ref 65–99)
HDLC SERPL-MCNC: 43 MG/DL
LDLC SERPL CALC-MCNC: 82 MG/DL (ref 0–99)
LDLC/HDLC SERPL: 1.9 RATIO (ref 0–3.6)
POTASSIUM SERPL-SCNC: 4.6 MMOL/L (ref 3.5–5.2)
PROT SERPL-MCNC: 6 G/DL (ref 6–8.5)
SL AMB EGFR AFRICAN AMERICAN: 106 ML/MIN/1.73
SL AMB EGFR NON AFRICAN AMERICAN: 92 ML/MIN/1.73
SL AMB VLDL CHOLESTEROL CALC: 21 MG/DL (ref 5–40)
SODIUM SERPL-SCNC: 141 MMOL/L (ref 134–144)
TRIGL SERPL-MCNC: 103 MG/DL (ref 0–149)
TSH SERPL DL<=0.005 MIU/L-ACNC: 4.29 UIU/ML (ref 0.45–4.5)

## 2020-06-08 ENCOUNTER — TELEPHONE (OUTPATIENT)
Dept: FAMILY MEDICINE CLINIC | Facility: CLINIC | Age: 60
End: 2020-06-08

## 2020-06-08 DIAGNOSIS — R97.20 ELEVATED PSA: ICD-10-CM

## 2020-06-08 DIAGNOSIS — N40.0 ENLARGED PROSTATE ON RECTAL EXAMINATION: Primary | ICD-10-CM

## 2020-06-08 DIAGNOSIS — R53.83 FATIGUE, UNSPECIFIED TYPE: ICD-10-CM

## 2020-06-08 DIAGNOSIS — S13.9XXD NECK SPRAIN, SUBSEQUENT ENCOUNTER: ICD-10-CM

## 2020-06-08 DIAGNOSIS — R05.3 CHRONIC COUGH: ICD-10-CM

## 2020-06-08 DIAGNOSIS — Z76.0 MEDICATION REFILL: ICD-10-CM

## 2020-06-08 DIAGNOSIS — R09.89 CHEST CONGESTION: ICD-10-CM

## 2020-06-08 DIAGNOSIS — J30.1 ALLERGIC RHINITIS DUE TO POLLEN, UNSPECIFIED SEASONALITY: ICD-10-CM

## 2020-06-08 DIAGNOSIS — G31.84 MILD COGNITIVE IMPAIRMENT: ICD-10-CM

## 2020-06-08 DIAGNOSIS — J30.89 ENVIRONMENTAL AND SEASONAL ALLERGIES: ICD-10-CM

## 2020-06-08 DIAGNOSIS — R10.13 EPIGASTRIC BURNING SENSATION: ICD-10-CM

## 2020-06-08 DIAGNOSIS — R09.82 POST-NASAL DRIP: ICD-10-CM

## 2020-06-08 DIAGNOSIS — R10.32 LLQ ABDOMINAL PAIN: ICD-10-CM

## 2020-06-08 DIAGNOSIS — K21.9 GASTROESOPHAGEAL REFLUX DISEASE, ESOPHAGITIS PRESENCE NOT SPECIFIED: ICD-10-CM

## 2020-06-08 DIAGNOSIS — E55.9 VITAMIN D DEFICIENCY: ICD-10-CM

## 2020-06-12 RX ORDER — MONTELUKAST SODIUM 10 MG/1
10 TABLET ORAL
Qty: 90 TABLET | Refills: 3 | Status: SHIPPED | OUTPATIENT
Start: 2020-06-12 | End: 2020-08-17 | Stop reason: ALTCHOICE

## 2020-06-12 RX ORDER — FLUTICASONE PROPIONATE 50 MCG
2 SPRAY, SUSPENSION (ML) NASAL DAILY
Qty: 16 G | Refills: 3 | Status: SHIPPED | OUTPATIENT
Start: 2020-06-12 | End: 2020-08-17 | Stop reason: ALTCHOICE

## 2020-06-12 RX ORDER — DICYCLOMINE HYDROCHLORIDE 10 MG/1
10 CAPSULE ORAL
Qty: 120 CAPSULE | Refills: 11 | Status: SHIPPED | OUTPATIENT
Start: 2020-06-12 | End: 2021-08-13 | Stop reason: SDUPTHER

## 2020-06-12 RX ORDER — TIMOLOL MALEATE 5 MG/ML
1 SOLUTION/ DROPS OPHTHALMIC DAILY
Qty: 15 ML | Refills: 5 | Status: SHIPPED | OUTPATIENT
Start: 2020-06-12

## 2020-06-12 RX ORDER — OMEPRAZOLE 40 MG/1
40 CAPSULE, DELAYED RELEASE ORAL DAILY
Qty: 90 CAPSULE | Refills: 3 | Status: SHIPPED | OUTPATIENT
Start: 2020-06-12 | End: 2021-06-14 | Stop reason: SDUPTHER

## 2020-06-12 RX ORDER — LANOLIN ALCOHOL/MO/W.PET/CERES
1000 CREAM (GRAM) TOPICAL DAILY
Qty: 90 TABLET | Refills: 3 | Status: SHIPPED | OUTPATIENT
Start: 2020-06-12 | End: 2021-06-14 | Stop reason: SDUPTHER

## 2020-06-12 RX ORDER — METHOCARBAMOL 500 MG/1
500 TABLET, FILM COATED ORAL 3 TIMES DAILY PRN
Qty: 60 TABLET | Refills: 2 | Status: SHIPPED | OUTPATIENT
Start: 2020-06-12 | End: 2021-08-23 | Stop reason: SDUPTHER

## 2020-06-12 RX ORDER — FAMOTIDINE 40 MG/1
40 TABLET, FILM COATED ORAL
Qty: 90 TABLET | Refills: 3 | Status: SHIPPED | OUTPATIENT
Start: 2020-06-12 | End: 2021-08-23

## 2020-06-12 RX ORDER — ASPIRIN 81 MG/1
81 TABLET ORAL DAILY
Qty: 90 TABLET | Refills: 3 | Status: SHIPPED | OUTPATIENT
Start: 2020-06-12 | End: 2021-06-14 | Stop reason: SDUPTHER

## 2020-06-12 RX ORDER — TAMSULOSIN HYDROCHLORIDE 0.4 MG/1
0.8 CAPSULE ORAL
Qty: 90 CAPSULE | Refills: 3 | Status: SHIPPED | OUTPATIENT
Start: 2020-06-12 | End: 2021-01-22 | Stop reason: SDUPTHER

## 2020-06-12 RX ORDER — RIVASTIGMINE TARTRATE 4.5 MG/1
4.5 CAPSULE ORAL 2 TIMES DAILY
Qty: 90 CAPSULE | Refills: 3 | Status: SHIPPED | OUTPATIENT
Start: 2020-06-12 | End: 2021-01-18 | Stop reason: DRUGHIGH

## 2020-06-12 RX ORDER — FEXOFENADINE HCL 180 MG/1
180 TABLET ORAL DAILY
Qty: 90 TABLET | Refills: 3 | Status: SHIPPED | OUTPATIENT
Start: 2020-06-12 | End: 2021-07-29

## 2020-06-15 ENCOUNTER — OFFICE VISIT (OUTPATIENT)
Dept: PODIATRY | Facility: CLINIC | Age: 60
End: 2020-06-15
Payer: COMMERCIAL

## 2020-06-15 VITALS
BODY MASS INDEX: 30.64 KG/M2 | RESPIRATION RATE: 17 BRPM | DIASTOLIC BLOOD PRESSURE: 64 MMHG | HEIGHT: 70 IN | WEIGHT: 214 LBS | SYSTOLIC BLOOD PRESSURE: 100 MMHG | HEART RATE: 78 BPM

## 2020-06-15 DIAGNOSIS — M20.42 HAMMER TOES OF BOTH FEET: ICD-10-CM

## 2020-06-15 DIAGNOSIS — M79.671 PAIN IN BOTH FEET: ICD-10-CM

## 2020-06-15 DIAGNOSIS — M21.969 ACQUIRED DEFORMITY OF FOOT, UNSPECIFIED LATERALITY: Primary | ICD-10-CM

## 2020-06-15 DIAGNOSIS — B35.9 DERMATOPHYTOSIS: ICD-10-CM

## 2020-06-15 DIAGNOSIS — M79.672 PAIN IN BOTH FEET: ICD-10-CM

## 2020-06-15 DIAGNOSIS — M20.41 HAMMER TOES OF BOTH FEET: ICD-10-CM

## 2020-06-15 PROCEDURE — 99213 OFFICE O/P EST LOW 20 MIN: CPT | Performed by: PODIATRIST

## 2020-06-19 ENCOUNTER — TELEPHONE (OUTPATIENT)
Dept: GASTROENTEROLOGY | Facility: AMBULARY SURGERY CENTER | Age: 60
End: 2020-06-19

## 2020-06-19 NOTE — TELEPHONE ENCOUNTER
Patients GI provider:  Dr Lesli Cano    Number to return call: (  [phone]    Reason for call: Pt calling to ask if dr Lesli aCno is hgoing to continue refills or if his pcp will take over since he sees him every 6 months    Scheduled procedure/appointment date if applicable: Apt/procedure  na

## 2020-06-22 NOTE — TELEPHONE ENCOUNTER
Reviewed Prilosec, Pepcid & Bentyl Rx were ordered 6/12/20 with pharmacy location (by other provider)

## 2020-07-06 ENCOUNTER — OFFICE VISIT (OUTPATIENT)
Dept: FAMILY MEDICINE CLINIC | Facility: CLINIC | Age: 60
End: 2020-07-06
Payer: COMMERCIAL

## 2020-07-06 VITALS
OXYGEN SATURATION: 98 % | BODY MASS INDEX: 29.03 KG/M2 | DIASTOLIC BLOOD PRESSURE: 74 MMHG | HEART RATE: 98 BPM | HEIGHT: 71 IN | TEMPERATURE: 98.7 F | RESPIRATION RATE: 18 BRPM | SYSTOLIC BLOOD PRESSURE: 122 MMHG | WEIGHT: 207.4 LBS

## 2020-07-06 DIAGNOSIS — M25.571 ACUTE RIGHT ANKLE PAIN: Primary | ICD-10-CM

## 2020-07-06 PROCEDURE — 99213 OFFICE O/P EST LOW 20 MIN: CPT | Performed by: FAMILY MEDICINE

## 2020-07-06 PROCEDURE — 3008F BODY MASS INDEX DOCD: CPT | Performed by: FAMILY MEDICINE

## 2020-07-06 PROCEDURE — 1036F TOBACCO NON-USER: CPT | Performed by: FAMILY MEDICINE

## 2020-07-06 NOTE — PROGRESS NOTES
Carlie Rivas is a 61 y o  male who presents with right ankle pain  Onset of the symptoms was several weeks ago  Inciting event: patient reports food from freezer fell on his foot (mainly 2nd toe)  Current symptoms include: ability to bear weight, but with some pain, pain with eversion of the foot, swelling and worsening symptoms after a period of activity  Aggravating factors: direct pressure, going up and down stairs and standing  Symptoms have waxed and waned  Patient has had no prior ankle problems  Evaluation to date: none  Treatment to date: ice, rest and tylenol  The following portions of the patient's history were reviewed and updated as appropriate: allergies, current medications, past family history, past medical history, past social history, past surgical history and problem list     Objective   /74 (BP Location: Left arm, Patient Position: Sitting, Cuff Size: Large)   Pulse 98   Temp 98 7 °F (37 1 °C) (Tympanic)   Resp 18   Ht 5' 11" (1 803 m)   Wt 94 1 kg (207 lb 6 4 oz)   SpO2 98%   BMI 28 93 kg/m²   Right ankle:   no effusion, full range of motion, no tenderness  no bruising noted  Positive tenderness above the medial malleolus    Left ankle:   normal  no effusion, full range of motion, no tenderness  Assessment/Plan   Ankle sprain    Natural history and expected course discussed  Questions answered  Rest, ice, compression, elevation (RICE) therapy  OTC analgesics as needed  PT referral   Will follow up ankle x ray results with patient   If improvement in symptoms following PT we will schedule his annual physical  If no improvement in symptoms with supprotive measures and PT may consider orthopedic referral

## 2020-07-13 ENCOUNTER — HOSPITAL ENCOUNTER (OUTPATIENT)
Dept: RADIOLOGY | Facility: HOSPITAL | Age: 60
Discharge: HOME | End: 2020-07-13
Payer: COMMERCIAL

## 2020-07-13 ENCOUNTER — TRANSCRIBE ORDERS (OUTPATIENT)
Dept: ADMINISTRATIVE | Facility: HOSPITAL | Age: 60
End: 2020-07-13

## 2020-07-13 DIAGNOSIS — M25.571 ACUTE RIGHT ANKLE PAIN: ICD-10-CM

## 2020-07-13 PROCEDURE — 73610 X-RAY EXAM OF ANKLE: CPT

## 2020-08-06 ENCOUNTER — EVALUATION (OUTPATIENT)
Dept: PHYSICAL THERAPY | Facility: CLINIC | Age: 60
End: 2020-08-06
Payer: COMMERCIAL

## 2020-08-06 DIAGNOSIS — M25.571 ACUTE RIGHT ANKLE PAIN: ICD-10-CM

## 2020-08-06 PROCEDURE — 97161 PT EVAL LOW COMPLEX 20 MIN: CPT

## 2020-08-06 NOTE — PROGRESS NOTES
PT Evaluation     Today's date: 2020  Patient name: Zelda Sierra  : 1960  MRN: [id number]  Referring provider: Shereen Castaneda MD  Dx:   Encounter Diagnosis     ICD-10-CM    1  Acute right ankle pain  M25 [id number] Ambulatory referral to Physical Therapy   (20) Pt has not returned to PT since initial evaluation on 20 despite multiple attempts to reschedule cancelled appointments  Pt will be discharged from outpatient PT due to non-compliance  Assessment  Assessment details: Zelda Sierra is a 61 y o  male with a history of low back pain and previously diagnosed pes planus who presents with signs and symptoms consistent with the referring diagnosis of acute right ankle pain, which has persisted and become chronic over the past 2 months  Patient presents with the following impairments: pain, decreased strength, decreased ROM, antalgic gait and referred symptoms toward the right knee  Due to these impairments, patient has difficulty performing the following: ADL's, engaging in social activities, ambulation, stair negotiation, lifting/carrying, transfers, self-care activities, prolonged sitting, prolonged standing, squatting, running, putting on/taking off shoes/socks, household chores, yard work, and caring for client at home  Patient has been educated in home exercise program, differential diagnoses, and plan of care  Patient would benefit from skilled physical therapy services to address the above functional limitations and progress towards prior level of function and independence with home exercise program   Impairments: abnormal gait, abnormal muscle firing, abnormal or restricted ROM, activity intolerance, impaired physical strength, lacks appropriate home exercise program, pain with function and poor body mechanics  Understanding of Dx/Px/POC: good   Prognosis: good    Goals  Short Term Goals (3 weeks)  1  Patient will be independent with initial HEP    2  Patient will demonstrate an increase in right ankle dorsiflexion AROM by 5 degrees  3  Patient will demonstrate an increase in right ankle strength of 1/2 grade on MMT  Long Term Goals (6 weeks)  1  Patient will demonstrate an increase in right ankle AROM to WNL in order to promote self-care activities pain-free  2  Patient will demonstrate an increase in right ankle strength to at least 4/5 grade on MMT  3  Patient will be able to perform a full, functional squat with proper mechanics  4  Patient will be able to ascend/descend 3 stairs reciprocally without pain  5  Patient will present without gait deviation while ambulating community distances on even/uneven surfaces  Plan  Patient would benefit from: skilled physical therapy  Planned modality interventions: cryotherapy and thermotherapy: hydrocollator packs  Planned therapy interventions: flexibility, functional ROM exercises, graded exercise, home exercise program, joint mobilization, manual therapy, neuromuscular re-education, patient education, strengthening, stretching, therapeutic exercise, therapeutic activities, Amado taping, balance/weight bearing training, gait training, abdominal trunk stabilization, activity modification, balance, body mechanics training, graded activity, self care, postural training, IADL retraining, ADL training, breathing training, behavior modification, muscle pump exercises, therapeutic training, transfer training, ADL retraining and graded motor  Frequency: 2x week  Duration in weeks: 6  Plan of Care beginning date: 8/6/2020  Plan of Care expiration date: 10/6/2020  Treatment plan discussed with: patient        Subjective Evaluation    History of Present Illness  Mechanism of injury: Pt reports that he has had right ankle pain since mid May/early June  Pt states that around this time he walked to [employer] from the grocery store, and then home for a total of about 4 miles in a 1-2 period  Pt states that he saw Dr Gianna Salas about 4-6 weeks later  X-ray was (-) for fracture or other abnormality  Pt states that he tried to manage this at home with exercise and biofreeze, however the right ankle never completely healed  Pt states that right ankle pain is located inside (medial) and limits him from walking for more than 20 minutes, standing more than 30 minutes, and during specific foot placement where ankle is placed below him while sitting  Pt states that 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) and is limited in this and in lifting objects/shopping, which he does for his [employer]ism  Pt is unable to recall a specific episode that may have caused ankle pain, however states it has been this way since that 4 mile walk in mid May  Pain  Current pain rating: 3  At best pain rating: 3  At worst pain ratin  Location: Right ankle   Quality: pulling and needle-like  Aggravating factors: sitting, standing, walking and lifting  Progression: no change    Social Support  Steps to enter house: yes  3  Stairs in house: no   Lives in: [facility]  Lives with: Is a home health provider  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)  Hand dominance: ambidextrous (Writes right handed, everything else left)  Exercise history: Rarely  Life stress: Medium/high caretaker      Diagnostic Tests  X-ray: normal  Treatments  No previous or current treatments  Patient Goals  Patient goals for therapy: decreased pain, increased motion, increased strength and independence with ADLs/IADLs  Patient goal: Decrease pain, be able to work at [employer], lift with less pain        Objective     Static Posture     Shoulders  Rounded  Ankle/Foot   Ankle/Foot (Right): Pes planus  Tenderness     Right Ankle/Foot   Tenderness in the medial malleolus and posterior tibial tendon       Active Range of Motion   Left Ankle/Foot   Dorsiflexion (kf): 15 degrees   Plantar flexion: 50 degrees   Inversion: 15 degrees   Eversion: 20 degrees     Right Ankle/Foot   Dorsiflexion (kf): 0 degrees   Plantar flexion: 50 degrees   Inversion: 15 degrees with pain  Eversion: 20 degrees with pain    Strength/Myotome Testing     Left Ankle/Foot   Dorsiflexion: 5  Plantar flexion: 5  Inversion: 5  Eversion: 5    Right Ankle/Foot   Dorsiflexion: 3+  Plantar flexion: 4-  Inversion: 3  Eversion: 3+    Tests     Right Ankle/Foot   Negative for anterior drawer and Gomez  Ambulation     Observational Gait   Gait: antalgic   Decreased walking speed and stride length  Additional Observational Gait Details  Slight antalgic gait with avoidance of FWB RLE    Functional Assessment        Comments  Excessive forward trunk flexion, hip hinging, right heel lift off with squatting  Lumbar AROM     Flexion  Fingertips to mid shins   Extension 60%   L lateral flexion Lateral joint line   R lateral flexion 2 in proximal to lateral joint line   L rotation 80%   R rotation  80%     Repeated motions Repetitions performed Centralized/  peripheralized?  Effect on ROM   Flexion  10 (seated w/ ball) Neither Increased   Extension 10 (standing) Neither No change   Comments: notes increased stretching of back muscles with both repeated motions     Precautions: PTSD, hx of cervical/lumbar issues, GERD        EVAL 2 3 4 5   Manuals                                Neuro Re-Ed        SLS        Lateral walks        SLR flex, abd, ext                                        Ther Ex        Gastroc stretch Instructed       Soleus stretch Instructed       Ankle ABC's Instructed       Ankle 4-way        Sciatic nerve glide Instructed in seated                               Ther Activity        Pt education HEP, POC                               Gait Training                        Modalities

## 2020-08-13 ENCOUNTER — APPOINTMENT (OUTPATIENT)
Dept: PHYSICAL THERAPY | Facility: CLINIC | Age: 60
End: 2020-08-13
Payer: COMMERCIAL

## 2020-08-14 ENCOUNTER — APPOINTMENT (OUTPATIENT)
Dept: PHYSICAL THERAPY | Facility: CLINIC | Age: 60
End: 2020-08-14
Payer: COMMERCIAL

## 2020-08-17 ENCOUNTER — OFFICE VISIT (OUTPATIENT)
Dept: FAMILY MEDICINE CLINIC | Facility: CLINIC | Age: 60
End: 2020-08-17
Payer: COMMERCIAL

## 2020-08-17 VITALS
HEART RATE: 91 BPM | SYSTOLIC BLOOD PRESSURE: 124 MMHG | DIASTOLIC BLOOD PRESSURE: 80 MMHG | BODY MASS INDEX: 30.06 KG/M2 | TEMPERATURE: 98 F | OXYGEN SATURATION: 97 % | WEIGHT: 210 LBS | RESPIRATION RATE: 18 BRPM | HEIGHT: 70 IN

## 2020-08-17 DIAGNOSIS — Z00.00 ANNUAL PHYSICAL EXAM: Primary | ICD-10-CM

## 2020-08-17 DIAGNOSIS — Z11.4 ENCOUNTER FOR SCREENING FOR HIV: ICD-10-CM

## 2020-08-17 DIAGNOSIS — Z11.59 ENCOUNTER FOR HEPATITIS C SCREENING TEST FOR LOW RISK PATIENT: ICD-10-CM

## 2020-08-17 DIAGNOSIS — M25.551 RIGHT HIP PAIN: ICD-10-CM

## 2020-08-17 PROCEDURE — 3008F BODY MASS INDEX DOCD: CPT | Performed by: FAMILY MEDICINE

## 2020-08-17 PROCEDURE — 1036F TOBACCO NON-USER: CPT | Performed by: FAMILY MEDICINE

## 2020-08-17 PROCEDURE — 99213 OFFICE O/P EST LOW 20 MIN: CPT | Performed by: FAMILY MEDICINE

## 2020-08-17 RX ORDER — FAMOTIDINE 20 MG/1
40 TABLET, FILM COATED ORAL
COMMUNITY
Start: 2020-08-01 | End: 2020-08-17 | Stop reason: DRUGHIGH
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
## 2020-08-18 ENCOUNTER — APPOINTMENT (OUTPATIENT)
Dept: PHYSICAL THERAPY | Facility: CLINIC | Age: 60
End: 2020-08-18
Payer: COMMERCIAL

## 2020-08-20 ENCOUNTER — APPOINTMENT (OUTPATIENT)
Dept: PHYSICAL THERAPY | Facility: CLINIC | Age: 60
End: 2020-08-20
Payer: COMMERCIAL

## 2020-08-24 ENCOUNTER — OFFICE VISIT (OUTPATIENT)
Dept: PODIATRY | Facility: CLINIC | Age: 60
End: 2020-08-24
Payer: COMMERCIAL

## 2020-08-24 VITALS
WEIGHT: 210 LBS | HEIGHT: 70 IN | RESPIRATION RATE: 17 BRPM | SYSTOLIC BLOOD PRESSURE: 124 MMHG | DIASTOLIC BLOOD PRESSURE: 80 MMHG | BODY MASS INDEX: 30.06 KG/M2 | HEART RATE: 91 BPM

## 2020-08-24 DIAGNOSIS — M79.672 PAIN IN BOTH FEET: ICD-10-CM

## 2020-08-24 DIAGNOSIS — B35.9 DERMATOPHYTOSIS: ICD-10-CM

## 2020-08-24 DIAGNOSIS — M21.969 ACQUIRED DEFORMITY OF FOOT, UNSPECIFIED LATERALITY: Primary | ICD-10-CM

## 2020-08-24 DIAGNOSIS — M79.671 PAIN IN BOTH FEET: ICD-10-CM

## 2020-08-24 DIAGNOSIS — M20.41 HAMMER TOES OF BOTH FEET: ICD-10-CM

## 2020-08-24 DIAGNOSIS — M20.42 HAMMER TOES OF BOTH FEET: ICD-10-CM

## 2020-08-24 PROCEDURE — 99213 OFFICE O/P EST LOW 20 MIN: CPT | Performed by: PODIATRIST

## 2020-08-24 NOTE — PROGRESS NOTES
Assessment/Plan:  Metatarsalgia   Hammertoe formation   Pes planus   Pain      Plan       Foot exam performed   All nails debrided   Patient will benefit from orthotics     He will wear these daily    Patient will follow up with Orthopedics for back pain          Subjective:   Patient only has mild pain in his feet with ambulation   He has known radiculopathy                 Past Medical History:   Diagnosis Date    Amnesia memory loss      Anxiety      Chronic fatigue      Disc degeneration, lumbar      Esophageal reflux      GERD (gastroesophageal reflux disease)      Glaucoma      Hiatal hernia      Increased pressure in the eye       both eyes; denies glaucoma     Internal hemorrhoids      Lumbar disc herniation with radiculopathy      Memory loss      Personal history of colonic polyps                     Past Surgical History:   Procedure Laterality Date    COLONOSCOPY N/A 4/25/2016     Procedure: COLONOSCOPY;  Surgeon: Caroline Plummer MD;  Location: [place] [address] [facility];  Service:     DENTAL SURGERY         ALL TEETH PULLED    EPIDURAL BLOCK INJECTION        EPIDURAL BLOCK INJECTION Right 12/28/2016     Procedure: BLOCK / INJECTION EPIDURAL STEROID TRANSFORAMINAL  L4-5;  Surgeon: John Bocanegra MD;  Location: [place] [address] [facility] OR;  Service:     EPIDURAL BLOCK INJECTION Right 11/16/2017     Procedure: BLOCK / INJECTION EPIDURAL STEROID TRANSFORAMINAL L4-5 RIGHT;  Surgeon: John Bocanegra MD;  Location: [place] [address] [facility] OR;  Service: Pain Management     EPIDURAL BLOCK INJECTION Left 5/10/2018     Procedure: Lt L4 L5 Tfesi (97919,14086);  Surgeon: John Bocanegra MD;  Location: [place] [address] [facility] OR;  Service: Pain Management     ESOPHAGOGASTRODUODENOSCOPY N/A 4/25/2016     Procedure: ESOPHAGOGASTRODUODENOSCOPY (EGD);  Surgeon: Caroline Plummer MD;  Location: [place] [address] [facility];  Service:     NOSE SURGERY                       Allergies   Allergen Reactions    Gabapentin Other (See Comments)       OUT OF BODY PSYCHOSIS, MEMORY LOSS, AMNESIA    Other Cough       HAYFEVER, SEASONAL, ALFALFA            Current Outpatient Prescriptions:     aspirin 81 MG tablet, Take 81 mg by mouth daily  , Disp: , Rfl:     omeprazole (PriLOSEC) 20 mg delayed release capsule, Take 1 capsule (20 mg total) by mouth daily, Disp: 30 capsule, Rfl: 3    ranitidine (ZANTAC) 300 MG tablet, Take 1 tablet (300 mg total) by mouth daily at bedtime, Disp: 30 tablet, Rfl: 3    tamsulosin (FLOMAX) 0 4 mg, Take 0 4 mg by mouth daily with dinner, Disp: , Rfl:     timolol (BETIMOL) 0 5 % ophthalmic solution, Administer 1 drop to both eyes 2 (two) times a day , Disp: , Rfl:     timolol (TIMOPTIC) 0 5 % ophthalmic solution, INSTILL 1 DROP INTO BOTH EYES TWO TIMES A DAY, Disp: , Rfl: 0    citalopram (CeleXA) 10 mg tablet, Take 3 tablets (30 mg total) by mouth daily for 90 days, Disp: 270 tablet, Rfl: 0    gabapentin (NEURONTIN) 300 mg capsule, Take 2 capsules (600 mg total) by mouth 3 (three) times a day, Disp: 90 capsule, Rfl: 3    gabapentin (NEURONTIN) 600 MG tablet, , Disp: , Rfl: 0           Patient Active Problem List   Diagnosis    Chronic pain syndrome    Chronic low back pain    Intervertebral disc disorder with radiculopathy of lumbar region    Spondylosis of lumbar region without myelopathy or radiculopathy    Abnormal chromosomal analysis    Adult-onset obesity    Anxiety    Elevated PSA    Enlarged prostate on rectal examination    Gastroesophageal reflux disease without esophagitis    Lumbar disc herniation with radiculopathy    Myofascial pain syndrome    Chronic fatigue    Memory loss    Glaucoma    Neuritis or radiculitis due to rupture of lumbar intervertebral disc    Low back pain             Patient ID: Jerry Lopez is a 58 y  o  male      Objective:      Foot Exam     General  General Appearance: appears stated age and healthy   Orientation: alert and oriented to person, place, and time   Affect: appropriate   Gait: antalgic         Right Foot/Ankle      Inspection and Palpation  Ecchymosis: none  Tenderness: bony tenderness and metatarsals   Swelling: metatarsals   Arch: pes planus  Hammertoes: fourth toe and fifth toe  Hallux valgus: no  Hallux limitus: yes  Skin Exam: callus;      Neurovascular  Dorsalis pedis: 2+  Posterior tibial: 2+  Saphenous nerve sensation: diminished  Tibial nerve sensation: diminished  Superficial peroneal nerve sensation: diminished  Deep peroneal nerve sensation: diminished  Sural nerve sensation: diminished  Achilles reflex: 1+     Muscle Strength  Ankle dorsiflexion: 4+     Tests  Lateral squeeze: positive      Comments  4th and 5th toes bilateral are in a abducto varus position  Pinch callus formation of 4th and 5th toes      Left Foot/Ankle       Inspection and Palpation  Ecchymosis: none  Tenderness: bony tenderness and metatarsals   Swelling: metatarsals   Arch: pes planus  Hammertoes: fourth toe and fifth toe  Hallux valgus: no  Hallux limitus: yes  Skin Exam: callus;      Neurovascular  Dorsalis pedis: 2+  Posterior tibial: 2+  Saphenous nerve sensation: diminished  Tibial nerve sensation: diminished  Superficial peroneal nerve sensation: diminished  Deep peroneal nerve sensation: diminished  Sural nerve sensation: diminished  Achilles reflex: 1+     Muscle Strength  Ankle dorsiflexion: 4+     Tests  Too many toes: positive      Comments  Patient is maximally pronated in stance and gait   There is transverse deviation of the DIPJ 4th toe bilateral   Physical Exam   Cardiovascular:   Pulses:       Dorsalis pedis pulses are 2+ on the right side, and 2+ on the left side         Posterior tibial pulses are 2+ on the right side, and 2+ on the left side  Musculoskeletal:        Right foot: There is bony tenderness         Left foot: There is bony tenderness  Feet:   Right Foot:   Skin Integrity: Positive for callus  Left Foot:   Skin Integrity: Positive for callus     Neurological:   Reflex Scores:       Achilles reflexes are 1+ on the right side and 1+ on the left side

## 2020-08-25 ENCOUNTER — APPOINTMENT (OUTPATIENT)
Dept: PHYSICAL THERAPY | Facility: CLINIC | Age: 60
End: 2020-08-25
Payer: COMMERCIAL

## 2020-08-27 ENCOUNTER — APPOINTMENT (OUTPATIENT)
Dept: PHYSICAL THERAPY | Facility: CLINIC | Age: 60
End: 2020-08-27
Payer: COMMERCIAL

## 2020-08-31 ENCOUNTER — TELEPHONE (OUTPATIENT)
Dept: NEUROLOGY | Facility: CLINIC | Age: 60
End: 2020-08-31

## 2020-09-02 ENCOUNTER — TELEPHONE (OUTPATIENT)
Dept: PHYSICAL THERAPY | Facility: CLINIC | Age: 60
End: 2020-09-02

## 2020-09-02 NOTE — TELEPHONE ENCOUNTER
Called and left message to patient due to canceled PT appointments this week and no future appointments scheduled

## 2020-10-11 DIAGNOSIS — K21.9 GASTROESOPHAGEAL REFLUX DISEASE: ICD-10-CM

## 2020-10-11 RX ORDER — FAMOTIDINE 20 MG/1
TABLET, FILM COATED ORAL
Qty: 30 TABLET | Refills: 11 | Status: SHIPPED | OUTPATIENT
Start: 2020-10-11 | End: 2021-08-23

## 2020-10-20 DIAGNOSIS — G31.84 MCI (MILD COGNITIVE IMPAIRMENT): Primary | ICD-10-CM

## 2020-10-20 RX ORDER — RIVASTIGMINE TARTRATE 3 MG/1
3 CAPSULE ORAL 2 TIMES DAILY
Qty: 60 CAPSULE | Refills: 1 | OUTPATIENT
Start: 2020-10-20

## 2020-10-20 RX ORDER — RIVASTIGMINE TARTRATE 3 MG/1
3 CAPSULE ORAL 2 TIMES DAILY
COMMUNITY
Start: 2020-08-31 | End: 2020-10-22 | Stop reason: SDUPTHER

## 2020-10-22 DIAGNOSIS — G31.84 MCI (MILD COGNITIVE IMPAIRMENT): Primary | ICD-10-CM

## 2020-10-22 RX ORDER — RIVASTIGMINE TARTRATE 3 MG/1
3 CAPSULE ORAL 2 TIMES DAILY
Qty: 60 CAPSULE | Refills: 2 | Status: SHIPPED | OUTPATIENT
Start: 2020-10-22 | End: 2021-01-18

## 2020-10-26 ENCOUNTER — OFFICE VISIT (OUTPATIENT)
Dept: PODIATRY | Facility: CLINIC | Age: 60
End: 2020-10-26
Payer: COMMERCIAL

## 2020-10-26 VITALS
DIASTOLIC BLOOD PRESSURE: 80 MMHG | RESPIRATION RATE: 17 BRPM | SYSTOLIC BLOOD PRESSURE: 124 MMHG | HEIGHT: 70 IN | WEIGHT: 210 LBS | BODY MASS INDEX: 30.06 KG/M2

## 2020-10-26 DIAGNOSIS — M21.969 ACQUIRED DEFORMITY OF FOOT, UNSPECIFIED LATERALITY: Primary | ICD-10-CM

## 2020-10-26 DIAGNOSIS — B35.9 DERMATOPHYTOSIS: ICD-10-CM

## 2020-10-26 DIAGNOSIS — M20.42 HAMMER TOES OF BOTH FEET: ICD-10-CM

## 2020-10-26 DIAGNOSIS — L03.039 PARONYCHIA OF TOENAIL, UNSPECIFIED LATERALITY: ICD-10-CM

## 2020-10-26 DIAGNOSIS — M79.672 PAIN IN BOTH FEET: ICD-10-CM

## 2020-10-26 DIAGNOSIS — M79.671 PAIN IN BOTH FEET: ICD-10-CM

## 2020-10-26 DIAGNOSIS — M20.41 HAMMER TOES OF BOTH FEET: ICD-10-CM

## 2020-10-26 PROCEDURE — 99213 OFFICE O/P EST LOW 20 MIN: CPT | Performed by: PODIATRIST

## 2020-11-10 ENCOUNTER — TELEPHONE (OUTPATIENT)
Dept: OTHER | Facility: OTHER | Age: 60
End: 2020-11-10

## 2020-11-16 DIAGNOSIS — J06.9 VIRAL URI WITH COUGH: Primary | ICD-10-CM

## 2020-11-17 DIAGNOSIS — J06.9 VIRAL URI WITH COUGH: ICD-10-CM

## 2020-11-17 PROCEDURE — U0003 INFECTIOUS AGENT DETECTION BY NUCLEIC ACID (DNA OR RNA); SEVERE ACUTE RESPIRATORY SYNDROME CORONAVIRUS 2 (SARS-COV-2) (CORONAVIRUS DISEASE [COVID-19]), AMPLIFIED PROBE TECHNIQUE, MAKING USE OF HIGH THROUGHPUT TECHNOLOGIES AS DESCRIBED BY CMS-2020-01-R: HCPCS | Performed by: STUDENT IN AN ORGANIZED HEALTH CARE EDUCATION/TRAINING PROGRAM

## 2020-11-18 LAB — SARS-COV-2 RNA SPEC QL NAA+PROBE: NOT DETECTED

## 2020-11-30 ENCOUNTER — TELEMEDICINE (OUTPATIENT)
Dept: FAMILY MEDICINE CLINIC | Facility: CLINIC | Age: 60
End: 2020-11-30
Payer: COMMERCIAL

## 2020-11-30 DIAGNOSIS — R05.9 COUGH: ICD-10-CM

## 2020-11-30 DIAGNOSIS — Z20.822 CLOSE EXPOSURE TO COVID-19 VIRUS: Primary | ICD-10-CM

## 2020-11-30 PROCEDURE — 99214 OFFICE O/P EST MOD 30 MIN: CPT | Performed by: FAMILY MEDICINE

## 2020-11-30 RX ORDER — BENZONATATE 200 MG/1
200 CAPSULE ORAL 3 TIMES DAILY PRN
Qty: 20 CAPSULE | Refills: 0 | Status: SHIPPED | OUTPATIENT
Start: 2020-11-30 | End: 2021-11-30

## 2020-12-01 ENCOUNTER — TELEPHONE (OUTPATIENT)
Dept: FAMILY MEDICINE CLINIC | Facility: CLINIC | Age: 60
End: 2020-12-01

## 2020-12-02 ENCOUNTER — TELEPHONE (OUTPATIENT)
Dept: FAMILY MEDICINE CLINIC | Facility: CLINIC | Age: 60
End: 2020-12-02

## 2020-12-02 NOTE — TELEPHONE ENCOUNTER
SEGUN for Canelo Torres to call back to confirm cancellation of flu shot appointment today  If patient calls back, please make sure I'm at my desk then transfer phone call to me  Patient cannot be seen, as he is symptomatic and has been in contact with a Covid positive patient

## 2020-12-07 ENCOUNTER — TRANSCRIBE ORDERS (OUTPATIENT)
Dept: ADMINISTRATIVE | Facility: HOSPITAL | Age: 60
End: 2020-12-07

## 2020-12-07 ENCOUNTER — HOSPITAL ENCOUNTER (OUTPATIENT)
Dept: RADIOLOGY | Facility: HOSPITAL | Age: 60
Discharge: HOME | End: 2020-12-07
Payer: COMMERCIAL

## 2020-12-07 DIAGNOSIS — Z20.822 CLOSE EXPOSURE TO COVID-19 VIRUS: ICD-10-CM

## 2020-12-07 DIAGNOSIS — R05.9 COUGH: ICD-10-CM

## 2020-12-07 PROCEDURE — 71046 X-RAY EXAM CHEST 2 VIEWS: CPT

## 2020-12-08 ENCOUNTER — TELEMEDICINE (OUTPATIENT)
Dept: FAMILY MEDICINE CLINIC | Facility: CLINIC | Age: 60
End: 2020-12-08
Payer: COMMERCIAL

## 2020-12-08 DIAGNOSIS — R05.9 COUGH: ICD-10-CM

## 2020-12-08 DIAGNOSIS — R06.02 SHORTNESS OF BREATH: ICD-10-CM

## 2020-12-08 DIAGNOSIS — Z20.822 CLOSE EXPOSURE TO COVID-19 VIRUS: ICD-10-CM

## 2020-12-08 DIAGNOSIS — Z20.822 CLOSE EXPOSURE TO COVID-19 VIRUS: Primary | ICD-10-CM

## 2020-12-08 PROCEDURE — U0003 INFECTIOUS AGENT DETECTION BY NUCLEIC ACID (DNA OR RNA); SEVERE ACUTE RESPIRATORY SYNDROME CORONAVIRUS 2 (SARS-COV-2) (CORONAVIRUS DISEASE [COVID-19]), AMPLIFIED PROBE TECHNIQUE, MAKING USE OF HIGH THROUGHPUT TECHNOLOGIES AS DESCRIBED BY CMS-2020-01-R: HCPCS | Performed by: STUDENT IN AN ORGANIZED HEALTH CARE EDUCATION/TRAINING PROGRAM

## 2020-12-08 PROCEDURE — 99213 OFFICE O/P EST LOW 20 MIN: CPT | Performed by: FAMILY MEDICINE

## 2020-12-09 LAB — SARS-COV-2 RNA SPEC QL NAA+PROBE: NOT DETECTED

## 2020-12-11 ENCOUNTER — TELEPHONE (OUTPATIENT)
Dept: FAMILY MEDICINE CLINIC | Facility: CLINIC | Age: 60
End: 2020-12-11

## 2020-12-14 ENCOUNTER — TELEMEDICINE (OUTPATIENT)
Dept: FAMILY MEDICINE CLINIC | Facility: CLINIC | Age: 60
End: 2020-12-14
Payer: COMMERCIAL

## 2020-12-14 DIAGNOSIS — Z71.2 ENCOUNTER TO DISCUSS TEST RESULTS: Primary | ICD-10-CM

## 2020-12-14 PROCEDURE — 99213 OFFICE O/P EST LOW 20 MIN: CPT | Performed by: FAMILY MEDICINE

## 2021-01-14 ENCOUNTER — OFFICE VISIT (OUTPATIENT)
Dept: PODIATRY | Facility: CLINIC | Age: 61
End: 2021-01-14
Payer: COMMERCIAL

## 2021-01-14 VITALS
WEIGHT: 210 LBS | HEART RATE: 87 BPM | HEIGHT: 70 IN | SYSTOLIC BLOOD PRESSURE: 127 MMHG | BODY MASS INDEX: 30.06 KG/M2 | DIASTOLIC BLOOD PRESSURE: 78 MMHG | RESPIRATION RATE: 17 BRPM

## 2021-01-14 DIAGNOSIS — M20.42 HAMMER TOES OF BOTH FEET: ICD-10-CM

## 2021-01-14 DIAGNOSIS — B35.9 DERMATOPHYTOSIS: ICD-10-CM

## 2021-01-14 DIAGNOSIS — M79.672 PAIN IN BOTH FEET: ICD-10-CM

## 2021-01-14 DIAGNOSIS — M79.671 PAIN IN BOTH FEET: ICD-10-CM

## 2021-01-14 DIAGNOSIS — M20.41 HAMMER TOES OF BOTH FEET: ICD-10-CM

## 2021-01-14 DIAGNOSIS — B35.1 ONYCHOMYCOSIS: ICD-10-CM

## 2021-01-14 DIAGNOSIS — M21.969 ACQUIRED DEFORMITY OF FOOT, UNSPECIFIED LATERALITY: Primary | ICD-10-CM

## 2021-01-14 DIAGNOSIS — L03.039 PARONYCHIA OF TOENAIL, UNSPECIFIED LATERALITY: ICD-10-CM

## 2021-01-14 PROCEDURE — 99213 OFFICE O/P EST LOW 20 MIN: CPT | Performed by: PODIATRIST

## 2021-01-14 NOTE — PROGRESS NOTES
Assessment/Plan:  Metatarsalgia   Hammertoe formation   Pes planus   Pain  Mycosis of nail  Paronychia      Plan        Foot exam performed   All nails debrided   Patient will benefit from orthotics      He will wear these daily   Patient will follow up with Orthopedics for back pain          Subjective:   Patient only has mild pain in his feet with ambulation   He has known radiculopathy                 Past Medical History:   Diagnosis Date    Amnesia memory loss      Anxiety      Chronic fatigue      Disc degeneration, lumbar      Esophageal reflux      GERD (gastroesophageal reflux disease)      Glaucoma      Hiatal hernia      Increased pressure in the eye       both eyes; denies glaucoma     Internal hemorrhoids      Lumbar disc herniation with radiculopathy      Memory loss      Personal history of colonic polyps                     Past Surgical History:   Procedure Laterality Date    COLONOSCOPY N/A 4/25/2016     Procedure: COLONOSCOPY;  Surgeon: Adrian Nick MD;  Location: [place] [address] [facility];  Service:     DENTAL SURGERY         ALL TEETH PULLED    EPIDURAL BLOCK INJECTION        EPIDURAL BLOCK INJECTION Right 12/28/2016     Procedure: BLOCK / INJECTION EPIDURAL STEROID TRANSFORAMINAL  L4-5;  Surgeon: Karri Chaidez MD;  Location: [place] [address] [facility] OR;  Service:     EPIDURAL BLOCK INJECTION Right 11/16/2017     Procedure: BLOCK / INJECTION EPIDURAL STEROID TRANSFORAMINAL L4-5 RIGHT;  Surgeon: Karri Chaidez MD;  Location: [place] [address] [facility] OR;  Service: Pain Management     EPIDURAL BLOCK INJECTION Left 5/10/2018     Procedure: Lt L4 L5 Tfesi ([id number]);  Surgeon: Karri Chaidez MD;  Location: [place] [address] [facility] OR;  Service: Pain Management     ESOPHAGOGASTRODUODENOSCOPY N/A 4/25/2016     Procedure: ESOPHAGOGASTRODUODENOSCOPY (EGD);  Surgeon: Adrian Nick MD;  Location: [place] [address] [facility];  Service:     NOSE SURGERY                       Allergies   Allergen Reactions    Gabapentin Other (See Comments)       OUT OF BODY PSYCHOSIS, MEMORY LOSS, AMNESIA    Other Cough       HAYFEVER, SEASONAL, ALFALFA            Current Outpatient Prescriptions:     aspirin 81 MG tablet, Take 81 mg by mouth daily  , Disp: , Rfl:     omeprazole (PriLOSEC) 20 mg delayed release capsule, Take 1 capsule (20 mg total) by mouth daily, Disp: 30 capsule, Rfl: 3    ranitidine (ZANTAC) 300 MG tablet, Take 1 tablet (300 mg total) by mouth daily at bedtime, Disp: 30 tablet, Rfl: 3    tamsulosin (FLOMAX) 0 4 mg, Take 0 4 mg by mouth daily with dinner, Disp: , Rfl:     timolol (BETIMOL) 0 5 % ophthalmic solution, Administer 1 drop to both eyes 2 (two) times a day , Disp: , Rfl:     timolol (TIMOPTIC) 0 5 % ophthalmic solution, INSTILL 1 DROP INTO BOTH EYES TWO TIMES A DAY, Disp: , Rfl: 0    citalopram (CeleXA) 10 mg tablet, Take 3 tablets (30 mg total) by mouth daily for 90 days, Disp: 270 tablet, Rfl: 0    gabapentin (NEURONTIN) 300 mg capsule, Take 2 capsules (600 mg total) by mouth 3 (three) times a day, Disp: 90 capsule, Rfl: 3    gabapentin (NEURONTIN) 600 MG tablet, , Disp: , Rfl: 0           Patient Active Problem List   Diagnosis    Chronic pain syndrome    Chronic low back pain    Intervertebral disc disorder with radiculopathy of lumbar region    Spondylosis of lumbar region without myelopathy or radiculopathy    Abnormal chromosomal analysis    Adult-onset obesity    Anxiety    Elevated PSA    Enlarged prostate on rectal examination    Gastroesophageal reflux disease without esophagitis    Lumbar disc herniation with radiculopathy    Myofascial pain syndrome    Chronic fatigue    Memory loss    Glaucoma    Neuritis or radiculitis due to rupture of lumbar intervertebral disc    Low back pain             Patient ID: Jerry Lopez is a 60 y o  male      Objective:      Foot Exam     General  General Appearance: appears stated age and healthy   Orientation: alert and oriented to person, place, and time   Affect: appropriate   Gait: antalgic         Right Foot/Ankle      Inspection and Palpation  Ecchymosis: none  Tenderness: bony tenderness and metatarsals   Swelling: metatarsals   Arch: pes planus  Hammertoes: fourth toe and fifth toe  Hallux valgus: no  Hallux limitus: yes  Skin Exam: callus;      Neurovascular  Dorsalis pedis: 2+  Posterior tibial: 2+  Saphenous nerve sensation: diminished  Tibial nerve sensation: diminished  Superficial peroneal nerve sensation: diminished  Deep peroneal nerve sensation: diminished  Sural nerve sensation: diminished  Achilles reflex: 1+     Muscle Strength  Ankle dorsiflexion: 4+     Tests  Lateral squeeze: positive      Comments  4th and 5th toes bilateral are in a abducto varus position  Pinch callus formation of 4th and 5th toes      Left Foot/Ankle       Inspection and Palpation  Ecchymosis: none  Tenderness: bony tenderness and metatarsals   Swelling: metatarsals   Arch: pes planus  Hammertoes: fourth toe and fifth toe  Hallux valgus: no  Hallux limitus: yes  Skin Exam: callus;      Neurovascular  Dorsalis pedis: 2+  Posterior tibial: 2+  Saphenous nerve sensation: diminished  Tibial nerve sensation: diminished  Superficial peroneal nerve sensation: diminished  Deep peroneal nerve sensation: diminished  Sural nerve sensation: diminished  Achilles reflex: 1+     Muscle Strength  Ankle dorsiflexion: 4+     Tests  Too many toes: positive      Comments  Patient is maximally pronated in stance and gait   There is transverse deviation of the DIPJ 4th toe bilateral   Physical Exam   Cardiovascular:   Pulses:       Dorsalis pedis pulses are 2+ on the right side, and 2+ on the left side         Posterior tibial pulses are 2+ on the right side, and 2+ on the left side  Musculoskeletal:        Right foot: There is bony tenderness         Left foot: There is bony tenderness  Feet:   Right Foot:   Skin Integrity: Positive for callus  Left Foot:   Skin Integrity: Positive for callus  Neurological:   Reflex Scores:       Achilles reflexes are 1+ on the right side and 1+ on the left side

## 2021-01-16 DIAGNOSIS — G31.84 MCI (MILD COGNITIVE IMPAIRMENT): ICD-10-CM

## 2021-01-18 RX ORDER — RIVASTIGMINE TARTRATE 3 MG/1
CAPSULE ORAL
Qty: 60 CAPSULE | Refills: 2 | Status: SHIPPED | OUTPATIENT
Start: 2021-01-18 | End: 2021-04-12

## 2021-01-22 DIAGNOSIS — R97.20 ELEVATED PSA: ICD-10-CM

## 2021-01-22 DIAGNOSIS — N40.0 ENLARGED PROSTATE ON RECTAL EXAMINATION: ICD-10-CM

## 2021-01-22 RX ORDER — TAMSULOSIN HYDROCHLORIDE 0.4 MG/1
0.8 CAPSULE ORAL
Qty: 90 CAPSULE | Refills: 3 | Status: SHIPPED | OUTPATIENT
Start: 2021-01-22 | End: 2021-08-13 | Stop reason: SDUPTHER

## 2021-03-25 ENCOUNTER — OFFICE VISIT (OUTPATIENT)
Dept: PODIATRY | Facility: CLINIC | Age: 61
End: 2021-03-25
Payer: COMMERCIAL

## 2021-03-25 VITALS
RESPIRATION RATE: 17 BRPM | DIASTOLIC BLOOD PRESSURE: 78 MMHG | SYSTOLIC BLOOD PRESSURE: 127 MMHG | HEART RATE: 87 BPM | HEIGHT: 70 IN | BODY MASS INDEX: 30.06 KG/M2 | WEIGHT: 210 LBS

## 2021-03-25 DIAGNOSIS — M20.42 HAMMER TOES OF BOTH FEET: ICD-10-CM

## 2021-03-25 DIAGNOSIS — M21.969 ACQUIRED DEFORMITY OF FOOT, UNSPECIFIED LATERALITY: Primary | ICD-10-CM

## 2021-03-25 DIAGNOSIS — M79.671 PAIN IN BOTH FEET: ICD-10-CM

## 2021-03-25 DIAGNOSIS — B35.9 DERMATOPHYTOSIS: ICD-10-CM

## 2021-03-25 DIAGNOSIS — M79.672 PAIN IN BOTH FEET: ICD-10-CM

## 2021-03-25 DIAGNOSIS — L03.039 PARONYCHIA OF TOENAIL, UNSPECIFIED LATERALITY: ICD-10-CM

## 2021-03-25 DIAGNOSIS — M20.41 HAMMER TOES OF BOTH FEET: ICD-10-CM

## 2021-03-25 PROCEDURE — 99213 OFFICE O/P EST LOW 20 MIN: CPT | Performed by: PODIATRIST

## 2021-03-25 NOTE — PROGRESS NOTES
Assessment/Plan:  Metatarsalgia   Hammertoe formation   Pes planus   Pain  Mycosis of nail  Paronychia      Plan     Foot exam performed   All nails debrided   Patient will benefit from orthotics      He will wear these daily            Subjective:   Patient only has mild pain in his feet with ambulation   He has known radiculopathy                 Past Medical History:   Diagnosis Date    Amnesia memory loss      Anxiety      Chronic fatigue      Disc degeneration, lumbar      Esophageal reflux      GERD (gastroesophageal reflux disease)      Glaucoma      Hiatal hernia      Increased pressure in the eye       both eyes; denies glaucoma     Internal hemorrhoids      Lumbar disc herniation with radiculopathy      Memory loss      Personal history of colonic polyps                     Past Surgical History:   Procedure Laterality Date    COLONOSCOPY N/A 4/25/2016     Procedure: COLONOSCOPY;  Surgeon: Kavon Weber MD;  Location: [place] [address] GI LAB;  Service:     DENTAL SURGERY         ALL TEETH PULLED    EPIDURAL BLOCK INJECTION        EPIDURAL BLOCK INJECTION Right 12/28/2016     Procedure: BLOCK / INJECTION EPIDURAL STEROID TRANSFORAMINAL  L4-5;  Surgeon: Kerrie Saleem MD;  Location: [place] [address] MAIN OR;  Service:     EPIDURAL BLOCK INJECTION Right 11/16/2017     Procedure: BLOCK / INJECTION EPIDURAL STEROID TRANSFORAMINAL L4-5 RIGHT;  Surgeon: Kerrie Saleem MD;  Location: [place] [address] MAIN OR;  Service: Pain Management     EPIDURAL BLOCK INJECTION Left 5/10/2018     Procedure: Lt L4 L5 Tfesi (60616,71414);  Surgeon: Kerrie Saleem MD;  Location: [place] [address] MAIN OR;  Service: Pain Management     ESOPHAGOGASTRODUODENOSCOPY N/A 4/25/2016     Procedure: ESOPHAGOGASTRODUODENOSCOPY (EGD);  Surgeon: Kavon Weber MD;  Location: [place] [address] GI LAB;  Service:     NOSE SURGERY                       Allergies   Allergen Reactions    Gabapentin Other (See Comments)       OUT OF BODY PSYCHOSIS, MEMORY LOSS, AMNESIA    Other Cough       HAYFEVER, SEASONAL, ALFALFA            Current Outpatient Prescriptions:     aspirin 81 MG tablet, Take 81 mg by mouth daily  , Disp: , Rfl:     omeprazole (PriLOSEC) 20 mg delayed release capsule, Take 1 capsule (20 mg total) by mouth daily, Disp: 30 capsule, Rfl: 3    ranitidine (ZANTAC) 300 MG tablet, Take 1 tablet (300 mg total) by mouth daily at bedtime, Disp: 30 tablet, Rfl: 3    tamsulosin (FLOMAX) 0 4 mg, Take 0 4 mg by mouth daily with dinner, Disp: , Rfl:     timolol (BETIMOL) 0 5 % ophthalmic solution, Administer 1 drop to both eyes 2 (two) times a day , Disp: , Rfl:     timolol (TIMOPTIC) 0 5 % ophthalmic solution, INSTILL 1 DROP INTO BOTH EYES TWO TIMES A DAY, Disp: , Rfl: 0    citalopram (CeleXA) 10 mg tablet, Take 3 tablets (30 mg total) by mouth daily for 90 days, Disp: 270 tablet, Rfl: 0    gabapentin (NEURONTIN) 300 mg capsule, Take 2 capsules (600 mg total) by mouth 3 (three) times a day, Disp: 90 capsule, Rfl: 3    gabapentin (NEURONTIN) 600 MG tablet, , Disp: , Rfl: 0           Patient Active Problem List   Diagnosis    Chronic pain syndrome    Chronic low back pain    Intervertebral disc disorder with radiculopathy of lumbar region    Spondylosis of lumbar region without myelopathy or radiculopathy    Abnormal chromosomal analysis    Adult-onset obesity    Anxiety    Elevated PSA    Enlarged prostate on rectal examination    Gastroesophageal reflux disease without esophagitis    Lumbar disc herniation with radiculopathy    Myofascial pain syndrome    Chronic fatigue    Memory loss    Glaucoma    Neuritis or radiculitis due to rupture of lumbar intervertebral disc    Low back pain             Patient ID: Jerry Lopez is a 60 y o  male      Objective:      Foot Exam     General  General Appearance: appears stated age and healthy   Orientation: alert and oriented to person, place, and time   Affect: appropriate   Gait: antalgic         Right Foot/Ankle      Inspection and Palpation  Ecchymosis: none  Tenderness: bony tenderness and metatarsals   Swelling: metatarsals   Arch: pes planus  Hammertoes: fourth toe and fifth toe  Hallux valgus: no  Hallux limitus: yes  Skin Exam: callus;      Neurovascular  Dorsalis pedis: 2+  Posterior tibial: 2+  Saphenous nerve sensation: diminished  Tibial nerve sensation: diminished  Superficial peroneal nerve sensation: diminished  Deep peroneal nerve sensation: diminished  Sural nerve sensation: diminished  Achilles reflex: 1+     Muscle Strength  Ankle dorsiflexion: 4+     Tests  Lateral squeeze: positive      Comments  4th and 5th toes bilateral are in a abducto varus position  Pinch callus formation of 4th and 5th toes      Left Foot/Ankle       Inspection and Palpation  Ecchymosis: none  Tenderness: bony tenderness and metatarsals   Swelling: metatarsals   Arch: pes planus  Hammertoes: fourth toe and fifth toe  Hallux valgus: no  Hallux limitus: yes  Skin Exam: callus;      Neurovascular  Dorsalis pedis: 2+  Posterior tibial: 2+  Saphenous nerve sensation: diminished  Tibial nerve sensation: diminished  Superficial peroneal nerve sensation: diminished  Deep peroneal nerve sensation: diminished  Sural nerve sensation: diminished  Achilles reflex: 1+     Muscle Strength  Ankle dorsiflexion: 4+     Tests  Too many toes: positive      Comments  Patient is maximally pronated in stance and gait   There is transverse deviation of the DIPJ 4th toe bilateral   Physical Exam   Cardiovascular:   Pulses:       Dorsalis pedis pulses are 2+ on the right side, and 2+ on the left side         Posterior tibial pulses are 2+ on the right side, and 2+ on the left side  Musculoskeletal:        Right foot: There is bony tenderness         Left foot: There is bony tenderness  Feet:   Right Foot:   Skin Integrity: Positive for callus  Left Foot:   Skin Integrity: Positive for callus     Neurological:   Reflex Scores:       Achilles reflexes are 1+ on the right side and 1+ on the left side

## 2021-04-10 DIAGNOSIS — G31.84 MCI (MILD COGNITIVE IMPAIRMENT): ICD-10-CM

## 2021-04-12 ENCOUNTER — OFFICE VISIT (OUTPATIENT)
Dept: PAIN MEDICINE | Facility: CLINIC | Age: 61
End: 2021-04-12
Payer: COMMERCIAL

## 2021-04-12 VITALS
SYSTOLIC BLOOD PRESSURE: 131 MMHG | HEART RATE: 90 BPM | HEIGHT: 70 IN | TEMPERATURE: 98.2 F | BODY MASS INDEX: 30.15 KG/M2 | WEIGHT: 210.6 LBS | DIASTOLIC BLOOD PRESSURE: 83 MMHG

## 2021-04-12 DIAGNOSIS — M79.18 MYOFASCIAL PAIN SYNDROME: ICD-10-CM

## 2021-04-12 DIAGNOSIS — M51.16 INTERVERTEBRAL DISC DISORDER WITH RADICULOPATHY OF LUMBAR REGION: ICD-10-CM

## 2021-04-12 DIAGNOSIS — M25.512 BILATERAL SHOULDER PAIN, UNSPECIFIED CHRONICITY: ICD-10-CM

## 2021-04-12 DIAGNOSIS — M51.16 LUMBAR DISC HERNIATION WITH RADICULOPATHY: ICD-10-CM

## 2021-04-12 DIAGNOSIS — G89.4 CHRONIC PAIN SYNDROME: Primary | ICD-10-CM

## 2021-04-12 DIAGNOSIS — M25.511 BILATERAL SHOULDER PAIN, UNSPECIFIED CHRONICITY: ICD-10-CM

## 2021-04-12 PROCEDURE — 99214 OFFICE O/P EST MOD 30 MIN: CPT | Performed by: ANESTHESIOLOGY

## 2021-04-12 PROCEDURE — 1036F TOBACCO NON-USER: CPT | Performed by: ANESTHESIOLOGY

## 2021-04-12 PROCEDURE — 3008F BODY MASS INDEX DOCD: CPT | Performed by: ANESTHESIOLOGY

## 2021-04-12 RX ORDER — RIVASTIGMINE TARTRATE 3 MG/1
CAPSULE ORAL
Qty: 60 CAPSULE | Refills: 2 | Status: SHIPPED | OUTPATIENT
Start: 2021-04-12 | End: 2021-07-13

## 2021-04-12 NOTE — PROGRESS NOTES
Pain Medicine Follow-Up Note  Scribe Attestation    I,:  EMBER Srinivasan am acting as a scribe while in the presence of the attending physician :       I,:  Dawit Gotti MD personally performed the services described in this documentation    as scribed in my presence :           Assessment:  1  Chronic pain syndrome    2  Intervertebral disc disorder with radiculopathy of lumbar region    3  Lumbar disc herniation with radiculopathy    4  Myofascial pain syndrome    5  Bilateral shoulder pain, unspecified chronicity        Plan:  Orders Placed This Encounter   Procedures    Ambulatory referral to Physical Therapy     Standing Status:   Future     Standing Expiration Date:   4/12/2022     Referral Priority:   Routine     Referral Type:   Physical Therapy     Referral Reason:   Specialty Services Required     Requested Specialty:   Physical Therapy     Number of Visits Requested:   1     Expiration Date:   4/12/2022     My impressions and treatment recommendations were discussed in detail with the patient who verbalized understanding and had no further questions  The patient is here today for re-evaluation of his chronic pain  He was last seen 2 years ago on 04/09/2019  On 03/07/2019 he had a right L4 and L5 transforaminal epidural steroid injection  He reports that he gets excellent relief from these injections lasting 5-6 months  He states that a couple months ago something happen in his back and he has not been able to get relief with his usual stretches and exercises  He states that he would like to avoid getting an injection at this time therefore I feel it is appropriate to send him for physical therapy so that he can learn proper body mechanics and core strengthening exercises as well as lower back and shoulder exercises  Follow-up is planned in 6 weeks time or sooner as warranted  Discharge instructions were provided   I personally saw and examined the patient and I agree with the above discussed plan of care  History of Present Illness:    Lilia Schofield is a 61 y o  male who presents to [facility] for interval re-evaluation of the above stated pain complaints  The patient has a past medical and chronic pain history as outlined in the assessment section  He was last seen on  04/09/2019  Today patient states he has a pain score of 7 5-8/10 at its worst and that right now it is a 3-4/10  He states his pain is intermittent and worse in the morning and he describes the quality as sharp, shooting with pins and needles  He states that a couple months ago something happened to his back and that his regular stretches and exercises are not helping him anymore  He states that he has been having bouts of pain where his back is so bad that he has to use a cane and he has difficulty getting in and out of his car  He uses methocarbamol 500 mg as needed which she states helps somewhat  He has done physical therapy in the past and he says that this helps with his pain however once his pain is better he stops doing the exercises  Other than as stated above, the patient denies any interval changes in medications, medical condition, mental condition, symptoms, or allergies since the last office visit  Review of Systems:    Review of Systems   Respiratory: Negative for shortness of breath  Cardiovascular: Negative for chest pain  Gastrointestinal: Negative for constipation, diarrhea, nausea and vomiting  Musculoskeletal: Positive for back pain  Negative for arthralgias, gait problem, joint swelling and myalgias  Decreased ROM  Joint stiffness   Skin: Negative for rash  Neurological: Negative for dizziness, seizures and weakness  All other systems reviewed and are negative          Patient Active Problem List   Diagnosis    Chronic pain syndrome    Intervertebral disc disorder with radiculopathy of lumbar region    Spondylosis of lumbar region without myelopathy or radiculopathy    Abnormal chromosomal analysis    Adult-onset obesity    Anxiety    Elevated PSA    Enlarged prostate    Gastroesophageal reflux disease without esophagitis    Myofascial pain syndrome    Chronic fatigue    Episode of memory loss    Glaucoma    Low back pain    Acquired deformity of foot    Hammer toes of both feet    Congenital pes planus of right foot    Congenital pes planus of left foot    TGA (transient global amnesia)    Dermatophytosis    LLQ abdominal pain    Lumbar disc herniation with radiculopathy    Pain in both feet    Observed seizure-like activity (HCC)    Cervical radiculopathy    Shoulder pain, bilateral       Past Medical History:   Diagnosis Date    Abdominal pain 10/24/2018    Amnesia memory loss     Anxiety     Chronic fatigue     Disc degeneration, lumbar     Esophageal reflux     GERD (gastroesophageal reflux disease)     Glaucoma     Hiatal hernia     Increased pressure in the eye     both eyes; denies glaucoma     Internal hemorrhoids     Lumbar disc herniation with radiculopathy     Memory loss     Pain in both feet 6/18/2018    Personal history of colonic polyps        Past Surgical History:   Procedure Laterality Date    COLONOSCOPY N/A 4/25/2016    Procedure: COLONOSCOPY;  Surgeon: Steve Johnson MD;  Location: [facility]; Service:     DENTAL SURGERY      ALL TEETH PULLED    EPIDURAL BLOCK INJECTION      EPIDURAL BLOCK INJECTION Right 12/28/2016    Procedure: BLOCK / INJECTION EPIDURAL STEROID TRANSFORAMINAL  L4-5;  Surgeon: Alice Lilly MD;  Location: [facility] MAIN OR;  Service:     EPIDURAL BLOCK INJECTION Right 11/16/2017    Procedure: BLOCK / INJECTION EPIDURAL STEROID TRANSFORAMINAL L4-5 RIGHT;  Surgeon: Alice Lilly MD;  Location: [facility] MAIN OR;  Service: Pain Management     EPIDURAL BLOCK INJECTION Left 5/10/2018    Procedure: Lt L4 L5 Tfesi (77748,63576);   Surgeon: Alice Lilly MD;  Location: [facility] MAIN OR;  Service: Pain Management     EPIDURAL BLOCK INJECTION Right 3/7/2019    Procedure: L4 L5 Transforaminal Epidural Steroid Injection ([id number]); Surgeon: Marisa Camacho MD;  Location: [facility] MAIN OR;  Service: Pain Management     ESOPHAGOGASTRODUODENOSCOPY N/A 2016    Procedure: ESOPHAGOGASTRODUODENOSCOPY (EGD); Surgeon: Jenifer Tovar MD;  Location: [facility] GI LAB;   Service:     NOSE SURGERY         Family History   Problem Relation Age of Onset    Stomach cancer Maternal Grandmother     Hypertension Mother     Diabetes Mother     Cataracts Father     Skin cancer Father     Stroke Father         2X    Aneurysm Father     No Known Problems Sister     No Known Problems Brother     Throat cancer Brother        Social History     Occupational History    Not on file   Tobacco Use    Smoking status: Former Smoker     Packs/day: 0 50     Years: 19 00     Pack years: 9 50     Types: Cigarettes     Quit date:      Years since quittin 3    Smokeless tobacco: Never Used   Substance and Sexual Activity    Alcohol use: No    Drug use: No    Sexual activity: Not on file         Current Outpatient Medications:     aspirin (ECOTRIN LOW STRENGTH) 81 mg EC tablet, Take 1 tablet (81 mg total) by mouth daily, Disp: 90 tablet, Rfl: 3    Cholecalciferol (RA Vitamin D-3) 25 MCG (1000 UT) tablet, Take 2 tablets (2,000 Units total) by mouth daily, Disp: 180 tablet, Rfl: 3    dicyclomine (BENTYL) 10 mg capsule, Take 1 capsule (10 mg total) by mouth 4 (four) times a day (before meals and at bedtime), Disp: 120 capsule, Rfl: 11    famotidine (PEPCID) 40 MG tablet, Take 1 tablet (40 mg total) by mouth daily at bedtime, Disp: 90 tablet, Rfl: 3    fexofenadine (ALLEGRA) 180 MG tablet, Take 1 tablet (180 mg total) by mouth daily, Disp: 90 tablet, Rfl: 3    methocarbamol (ROBAXIN) 500 mg tablet, Take 1 tablet (500 mg total) by mouth 3 (three) times a day as needed for muscle spasms, Disp: 60 tablet, Rfl: 2    omeprazole (PriLOSEC) 40 MG capsule, Take 1 capsule (40 mg total) by mouth daily, Disp: 90 capsule, Rfl: 3    rivastigmine (EXELON) 3 mg capsule, take 1 capsule by mouth twice a day, Disp: 60 capsule, Rfl: 2    tamsulosin (FLOMAX) 0 4 mg, Take 2 capsules (0 8 mg total) by mouth daily at bedtime, Disp: 90 capsule, Rfl: 3    vitamin B-12 (VITAMIN B-12) 1,000 mcg tablet, Take 1 tablet (1,000 mcg total) by mouth daily, Disp: 90 tablet, Rfl: 3    benzonatate (TESSALON) 200 MG capsule, Take 1 capsule (200 mg total) by mouth 3 (three) times a day as needed for cough (Patient not taking: Reported on 3/3/2020), Disp: 20 capsule, Rfl: 0    benzonatate (TESSALON) 200 MG capsule, Take 1 capsule (200 mg total) by mouth 3 (three) times a day as needed for cough (Patient not taking: Reported on 4/12/2021), Disp: 20 capsule, Rfl: 0    famotidine (PEPCID) 20 mg tablet, take 2 tablets by mouth at bedtime (Patient not taking: Reported on 4/12/2021), Disp: 30 tablet, Rfl: 11    ketoconazole (NIZORAL) 2 % cream, Apply topically daily for 30 days, Disp: 60 g, Rfl: 5    timolol (TIMOPTIC) 0 5 % ophthalmic solution, Apply 1 drop to eye daily (Patient not taking: Reported on 4/12/2021), Disp: 15 mL, Rfl: 5    Allergies   Allergen Reactions    Gabapentin Other (See Comments)     OUT OF BODY PSYCHOSIS, MEMORY LOSS, AMNESIA    Other Cough     HAYFEVER, SEASONAL, ALFALFA       Physical Exam:    /83   Pulse 90   Temp 98 2 °F (36 8 °C)   Ht 5' 10" (1 778 m)   Wt 95 5 kg (210 lb 9 6 oz)   BMI 30 22 kg/m²     Constitutional:obese  Eyes:anicteric   HEENT:grossly intact  Neck:supple, symmetric, trachea midline and no masses   Pulmonary:even and unlabored  Cardiovascular:No edema or pitting edema present  Skin:Normal without rashes or lesions and well hydrated  Psychiatric:Mood and affect appropriate  Neurologic:Cranial Nerves II-XII grossly intact  Musculoskeletal:normal  Lumbar Spine Exam    Appearance:  Normal lordosis  Palpation/Tenderness:  left lumbar paraspinal tenderness  right lumbar paraspinal tenderness  Sensory:  no sensory deficits noted  Range of Motion:  Flexion:  Minimally limited  with pain  Extension:  No limitation  without pain  Cervical Spine Exam    Appearance:  Normal lordosis  Palpation/Tenderness:  left cervical paraspinal tenderness  right cervical paraspinal tenderness  left trapezium tenderness  right trapezium tenderness  trigger points palpable  Sensory:  no sensory deficits noted  Range of Motion:  Full range of motion with no pain or limitations in flexion, extension, lateral flexion and rotation  Shoulder Exam    Appearance:  Normal with no swelling or bruising and no obvious joint deformity  Palpation/Tenderness:  Tender to palpation along trapezius and glenohumeral joint    Active Range of Motion:  Flexion: No limitation, Extension: No limitation, Abduction: No limitation, External Rotation: No limitation and Internal Rotation: No limitation    Orders Placed This Encounter   Procedures    Ambulatory referral to Physical Therapy

## 2021-04-12 NOTE — PATIENT INSTRUCTIONS
Lower Back Exercises   AMBULATORY CARE:   Lower back exercises  help heal and strengthen your back muscles to prevent another injury  Ask your healthcare provider if you need to see a physical therapist for more advanced exercises  Seek care immediately if:   · You have severe pain that prevents you from moving  Contact your healthcare provider if:   · Your pain becomes worse  · You have new pain  · You have questions or concerns about your condition or care  Do lower back exercises safely:   · Do the exercises on a mat or firm surface  (not on a bed) to support your spine and prevent low back pain  · Move slowly and smoothly  Avoid fast or jerky motions  · Breathe normally  Do not hold your breath  · Stop if you feel pain  It is normal to feel some discomfort at first  Regular exercise will help decrease your discomfort over time  Lower back exercises: Your healthcare provider may recommend that you do back exercises 10 to 30 minutes each day  He may also recommend that you do exercises 1 to 3 times each day  Ask your healthcare provider which exercises are best for you and how often to do them  · Ankle pumps:  Lie on your back  Move your foot up (with your toes pointing toward your head)  Then, move your foot down (with your toes pointing away from you)  Repeat this exercise 10 times on each side  · Heel slides:  Lie on your back  Slowly bend one leg and then straighten it  Next, bend the other leg and then straighten it  Repeat 10 times on each side  · Pelvic tilt:  Lie on your back with your knees bent and feet flat on the floor  Place your arms in a relaxed position beside your body  Tighten the muscles of your abdomen and flatten your back against the floor  Hold for 5 seconds  Repeat 5 times  · Back stretch:  Lie on your back with your hands behind your head  Bend your knees and turn the lower half of your body to one side   Hold this position for 10 seconds  Repeat 3 times on each side  · Straight leg raises:  Lie on your back with one leg straight  Bend the other knee  Tighten your abdomen and then slowly lift the straight leg up about 6 to 12 inches off the floor  Hold for 1 to 5 seconds  Lower your leg slowly  Repeat 10 times on each leg  · Knee-to-chest:  Lie on your back with your knees bent and feet flat on the floor  Pull one of your knees toward your chest and hold it there for 5 seconds  Return your leg to the starting position  Lift the other knee toward your chest and hold for 5 seconds  Do this 5 times on each side  · Cat and camel:  Place your hands and knees on the floor  Arch your back upward toward the ceiling and lower your head  Round out your spine as much as you can  Hold for 5 seconds  Lift your head upward and push your chest downward toward the floor  Hold for 5 seconds  Do 3 sets or as directed  · Wall squats:  Stand with your back against a wall  Tighten the muscles of your abdomen  Slowly lower your body until your knees are bent at a 45 degree angle  Hold this position for 5 seconds  Slowly move back up to a standing position  Repeat 10 times  · Curl up:  Lie on your back with your knees bent and feet flat on the floor  Place your hands, palms down, underneath the curve in your lower back  Next, with your elbows on the floor, lift your shoulders and chest 2 to 3 inches  Keep your head in line with your shoulders  Hold this position for 5 seconds  When you can do this exercise without pain for 10 to 15 seconds, you may add a rotation  While your shoulders and chest are lifted off the ground, turn slightly to the left and hold  Repeat on the other side  · Bird dog:  Place your hands and knees on the floor  Keep your wrists directly below your shoulders and your knees directly below your hips  Pull your belly button in toward your spine  Do not flatten or arch your back   Tighten your abdominal muscles  Raise one arm straight out so that it is aligned with your head  Next, raise the leg opposite your arm  Hold this position for 15 seconds  Lower your arm and leg slowly and change sides  Do 5 sets  © Copyright [address] Information is for End User's use only and may not be sold, redistributed or otherwise used for commercial purposes  All illustrations and images included in CareNotes® are the copyrighted property of A D A M , Inc  or Bari Devine  The above information is an  only  It is not intended as medical advice for individual conditions or treatments  Talk to your doctor, nurse or pharmacist before following any medical regimen to see if it is safe and effective for you  Core Strengthening Exercises   AMBULATORY CARE:   What you need to know about core strengthening exercises: Your core includes the muscles of your lower back, hip, pelvis, and abdomen  Core strengthening exercises help heal and strengthen these muscles  This helps prevent another injury, and keeps your pelvis, spine, and hips in the correct position  Contact your healthcare provider if:   · You have sharp or worsening pain during exercise or at rest     · You have questions or concerns about your shoulder exercises  Safety tips:  Talk to your healthcare provider before you start an exercise program  A physical therapist can teach you how to do core strengthening exercises safely  · Do the exercises on a mat or firm surface  A firm surface will support your spine and prevent low back pain  Do not do these exercises on a bed  · Move slowly and smoothly  Avoid fast or jerky motions  · Stop if you feel pain  Core exercises should not be painful  Stop if you feel pain  · Breathe normally during core exercises  Do not hold your breath  This may cause an increase in blood pressure and prevent muscle strengthening   Your healthcare provider will tell you when to inhale and exhale during the exercise  · Begin all of your exercises with abdominal bracing  Abdominal bracing helps warm up your core muscles  You can also practice abdominal bracing throughout the day  Lie on your back with your knees bent and feet flat on the floor  Place your arms in a relaxed position beside your body  Tighten your abdominal muscles  Pull your belly button in and up toward your spine  Hold for 5 seconds  Relax your muscles  Repeat 10 times  Core strengthening exercises: Your healthcare provider will tell you how often to do these exercises  The provider will also tell you how many repetitions of each exercise you should do  Hold each exercise for 5 seconds or as directed  As you get stronger, increase your hold to 10 to 15 seconds  You can do some of these exercises on a stability ball, or with a weight  Ask your healthcare provider how to use a stability ball or weight for these exercises:  · Bridging:  Lie on your back with your knees bent and feet flat on the floor  Rest your arms at your side  Tighten your buttocks, and then lift your hips 1 inch off the floor  Hold for 5 seconds  When you can do this exercise without pain for 10 seconds, increase the distance you lift your hips  A good goal is to be able to lift your hips so that your shoulders, hips, and knees are in a straight line  · Dead bug:  Lie on your back with your knees bent and feet flat on the floor  Place your arms in a relaxed position beside your body  Begin with abdominal bracing  Next, raise one leg, keeping your knee bent  Hold for 5 seconds  Repeat with the other leg  When you can do this exercise without pain for 10 to 15 seconds, you may raise one straight leg and hold  Repeat with the other leg  · Quadruped:  Place your hands and knees on the floor  Keep your wrists directly below your shoulders and your knees directly below your hips  Pull your belly button in toward your spine   Do not flatten or arch your back  Tighten your abdominal muscles below your belly button  Hold for 5 seconds  When you can do this exercise without pain for 10 to 15 seconds, you may extend one arm and hold  Repeat on the other side  · Side bridge exercises:      ? Standing side bridge:  Stand next to a wall and extend one arm toward the wall  Place your palm flat on the wall with your fingers pointing upward  Begin with abdominal bracing  Next, without moving your feet, slowly bend your arm to 90 degrees  Hold for 5 seconds  Repeat on the other side  When you can do this exercise without pain for 10 to 15 seconds, you may do the bent leg side bridge on the floor  ? Bent leg side bridge:  Lie on one side with your legs, hips, and shoulders in a straight line  Prop yourself up onto your forearm so your elbow is directly below your shoulder  Bend your knees back to 90 degrees  Begin with abdominal bracing  Next, lift your hips and balance yourself on your forearm and knees  Hold for 5 seconds  Repeat on the other side  When you can do this exercise without pain for 10 to 15 seconds, you may do the straight leg side bridge on the floor  ? Straight leg side bridge:  Lie on one side with your legs, hips, and shoulders in a straight line  Prop yourself up onto your forearm so your elbow is directly below your shoulder  Begin with abdominal bracing  Lift your hips off the floor and balance yourself on your forearm and the outside of your flexed foot  Do not let your ankle bend sideways  Hold for 5 seconds  Repeat on the other side  When you can do this exercise without pain for 10 to 15 seconds, ask your healthcare provider for more advanced exercises  · Superman:  Lie on your stomach  Extend your arms forward on the floor  Tighten your abdominal muscles and lift your right hand and left leg off the floor  Hold this position  Slowly return to the starting position   Tighten your abdominal muscles and lift your left hand and right leg off the floor  Hold this position  Slowly return to the starting position  · Clam:  Lie on your side with your knees bent  Put your bottom arm under your head to keep your neck in line  Put your top hand on your hip to keep your pelvis from moving  Put your heels together, and keep them together during this exercise  Slowly raise your top knee toward the ceiling  Then lower your leg so your knees are together  Repeat this exercise 10 times  Then switch sides and do the exercise 10 times with the other leg  · Curl up:  Lie on your back with your knees bent and feet flat on the floor  Place your hands, palms down, underneath your lower back  Next, with your elbows on the floor, lift your shoulders and chest 2 to 3 inches off the floor  Keep your head in line with your shoulders  Hold this position  Slowly return to the starting position  · Straight leg raises:  Lie on your back with one leg straight  Bend the other knee and place your foot flat on the floor  Tighten your abdominal muscles  Keep your leg straight and slowly lift it straight up 6 to 12 inches off the floor  Hold this position  Lower your leg slowly  Do as many repetitions as directed on this side  Repeat with the other leg  · Plank:  Lie on your stomach  Bend your elbows and place your forearms flat on the floor  Lift your chest, stomach, and knees off the floor  Make sure your elbows are below your shoulders  Your body should be in a straight line  Do not let your hips or lower back sink to the ground  Squeeze your abdominal muscles together and hold for 15 seconds  To make this exercise harder, hold for 30 seconds or lift 1 leg at a time  · Bicycles:  Lie on your back  Bend both knees and bring them toward your chest  Your calves should be parallel to the floor  Place the palms of your hands on the back of your head  Straighten your right leg and keep it lifted 2 inches off the floor  Raise your head and shoulders off the floor and twist towards your left  Keep your head and shoulders lifted  Bend your right knee while you straighten your left leg  Keep your left leg 2 inches off the floor  Twist your head and chest towards the left leg  Continue to straighten 1 leg at a time and twist        Follow up with your healthcare provider as directed:  Write down your questions so you remember to ask them during your visits  © Copyright [address] Information is for End User's use only and may not be sold, redistributed or otherwise used for commercial purposes  All illustrations and images included in CareNotes® are the copyrighted property of A D A M , Inc  or [employer]   The above information is an  only  It is not intended as medical advice for individual conditions or treatments  Talk to your doctor, nurse or pharmacist before following any medical regimen to see if it is safe and effective for you  Early Postoperative or Post Injury Shoulder Exercises   AMBULATORY CARE:   Before you start an exercise program after an injury or surgery: You may need to wait until your swelling and pain have gone down before you start to exercise  Do not start an exercise program before you talk to your healthcare provider  Before you exercise:  Warm up and stretch before you exercise  Walk or ride a stationary bike for 5 to 10 minutes to help you warm up  Stretching helps increase range of motion  It may also decrease muscle soreness and help prevent another injury  Your healthcare provider will tell you which of the following stretches to do:  · Crossover arm stretch:  Relax your shoulders  Hold your upper arm with the opposite hand  Pull your arm across your chest until you feel a stretch  Hold the stretch for 30 seconds  Return to the starting position  · Shoulder flexion stretch:  Stand facing a wall  Slowly walk your fingers up the wall until you feel a stretch  Hold the stretch for 30 seconds  Return to the starting position  · Sleeper stretch:  Lie on your injured side on a firm, flat surface  Bend the elbow of your injured arm 90° with your hand facing up  Use your arm that is not injured to slowly push your injured arm down  Stop when you feel a stretch at the back of your injured shoulder  Hold the stretch for 30 seconds  Slowly return to the starting position  Contact your healthcare provider if:   · You have sharp or worsening pain during exercise or at rest     · You have questions or concerns about your shoulder exercises  How to perform exercises without a weight or an exercise band:   · Pendulum swings:  Lean forward and rest the arm that is not injured on a table  Do not round your back or lock your knees during the exercise  Let your other arm hang freely by your side  Gently swing your injured arm forward and backward, side to side, and in circles  · Shrugs:  Stand with your arms by your side  Gently lift your shoulders up to your ears and hold for 5 seconds  With your shoulders lifted, pinch your shoulder blades together  Hold for 5 seconds  Slowly return to the starting position  How to exercise with a weight:  Hold a weight with your arm slightly in front of your body  Slowly raise your arm to the side with your thumb pointing up or down as directed  Stop when you reach the level of your shoulder  Hold for as many seconds as directed  Slowly return to the starting position  How to exercise with an exercise band:   · Hold the exercise band with both hands in front of your body  Slowly raise your injured arm up and to the side with your thumb pointing up or down as directed  Stop when you reach the level of your shoulder  Hold for as many seconds as directed  Slowly return to the starting position  · Tie one end of the exercise band to a heavy object  Stand and hold the band in your hand  Bend your elbow   Keep your arm close to your side and pull the band straight back  Squeeze your shoulder blades together as you pull  Slowly return to the starting position  Follow up with your physical therapist as directed:  Write down your questions so you remember to ask them at your visits  © Copyright [address] Information is for End User's use only and may not be sold, redistributed or otherwise used for commercial purposes  All illustrations and images included in CareNotes® are the copyrighted property of A D A M , Inc  or [facility] Janene Chakraborty   The above information is an  only  It is not intended as medical advice for individual conditions or treatments  Talk to your doctor, nurse or pharmacist before following any medical regimen to see if it is safe and effective for you

## 2021-04-12 NOTE — TELEPHONE ENCOUNTER
I called the patient and left him a voicemail to please call our [address]  [facility] office to schedule a follow up appointment

## 2021-05-25 ENCOUNTER — TELEPHONE (OUTPATIENT)
Dept: NEUROLOGY | Facility: CLINIC | Age: 61
End: 2021-05-25

## 2021-05-25 NOTE — TELEPHONE ENCOUNTER
THE [facility] for patient to [facility] and confirm appointment with Dr Izaiah Gregory  Gave direct numbers in [facility]

## 2021-06-11 ENCOUNTER — TELEPHONE (OUTPATIENT)
Dept: NEUROLOGY | Facility: CLINIC | Age: 61
End: 2021-06-11

## 2021-06-11 NOTE — TELEPHONE ENCOUNTER
THE [facility] for patient to [facility] and confirm appointment with Dr Cierra Banegas  Gave direct numbers in [place]

## 2021-06-14 DIAGNOSIS — R53.83 FATIGUE, UNSPECIFIED TYPE: ICD-10-CM

## 2021-06-14 DIAGNOSIS — E55.9 VITAMIN D DEFICIENCY: ICD-10-CM

## 2021-06-14 DIAGNOSIS — Z76.0 MEDICATION REFILL: ICD-10-CM

## 2021-06-14 DIAGNOSIS — R10.13 EPIGASTRIC BURNING SENSATION: ICD-10-CM

## 2021-06-16 RX ORDER — OMEPRAZOLE 40 MG/1
40 CAPSULE, DELAYED RELEASE ORAL DAILY
Qty: 90 CAPSULE | Refills: 3 | Status: SHIPPED | OUTPATIENT
Start: 2021-06-16 | End: 2021-08-13 | Stop reason: SDUPTHER

## 2021-06-16 RX ORDER — AVOBENZONE, HOMOSALATE, OCTISALATE, OCTOCRYLENE 30; 100; 50; 25 MG/ML; MG/ML; MG/ML; MG/ML
2000 SPRAY TOPICAL DAILY
Qty: 180 TABLET | Refills: 3 | Status: SHIPPED | OUTPATIENT
Start: 2021-06-16 | End: 2022-03-14 | Stop reason: SDUPTHER

## 2021-06-16 RX ORDER — LANOLIN ALCOHOL/MO/W.PET/CERES
1000 CREAM (GRAM) TOPICAL DAILY
Qty: 90 TABLET | Refills: 3 | Status: SHIPPED | OUTPATIENT
Start: 2021-06-16 | End: 2021-08-23 | Stop reason: SDUPTHER

## 2021-06-16 RX ORDER — ASPIRIN 81 MG/1
81 TABLET ORAL DAILY
Qty: 90 TABLET | Refills: 3 | Status: SHIPPED | OUTPATIENT
Start: 2021-06-16 | End: 2021-08-13 | Stop reason: SDUPTHER

## 2021-06-16 NOTE — TELEPHONE ENCOUNTER
Patient due for yearly physical in August   Please schedule visit with me as I am unfamiliar with patient  Will refill medications for now

## 2021-06-23 NOTE — TELEPHONE ENCOUNTER
Patient was called and informed he is do for a physical    He was not ready to set up an apt at this time

## 2021-06-24 ENCOUNTER — OFFICE VISIT (OUTPATIENT)
Dept: PODIATRY | Facility: CLINIC | Age: 61
End: 2021-06-24
Payer: COMMERCIAL

## 2021-06-24 VITALS — RESPIRATION RATE: 17 BRPM | BODY MASS INDEX: 30.06 KG/M2 | HEIGHT: 70 IN | WEIGHT: 210 LBS

## 2021-06-24 DIAGNOSIS — M20.41 HAMMER TOES OF BOTH FEET: ICD-10-CM

## 2021-06-24 DIAGNOSIS — M79.672 PAIN IN BOTH FEET: Primary | ICD-10-CM

## 2021-06-24 DIAGNOSIS — B35.9 DERMATOPHYTOSIS: ICD-10-CM

## 2021-06-24 DIAGNOSIS — B35.1 ONYCHOMYCOSIS: ICD-10-CM

## 2021-06-24 DIAGNOSIS — L03.039 PARONYCHIA OF TOENAIL, UNSPECIFIED LATERALITY: ICD-10-CM

## 2021-06-24 DIAGNOSIS — M20.42 HAMMER TOES OF BOTH FEET: ICD-10-CM

## 2021-06-24 DIAGNOSIS — M79.671 PAIN IN BOTH FEET: Primary | ICD-10-CM

## 2021-06-24 PROCEDURE — 99213 OFFICE O/P EST LOW 20 MIN: CPT | Performed by: PODIATRIST

## 2021-06-24 NOTE — PROGRESS NOTES
Assessment/Plan:  Metatarsalgia   Hammertoe formation   Pes planus   Pain   Mycosis of nail   Paronychia      Plan     Foot exam performed   All nails debrided   Patient will benefit from orthotics      He will wear these daily            Subjective:   Patient only has mild pain in his feet with ambulation   He has known radiculopathy                 Past Medical History:   Diagnosis Date    Amnesia memory loss      Anxiety      Chronic fatigue      Disc degeneration, lumbar      Esophageal reflux      GERD (gastroesophageal reflux disease)      Glaucoma      Hiatal hernia      Increased pressure in the eye       both eyes; denies glaucoma     Internal hemorrhoids      Lumbar disc herniation with radiculopathy      Memory loss      Personal history of colonic polyps                     Past Surgical History:   Procedure Laterality Date    COLONOSCOPY N/A 4/25/2016     Procedure: COLONOSCOPY;  Surgeon: Teresita Chapa MD;  Location: [place] [address] GI LAB;  Service:     DENTAL SURGERY         ALL TEETH PULLED    EPIDURAL BLOCK INJECTION        EPIDURAL BLOCK INJECTION Right 12/28/2016     Procedure: BLOCK / INJECTION EPIDURAL STEROID TRANSFORAMINAL  L4-5;  Surgeon: Parrish Velasquez MD;  Location: [place] [address] MAIN OR;  Service:     EPIDURAL BLOCK INJECTION Right 11/16/2017     Procedure: BLOCK / INJECTION EPIDURAL STEROID TRANSFORAMINAL L4-5 RIGHT;  Surgeon: Parrish Velasquez MD;  Location: [place] [address] MAIN OR;  Service: Pain Management     EPIDURAL BLOCK INJECTION Left 5/10/2018     Procedure: Lt L4 L5 Tfesi (79823,28012);  Surgeon: Parrish Velasquez MD;  Location: [place] [address] MAIN OR;  Service: Pain Management     ESOPHAGOGASTRODUODENOSCOPY N/A 4/25/2016     Procedure: ESOPHAGOGASTRODUODENOSCOPY (EGD);  Surgeon: Teresita Chapa MD;  Location: [place] [address] GI LAB;  Service:     NOSE SURGERY                       Allergies   Allergen Reactions    Gabapentin Other (See Comments)       OUT OF BODY PSYCHOSIS, MEMORY LOSS, AMNESIA    Other Cough       HAYFEVER, SEASONAL, ALFALFA            Current Outpatient Prescriptions:     aspirin 81 MG tablet, Take 81 mg by mouth daily  , Disp: , Rfl:     omeprazole (PriLOSEC) 20 mg delayed release capsule, Take 1 capsule (20 mg total) by mouth daily, Disp: 30 capsule, Rfl: 3    ranitidine (ZANTAC) 300 MG tablet, Take 1 tablet (300 mg total) by mouth daily at bedtime, Disp: 30 tablet, Rfl: 3    tamsulosin (FLOMAX) 0 4 mg, Take 0 4 mg by mouth daily with dinner, Disp: , Rfl:     timolol (BETIMOL) 0 5 % ophthalmic solution, Administer 1 drop to both eyes 2 (two) times a day , Disp: , Rfl:     timolol (TIMOPTIC) 0 5 % ophthalmic solution, INSTILL 1 DROP INTO BOTH EYES TWO TIMES A DAY, Disp: , Rfl: 0    citalopram (CeleXA) 10 mg tablet, Take 3 tablets (30 mg total) by mouth daily for 90 days, Disp: 270 tablet, Rfl: 0    gabapentin (NEURONTIN) 300 mg capsule, Take 2 capsules (600 mg total) by mouth 3 (three) times a day, Disp: 90 capsule, Rfl: 3    gabapentin (NEURONTIN) 600 MG tablet, , Disp: , Rfl: 0           Patient Active Problem List   Diagnosis    Chronic pain syndrome    Chronic low back pain    Intervertebral disc disorder with radiculopathy of lumbar region    Spondylosis of lumbar region without myelopathy or radiculopathy    Abnormal chromosomal analysis    Adult-onset obesity    Anxiety    Elevated PSA    Enlarged prostate on rectal examination    Gastroesophageal reflux disease without esophagitis    Lumbar disc herniation with radiculopathy    Myofascial pain syndrome    Chronic fatigue    Memory loss    Glaucoma    Neuritis or radiculitis due to rupture of lumbar intervertebral disc    Low back pain             Patient ID: Jerry Lopez is a 60 y o  male      Objective:      Foot Exam     General  General Appearance: appears stated age and healthy   Orientation: alert and oriented to person, place, and time   Affect: appropriate   Gait: antalgic         Right Foot/Ankle      Inspection and Palpation  Ecchymosis: none  Tenderness: bony tenderness and metatarsals   Swelling: metatarsals   Arch: pes planus  Hammertoes: fourth toe and fifth toe  Hallux valgus: no  Hallux limitus: yes  Skin Exam: callus;      Neurovascular  Dorsalis pedis: 2+  Posterior tibial: 2+  Saphenous nerve sensation: diminished  Tibial nerve sensation: diminished  Superficial peroneal nerve sensation: diminished  Deep peroneal nerve sensation: diminished  Sural nerve sensation: diminished  Achilles reflex: 1+     Muscle Strength  Ankle dorsiflexion: 4+     Tests  Lateral squeeze: positive      Comments  4th and 5th toes bilateral are in a abducto varus position  Pinch callus formation of 4th and 5th toes      Left Foot/Ankle       Inspection and Palpation  Ecchymosis: none  Tenderness: bony tenderness and metatarsals   Swelling: metatarsals   Arch: pes planus  Hammertoes: fourth toe and fifth toe  Hallux valgus: no  Hallux limitus: yes  Skin Exam: callus;      Neurovascular  Dorsalis pedis: 2+  Posterior tibial: 2+  Saphenous nerve sensation: diminished  Tibial nerve sensation: diminished  Superficial peroneal nerve sensation: diminished  Deep peroneal nerve sensation: diminished  Sural nerve sensation: diminished  Achilles reflex: 1+     Muscle Strength  Ankle dorsiflexion: 4+     Tests  Too many toes: positive      Comments  Patient is maximally pronated in stance and gait   There is transverse deviation of the DIPJ 4th toe bilateral   Physical Exam   Cardiovascular:   Pulses:       Dorsalis pedis pulses are 2+ on the right side, and 2+ on the left side         Posterior tibial pulses are 2+ on the right side, and 2+ on the left side  Musculoskeletal:        Right foot: There is bony tenderness         Left foot: There is bony tenderness  Feet:   Right Foot:   Skin Integrity: Positive for callus  Left Foot:   Skin Integrity: Positive for callus     Neurological:   Reflex Scores:       Achilles reflexes are 1+ on the right side and 1+ on the left side

## 2021-07-13 DIAGNOSIS — G31.84 MCI (MILD COGNITIVE IMPAIRMENT): ICD-10-CM

## 2021-07-13 RX ORDER — RIVASTIGMINE TARTRATE 3 MG/1
CAPSULE ORAL
Qty: 60 CAPSULE | Refills: 2 | Status: SHIPPED | OUTPATIENT
Start: 2021-07-13 | End: 2021-08-23 | Stop reason: SDUPTHER

## 2021-07-14 NOTE — TELEPHONE ENCOUNTER
I called the patient and left a message to call us back to schedule a follow up appointment  He had cancelled his June appt

## 2021-07-23 DIAGNOSIS — R09.82 POST-NASAL DRIP: ICD-10-CM

## 2021-07-26 ENCOUNTER — TELEPHONE (OUTPATIENT)
Dept: NEUROLOGY | Facility: CLINIC | Age: 61
End: 2021-07-26

## 2021-07-26 NOTE — TELEPHONE ENCOUNTER
THE [facility] for patient to [facility] and confirm appointment with Dr Prem Arevalo  Gave direct numbers in [place]

## 2021-07-29 DIAGNOSIS — R09.82 POST-NASAL DRIP: ICD-10-CM

## 2021-07-29 RX ORDER — FEXOFENADINE HCL 180 MG
TABLET ORAL
Qty: 90 TABLET | Refills: 3 | Status: SHIPPED | OUTPATIENT
Start: 2021-07-29 | End: 2021-08-23 | Stop reason: SDUPTHER

## 2021-07-29 NOTE — TELEPHONE ENCOUNTER
Patient called about refills  Patient needs new refills ASAP  He has been w/o med for 5 days  Dr Greg De La Paz, can you please fill this med as Dr Jasper Gilbert is off rotation? He is scheduled for a CPE with Dr Jasper Gilbert 8/28

## 2021-08-10 RX ORDER — FEXOFENADINE HCL 180 MG
TABLET ORAL
Qty: 90 TABLET | Refills: 3 | OUTPATIENT
Start: 2021-08-10

## 2021-08-20 ENCOUNTER — OFFICE VISIT (OUTPATIENT)
Dept: FAMILY MEDICINE CLINIC | Facility: CLINIC | Age: 61
End: 2021-08-20
Payer: COMMERCIAL

## 2021-08-20 ENCOUNTER — TELEPHONE (OUTPATIENT)
Dept: UROLOGY | Facility: CLINIC | Age: 61
End: 2021-08-20

## 2021-08-20 VITALS
DIASTOLIC BLOOD PRESSURE: 80 MMHG | SYSTOLIC BLOOD PRESSURE: 110 MMHG | HEART RATE: 107 BPM | TEMPERATURE: 99 F | HEIGHT: 70 IN | WEIGHT: 212 LBS | OXYGEN SATURATION: 97 % | RESPIRATION RATE: 18 BRPM | BODY MASS INDEX: 30.35 KG/M2

## 2021-08-20 DIAGNOSIS — Z00.00 ANNUAL PHYSICAL EXAM: ICD-10-CM

## 2021-08-20 DIAGNOSIS — M51.16 LUMBAR DISC HERNIATION WITH RADICULOPATHY: ICD-10-CM

## 2021-08-20 DIAGNOSIS — M54.12 CERVICAL RADICULOPATHY: ICD-10-CM

## 2021-08-20 PROCEDURE — 99396 PREV VISIT EST AGE 40-64: CPT | Performed by: FAMILY MEDICINE

## 2021-08-20 NOTE — PROGRESS NOTES
[address] [facility]    NAME: Kun Sandhu  AGE: 64 y o  SEX: male  : 1960     DATE: 2021     Assessment and Plan:     Problem List Items Addressed This Visit        Nervous and Auditory    Lumbar disc herniation with radiculopathy    Relevant Orders    Ambulatory referral to Physical Therapy    Cervical radiculopathy    Relevant Orders    Ambulatory referral to Physical Therapy      Other Visit Diagnoses     BMI 30 0-30 9,adult    -  Primary    Relevant Orders    Lipid panel    TSH, 3rd generation    HEMOGLOBIN A1C W/ EAG ESTIMATION    Annual physical exam              Immunizations and preventive care screenings were discussed with patient today  Appropriate education was printed on patient's after visit summary  Counseling:  Alcohol/drug use: discussed moderation in alcohol intake, the recommendations for healthy alcohol use, and avoidance of illicit drug use  Dental Health: discussed importance of regular tooth brushing, flossing, and dental visits  Injury prevention: discussed safety/seat belts, safety helmets, smoke detectors, carbon dioxide detectors, and smoking near bedding or upholstery  Sexual health: discussed sexually transmitted diseases, partner selection, use of condoms, avoidance of unintended pregnancy, and contraceptive alternatives  · Exercise: the importance of regular exercise/physical activity was discussed  Recommend exercise 3-5 times per week for at least 30 minutes  Return in 1 year (on 2022)  Chief Complaint:     Chief Complaint   Patient presents with    Annual Exam     annual  exam,feeling same still having chronic back pain,no new food or drug allergies  History of Present Illness:     Adult Annual Physical   Patient here for a comprehensive physical exam  The patient reports problems - chronic back pain  He sees Dr Naveen Ramos for pain management   He is open to trying out PT  Diet and Physical Activity  · Diet/Nutrition: well balanced diet  · Exercise: no formal exercise  Depression Screening  PHQ-9 Depression Screening    PHQ-9:   Frequency of the following problems over the past two weeks:           General Health  · Sleep: sleeps poorly  Due to frequent bathroom use 2/2 BPA  · Hearing: normal - bilateral   · Vision: no vision problems and wears glasses  · Dental: regular dental visits and brushes teeth twice daily   Health  · Symptoms include: nocturia     Review of Systems:     Review of Systems   Respiratory: Negative for shortness of breath  Cardiovascular: Negative for chest pain  Chronic low back pain, nocturia     Past Medical History:     Past Medical History:   Diagnosis Date    Abdominal pain 10/24/2018    Amnesia memory loss     Anxiety     Chronic fatigue     Disc degeneration, lumbar     Esophageal reflux     GERD (gastroesophageal reflux disease)     Glaucoma     Hiatal hernia     Increased pressure in the eye     both eyes; denies glaucoma     Internal hemorrhoids     Lumbar disc herniation with radiculopathy     Memory loss     Pain in both feet 6/18/2018    Personal history of colonic polyps       Past Surgical History:     Past Surgical History:   Procedure Laterality Date    COLONOSCOPY N/A 4/25/2016    Procedure: COLONOSCOPY;  Surgeon: Temi Laughlin MD;  Location: [facility] GI [facility];   Service:     DENTAL SURGERY      ALL TEETH PULLED    EPIDURAL BLOCK INJECTION      EPIDURAL BLOCK INJECTION Right 12/28/2016    Procedure: BLOCK / INJECTION EPIDURAL STEROID TRANSFORAMINAL  L4-5;  Surgeon: Franklyn Garcia MD;  Location: [facility] MAIN OR;  Service:     EPIDURAL BLOCK INJECTION Right 11/16/2017    Procedure: BLOCK / INJECTION EPIDURAL STEROID TRANSFORAMINAL L4-5 RIGHT;  Surgeon: Franklyn Garcia MD;  Location: [facility] MAIN OR;  Service: Pain Management     EPIDURAL BLOCK INJECTION Left 5/10/2018    Procedure: Lt L4 L5 Tfesi (61442,19771); Surgeon: Toñito Loya MD;  Location: [facility];  Service: Pain Management     EPIDURAL BLOCK INJECTION Right 3/7/2019    Procedure: L4 L5 Transforaminal Epidural Steroid Injection ([id number]); Surgeon: Toñito Loya MD;  Location: [facility] MAIN OR;  Service: Pain Management     ESOPHAGOGASTRODUODENOSCOPY N/A 2016    Procedure: ESOPHAGOGASTRODUODENOSCOPY (EGD); Surgeon: Helio Casanova MD;  Location: [facility]; Service:     NOSE SURGERY        Family History:     Family History   Problem Relation Age of Onset    Stomach cancer Maternal Grandmother     Hypertension Mother     Diabetes Mother     Cataracts Father     Skin cancer Father     Stroke Father         2X    Aneurysm Father     No Known Problems Sister     No Known Problems Brother     Throat cancer Brother       Social History:     Social History     Socioeconomic History    Marital status: Single     Spouse name: None    Number of children: None    Years of education: None    Highest education level: None   Occupational History    None   Tobacco Use    Smoking status: Former Smoker     Packs/day: 0 50     Years: 19 00     Pack years: 9 50     Types: Cigarettes     Quit date:      Years since quittin 6    Smokeless tobacco: Never Used   Vaping Use    Vaping Use: Never used   Substance and Sexual Activity    Alcohol use: No    Drug use: No    Sexual activity: Not Currently   Other Topics Concern    None   Social History Narrative    None     Social Determinants of Health     Financial Resource Strain:     Difficulty of Paying Living Expenses:    Food Insecurity:     Worried About Running Out of Food in the Last Year:     Ran Out of Food in the Last Year:    Transportation Needs:     Lack of Transportation (Medical):      Lack of Transportation (Non-Medical):    Physical Activity:     Days of Exercise per Week:     Minutes of Exercise per Session:    Stress:     Feeling of Stress : Social Connections:     Frequency of Communication with Friends and Family:     Frequency of Social Gatherings with Friends and Family:     Attends Restorationist Services:     Active Member of Clubs or Organizations:     Attends Club or Organization Meetings:     Marital Status:    Intimate Partner Violence:     Fear of Current or Ex-Partner:     Emotionally Abused:     Physically Abused:     Sexually Abused:       Current Medications:     Current Outpatient Medications   Medication Sig Dispense Refill    aspirin (ECOTRIN LOW STRENGTH) 81 mg EC tablet Take 1 tablet (81 mg total) by mouth daily 90 tablet 0    Cholecalciferol (RA Vitamin D-3) 25 MCG (1000 UT) tablet Take 2 tablets (2,000 Units total) by mouth daily 180 tablet 3    dicyclomine (BENTYL) 10 mg capsule Take 1 capsule (10 mg total) by mouth 4 (four) times a day (before meals and at bedtime) 120 capsule 0    famotidine (PEPCID) 40 MG tablet Take 1 tablet (40 mg total) by mouth daily at bedtime 90 tablet 3    methocarbamol (ROBAXIN) 500 mg tablet Take 1 tablet (500 mg total) by mouth 3 (three) times a day as needed for muscle spasms 60 tablet 2    omeprazole (PriLOSEC) 40 MG capsule Take 1 capsule (40 mg total) by mouth daily 90 capsule 0    RA Allergy Relief 180 MG tablet take 1 tablet by mouth once daily 90 tablet 3    rivastigmine (EXELON) 3 mg capsule take 1 capsule by mouth twice a day 60 capsule 2    tamsulosin (FLOMAX) 0 4 mg Take 2 capsules (0 8 mg total) by mouth daily at bedtime 90 capsule 0    vitamin B-12 (VITAMIN B-12) 1,000 mcg tablet Take 1 tablet (1,000 mcg total) by mouth daily 90 tablet 3    benzonatate (TESSALON) 200 MG capsule Take 1 capsule (200 mg total) by mouth 3 (three) times a day as needed for cough (Patient not taking: Reported on 3/3/2020) 20 capsule 0    benzonatate (TESSALON) 200 MG capsule Take 1 capsule (200 mg total) by mouth 3 (three) times a day as needed for cough (Patient not taking: Reported on 4/12/2021) 20 capsule 0    famotidine (PEPCID) 20 mg tablet take 2 tablets by mouth at bedtime (Patient not taking: Reported on 4/12/2021) 30 tablet 11    ketoconazole (NIZORAL) 2 % cream Apply topically daily for 30 days 60 g 5    timolol (TIMOPTIC) 0 5 % ophthalmic solution Apply 1 drop to eye daily (Patient not taking: Reported on 4/12/2021) 15 mL 5     No current facility-administered medications for this visit  Allergies: Allergies   Allergen Reactions    Gabapentin Other (See Comments)     OUT OF BODY PSYCHOSIS, MEMORY LOSS, AMNESIA    Other Cough     HAYFEVER, SEASONAL, ALFALFA      Physical Exam:     /80 (BP Location: Left arm, Patient Position: Sitting, Cuff Size: Standard)   Pulse (!) 107   Temp 99 °F (37 2 °C) (Tympanic)   Resp 18   Ht 5' 10" (1 778 m)   Wt 96 2 kg (212 lb)   SpO2 97%   BMI 30 42 kg/m²     Physical Exam  Vitals and nursing note reviewed  Constitutional:       General: He is not in acute distress  Appearance: He is well-developed  He is obese  He is not ill-appearing, toxic-appearing or diaphoretic  HENT:      Head: Normocephalic and atraumatic  Right Ear: Tympanic membrane, ear canal and external ear normal  There is no impacted cerumen  Left Ear: Tympanic membrane, ear canal and external ear normal  There is no impacted cerumen  Nose: Nose normal       Mouth/Throat:      Mouth: Mucous membranes are moist       Pharynx: Oropharynx is clear  No oropharyngeal exudate or posterior oropharyngeal erythema  Eyes:      Extraocular Movements: Extraocular movements intact  Conjunctiva/sclera: Conjunctivae normal       Pupils: Pupils are equal, round, and reactive to light  Cardiovascular:      Rate and Rhythm: Normal rate and regular rhythm  Pulses: Normal pulses  Heart sounds: Normal heart sounds  No murmur heard  Pulmonary:      Effort: Pulmonary effort is normal  No respiratory distress        Breath sounds: Normal breath sounds  Abdominal:      General: Abdomen is flat  Bowel sounds are normal       Palpations: Abdomen is soft  Tenderness: There is no abdominal tenderness  Genitourinary:     Comments: [address] [facility] declines exam  Musculoskeletal:         General: Normal range of motion  Cervical back: Normal range of motion and neck supple  Right lower leg: No edema  Left lower leg: No edema  Lymphadenopathy:      Cervical: No cervical adenopathy  Skin:     General: Skin is warm and dry  Neurological:      Mental Status: He is alert and oriented to person, place, and time  Cranial Nerves: No cranial nerve deficit  Motor: No weakness  Gait: Gait normal    Psychiatric:         Thought Content:  Thought content normal          Judgment: Judgment normal           Lawrence Golden DO  [address]

## 2021-08-20 NOTE — PATIENT INSTRUCTIONS

## 2021-08-20 NOTE — TELEPHONE ENCOUNTER
Patient stopped in at [facility] office questioning last visit with Cristopher Mayo and last colonoscopy  Advised that he had a virtual visit in April 2020 and in that note, last colonoscopy was 2016  Patient states his doctor recommended screening colonoscopy every 5 years and he is due  Can you please connect with this patient to offer screening and make sure referral is in place?

## 2021-08-23 DIAGNOSIS — R53.83 FATIGUE, UNSPECIFIED TYPE: ICD-10-CM

## 2021-08-23 DIAGNOSIS — S13.9XXD NECK SPRAIN, SUBSEQUENT ENCOUNTER: ICD-10-CM

## 2021-08-23 DIAGNOSIS — R09.82 POST-NASAL DRIP: ICD-10-CM

## 2021-08-23 DIAGNOSIS — R10.32 LLQ ABDOMINAL PAIN: ICD-10-CM

## 2021-08-23 DIAGNOSIS — Z76.0 MEDICATION REFILL: ICD-10-CM

## 2021-08-23 DIAGNOSIS — N40.0 ENLARGED PROSTATE ON RECTAL EXAMINATION: ICD-10-CM

## 2021-08-23 DIAGNOSIS — R97.20 ELEVATED PSA: ICD-10-CM

## 2021-08-23 DIAGNOSIS — G31.84 MCI (MILD COGNITIVE IMPAIRMENT): ICD-10-CM

## 2021-08-23 DIAGNOSIS — K21.9 GASTROESOPHAGEAL REFLUX DISEASE: ICD-10-CM

## 2021-08-23 DIAGNOSIS — R10.13 EPIGASTRIC BURNING SENSATION: ICD-10-CM

## 2021-08-23 RX ORDER — RIVASTIGMINE TARTRATE 3 MG/1
3 CAPSULE ORAL 2 TIMES DAILY
Qty: 60 CAPSULE | Refills: 2 | Status: SHIPPED | OUTPATIENT
Start: 2021-08-23 | End: 2021-11-30

## 2021-08-23 RX ORDER — LANOLIN ALCOHOL/MO/W.PET/CERES
1000 CREAM (GRAM) TOPICAL DAILY
Qty: 90 TABLET | Refills: 3 | Status: SHIPPED | OUTPATIENT
Start: 2021-08-23 | End: 2022-03-14 | Stop reason: SDUPTHER

## 2021-08-23 RX ORDER — FAMOTIDINE 40 MG/1
40 TABLET, FILM COATED ORAL
Qty: 90 TABLET | Refills: 3 | Status: SHIPPED | OUTPATIENT
Start: 2021-08-23 | End: 2021-08-23 | Stop reason: SDUPTHER

## 2021-08-23 RX ORDER — OMEPRAZOLE 40 MG/1
40 CAPSULE, DELAYED RELEASE ORAL DAILY
Qty: 90 CAPSULE | Refills: 0 | Status: SHIPPED | OUTPATIENT
Start: 2021-08-23 | End: 2021-10-06 | Stop reason: SDUPTHER

## 2021-08-23 RX ORDER — ASPIRIN 81 MG/1
81 TABLET ORAL DAILY
Qty: 90 TABLET | Refills: 0 | Status: SHIPPED | OUTPATIENT
Start: 2021-08-23 | End: 2021-10-06 | Stop reason: SDUPTHER

## 2021-08-23 RX ORDER — DICYCLOMINE HYDROCHLORIDE 10 MG/1
10 CAPSULE ORAL
Qty: 120 CAPSULE | Refills: 0 | Status: SHIPPED | OUTPATIENT
Start: 2021-08-23 | End: 2021-10-06

## 2021-08-23 RX ORDER — FEXOFENADINE HCL 180 MG/1
180 TABLET ORAL DAILY
Qty: 90 TABLET | Refills: 3 | Status: SHIPPED | OUTPATIENT
Start: 2021-08-23 | End: 2022-03-14 | Stop reason: SDUPTHER

## 2021-08-23 RX ORDER — METHOCARBAMOL 500 MG/1
500 TABLET, FILM COATED ORAL 3 TIMES DAILY PRN
Qty: 60 TABLET | Refills: 2 | Status: SHIPPED | OUTPATIENT
Start: 2021-08-23 | End: 2022-03-14 | Stop reason: SDUPTHER

## 2021-08-23 RX ORDER — FAMOTIDINE 40 MG/1
40 TABLET, FILM COATED ORAL
Qty: 90 TABLET | Refills: 3 | Status: SHIPPED | OUTPATIENT
Start: 2021-08-23 | End: 2022-03-14 | Stop reason: SDUPTHER

## 2021-08-23 RX ORDER — TAMSULOSIN HYDROCHLORIDE 0.4 MG/1
0.8 CAPSULE ORAL
Qty: 90 CAPSULE | Refills: 0 | Status: SHIPPED | OUTPATIENT
Start: 2021-08-23 | End: 2021-10-06 | Stop reason: SDUPTHER

## 2021-08-23 NOTE — TELEPHONE ENCOUNTER
famotidine (PEPCID) 40 MG tablet     Patient is calling very upset that his medication was not sent over to [facility] on Friday after his office visit

## 2021-08-23 NOTE — TELEPHONE ENCOUNTER
Patient is requesting All of his medication be called in for the next year  States she does NOT want to keep calling the office for refills, and to please call him if this request in a problem          [phone]

## 2021-08-24 NOTE — TELEPHONE ENCOUNTER
Called patient and left message, patient is over due for 1 year follow up with Dr Ayleen oMscoso which at that time per Dr Mayuri Fuentes notes he will decide if patient needs another colonoscopy and EGD

## 2021-08-25 ENCOUNTER — OFFICE VISIT (OUTPATIENT)
Dept: NEUROLOGY | Facility: CLINIC | Age: 61
End: 2021-08-25
Payer: COMMERCIAL

## 2021-08-25 VITALS
SYSTOLIC BLOOD PRESSURE: 123 MMHG | WEIGHT: 208 LBS | HEIGHT: 70 IN | HEART RATE: 109 BPM | TEMPERATURE: 97.3 F | BODY MASS INDEX: 29.78 KG/M2 | DIASTOLIC BLOOD PRESSURE: 72 MMHG

## 2021-08-25 DIAGNOSIS — F43.9 STRESS AT HOME: ICD-10-CM

## 2021-08-25 DIAGNOSIS — G31.84 MCI (MILD COGNITIVE IMPAIRMENT): Primary | ICD-10-CM

## 2021-08-25 DIAGNOSIS — F43.10 PTSD (POST-TRAUMATIC STRESS DISORDER): ICD-10-CM

## 2021-08-25 PROCEDURE — 1036F TOBACCO NON-USER: CPT | Performed by: PSYCHIATRY & NEUROLOGY

## 2021-08-25 PROCEDURE — 3008F BODY MASS INDEX DOCD: CPT | Performed by: PSYCHIATRY & NEUROLOGY

## 2021-08-25 PROCEDURE — 99213 OFFICE O/P EST LOW 20 MIN: CPT | Performed by: PSYCHIATRY & NEUROLOGY

## 2021-08-25 NOTE — PROGRESS NOTES
Return NeuroOutpatient Note        Celina Butcher  [id number]  64 y o   1960       CC: memory disturbance       History obtained from:  Patient     HPI/Subjective:  Celina Butcher is a 63 yo M with PMH of MCI presents as f/u  Per my previous history, on May 19th, 2018 patient was argumentative, forgetful and then went into shower  After 20 min, he was walking in hallway, couldn't understand simple conversation  He then asked what day is it  He then kept repeating same thing over and over  He thought president was eWise  He was not oriented to month or day  Memory disturbance lasted from 10:30 until 5pm  He kept persevering over same questions  He had CT brain which was negative  Lab work was essentially normal  We had ordered MRI brain neuro quant which was normal  His EEG did however reveal 2 separate independent temporal sharps to clinically correlate  He had a lot of issues with medications earlier so we had deferred an AED  Patient's repeat EEG in June of 2019 was normal  Patient denies any seizure like activity  He may get mental fogginess  Patient is placed on exelon for his memory  He hasn't seen much difference  Patient was referred for neuro psych testing  He finally had this completed in Oct of 2019  Report stated reduced mild memory capacity and initial learning for non verbal memory  He overall scored at average level for his age  They wanted to place more emphasis on possible seizures  Patient was advised to go for psychotherapy  He's not going for it  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   He would like to quit if he could       He had MRI brain neuroquant as well and it was normal in June 2018       All of reversible etiology labs ordered previously were all normal except mildly low vit D level for which he takes supplement of 2000units/day       He has h/o suffering from PTSD      His father and grandfather do have history of dementia        Past Medical History:   Diagnosis Date    Abdominal pain 10/24/2018    Amnesia memory loss     Anxiety     Chronic fatigue     Disc degeneration, lumbar     Esophageal reflux     GERD (gastroesophageal reflux disease)     Glaucoma     Hiatal hernia     Increased pressure in the eye     both eyes; denies glaucoma     Internal hemorrhoids     Lumbar disc herniation with radiculopathy     Memory loss     Pain in both feet 2018    Personal history of colonic polyps      Social History     Socioeconomic History    Marital status: Single     Spouse name: Not on file    Number of children: Not on file    Years of education: Not on file    Highest education level: Not on file   Occupational History    Not on file   Tobacco Use    Smoking status: Former Smoker     Packs/day: 0 50     Years: 19 00     Pack years: 9 50     Types: Cigarettes     Quit date:      Years since quittin 6    Smokeless tobacco: Never Used   Vaping Use    Vaping Use: Never used   Substance and Sexual Activity    Alcohol use: No    Drug use: No    Sexual activity: Not Currently   Other Topics Concern    Not on file   Social History Narrative    Not on file     Social Determinants of Health     Financial Resource Strain:     Difficulty of Paying Living Expenses:    Food Insecurity:     Worried About Running Out of Food in the Last Year:     Ran Out of Food in the Last Year:    Transportation Needs:     Lack of Transportation (Medical):      Lack of Transportation (Non-Medical):    Physical Activity:     Days of Exercise per Week:     Minutes of Exercise per Session:    Stress:     Feeling of Stress :    Social Connections:     Frequency of Communication with Friends and Family:     Frequency of Social Gatherings with Friends and Family:     Attends Muslim Services:     Active Member of Clubs or Organizations:     Attends Club or Organization Meetings:     Marital Status:    Intimate Partner Violence:     Fear of Current or Ex-Partner:     Emotionally Abused:     Physically Abused:     Sexually Abused:      Family History   Problem Relation Age of Onset    Stomach cancer Maternal Grandmother     Hypertension Mother     Diabetes Mother     Cataracts Father     Skin cancer Father     Stroke Father         2X    Aneurysm Father     No Known Problems Sister     No Known Problems Brother     Throat cancer Brother      Allergies   Allergen Reactions    Gabapentin Other (See Comments)     OUT OF BODY PSYCHOSIS, MEMORY LOSS, AMNESIA    Other Cough     HAYFEVER, SEASONAL, ALFALFA     Current Outpatient Medications on File Prior to Visit   Medication Sig Dispense Refill    aspirin (ECOTRIN LOW STRENGTH) 81 mg EC tablet Take 1 tablet (81 mg total) by mouth daily 90 tablet 0    Cholecalciferol (RA Vitamin D-3) 25 MCG (1000 UT) tablet Take 2 tablets (2,000 Units total) by mouth daily 180 tablet 3    dicyclomine (BENTYL) 10 mg capsule Take 1 capsule (10 mg total) by mouth 4 (four) times a day (before meals and at bedtime) 120 capsule 0    famotidine (PEPCID) 40 MG tablet Take 1 tablet (40 mg total) by mouth daily at bedtime 90 tablet 3    fexofenadine (RA Allergy Relief) 180 MG tablet Take 1 tablet (180 mg total) by mouth daily (Patient taking differently: Take 180 mg by mouth daily allegra) 90 tablet 3    ketoconazole (NIZORAL) 2 % cream Apply topically daily for 30 days (Patient taking differently: Apply topically as needed ) 60 g 5    methocarbamol (ROBAXIN) 500 mg tablet Take 1 tablet (500 mg total) by mouth 3 (three) times a day as needed for muscle spasms 60 tablet 2    omeprazole (PriLOSEC) 40 MG capsule Take 1 capsule (40 mg total) by mouth daily 90 capsule 0    rivastigmine (EXELON) 3 mg capsule Take 1 capsule (3 mg total) by mouth 2 (two) times a day 60 capsule 2    tamsulosin (FLOMAX) 0 4 mg Take 2 capsules (0 8 mg total) by mouth daily at bedtime 90 capsule 0    vitamin B-12 (VITAMIN B-12) 1,000 mcg tablet Take 1 tablet (1,000 mcg total) by mouth daily 90 tablet 3    benzonatate (TESSALON) 200 MG capsule Take 1 capsule (200 mg total) by mouth 3 (three) times a day as needed for cough (Patient not taking: Reported on 3/3/2020) 20 capsule 0    benzonatate (TESSALON) 200 MG capsule Take 1 capsule (200 mg total) by mouth 3 (three) times a day as needed for cough (Patient not taking: Reported on 4/12/2021) 20 capsule 0    timolol (TIMOPTIC) 0 5 % ophthalmic solution Apply 1 drop to eye daily (Patient not taking: Reported on 4/12/2021) 15 mL 5     No current facility-administered medications on file prior to visit  Review of Systems   Refer to positive review of systems in HPI     Review of Systems    Constitutional- No fever  Eyes- No visual change  ENT- Hearing normal  CV- No chest pain  Resp- No Shortness of breath  GI- No diarrhea  - Bladder normal  MS- No Arthritis   Skin- No rash  Psych- No depression  Endo- No DM  Heme- No nodes    Vitals:    08/25/21 1122   BP: 123/72   BP Location: Left arm   Patient Position: Sitting   Cuff Size: Adult   Pulse: (!) 109   Temp: (!) 97 3 °F (36 3 °C)   Weight: 94 3 kg (208 lb)   Height: 5' 10" (1 778 m)       PHYSICAL EXAM:  Appearance: No Acute Distress  Ophthalmoscopic: Disc Flat, Normal fundus  Mental status:  Orientation: Awake, Alert, and Orientedx3  Memory: Registation 3/3 Recall 2/3  Attention: normal  Knowledge: good  Language: No aphasia  Speech: No dysarthria  Cranial Nerves:  2 No Visual Defect on Confrontation, Pupils round, equal, reactive to light  3,4,6 Extraocular Movements Intact, no nystagmus  5 Facial Sensation Intact  7 No facial asymmetry  8 Intact hearing  9,10 Palate symmetric, normal gag  11 Good shoulder shrug  12 Tongue Midline  Gait: Stable  Coordination: No ataxia with finger to nose testing, and heel to shin  Sensory: Intact, Symmetric to pinprick, light touch, vibration, and joint position  Muscle Tone: Normal              Muscle exam:  Arm Right Left Leg Right Left   Deltoid 5/5 5/5 Iliopsoas 5/5 5/5   Biceps 5/5 5/5 Quads 5/5 5/5   Triceps 5/5 5/5 Hamstrings 5/5 5/5   Wrist Extension 5/5 5/5 Ankle Dorsi Flexion 5/5 5/5   Wrist Flexion 5/5 5/5 Ankle Plantar Flexion 5/5 5/5   Interossei 5/5 5/5 Ankle Eversion 5/5 5/5   APB 5/5 5/5 Ankle Inversion 5/5 5/5       Reflexes   RJ BJ TJ KJ AJ Plantars Rankin's   Right 2+ 2+ 2+ 2+ 2+ Downgoing Not present   Left 2+ 2+ 2+ 2+ 2+ Downgoing Not present     Personal review of  Labs:                    Diagnoses and all orders for this visit:      1  MCI (mild cognitive impairment)     2  Stress at home     3  PTSD (post-traumatic stress disorder)         Patient has remained stable  It's not clear whether exelon is making any difference  Will wean it off and see how it affects him  He previously had side effects to gabapentin  Encouraged cognitive exercises  We have discussed that he could use psychotherapy for his anxiety but he doesn't wish to                 Total time of encounter:  30 min  More than 50% of the time was used in counseling and/or coordination of care  Extent of counseling and/or coordination of care        Parrish Joseph MD  [facility]  [address]  [address]  [phone]

## 2021-10-06 DIAGNOSIS — Z76.0 MEDICATION REFILL: ICD-10-CM

## 2021-10-06 DIAGNOSIS — N40.0 ENLARGED PROSTATE ON RECTAL EXAMINATION: ICD-10-CM

## 2021-10-06 DIAGNOSIS — R97.20 ELEVATED PSA: ICD-10-CM

## 2021-10-06 DIAGNOSIS — R10.32 LLQ ABDOMINAL PAIN: ICD-10-CM

## 2021-10-06 DIAGNOSIS — R10.13 EPIGASTRIC BURNING SENSATION: ICD-10-CM

## 2021-10-06 RX ORDER — DICYCLOMINE HYDROCHLORIDE 10 MG/1
CAPSULE ORAL
Qty: 120 CAPSULE | Refills: 0 | Status: SHIPPED | OUTPATIENT
Start: 2021-10-06 | End: 2021-10-06 | Stop reason: SDUPTHER

## 2021-10-06 RX ORDER — ASPIRIN 81 MG/1
81 TABLET ORAL DAILY
Qty: 30 TABLET | Refills: 5 | Status: SHIPPED | OUTPATIENT
Start: 2021-10-06 | End: 2022-03-14 | Stop reason: SDUPTHER

## 2021-10-06 RX ORDER — OMEPRAZOLE 40 MG/1
40 CAPSULE, DELAYED RELEASE ORAL DAILY
Qty: 30 CAPSULE | Refills: 5 | Status: SHIPPED | OUTPATIENT
Start: 2021-10-06 | End: 2022-03-14 | Stop reason: SDUPTHER

## 2021-10-06 RX ORDER — TAMSULOSIN HYDROCHLORIDE 0.4 MG/1
0.8 CAPSULE ORAL
Qty: 60 CAPSULE | Refills: 5 | Status: SHIPPED | OUTPATIENT
Start: 2021-10-06 | End: 2022-03-14 | Stop reason: SDUPTHER

## 2021-10-06 RX ORDER — DICYCLOMINE HYDROCHLORIDE 10 MG/1
10 CAPSULE ORAL
Qty: 120 CAPSULE | Refills: 5 | Status: SHIPPED | OUTPATIENT
Start: 2021-10-06 | End: 2022-03-14 | Stop reason: SDUPTHER

## 2021-10-14 ENCOUNTER — OFFICE VISIT (OUTPATIENT)
Dept: PODIATRY | Facility: CLINIC | Age: 61
End: 2021-10-14
Payer: COMMERCIAL

## 2021-10-14 VITALS
HEIGHT: 70 IN | DIASTOLIC BLOOD PRESSURE: 70 MMHG | BODY MASS INDEX: 29.78 KG/M2 | WEIGHT: 208 LBS | SYSTOLIC BLOOD PRESSURE: 123 MMHG | RESPIRATION RATE: 17 BRPM

## 2021-10-14 DIAGNOSIS — M79.671 PAIN IN BOTH FEET: Primary | ICD-10-CM

## 2021-10-14 DIAGNOSIS — B35.1 ONYCHOMYCOSIS: ICD-10-CM

## 2021-10-14 DIAGNOSIS — M20.42 HAMMER TOES OF BOTH FEET: ICD-10-CM

## 2021-10-14 DIAGNOSIS — L03.039 PARONYCHIA OF TOENAIL, UNSPECIFIED LATERALITY: ICD-10-CM

## 2021-10-14 DIAGNOSIS — M79.672 PAIN IN BOTH FEET: Primary | ICD-10-CM

## 2021-10-14 DIAGNOSIS — M20.41 HAMMER TOES OF BOTH FEET: ICD-10-CM

## 2021-10-14 PROCEDURE — 99213 OFFICE O/P EST LOW 20 MIN: CPT | Performed by: PODIATRIST

## 2021-11-30 ENCOUNTER — OFFICE VISIT (OUTPATIENT)
Dept: FAMILY MEDICINE CLINIC | Facility: CLINIC | Age: 61
End: 2021-11-30
Payer: COMMERCIAL

## 2021-11-30 VITALS
SYSTOLIC BLOOD PRESSURE: 128 MMHG | OXYGEN SATURATION: 97 % | HEIGHT: 70 IN | BODY MASS INDEX: 30.35 KG/M2 | WEIGHT: 212 LBS | HEART RATE: 84 BPM | RESPIRATION RATE: 16 BRPM | DIASTOLIC BLOOD PRESSURE: 82 MMHG | TEMPERATURE: 98.5 F

## 2021-11-30 DIAGNOSIS — M47.816 SPONDYLOSIS OF LUMBAR REGION WITHOUT MYELOPATHY OR RADICULOPATHY: ICD-10-CM

## 2021-11-30 DIAGNOSIS — Z23 ENCOUNTER FOR IMMUNIZATION: Primary | ICD-10-CM

## 2021-11-30 DIAGNOSIS — K21.9 GASTROESOPHAGEAL REFLUX DISEASE WITHOUT ESOPHAGITIS: ICD-10-CM

## 2021-11-30 PROCEDURE — 99213 OFFICE O/P EST LOW 20 MIN: CPT | Performed by: FAMILY MEDICINE

## 2021-11-30 PROCEDURE — 3725F SCREEN DEPRESSION PERFORMED: CPT | Performed by: FAMILY MEDICINE

## 2022-03-07 LAB
CHOLEST SERPL-MCNC: 189 MG/DL (ref 100–199)
CHOLEST/HDLC SERPL: 3 RATIO (ref 0–5)
EST. AVERAGE GLUCOSE BLD GHB EST-MCNC: 114 MG/DL
HBA1C MFR BLD: 5.6 % (ref 4.8–5.6)
HDLC SERPL-MCNC: 63 MG/DL
LDLC SERPL CALC-MCNC: 113 MG/DL (ref 0–99)
SL AMB VLDL CHOLESTEROL CALC: 13 MG/DL (ref 5–40)
TRIGL SERPL-MCNC: 73 MG/DL (ref 0–149)
TSH SERPL DL<=0.005 MIU/L-ACNC: 5.48 UIU/ML (ref 0.45–4.5)

## 2022-03-14 ENCOUNTER — TELEMEDICINE (OUTPATIENT)
Dept: FAMILY MEDICINE CLINIC | Facility: CLINIC | Age: 62
End: 2022-03-14
Payer: COMMERCIAL

## 2022-03-14 DIAGNOSIS — G89.4 CHRONIC PAIN DISORDER: ICD-10-CM

## 2022-03-14 DIAGNOSIS — M25.562 BILATERAL CHRONIC KNEE PAIN: ICD-10-CM

## 2022-03-14 DIAGNOSIS — S39.81XD SPORTS HERNIA, SUBSEQUENT ENCOUNTER: ICD-10-CM

## 2022-03-14 DIAGNOSIS — E55.9 VITAMIN D DEFICIENCY: ICD-10-CM

## 2022-03-14 DIAGNOSIS — M25.561 BILATERAL CHRONIC KNEE PAIN: ICD-10-CM

## 2022-03-14 DIAGNOSIS — Z76.0 MEDICATION REFILL: ICD-10-CM

## 2022-03-14 DIAGNOSIS — G89.29 BILATERAL CHRONIC KNEE PAIN: ICD-10-CM

## 2022-03-14 DIAGNOSIS — M51.16 LUMBAR DISC HERNIATION WITH RADICULOPATHY: Primary | ICD-10-CM

## 2022-03-14 DIAGNOSIS — R10.32 LLQ ABDOMINAL PAIN: ICD-10-CM

## 2022-03-14 DIAGNOSIS — K21.9 GASTROESOPHAGEAL REFLUX DISEASE: ICD-10-CM

## 2022-03-14 DIAGNOSIS — N40.0 ENLARGED PROSTATE ON RECTAL EXAMINATION: ICD-10-CM

## 2022-03-14 DIAGNOSIS — R53.83 FATIGUE, UNSPECIFIED TYPE: ICD-10-CM

## 2022-03-14 DIAGNOSIS — R97.20 ELEVATED PSA: ICD-10-CM

## 2022-03-14 DIAGNOSIS — S13.9XXD NECK SPRAIN, SUBSEQUENT ENCOUNTER: ICD-10-CM

## 2022-03-14 DIAGNOSIS — R09.82 POST-NASAL DRIP: ICD-10-CM

## 2022-03-14 DIAGNOSIS — R10.13 EPIGASTRIC BURNING SENSATION: ICD-10-CM

## 2022-03-14 PROCEDURE — 99213 OFFICE O/P EST LOW 20 MIN: CPT | Performed by: FAMILY MEDICINE

## 2022-03-14 RX ORDER — AVOBENZONE, HOMOSALATE, OCTISALATE, OCTOCRYLENE 30; 100; 50; 25 MG/ML; MG/ML; MG/ML; MG/ML
2000 SPRAY TOPICAL DAILY
Qty: 180 TABLET | Refills: 3 | Status: SHIPPED | OUTPATIENT
Start: 2022-03-14

## 2022-03-14 RX ORDER — ASPIRIN 81 MG/1
81 TABLET ORAL DAILY
Qty: 30 TABLET | Refills: 5 | Status: SHIPPED | OUTPATIENT
Start: 2022-03-14

## 2022-03-14 RX ORDER — METHOCARBAMOL 500 MG/1
500 TABLET, FILM COATED ORAL 3 TIMES DAILY PRN
Qty: 60 TABLET | Refills: 2 | Status: SHIPPED | OUTPATIENT
Start: 2022-03-14

## 2022-03-14 RX ORDER — LANOLIN ALCOHOL/MO/W.PET/CERES
1000 CREAM (GRAM) TOPICAL DAILY
Qty: 90 TABLET | Refills: 3 | Status: SHIPPED | OUTPATIENT
Start: 2022-03-14

## 2022-03-14 RX ORDER — OMEPRAZOLE 40 MG/1
40 CAPSULE, DELAYED RELEASE ORAL DAILY
Qty: 30 CAPSULE | Refills: 5 | Status: SHIPPED | OUTPATIENT
Start: 2022-03-14

## 2022-03-14 RX ORDER — DICYCLOMINE HYDROCHLORIDE 10 MG/1
10 CAPSULE ORAL
Qty: 120 CAPSULE | Refills: 5 | Status: SHIPPED | OUTPATIENT
Start: 2022-03-14

## 2022-03-14 RX ORDER — TAMSULOSIN HYDROCHLORIDE 0.4 MG/1
0.8 CAPSULE ORAL
Qty: 60 CAPSULE | Refills: 5 | Status: SHIPPED | OUTPATIENT
Start: 2022-03-14

## 2022-03-14 RX ORDER — FEXOFENADINE HCL 180 MG/1
180 TABLET ORAL DAILY
Qty: 90 TABLET | Refills: 3 | Status: SHIPPED | OUTPATIENT
Start: 2022-03-14

## 2022-03-14 RX ORDER — FAMOTIDINE 40 MG/1
40 TABLET, FILM COATED ORAL
Qty: 90 TABLET | Refills: 3 | Status: SHIPPED | OUTPATIENT
Start: 2022-03-14

## 2022-03-14 NOTE — PROGRESS NOTES
Virtual Regular Visit    Verification of patient location:    Patient is located in the following state in which I hold an active license [place]      Assessment/Plan:    Problem List Items Addressed This Visit        Nervous and Auditory    Lumbar disc herniation with radiculopathy - Primary    Relevant Orders    Ambulatory Referral to Pain Management  - Prior had good effect with epidural injections      Other Visit Diagnoses     Chronic pain disorder        Relevant Orders    Ambulatory Referral to Pain Management    Bilateral chronic knee pain        Relevant Orders    Ambulatory Referral to Orthopedic Surgery  - Likely OA  - Given difficulty in going to appointments, agreed with referral to ortho where he can have xrays and evaluation at same visit    Sports hernia, subsequent encounter   - Left pubic pain area for several years, acutely worsened in the fall  - Pain with abdominal crunch, and prior clinic visits with activation of adductors  - Discussed PT as best initial tx option, will first address b/l knee and back pain which appear to be more debilitating per patient               BMI Counseling: There is no height or weight on file to calculate BMI  The BMI is above normal  Nutrition recommendations include decreasing portion sizes  Exercise recommendations include exercising 3-5 times per week  Rationale for BMI follow-up plan is due to patient being overweight or obese  Reason for visit is No chief complaint on file  Encounter provider Lisa Mejía DO    Provider located at [address] [place] [id number]      Recent Visits  No visits were found meeting these conditions    Showing recent visits within past 7 days and meeting all other requirements  Today's Visits  Date Type Provider Dept   03/14/22 Telemedicine Lisa Mejía DO [facility]   Showing today's visits and meeting all other requirements  Future Appointments  No visits were found meeting these conditions  Showing future appointments within next 150 days and meeting all other requirements       The patient was identified by name and date of birth  Tr Anderson was informed that this is a telemedicine visit and that the visit is being conducted through Telephone  My office door was closed  No one else was in the room  He acknowledged consent and understanding of privacy and security of the video platform  The patient has agreed to participate and understands they can discontinue the visit at any time  It was my intent to perform this visit via video technology but the patient was not able to do a video connection so the visit was completed via audio telephone only  Patient is aware this is a billable service  Subjective  Tr Anderson is a 64 y o  male          Patient presents today for follow up    Low back pain  - Chronic, had prior epidural injections with Dr Arya Jain with good effect  - Requesting referral back to see Dr Arya Jain  - Prior Ct L-spine showing central canal narrowing, spolyosis with facet hypertrophy at multiple levels    BL knee pain  - Chronic, worse with ambulation, squatting  - Pain is in front of knee  - Not taking anything for pain  - Would like to be seen for this, amenable to seeing ortho as he says he is having a hard time getting out to appointment because his roommate/friend Homer Swanson is have SOB and has a hard time going out    Pubic pain  - Pain in left groin/pubic area  - At last visit, had said present for 3 weeks but today repiorts has had this pain for many years  - Was concerned for hernia, however CTAP in 2018 was negative for hernia  - Considering sports hernia as he used to be much more active prior to this pain/back pain  - Discussed PT as next best step, will fu to discuss further after getting evaluated for the above, can also follow with Sports medicine for this once knees are evaluated       Past Medical History:   Diagnosis Date    Abdominal pain 10/24/2018    Amnesia memory loss     Anxiety     Chronic fatigue     Disc degeneration, lumbar     Esophageal reflux     GERD (gastroesophageal reflux disease)     Glaucoma     Hiatal hernia     Increased pressure in the eye     both eyes; denies glaucoma     Internal hemorrhoids     Lumbar disc herniation with radiculopathy     Memory loss     Pain in both feet 6/18/2018    Personal history of colonic polyps        Past Surgical History:   Procedure Laterality Date    COLONOSCOPY N/A 4/25/2016    Procedure: COLONOSCOPY;  Surgeon: Wilfred Mckeon MD;  Location: [facility] GI LAB; Service:     DENTAL SURGERY      ALL TEETH PULLED    EPIDURAL BLOCK INJECTION      EPIDURAL BLOCK INJECTION Right 12/28/2016    Procedure: BLOCK / INJECTION EPIDURAL STEROID TRANSFORAMINAL  L4-5;  Surgeon: Vincent Klinefelter, MD;  Location: [facility] MAIN OR;  Service:     EPIDURAL BLOCK INJECTION Right 11/16/2017    Procedure: BLOCK / INJECTION EPIDURAL STEROID TRANSFORAMINAL L4-5 RIGHT;  Surgeon: Vincent Klinefelter, MD;  Location: [facility] MAIN OR;  Service: Pain Management     EPIDURAL BLOCK INJECTION Left 5/10/2018    Procedure: Lt L4 L5 Tfesi (05496,31063); Surgeon: Vincent Klinefelter, MD;  Location: [facility] MAIN OR;  Service: Pain Management     EPIDURAL BLOCK INJECTION Right 3/7/2019    Procedure: L4 L5 Transforaminal Epidural Steroid Injection ([id number]); Surgeon: Vincent Klinefelter, MD;  Location: [facility] MAIN OR;  Service: Pain Management     ESOPHAGOGASTRODUODENOSCOPY N/A 4/25/2016    Procedure: ESOPHAGOGASTRODUODENOSCOPY (EGD); Surgeon: Wilfred Mckeon MD;  Location: [facility] GI LAB;   Service:     NOSE SURGERY         Current Outpatient Medications   Medication Sig Dispense Refill    aspirin (ECOTRIN LOW STRENGTH) 81 mg EC tablet Take 1 tablet (81 mg total) by mouth daily 30 tablet 5    Cholecalciferol (RA Vitamin D-3) 25 MCG (1000 UT) tablet Take 2 tablets (2,000 Units total) by mouth daily 180 tablet 3    dicyclomine (BENTYL) 10 mg capsule Take 1 capsule (10 mg total) by mouth 4 (four) times a day (before meals and at bedtime) 120 capsule 5    famotidine (PEPCID) 40 MG tablet Take 1 tablet (40 mg total) by mouth daily at bedtime 90 tablet 3    fexofenadine (RA Allergy Relief) 180 MG tablet Take 1 tablet (180 mg total) by mouth daily 90 tablet 3    methocarbamol (ROBAXIN) 500 mg tablet Take 1 tablet (500 mg total) by mouth 3 (three) times a day as needed for muscle spasms 60 tablet 2    omeprazole (PriLOSEC) 40 MG capsule Take 1 capsule (40 mg total) by mouth daily 30 capsule 5    tamsulosin (FLOMAX) 0 4 mg Take 2 capsules (0 8 mg total) by mouth daily at bedtime 60 capsule 5    timolol (TIMOPTIC) 0 5 % ophthalmic solution Apply 1 drop to eye daily (Patient not taking: Reported on 4/12/2021) 15 mL 5    vitamin B-12 (VITAMIN B-12) 1,000 mcg tablet Take 1 tablet (1,000 mcg total) by mouth daily 90 tablet 3     No current facility-administered medications for this visit  Allergies   Allergen Reactions    Gabapentin Other (See Comments)     OUT OF BODY PSYCHOSIS, MEMORY LOSS, AMNESIA    Other Cough     HAYFEVER, SEASONAL, ALFALFA       Review of Systems   Constitutional: Negative for chills and fever  HENT: Negative for congestion and sore throat  Respiratory: Negative for shortness of breath  Gastrointestinal: Negative for constipation, nausea and vomiting  Musculoskeletal: Positive for arthralgias and back pain  Video Exam    There were no vitals filed for this visit  Physical Exam     I spent 25 minutes directly with the patient during this visit    VIRTUAL VISIT [address] verbally agrees to participate in [employer]   Pt is aware that [employer] could be limited without vital signs or the ability to perform a full hands-on physical Barbar Rasmary understands he or the provider may request at any time to terminate the video visit and request the patient to seek care or treatment in person

## 2022-03-21 ENCOUNTER — TELEPHONE (OUTPATIENT)
Dept: PEDIATRIC CARDIOLOGY | Facility: CLINIC | Age: 62
End: 2022-03-21

## 2022-03-21 DIAGNOSIS — R79.89 LOW TSH LEVEL: Primary | ICD-10-CM

## 2022-03-21 DIAGNOSIS — R79.89 HIGH SERUM THYROID STIMULATING HORMONE (TSH): ICD-10-CM

## 2022-04-05 ENCOUNTER — APPOINTMENT (OUTPATIENT)
Dept: RADIOLOGY | Facility: CLINIC | Age: 62
End: 2022-04-05
Payer: COMMERCIAL

## 2022-04-05 ENCOUNTER — OFFICE VISIT (OUTPATIENT)
Dept: OBGYN CLINIC | Facility: CLINIC | Age: 62
End: 2022-04-05
Payer: COMMERCIAL

## 2022-04-05 VITALS
SYSTOLIC BLOOD PRESSURE: 130 MMHG | WEIGHT: 210 LBS | DIASTOLIC BLOOD PRESSURE: 80 MMHG | HEART RATE: 90 BPM | HEIGHT: 70 IN | BODY MASS INDEX: 30.06 KG/M2

## 2022-04-05 DIAGNOSIS — M17.12 PATELLOFEMORAL ARTHRITIS OF LEFT KNEE: ICD-10-CM

## 2022-04-05 DIAGNOSIS — M25.561 BILATERAL CHRONIC KNEE PAIN: ICD-10-CM

## 2022-04-05 DIAGNOSIS — M25.562 BILATERAL CHRONIC KNEE PAIN: ICD-10-CM

## 2022-04-05 DIAGNOSIS — G89.29 BILATERAL CHRONIC KNEE PAIN: ICD-10-CM

## 2022-04-05 PROCEDURE — 99203 OFFICE O/P NEW LOW 30 MIN: CPT | Performed by: ORTHOPAEDIC SURGERY

## 2022-04-05 PROCEDURE — 73562 X-RAY EXAM OF KNEE 3: CPT

## 2022-04-05 NOTE — PROGRESS NOTES
Assessment/Plan:  1  Patellofemoral arthritis of left knee  Ambulatory Referral to Orthopedic Surgery    XR knee 3 vw left non injury    XR knee 3 vw right non injury    Ambulatory referral to Physical Therapy       Martha Brown has left-sided knee pain consistent with patellofemoral arthritis  He does have some spurring underneath the patella and this is consistent with his physical examination  Does have some additional medial joint line tenderness but only mild changes on his x-ray  There is no clinical concern for meniscus tear based on his examination today  I recommended beginning with formal physical therapy for strengthening of his quadriceps tendon and reducing his patellofemoral knee pain  I also offered a left knee cortisone injection for him today but he declined this and would like to try PT only first   Will follow up in 6 weeks after therapy is complete  Subjective:   Lukas Rangel is a 64 y o  male who presents to the office for evaluation for left-sided knee pain  He was referred by his PCP office  He has been feeling discomfort over the anterior medial aspect of his left knee for the last 2-3 months  He denies any injury or trauma  He states that the pain seems to bother him more when he is standing or squatting or going up and down stairs  He feels like his knee may give out on him with the as severe pain over the front of the knee when he is going down steps  This pain does come and go  There are days where it does not significantly bother him  He has some mild swelling but denies any large effusion  He denies any can mechanical symptoms of locking or catching within the knee  He denies any problems with his knee in the past   He did mention some right knee pain to his primary care physician in the past but the pain is not present today  Review of Systems   Constitutional: Negative for chills, fever and unexpected weight change     HENT: Negative for hearing loss, nosebleeds and sore throat  Eyes: Negative for pain, redness and visual disturbance  Respiratory: Negative for cough, shortness of breath and wheezing  Cardiovascular: Negative for chest pain, palpitations and leg swelling  Gastrointestinal: Negative for abdominal pain, nausea and vomiting  Endocrine: Negative for polyphagia and polyuria  Genitourinary: Negative for dysuria and hematuria  Musculoskeletal:        See HPI   Skin: Negative for rash and wound  Neurological: Negative for dizziness, numbness and headaches  Psychiatric/Behavioral: Negative for decreased concentration and suicidal ideas  The patient is not nervous/anxious  Past Medical History:   Diagnosis Date    Abdominal pain 10/24/2018    Amnesia memory loss     Anxiety     Chronic fatigue     Disc degeneration, lumbar     Esophageal reflux     GERD (gastroesophageal reflux disease)     Glaucoma     Hiatal hernia     Increased pressure in the eye     both eyes; denies glaucoma     Internal hemorrhoids     Lumbar disc herniation with radiculopathy     Memory loss     Pain in both feet 6/18/2018    Personal history of colonic polyps        Past Surgical History:   Procedure Laterality Date    COLONOSCOPY N/A 4/25/2016    Procedure: COLONOSCOPY;  Surgeon: Giancarlo Jordan MD;  Location: [facility]; Service:     DENTAL SURGERY      ALL TEETH PULLED    EPIDURAL BLOCK INJECTION      EPIDURAL BLOCK INJECTION Right 12/28/2016    Procedure: BLOCK / INJECTION EPIDURAL STEROID TRANSFORAMINAL  L4-5;  Surgeon: José Barcenas MD;  Location: [facility] MAIN OR;  Service:     EPIDURAL BLOCK INJECTION Right 11/16/2017    Procedure: BLOCK / INJECTION EPIDURAL STEROID TRANSFORAMINAL L4-5 RIGHT;  Surgeon: José Barcenas MD;  Location: [facility] MAIN OR;  Service: Pain Management     EPIDURAL BLOCK INJECTION Left 5/10/2018    Procedure: Lt L4 L5 Tfesi ([id number]);   Surgeon: José Barcenas MD;  Location: [facility] MAIN OR;  Service: Pain Management     EPIDURAL BLOCK INJECTION Right 3/7/2019    Procedure: L4 L5 Transforaminal Epidural Steroid Injection ([id number]); Surgeon: Kim Rojas MD;  Location: [facility] MAIN OR;  Service: Pain Management     ESOPHAGOGASTRODUODENOSCOPY N/A 2016    Procedure: ESOPHAGOGASTRODUODENOSCOPY (EGD); Surgeon: Nadir Teague MD;  Location: [facility] GI LAB;   Service:     NOSE SURGERY         Family History   Problem Relation Age of Onset    Stomach cancer Maternal Grandmother     Hypertension Mother     Diabetes Mother     Cataracts Father     Skin cancer Father     Stroke Father         2X    Aneurysm Father     No Known Problems Sister     No Known Problems Brother     Throat cancer Brother        Social History     Occupational History    Not on file   Tobacco Use    Smoking status: Former Smoker     Packs/day: 0 50     Years: 19 00     Pack years: 9 50     Types: Cigarettes     Quit date:      Years since quittin 2    Smokeless tobacco: Never Used   Vaping Use    Vaping Use: Never used   Substance and Sexual Activity    Alcohol use: No    Drug use: No    Sexual activity: Not Currently         Current Outpatient Medications:     aspirin (ECOTRIN LOW STRENGTH) 81 mg EC tablet, Take 1 tablet (81 mg total) by mouth daily, Disp: 30 tablet, Rfl: 5    Cholecalciferol (RA Vitamin D-3) 25 MCG (1000 UT) tablet, Take 2 tablets (2,000 Units total) by mouth daily, Disp: 180 tablet, Rfl: 3    dicyclomine (BENTYL) 10 mg capsule, Take 1 capsule (10 mg total) by mouth 4 (four) times a day (before meals and at bedtime), Disp: 120 capsule, Rfl: 5    famotidine (PEPCID) 40 MG tablet, Take 1 tablet (40 mg total) by mouth daily at bedtime, Disp: 90 tablet, Rfl: 3    fexofenadine (RA Allergy Relief) 180 MG tablet, Take 1 tablet (180 mg total) by mouth daily, Disp: 90 tablet, Rfl: 3    methocarbamol (ROBAXIN) 500 mg tablet, Take 1 tablet (500 mg total) by mouth 3 (three) times a day as needed for muscle spasms, Disp: 60 tablet, Rfl: 2    omeprazole (PriLOSEC) 40 MG capsule, Take 1 capsule (40 mg total) by mouth daily, Disp: 30 capsule, Rfl: 5    tamsulosin (FLOMAX) 0 4 mg, Take 2 capsules (0 8 mg total) by mouth daily at bedtime, Disp: 60 capsule, Rfl: 5    timolol (TIMOPTIC) 0 5 % ophthalmic solution, Apply 1 drop to eye daily (Patient not taking: Reported on 4/12/2021), Disp: 15 mL, Rfl: 5    vitamin B-12 (VITAMIN B-12) 1,000 mcg tablet, Take 1 tablet (1,000 mcg total) by mouth daily, Disp: 90 tablet, Rfl: 3    Allergies   Allergen Reactions    Gabapentin Other (See Comments)     OUT OF BODY PSYCHOSIS, MEMORY LOSS, AMNESIA    Other Cough     HAYFEVER, SEASONAL, ALFALFA       Objective:  Vitals:    04/05/22 1109   BP: 130/80   Pulse: 90       Left Knee Exam     Tenderness   The patient is experiencing tenderness in the patella, medial retinaculum and medial joint line  Range of Motion   Extension: normal   Flexion: normal     Tests   Praveen:  Medial - negative Lateral - negative  Varus: negative Valgus: negative    Other   Erythema: absent  Sensation: normal  Pulse: present  Swelling: none  Effusion: no effusion present          Observations   Left Knee   Negative for effusion  Physical Exam  Vitals reviewed  Constitutional:       Appearance: He is well-developed  HENT:      Head: Normocephalic and atraumatic  Eyes:      Conjunctiva/sclera: Conjunctivae normal       Pupils: Pupils are equal, round, and reactive to light  Cardiovascular:      Rate and Rhythm: Normal rate  Pulmonary:      Effort: Pulmonary effort is normal  No respiratory distress  Musculoskeletal:      Cervical back: Normal range of motion and neck supple  Left knee: No effusion  Instability Tests: Medial Praveen test negative and lateral Praveen test negative  Comments: As noted in HPI   Skin:     General: Skin is warm and dry  Neurological:      Mental Status: He is alert and oriented to person, place, and time  Psychiatric:         Behavior: Behavior normal          I have personally reviewed pertinent films in PACS and my interpretation is as follows: Three-view x-rays of the left knee demonstrates mild to moderate patellofemoral arthritis  Mild medial compartment narrowing

## 2022-04-07 ENCOUNTER — TELEPHONE (OUTPATIENT)
Dept: OBGYN CLINIC | Facility: HOSPITAL | Age: 62
End: 2022-04-07

## 2022-04-07 DIAGNOSIS — M17.12 PATELLOFEMORAL ARTHRITIS OF LEFT KNEE: Primary | ICD-10-CM

## 2022-04-07 NOTE — TELEPHONE ENCOUNTER
Dr Smith Manus    Patient is calling about the Voltaren for his kneecap  Please send to [facility] at [facility] at your earliest convenience        # [phone] or [phone]

## 2022-04-11 ENCOUNTER — OFFICE VISIT (OUTPATIENT)
Dept: PAIN MEDICINE | Facility: CLINIC | Age: 62
End: 2022-04-11
Payer: COMMERCIAL

## 2022-04-11 VITALS
HEART RATE: 74 BPM | OXYGEN SATURATION: 97 % | SYSTOLIC BLOOD PRESSURE: 144 MMHG | WEIGHT: 214 LBS | HEIGHT: 70 IN | BODY MASS INDEX: 30.64 KG/M2 | DIASTOLIC BLOOD PRESSURE: 78 MMHG | TEMPERATURE: 98.9 F

## 2022-04-11 DIAGNOSIS — G89.29 CHRONIC LEFT-SIDED LOW BACK PAIN WITH LEFT-SIDED SCIATICA: ICD-10-CM

## 2022-04-11 DIAGNOSIS — M51.16 LUMBAR DISC HERNIATION WITH RADICULOPATHY: ICD-10-CM

## 2022-04-11 DIAGNOSIS — M54.42 CHRONIC LEFT-SIDED LOW BACK PAIN WITH LEFT-SIDED SCIATICA: ICD-10-CM

## 2022-04-11 DIAGNOSIS — G89.4 CHRONIC PAIN SYNDROME: Primary | ICD-10-CM

## 2022-04-11 PROCEDURE — 1036F TOBACCO NON-USER: CPT | Performed by: ANESTHESIOLOGY

## 2022-04-11 PROCEDURE — 99214 OFFICE O/P EST MOD 30 MIN: CPT | Performed by: ANESTHESIOLOGY

## 2022-04-11 NOTE — PROGRESS NOTES
Pain Medicine Follow-Up Note    Assessment:  1  Chronic pain syndrome    2  Chronic left-sided low back pain with left-sided sciatica    3  Lumbar disc herniation with radiculopathy        Plan:  Orders Placed This Encounter   Procedures    Ambulatory referral to Physical Therapy     Standing Status:   Future     Standing Expiration Date:   4/11/2023     Referral Priority:   Routine     Referral Type:   Physical Therapy     Referral Reason:   Specialty Services Required     Requested Specialty:   Physical Therapy     Number of Visits Requested:   1     Expiration Date:   4/11/2023     My impressions and treatment recommendations were discussed in detail with the patient who verbalized understanding and had no further questions  Given that the patient has signs and symptoms of low back pain and left lower extremity radiculopathy what appears to be the left L4 and L5 distributions in the context of a lumbar disc herniation, I felt a reasonable to offer the patient a repeat left L4 and L5 transforaminal epidural steroid injection since this could be potentially therapeutic  The procedures, its risks, and benefits were explained in detail to the patient  Risks include but are not limited to bleeding, infection, hematoma formation, abscess formation, weakness, headache, failure the pain to improve, nerve irritation or damage, and potential worsening of the pain  The patient verbalized understanding and wished to proceed with the procedure  I also felt a reasonable to have the patient undergo a course of physical therapy 2-3 times per week for 4-6 weeks  Follow-up is planned in 4 weeks time or sooner as warranted  Discharge instructions were provided  I personally saw and examined the patient and I agree with the above discussed plan of care      History of Present Illness:    Sapna Greenberg is a 64 y o  male who presents to [facility] for interval re-evaluation of the above stated pain complaints  The patient has a past medical and chronic pain history as outlined in the assessment section  He was last seen on April 12, 2021  The patient is reporting pain primarily in the low back and left lower extremity in what appears to be the left L4 and L5 distributions  He describes his pain as moderate to severe and 7 to 9/10 on the verbal numerical pain rating scale  His pain is nearly constant in nature and worse in the morning, afternoon, evening  He describes this pain as cramping, sharp, and pulling  He ambulates with the assistance of a cane  Lying down and relaxation decreases pain  Standing, bending, walking, exercise, coughing, sneezing, and bowel movements increases pain  Physical therapy, home exercises, heat/ice treatment, TENS therapy, and chiropractic manipulation all provided moderate pain relief  Nerve blocks and nerve injections provide excellent pain relief  Diclofenac, acetaminophen, aspirin are being used currently  Codeine, hydrocodone, propoxyphene, Voltaren gel, capsaicin, lidocaine patch, ibuprofen, naproxen, and cyclobenzaprine were used in the past     Other than as stated above, the patient denies any interval changes in medications, medical condition, mental condition, symptoms, or allergies since the last office visit  Review of Systems:    Review of Systems   Constitutional: Positive for unexpected weight change  Eyes: Positive for pain  Respiratory: Positive for shortness of breath  Cardiovascular: Negative for chest pain  Gastrointestinal: Positive for nausea  Negative for constipation, diarrhea and vomiting  Endocrine: Positive for polydipsia and polyuria  Musculoskeletal: Positive for back pain, joint swelling and neck pain  Negative for arthralgias, gait problem and myalgias  Skin: Negative for rash  Neurological: Positive for headaches  Negative for dizziness, seizures and weakness     Psychiatric/Behavioral: Positive for decreased concentration and dysphoric mood  The patient is nervous/anxious  All other systems reviewed and are negative  Patient Active Problem List   Diagnosis    Chronic pain syndrome    Intervertebral disc disorder with radiculopathy of lumbar region    Spondylosis of lumbar region without myelopathy or radiculopathy    Abnormal chromosomal analysis    Adult-onset obesity    Anxiety    Elevated PSA    Enlarged prostate    Gastroesophageal reflux disease without esophagitis    Myofascial pain syndrome    Chronic fatigue    Episode of memory loss    Glaucoma    Low back pain    Acquired deformity of foot    Hammer toes of both feet    Congenital pes planus of right foot    Congenital pes planus of left foot    TGA (transient global amnesia)    Dermatophytosis    LLQ abdominal pain    Lumbar disc herniation with radiculopathy    Pain in both feet    Observed seizure-like activity (HCC)    Cervical radiculopathy    Shoulder pain, bilateral       Past Medical History:   Diagnosis Date    Abdominal pain 10/24/2018    Amnesia memory loss     Anxiety     Chronic fatigue     Disc degeneration, lumbar     Esophageal reflux     GERD (gastroesophageal reflux disease)     Glaucoma     Hiatal hernia     Increased pressure in the eye     both eyes; denies glaucoma     Internal hemorrhoids     Lumbar disc herniation with radiculopathy     Memory loss     Pain in both feet 6/18/2018    Personal history of colonic polyps        Past Surgical History:   Procedure Laterality Date    COLONOSCOPY N/A 4/25/2016    Procedure: COLONOSCOPY;  Surgeon: Delia Mauricio MD;  Location: [facility];   Service:     DENTAL SURGERY      ALL TEETH PULLED    EPIDURAL BLOCK INJECTION      EPIDURAL BLOCK INJECTION Right 12/28/2016    Procedure: BLOCK / INJECTION EPIDURAL STEROID TRANSFORAMINAL  L4-5;  Surgeon: Adwoa Zarate MD;  Location: [facility] MAIN OR;  Service:     EPIDURAL BLOCK INJECTION Right 2017    Procedure: BLOCK / INJECTION EPIDURAL STEROID TRANSFORAMINAL L4-5 RIGHT;  Surgeon: Yoanna Costa MD;  Location: [place] [address] MAIN OR;  Service: Pain Management     EPIDURAL BLOCK INJECTION Left 5/10/2018    Procedure: Lt L4 L5 Tfesi ([id number]); Surgeon: Yoanna Costa MD;  Location: [facility] MAIN OR;  Service: Pain Management     EPIDURAL BLOCK INJECTION Right 3/7/2019    Procedure: L4 L5 Transforaminal Epidural Steroid Injection ([id number]); Surgeon: Yoanna Costa MD;  Location: [facility] MAIN OR;  Service: Pain Management     ESOPHAGOGASTRODUODENOSCOPY N/A 2016    Procedure: ESOPHAGOGASTRODUODENOSCOPY (EGD); Surgeon: Chris Pritchett MD;  Location: [facility];   Service:     NOSE SURGERY         Family History   Problem Relation Age of Onset    Stomach cancer Maternal Grandmother     Hypertension Mother     Diabetes Mother     Cataracts Father     Skin cancer Father     Stroke Father         2X    Aneurysm Father     No Known Problems Sister     No Known Problems Brother     Throat cancer Brother        Social History     Occupational History    Not on file   Tobacco Use    Smoking status: Former Smoker     Packs/day: 0 50     Years: 19 00     Pack years: 9 50     Types: Cigarettes     Quit date:      Years since quittin 2    Smokeless tobacco: Never Used   Vaping Use    Vaping Use: Never used   Substance and Sexual Activity    Alcohol use: No    Drug use: No    Sexual activity: Not Currently         Current Outpatient Medications:     aspirin (ECOTRIN LOW STRENGTH) 81 mg EC tablet, Take 1 tablet (81 mg total) by mouth daily, Disp: 30 tablet, Rfl: 5    Cholecalciferol (RA Vitamin D-3) 25 MCG (1000 UT) tablet, Take 2 tablets (2,000 Units total) by mouth daily, Disp: 180 tablet, Rfl: 3    Diclofenac Sodium (VOLTAREN) 1 %, Apply 2 g topically 4 (four) times a day, Disp: 2 g, Rfl: 2    dicyclomine (BENTYL) 10 mg capsule, Take 1 capsule (10 mg total) by mouth 4 (four) times a day (before meals and at bedtime), Disp: 120 capsule, Rfl: 5    famotidine (PEPCID) 40 MG tablet, Take 1 tablet (40 mg total) by mouth daily at bedtime, Disp: 90 tablet, Rfl: 3    fexofenadine (RA Allergy Relief) 180 MG tablet, Take 1 tablet (180 mg total) by mouth daily, Disp: 90 tablet, Rfl: 3    methocarbamol (ROBAXIN) 500 mg tablet, Take 1 tablet (500 mg total) by mouth 3 (three) times a day as needed for muscle spasms, Disp: 60 tablet, Rfl: 2    omeprazole (PriLOSEC) 40 MG capsule, Take 1 capsule (40 mg total) by mouth daily, Disp: 30 capsule, Rfl: 5    tamsulosin (FLOMAX) 0 4 mg, Take 2 capsules (0 8 mg total) by mouth daily at bedtime, Disp: 60 capsule, Rfl: 5    vitamin B-12 (VITAMIN B-12) 1,000 mcg tablet, Take 1 tablet (1,000 mcg total) by mouth daily, Disp: 90 tablet, Rfl: 3    timolol (TIMOPTIC) 0 5 % ophthalmic solution, Apply 1 drop to eye daily (Patient not taking: Reported on 4/12/2021), Disp: 15 mL, Rfl: 5    Allergies   Allergen Reactions    Gabapentin Other (See Comments)     OUT OF BODY PSYCHOSIS, MEMORY LOSS, AMNESIA    Other Cough     HAYFEVER, SEASONAL, ALFALFA       Physical Exam:    /78   Pulse 74   Temp 98 9 °F (37 2 °C)   Ht 5' 10" (1 778 m)   Wt 97 1 kg (214 lb)   SpO2 97%   BMI 30 71 kg/m²     Constitutional:obese  Eyes:anicteric  HEENT:grossly intact  Neck:supple, symmetric, trachea midline and no masses   Pulmonary:even and unlabored  Cardiovascular:No edema or pitting edema present  Skin:Normal without rashes or lesions and well hydrated  Psychiatric:Mood and affect appropriate  Neurologic:Cranial Nerves II-XII grossly intact  Musculoskeletal:normal    Orders Placed This Encounter   Procedures    Ambulatory referral to Physical Therapy

## 2022-04-12 ENCOUNTER — TELEPHONE (OUTPATIENT)
Dept: PAIN MEDICINE | Facility: CLINIC | Age: 62
End: 2022-04-12

## 2022-04-12 NOTE — TELEPHONE ENCOUNTER
Scheduled patient for 5/12/22  Patient denies RX blood thinners/ NSAIDS  Nothing to eat or drink 1 hour prior to procedure  Needs to arrange transportation  Proper clothing for procedure  No vaccines 2 weeks prior or after procedure  If ill or place on antibiotics, please call to reschedule

## 2022-04-28 ENCOUNTER — EVALUATION (OUTPATIENT)
Dept: PHYSICAL THERAPY | Facility: CLINIC | Age: 62
End: 2022-04-28
Payer: COMMERCIAL

## 2022-04-28 DIAGNOSIS — G89.4 CHRONIC PAIN SYNDROME: ICD-10-CM

## 2022-04-28 DIAGNOSIS — M51.16 LUMBAR DISC HERNIATION WITH RADICULOPATHY: ICD-10-CM

## 2022-04-28 DIAGNOSIS — M17.12 PATELLOFEMORAL ARTHRITIS OF LEFT KNEE: ICD-10-CM

## 2022-04-28 DIAGNOSIS — G89.29 CHRONIC LEFT-SIDED LOW BACK PAIN WITH LEFT-SIDED SCIATICA: Primary | ICD-10-CM

## 2022-04-28 DIAGNOSIS — M54.42 CHRONIC LEFT-SIDED LOW BACK PAIN WITH LEFT-SIDED SCIATICA: Primary | ICD-10-CM

## 2022-04-28 PROCEDURE — 97161 PT EVAL LOW COMPLEX 20 MIN: CPT | Performed by: PHYSICAL THERAPIST

## 2022-04-28 PROCEDURE — 97110 THERAPEUTIC EXERCISES: CPT | Performed by: PHYSICAL THERAPIST

## 2022-04-28 NOTE — PROGRESS NOTES
PT Evaluation     Today's date: 2022  Patient name: Kendy Comer  : 1960  MRN: [id number]  Referring provider: Torie Nix MD  Dx:   Encounter Diagnosis     ICD-10-CM    1  Chronic left-sided low back pain with left-sided sciatica  M54 42 Ambulatory referral to Physical Therapy    G89 29    2  Chronic pain syndrome  G89 4 Ambulatory referral to Physical Therapy   3  Lumbar disc herniation with radiculopathy  M51 16 Ambulatory referral to Physical Therapy   4  Patellofemoral arthritis of left knee  M17 12        Start Time: 1545  Stop Time: 1630  Total time in clinic (min): 45 minutes    Assessment  Assessment details: Kendy Comer is a 64 y o  male who presents with complaints of Chronic left-sided low back pain with left-sided sciatica, Chronic pain syndrome, Lumbar disc herniation with radiculopathy and left knee pain  Patient reported improvement in symptoms and demonstrated improved ROM following lumbar repeated extension in standing  Symptoms made worse with repeated flexion  Provisional classification of extension principle  Patient educated on findings, role of posture, sitting surface, and HEP  Patient's left knee demonstrates joint hypomobility with symptom reporting, knee ROM, and squat symptoms all improved after manual intervention  Current presentation leading to limitations with performing transfers, sitting > 30 minutes, squatting, bending, and  Prognosis is good given HEP compliance and PT 1-2/wk  Positive prognostic indicators include positive attitude toward recovery  Please contact me if you have any questions or recommendations  Thank you for the opportunity to share in [facility] care       Impairments: abnormal gait, abnormal or restricted ROM, abnormal movement, activity intolerance, impaired balance, impaired physical strength, lacks appropriate home exercise program, pain with function, poor posture  and poor body mechanics    Symptom irritability: moderateUnderstanding of Dx/Px/POC: good   Prognosis: good    Plan  Patient would benefit from: skilled physical therapy  Planned therapy interventions: joint mobilization, manual therapy, patient education, postural training, strengthening, stretching, therapeutic activities, therapeutic exercise, home exercise program, neuromuscular re-education, flexibility, functional ROM exercises and abdominal trunk stabilization  Frequency: 1-2x  Duration in weeks: 8  Treatment plan discussed with: patient        Subjective Evaluation    History of Present Illness  Mechanism of injury: Patient presenting to therapy with chronic low back pain  He reports pain has been present since his 25s with injections in past reported  Reports that injections have helped in the past  He is scheduled for injections in 2 weeks  Patient reports worsening of back symptoms over the past 2 years  He reports he also has left knee pain with reporting of bone spur under patella region  He reports having pain with transitions off couch and when putting weight onto left leg  Reports having pain when moving down into squat position such as when descending to go toilet  Does report having pain when in flexed position  Reports standing/walking feels better on his back  He reports that left knee is described as tight and compressive is nature that does not want to stretch  Sitting for about 20 minutes is tolerable before requiring him to stand up as well as limited with standing washing dishes for about 5 minutes  Patient denies bowel/bladder dysfunction, saddle paraesthesias, nor cough/sneeze provocation  Patient denies peripheralization of symptoms            Recurrent probem    Quality of life: fair    Pain  Current pain ratin (knee- 6)  At worst pain ratin (knee-9)  Quality: burning, dull ache and sharp  Aggravating factors: sitting  Progression: worsening    Social Support  Steps to enter house: no  Stairs in house: no     Treatments  Previous treatment: medication and injection treatment  Patient Goals  Patient goals for therapy: decreased pain  Patient goal: less pain with bending         Objective  GAIT: antalgic gait LLE  Squat assess: 50% depth Left knee pain  Heel toe walk: -    Lumbar  % of normal   Flex  50p! RLBP   Extn  50   SB Left 75   SB Right 75   ROT Left 75   ROT Right 75   Repetitive testing: extension in standing= improves, better  extension in lying= not tested  flexion standing= worse           MMT    Hip       L       R   Flex  5 5   Extn  4 4   Abd  4 4   Add  5 5   IR  5 5   ER  5 5                 MMT         AROM         PROM    Knee      R          L         R          L           R         L   Flex  5 4 WNL 95p! WNL 98p! Extn  5 4 WNL WNL 5 2                       MMT    Ankle       L        R   PF 5 5   DF  5 5   EHL 5 5   Inv  5 5   Ever  5 5     Posture: postural correction improves  Palpation: no TTP  Slump test: L= -    R=   -    Straight leg raise:   L=  -    R=   -           Knee: post drawer = -  Lachmans test= -  anterior draw test= -   Valgus stress test= - varus stress test = -   Praveen test = -   joint findings= hypomobile Fibular head        Insurance:  AMA/CMS Eval[facility] # Total    Start date  Expiration date Extension  Visit limitation? PT only or  PT+OT?  Co-Insurance   CMS 4 28 6 23   12 v                         AUTH #:  Date 4 28              Authed: Used                Remaining                  Precautions: hx of lumbar and cervical radiculopathy, Myofascial pain syndrome, hx of L4-5 nerve blocks, hx of mini stroke    Manuals 4 28        Fib head mobs FB                                   Neuro Re-Ed                                                                        Ther Ex         SLR flexion 2*10        Postural ed FB        PT edu FB        JOYCE 10x        Supine heel slide 2*10                                   Ther Activity                           Modalities Short Term:  1  Pt will report decreased levels of pain by at least 2 subjective ratings in 4 weeks  2  Pt will demonstrate improved ROM by at least 10 degrees in 4 weeks  3  Pt will demonstrate improved strength by 1/2 grade in 4 weeks  4  Pt will be able to sit> 30 minutes without pain in 4 weeks  5  Pt will be able to perform squat without pain in 4 weeks  Long Term:   1  Pt will be independent in their HEP in 8 weeks  2  Pt will be pain free with IADL's in 8 weeks    3  Pt will be able to perform transfers without limitations/pain in 8 weeks

## 2022-05-05 ENCOUNTER — OFFICE VISIT (OUTPATIENT)
Dept: PHYSICAL THERAPY | Facility: CLINIC | Age: 62
End: 2022-05-05
Payer: COMMERCIAL

## 2022-05-05 DIAGNOSIS — M51.16 LUMBAR DISC HERNIATION WITH RADICULOPATHY: ICD-10-CM

## 2022-05-05 DIAGNOSIS — M17.12 PATELLOFEMORAL ARTHRITIS OF LEFT KNEE: ICD-10-CM

## 2022-05-05 DIAGNOSIS — G89.29 CHRONIC LEFT-SIDED LOW BACK PAIN WITH LEFT-SIDED SCIATICA: ICD-10-CM

## 2022-05-05 DIAGNOSIS — M54.42 CHRONIC LEFT-SIDED LOW BACK PAIN WITH LEFT-SIDED SCIATICA: ICD-10-CM

## 2022-05-05 DIAGNOSIS — G89.4 CHRONIC PAIN SYNDROME: Primary | ICD-10-CM

## 2022-05-05 PROCEDURE — 97110 THERAPEUTIC EXERCISES: CPT | Performed by: PHYSICAL THERAPIST

## 2022-05-05 PROCEDURE — 97140 MANUAL THERAPY 1/> REGIONS: CPT | Performed by: PHYSICAL THERAPIST

## 2022-05-05 NOTE — PROGRESS NOTES
Daily Note     Today's date: 2022  Patient name: Lukas Rangel  : 1960  MRN: [id number]  Referring provider: Raul Shaw MD  Dx:   Encounter Diagnosis     ICD-10-CM    1  Chronic pain syndrome  G89 4    2  Chronic left-sided low back pain with left-sided sciatica  M54 42     G89 29    3  Lumbar disc herniation with radiculopathy  M51 16    4  Patellofemoral arthritis of left knee  M17 12        Start Time: 1500  Stop Time: 1540  Total time in clinic (min): 40 minutes  Subjective: Patient reports doing better overall  Reports increased right knee ROM and back pain 3/10 entering session described as "gnawing"  Objective: See treatment diary below    Assessment: Tolerated treatment well  Patient reported significant improvement in symptoms with manual intervention with full knee flexion restored after  Also absence of pain reported in back post press up exercise  Improved knee symptoms with squat following medial tibial glides in standing  Plan: Continue per plan of care  Insurance:  AMA/CMS Eval/ Re-eval POC expires Maryru Beams #/ Referral # Total    Start date  Expiration date Extension  Visit limitation? PT only or  PT+OT?  Co-Insurance   CMS  6 23   12 v                         AUTH #:  Date               Authed: Used                Remaining                  Precautions: hx of lumbar and cervical radiculopathy, Myofascial pain syndrome, hx of L4-5 nerve blocks, hx of mini stroke    Manuals  28 5 5       Fib head mobs FB FB gr  5       Medial tib MWM with squats  FB                Neuro Re-Ed                                             Ther Ex         SLR flexion 2*10 2*10       Postural ed FB        PT edu FB 10'       JOYCE 10x 10x       Supine heel slide 2*10 10x       Press up with sag  2*10       Prone on elbows  3'                Ther Activity                           Modalities

## 2022-05-09 ENCOUNTER — APPOINTMENT (OUTPATIENT)
Dept: PHYSICAL THERAPY | Facility: CLINIC | Age: 62
End: 2022-05-09
Payer: COMMERCIAL

## 2022-05-11 ENCOUNTER — OFFICE VISIT (OUTPATIENT)
Dept: PHYSICAL THERAPY | Facility: CLINIC | Age: 62
End: 2022-05-11
Payer: COMMERCIAL

## 2022-05-11 DIAGNOSIS — M54.42 CHRONIC LEFT-SIDED LOW BACK PAIN WITH LEFT-SIDED SCIATICA: ICD-10-CM

## 2022-05-11 DIAGNOSIS — G89.4 CHRONIC PAIN SYNDROME: Primary | ICD-10-CM

## 2022-05-11 DIAGNOSIS — G89.29 CHRONIC LEFT-SIDED LOW BACK PAIN WITH LEFT-SIDED SCIATICA: ICD-10-CM

## 2022-05-11 DIAGNOSIS — M51.16 LUMBAR DISC HERNIATION WITH RADICULOPATHY: ICD-10-CM

## 2022-05-11 PROCEDURE — 97112 NEUROMUSCULAR REEDUCATION: CPT | Performed by: PHYSICAL THERAPIST

## 2022-05-11 PROCEDURE — 97110 THERAPEUTIC EXERCISES: CPT | Performed by: PHYSICAL THERAPIST

## 2022-05-11 NOTE — PROGRESS NOTES
Daily Note     Today's date: 2022  Patient name: Kendy Comer  : 1960  MRN: [id number]  Referring provider: Torie Nix MD  Dx:   Encounter Diagnosis     ICD-10-CM    1  Chronic pain syndrome  G89 4    2  Lumbar disc herniation with radiculopathy  M51 16    3  Chronic left-sided low back pain with left-sided sciatica  M54 42     G89 29        Start Time: 1010  Stop Time: 1100  Total time in clinic (min): 50 minutes  Subjective: Patient reports tightness in low back entering treatment today  Otherwise notes his flexibility and symptoms are improving  1 on 1 with PT for 25 minutes  Remaining time independent fitness program     Objective: See treatment diary below    Assessment: Tolerated treatment well  Patient reported absence of low back pain following session today  Improved knee ROM following manual intervention  Patient scheduled for low back injections tomorrow  Will continue with therapy once cleared  Plan: Continue per plan of care  Insurance:  AMA/CMS Eval/ Re-eval POC expires Jelena Pio #/ Referral # Total    Start date  Expiration date Extension  Visit limitation? PT only or  PT+OT?  Co-Insurance   CMS  6 23   12 v                         AUTH #:  Date               Authed: Used                Remaining                  Precautions: hx of lumbar and cervical radiculopathy, Myofascial pain syndrome, hx of L4-5 nerve blocks, hx of mini stroke    Manuals  28 5 5 5 11      Fib head mobs FB FB gr  5       Medial tib MWM with squats  FB FB  gr 5      Clinician OP with press up   10x                        Neuro Re-Ed         bridge   3*20      TrA iso    2*10, 10"                        Ther Ex         SLR flexion 2*10 2*10 2*10      Postural ed FB        PT edu FB 10' 5'      JOYCE 10x 10x       Supine heel slide 2*10 10x 30x      Press up with sag  2*10 2*10      Prone on elbows  3' 3'               Ther Activity                           Modalities

## 2022-05-12 ENCOUNTER — APPOINTMENT (OUTPATIENT)
Dept: RADIOLOGY | Facility: HOSPITAL | Age: 62
End: 2022-05-12
Payer: COMMERCIAL

## 2022-05-12 ENCOUNTER — HOSPITAL ENCOUNTER (OUTPATIENT)
Facility: AMBULARY SURGERY CENTER | Age: 62
Setting detail: OUTPATIENT SURGERY
Discharge: HOME | End: 2022-05-12
Attending: ANESTHESIOLOGY | Admitting: ANESTHESIOLOGY
Payer: COMMERCIAL

## 2022-05-12 VITALS
OXYGEN SATURATION: 96 % | RESPIRATION RATE: 18 BRPM | HEART RATE: 97 BPM | DIASTOLIC BLOOD PRESSURE: 85 MMHG | TEMPERATURE: 98.6 F | SYSTOLIC BLOOD PRESSURE: 152 MMHG

## 2022-05-12 PROCEDURE — A9585 GADOBUTROL INJECTION: HCPCS | Performed by: ANESTHESIOLOGY

## 2022-05-12 PROCEDURE — 64483 NJX AA&/STRD TFRM EPI L/S 1: CPT | Performed by: ANESTHESIOLOGY

## 2022-05-12 PROCEDURE — 64484 NJX AA&/STRD TFRM EPI L/S EA: CPT | Performed by: ANESTHESIOLOGY

## 2022-05-12 PROCEDURE — 62323 NJX INTERLAMINAR LMBR/SAC: CPT

## 2022-05-12 RX ORDER — BUPIVACAINE HYDROCHLORIDE 2.5 MG/ML
INJECTION, SOLUTION EPIDURAL; INFILTRATION; INTRACAUDAL AS NEEDED
Status: DISCONTINUED | OUTPATIENT
Start: 2022-05-12 | End: 2022-05-12 | Stop reason: HOSPADM

## 2022-05-12 RX ORDER — LIDOCAINE WITH 8.4% SOD BICARB 0.9%(10ML)
SYRINGE (ML) INJECTION AS NEEDED
Status: DISCONTINUED | OUTPATIENT
Start: 2022-05-12 | End: 2022-05-12 | Stop reason: HOSPADM

## 2022-05-12 RX ORDER — METHYLPREDNISOLONE ACETATE 80 MG/ML
INJECTION, SUSPENSION INTRA-ARTICULAR; INTRALESIONAL; INTRAMUSCULAR; SOFT TISSUE AS NEEDED
Status: DISCONTINUED | OUTPATIENT
Start: 2022-05-12 | End: 2022-05-12 | Stop reason: HOSPADM

## 2022-05-12 NOTE — H&P
History of Present Illness: The patient is a 64 y o  male who presents with complaints of low back pain and left lower extremity radiculopathy  Patient Active Problem List   Diagnosis    Chronic pain syndrome    Intervertebral disc disorder with radiculopathy of lumbar region    Spondylosis of lumbar region without myelopathy or radiculopathy    Abnormal chromosomal analysis    Adult-onset obesity    Anxiety    Elevated PSA    Enlarged prostate    Gastroesophageal reflux disease without esophagitis    Myofascial pain syndrome    Chronic fatigue    Episode of memory loss    Glaucoma    Low back pain    Acquired deformity of foot    Hammer toes of both feet    Congenital pes planus of right foot    Congenital pes planus of left foot    TGA (transient global amnesia)    Dermatophytosis    LLQ abdominal pain    Lumbar disc herniation with radiculopathy    Pain in both feet    Observed seizure-like activity (HCC)    Cervical radiculopathy    Shoulder pain, bilateral       Past Medical History:   Diagnosis Date    Abdominal pain 10/24/2018    Amnesia memory loss     Anxiety     Chronic fatigue     Disc degeneration, lumbar     Esophageal reflux     GERD (gastroesophageal reflux disease)     Glaucoma     Hiatal hernia     Increased pressure in the eye     both eyes; denies glaucoma     Internal hemorrhoids     Lumbar disc herniation with radiculopathy     Memory loss     Pain in both feet 6/18/2018    Personal history of colonic polyps        Past Surgical History:   Procedure Laterality Date    COLONOSCOPY N/A 4/25/2016    Procedure: COLONOSCOPY;  Surgeon: Prince Le MD;  Location: [facility];   Service:     DENTAL SURGERY      ALL TEETH PULLED    EPIDURAL BLOCK INJECTION      EPIDURAL BLOCK INJECTION Right 12/28/2016    Procedure: BLOCK / INJECTION EPIDURAL STEROID TRANSFORAMINAL  L4-5;  Surgeon: Mendoza Whittaker MD;  Location: [facility] MAIN OR;  Service:    Janes Liu BLOCK INJECTION Right 11/16/2017    Procedure: BLOCK / INJECTION EPIDURAL STEROID TRANSFORAMINAL L4-5 RIGHT;  Surgeon: Monik Stokes MD;  Location: [facility] MAIN OR;  Service: Pain Management     EPIDURAL BLOCK INJECTION Left 5/10/2018    Procedure: Lt L4 L5 Tfesi ([id number]); Surgeon: Monik Stokes MD;  Location: [facility] MAIN OR;  Service: Pain Management     EPIDURAL BLOCK INJECTION Right 3/7/2019    Procedure: L4 L5 Transforaminal Epidural Steroid Injection ([id number]); Surgeon: Monik Stokes MD;  Location: [facility] MAIN OR;  Service: Pain Management     ESOPHAGOGASTRODUODENOSCOPY N/A 4/25/2016    Procedure: ESOPHAGOGASTRODUODENOSCOPY (EGD); Surgeon: April Armando MD;  Location: [facility]; Service:     NOSE SURGERY         No current facility-administered medications for this encounter  Allergies   Allergen Reactions    Gabapentin Other (See Comments)     OUT OF BODY PSYCHOSIS, MEMORY LOSS, AMNESIA    Other Cough     HAYFEVER, SEASONAL, ALFALFA       Physical Exam:   Vitals:    05/12/22 1305   BP: 144/95   Pulse: 97   Resp: 18   Temp: 98 6 °F (37 °C)   SpO2: 100%     General: Awake, Alert, Oriented x 3, Mood and affect appropriate  Respiratory: Respirations even and unlabored  Cardiovascular: Peripheral pulses intact; no edema  Musculoskeletal Exam:  Tenderness in lumbar spine region    ASA Score: 2    Patient/Chart Verification  Patient ID Verified: Verbal, Armband  ID Band Applied: Yes  Consents Confirmed: Procedural  H&P( within 30 days) Verified: Yes  Interval H&P(within 24 hr) Complete (required for Outpatients and Surgery Admit only): Yes  Beta Blocker given : N/A  Pre-op Lab/Test Results Available: In chart  Does Patient Have a Prosthetic Device/Implant: No    Assessment:  Low back pain and left lower extremity radiculopathy      Plan:  Proceed with left L4 and L5 transforaminal epidural steroid injection

## 2022-05-12 NOTE — OP NOTE
ATTENDING PHYSICIAN:  Margarita Pena MD     PROCEDURE:  1  Left L4 transforaminal epidural steroid injection under fluoroscopic guidance  2  Left L5 transforaminal epidural steroid injection under fluoroscopic guidance  PRE-PROCEDURE DIAGNOSIS:  Low back pain and left lower extremity radiculopathy  POST-PROCEDURE DIAGNOSIS:  Low back pain and left lower extremity radiculopathy  ANESTHESIA:  Local     ESTIMATED BLOOD LOSS:  Minimal     COMPLICATIONS:  None  LOCATION:  [facility]  CONSENT:  Today's procedure, its potential benefits as well as its risks and potential side effects were reviewed  Discussed risks of the procedure including bleeding, infection, nerve irritation or damage, reactions to the medications, weakness, headache, failure of the pain to improve, and potential worsening of the pain were explained to the patient who verbalized understanding and who wished to proceed  Written informed consent was thereby obtained  DESCRIPTION OF THE PROCEDURE:  After written informed consent was obtained, the patient was taken to the fluoroscopy suite and placed in the prone position  Anatomical landmarks were identified by way of fluoroscopy in multiple views  The skin of the lumbar region was prepped using antiseptic and draped in the usual sterile fashion  Strict aseptic technique was utilized  The skin and subcutaneous tissues at the needle entry site were infiltrated with a total of 5 mL of 1% preservative-free lidocaine using a 25-gauge 1-1/2-inch needle  22-gauge needles were then incrementally advanced under fluoroscopic guidance in the oblique view into the neural foramina as mentioned above  Proper placement into each of the neural foramen was confirmed with fluoroscopy in both the lateral and AP views  After negative aspiration for CSF or heme, contrast was injected, which delineated the nerve roots and the epidural space under fluoroscopy in the AP view  There was only a transient pressure paresthesia that resolved immediately upon injection  After negative aspiration, a 2 mL of a 4 mL injectate consisting of 3 mL of preservative-free 0 25% bupivacaine and 1 mL of Depo-Medrol 80 mg/mL was slowly injected into each of the needles as delineated above  The patient tolerated the procedure well and all needles were removed intact  Hemostasis was maintained  There were no apparent paresthesias or complications  The skin was wiped clean and a Band-Aid was placed as appropriate  The patient was monitored for an appropriate period of time and remained hemodynamically stable following the procedure  The patient was ultimately discharged to home with supervision in good condition and instructed to call the office in approximately 7-10 days with an update or sooner as warranted  Discharge instructions were provided  I was present for and participated in all key and critical portions of this procedure      Tiffanie Knox MD  5/12/2022  1:51 PM

## 2022-05-12 NOTE — DISCHARGE INSTRUCTIONS
Epidural Steroid Injection   WHAT YOU NEED TO KNOW:   An epidural steroid injection (KATHLEEN) is a procedure to inject steroid medicine into the epidural space  The epidural space is between your spinal cord and vertebrae  Steroids reduce inflammation and fluid buildup in your spine that may be causing pain  You may be given pain medicine along with the steroids  ACTIVITY  Do not drive or operate machinery today  No strenuous activity today - bending, lifting, etc   You may resume normal activites starting tomorrow - start slowly and as tolerated  You may shower today, but no tub baths or hot tubs  You may have numbness for several hours from the local anesthetic  Please use caution and common sense, especially with weight-bearing activities  CARE OF THE INJECTION SITE  If you have soreness or pain, apply ice to the area today (20 minutes on/20 minutes off)  Starting tomorrow, you may use warm, moist heat or ice if needed  You may have an increase or change in your discomfort for 36-48 hours after your treatment  Apply ice and continue with any pain medication you have been prescribed  Notify the [facility] if you have any of the following: redness, drainage, swelling, headache, stiff neck or fever above 100°F     SPECIAL INSTRUCTIONS  Our office will contact you in approximately 7 days for a progress report  MEDICATIONS  Continue to take all routine medications  Our office may have instructed you to hold some medications  As no general anesthesia was used in today's procedure, you should not experience any side effects related to anesthesia  If you have a problem specifically related to your procedure, please call our office at [phone]  Problems not related to your procedure should be directed to your primary care physician   Epidural Steroid Injection   WHAT YOU NEED TO KNOW:   An epidural steroid injection (KATHLEEN) is a procedure to inject steroid medicine into the epidural space  The epidural space is between your spinal cord and vertebrae  Steroids reduce inflammation and fluid buildup in your spine that may be causing pain  You may be given pain medicine along with the steroids  ACTIVITY  Do not drive or operate machinery today  No strenuous activity today - bending, lifting, etc   You may resume normal activites starting tomorrow - start slowly and as tolerated  You may shower today, but no tub baths or hot tubs  You may have numbness for several hours from the local anesthetic  Please use caution and common sense, especially with weight-bearing activities  CARE OF THE INJECTION SITE  If you have soreness or pain, apply ice to the area today (20 minutes on/20 minutes off)  Starting tomorrow, you may use warm, moist heat or ice if needed  You may have an increase or change in your discomfort for 36-48 hours after your treatment  Apply ice and continue with any pain medication you have been prescribed  Notify the [facility] if you have any of the following: redness, drainage, swelling, headache, stiff neck or fever above 100°F     SPECIAL INSTRUCTIONS  Our office will contact you in approximately 7 days for a progress report  MEDICATIONS  Continue to take all routine medications  Our office may have instructed you to hold some medications  As no general anesthesia was used in today's procedure, you should not experience any side effects related to anesthesia  If you have a problem specifically related to your procedure, please call our office at [phone]  Problems not related to your procedure should be directed to your primary care physician

## 2022-05-16 ENCOUNTER — APPOINTMENT (OUTPATIENT)
Dept: PHYSICAL THERAPY | Facility: CLINIC | Age: 62
End: 2022-05-16
Payer: COMMERCIAL

## 2022-05-18 ENCOUNTER — OFFICE VISIT (OUTPATIENT)
Dept: PHYSICAL THERAPY | Facility: CLINIC | Age: 62
End: 2022-05-18
Payer: COMMERCIAL

## 2022-05-18 DIAGNOSIS — G89.29 CHRONIC LEFT-SIDED LOW BACK PAIN WITH LEFT-SIDED SCIATICA: ICD-10-CM

## 2022-05-18 DIAGNOSIS — M17.12 PATELLOFEMORAL ARTHRITIS OF LEFT KNEE: ICD-10-CM

## 2022-05-18 DIAGNOSIS — M54.42 CHRONIC LEFT-SIDED LOW BACK PAIN WITH LEFT-SIDED SCIATICA: ICD-10-CM

## 2022-05-18 DIAGNOSIS — G89.4 CHRONIC PAIN SYNDROME: Primary | ICD-10-CM

## 2022-05-18 DIAGNOSIS — M51.16 LUMBAR DISC HERNIATION WITH RADICULOPATHY: ICD-10-CM

## 2022-05-18 PROCEDURE — 97140 MANUAL THERAPY 1/> REGIONS: CPT | Performed by: PHYSICAL THERAPIST

## 2022-05-18 PROCEDURE — 97112 NEUROMUSCULAR REEDUCATION: CPT | Performed by: PHYSICAL THERAPIST

## 2022-05-18 NOTE — PROGRESS NOTES
Daily Note     Today's date: 2022  Patient name: Lukas Rangel  : 1960  MRN: [id number]  Referring provider: Raul Shaw MD  Dx:   Encounter Diagnosis     ICD-10-CM    1  Chronic pain syndrome  G89 4    2  Lumbar disc herniation with radiculopathy  M51 16    3  Patellofemoral arthritis of left knee  M17 12    4  Chronic left-sided low back pain with left-sided sciatica  M54 42     G89 29        Start Time: 1100  Stop Time: 1145  Total time in clinic (min): 45 minutes  Subjective: Patient reports feeling good after lumbar injections last week  Reports knee feels stiff first thing in the morning but is improving  1 on 1 with PT for 23 minutes  Remaining time independent fitness program     Objective: See treatment diary below    Assessment: Tolerated treatment well  Patient reported knee felt better after manual intervention today of IASTM to lateral insertion of quad tendon  Fatigue reported with core based endurance/strengthening program      Plan: Continue per plan of care  Insurance:  AMA/CMS Eval/ Re-eval POC expires MaryPendroy Beams #/ Referral # Total    Start date  Expiration date Extension  Visit limitation? PT only or  PT+OT?  Co-Insurance   CMS  28 6 23   12 v                         AUTH #:  Date               Authed: Used                Remaining                  Precautions: hx of lumbar and cervical radiculopathy, Myofascial pain syndrome, hx of L4-5 nerve blocks, hx of mini stroke    Manuals  28 5 5 5 11 5 18     visit 1/12 2/12 3/12 4/12     Fib head mobs FB FB gr  5       Medial tib MWM with squats  FB FB  gr 5      Clinician OP with press up   10x      IASTM left VMO    FB              Neuro Re-Ed         bridge   3*20 3*10, 5"     TrA iso    2*10, 10" 2*10, 10"     TrA BKFO    3*10              Ther Ex         SLR flexion 2*10 2*10 2*10      Postural ed FB        PT edu FB 10' 5'      JOYCE 10x 10x       Supine heel slide 2*10 10x 30x      Press up with sag  2*10 2*10 Prone on elbows  3' 3'      Leg press    3*10, 75#                                                           Ther Activity                           Modalities

## 2022-05-19 ENCOUNTER — TELEPHONE (OUTPATIENT)
Dept: PAIN MEDICINE | Facility: CLINIC | Age: 62
End: 2022-05-19

## 2022-05-19 NOTE — TELEPHONE ENCOUNTER
Pt returning will the  70 % of improvement and pain level 4 /10.        Please be advised thank you.  Pt can be contacted @   [phone]

## 2022-05-23 ENCOUNTER — APPOINTMENT (OUTPATIENT)
Dept: PHYSICAL THERAPY | Facility: CLINIC | Age: 62
End: 2022-05-23
Payer: COMMERCIAL

## 2022-05-25 ENCOUNTER — APPOINTMENT (OUTPATIENT)
Dept: PHYSICAL THERAPY | Facility: CLINIC | Age: 62
End: 2022-05-25
Payer: COMMERCIAL

## 2022-06-03 ENCOUNTER — OFFICE VISIT (OUTPATIENT)
Dept: FAMILY MEDICINE CLINIC | Facility: CLINIC | Age: 62
End: 2022-06-03
Payer: COMMERCIAL

## 2022-06-03 VITALS
DIASTOLIC BLOOD PRESSURE: 72 MMHG | HEART RATE: 94 BPM | BODY MASS INDEX: 29.92 KG/M2 | OXYGEN SATURATION: 97 % | TEMPERATURE: 98.6 F | WEIGHT: 209 LBS | HEIGHT: 70 IN | RESPIRATION RATE: 18 BRPM | SYSTOLIC BLOOD PRESSURE: 126 MMHG

## 2022-06-03 DIAGNOSIS — E55.9 VITAMIN D DEFICIENCY: ICD-10-CM

## 2022-06-03 DIAGNOSIS — G89.29 BILATERAL CHRONIC KNEE PAIN: ICD-10-CM

## 2022-06-03 DIAGNOSIS — M25.562 BILATERAL CHRONIC KNEE PAIN: ICD-10-CM

## 2022-06-03 DIAGNOSIS — K21.9 GASTROESOPHAGEAL REFLUX DISEASE WITHOUT ESOPHAGITIS: ICD-10-CM

## 2022-06-03 DIAGNOSIS — M25.561 BILATERAL CHRONIC KNEE PAIN: ICD-10-CM

## 2022-06-03 DIAGNOSIS — M51.16 LUMBAR DISC HERNIATION WITH RADICULOPATHY: Primary | ICD-10-CM

## 2022-06-03 PROCEDURE — 3008F BODY MASS INDEX DOCD: CPT | Performed by: FAMILY MEDICINE

## 2022-06-03 PROCEDURE — 99213 OFFICE O/P EST LOW 20 MIN: CPT | Performed by: FAMILY MEDICINE

## 2022-06-03 PROCEDURE — 1036F TOBACCO NON-USER: CPT | Performed by: FAMILY MEDICINE

## 2022-06-03 NOTE — PROGRESS NOTES
Assessment/Plan:    No problem-specific Assessment & Plan notes found for this encounter  Diagnoses and all orders for this visit:    Lumbar disc herniation with radiculopathy    Vitamin D deficiency    Gastroesophageal reflux disease without esophagitis    Bilateral chronic knee pain      Amanda Urrutia is doing well  He had good relief of his back pain with recent steroid injection  Physical therapy is helping a lot with his knee pain  We will see him back in 3 months for follow-up  All of his questions were answered  Subjective:      Patient ID: Sapna Greenberg is a 64 y o  male  Veneda Phillip presents today for follow-up    Knee pain  - bilateral, x-rays consistent with patellofemoral arthritis  - currently engage in physical therapy, doing well, pain is improving  - he follows with Sports Medicine, Dr Cy Sung    Lumbar radiculopathy  - spondylosis, recent epidural with Dr Vivien Rust with improvement, very satisfied      The following portions of the patient's history were reviewed and updated as appropriate:   He  has a past medical history of Abdominal pain (10/24/2018), Amnesia memory loss, Anxiety, Chronic fatigue, Disc degeneration, lumbar, Esophageal reflux, GERD (gastroesophageal reflux disease), Glaucoma, Hiatal hernia, Increased pressure in the eye, Internal hemorrhoids, Lumbar disc herniation with radiculopathy, Memory loss, Pain in both feet (6/18/2018), and Personal history of colonic polyps    He   Patient Active Problem List    Diagnosis Date Noted    Cervical radiculopathy 09/12/2019    Observed seizure-like activity ([facility] [address] )     Pain in both feet 08/12/2019    Lumbar disc herniation with radiculopathy 02/18/2019    LLQ abdominal pain 10/31/2018    Dermatophytosis 09/17/2018    TGA (transient global amnesia)     Acquired deformity of foot 06/18/2018    Hammer toes of both feet 06/18/2018    Congenital pes planus of right foot 06/18/2018    Congenital pes planus of left foot 06/18/2018    Low back pain 04/10/2018    Chronic fatigue 03/23/2018    Episode of memory loss 03/23/2018    Glaucoma 03/23/2018    Adult-onset obesity 06/02/2017    Spondylosis of lumbar region without myelopathy or radiculopathy 03/08/2017    Chronic pain syndrome 12/28/2016    Intervertebral disc disorder with radiculopathy of lumbar region 12/28/2016    Abnormal chromosomal analysis 08/25/2016    Myofascial pain syndrome 07/18/2016    Anxiety 06/24/2016    Elevated PSA 05/11/2016    Enlarged prostate 04/04/2016    Gastroesophageal reflux disease without esophagitis 03/01/2016    Shoulder pain, bilateral 04/12/2021     He  has a past surgical history that includes Nose surgery; Epidural block injection; Epidural block injection (Right, 12/28/2016); Esophagogastroduodenoscopy (N/A, 4/25/2016); Colonoscopy (N/A, 4/25/2016); Epidural block injection (Right, 11/16/2017); Epidural block injection (Left, 5/10/2018); Dental surgery; Epidural block injection (Right, 3/7/2019); Epidural block injection (Left, 5/12/2022); and FL injection left elbow (non arthrogram) (5/12/2022)  His family history includes Aneurysm in his father; Cataracts in his father; Diabetes in his mother; Hypertension in his mother; No Known Problems in his brother and sister; Skin cancer in his father; Stomach cancer in his maternal grandmother; Stroke in his father; Throat cancer in his brother  He  reports that he quit smoking about 24 years ago  His smoking use included cigarettes  He has a 9 50 pack-year smoking history  He has never used smokeless tobacco  He reports that he does not drink alcohol and does not use drugs    Current Outpatient Medications   Medication Sig Dispense Refill    aspirin (ECOTRIN LOW STRENGTH) 81 mg EC tablet Take 1 tablet (81 mg total) by mouth daily 30 tablet 5    Cholecalciferol (RA Vitamin D-3) 25 MCG (1000 UT) tablet Take 2 tablets (2,000 Units total) by mouth daily 180 tablet 3    Diclofenac Sodium (VOLTAREN) 1 % Apply 2 g topically 4 (four) times a day 2 g 2    dicyclomine (BENTYL) 10 mg capsule Take 1 capsule (10 mg total) by mouth 4 (four) times a day (before meals and at bedtime) 120 capsule 5    famotidine (PEPCID) 40 MG tablet Take 1 tablet (40 mg total) by mouth daily at bedtime 90 tablet 3    fexofenadine (RA Allergy Relief) 180 MG tablet Take 1 tablet (180 mg total) by mouth daily 90 tablet 3    methocarbamol (ROBAXIN) 500 mg tablet Take 1 tablet (500 mg total) by mouth 3 (three) times a day as needed for muscle spasms 60 tablet 2    omeprazole (PriLOSEC) 40 MG capsule Take 1 capsule (40 mg total) by mouth daily 30 capsule 5    tamsulosin (FLOMAX) 0 4 mg Take 2 capsules (0 8 mg total) by mouth daily at bedtime 60 capsule 5    vitamin B-12 (VITAMIN B-12) 1,000 mcg tablet Take 1 tablet (1,000 mcg total) by mouth daily 90 tablet 3    timolol (TIMOPTIC) 0 5 % ophthalmic solution Apply 1 drop to eye daily (Patient not taking: Reported on 6/3/2022) 15 mL 5     No current facility-administered medications for this visit       Current Outpatient Medications on File Prior to Visit   Medication Sig    aspirin (ECOTRIN LOW STRENGTH) 81 mg EC tablet Take 1 tablet (81 mg total) by mouth daily    Cholecalciferol (RA Vitamin D-3) 25 MCG (1000 UT) tablet Take 2 tablets (2,000 Units total) by mouth daily    Diclofenac Sodium (VOLTAREN) 1 % Apply 2 g topically 4 (four) times a day    dicyclomine (BENTYL) 10 mg capsule Take 1 capsule (10 mg total) by mouth 4 (four) times a day (before meals and at bedtime)    famotidine (PEPCID) 40 MG tablet Take 1 tablet (40 mg total) by mouth daily at bedtime    fexofenadine (RA Allergy Relief) 180 MG tablet Take 1 tablet (180 mg total) by mouth daily    methocarbamol (ROBAXIN) 500 mg tablet Take 1 tablet (500 mg total) by mouth 3 (three) times a day as needed for muscle spasms    omeprazole (PriLOSEC) 40 MG capsule Take 1 capsule (40 mg total) by mouth daily    tamsulosin (FLOMAX) 0 4 mg Take 2 capsules (0 8 mg total) by mouth daily at bedtime    vitamin B-12 (VITAMIN B-12) 1,000 mcg tablet Take 1 tablet (1,000 mcg total) by mouth daily    timolol (TIMOPTIC) 0 5 % ophthalmic solution Apply 1 drop to eye daily (Patient not taking: Reported on 6/3/2022)     No current facility-administered medications on file prior to visit  He is allergic to gabapentin and other       Review of Systems   All other systems reviewed and are negative  Objective:      /72   Pulse 94   Temp 98 6 °F (37 °C) (Tympanic)   Resp 18   Ht 5' 10" (1 778 m)   Wt 94 8 kg (209 lb)   SpO2 97%   BMI 29 99 kg/m²          Physical Exam  Constitutional:       General: He is not in acute distress  Appearance: He is well-developed  He is not toxic-appearing  HENT:      Head: Normocephalic and atraumatic  Right Ear: External ear normal       Left Ear: External ear normal    Eyes:      Extraocular Movements: Extraocular movements intact  Cardiovascular:      Rate and Rhythm: Normal rate  Heart sounds: No murmur heard  Pulmonary:      Effort: Pulmonary effort is normal    Neurological:      General: No focal deficit present  Mental Status: He is alert and oriented to person, place, and time     Psychiatric:         Mood and Affect: Mood normal

## 2022-06-23 ENCOUNTER — TELEPHONE (OUTPATIENT)
Dept: PHYSICAL THERAPY | Facility: CLINIC | Age: 62
End: 2022-06-23

## 2022-06-23 NOTE — TELEPHONE ENCOUNTER
Called patient asking for update with regards to knee and back  Asked for him to call back to determine if further care is deemed appropriate

## 2022-08-31 DIAGNOSIS — R97.20 ELEVATED PSA: ICD-10-CM

## 2022-08-31 DIAGNOSIS — R10.32 LLQ ABDOMINAL PAIN: ICD-10-CM

## 2022-08-31 DIAGNOSIS — N40.0 ENLARGED PROSTATE ON RECTAL EXAMINATION: ICD-10-CM

## 2022-08-31 DIAGNOSIS — R10.13 EPIGASTRIC BURNING SENSATION: ICD-10-CM

## 2022-08-31 RX ORDER — TAMSULOSIN HYDROCHLORIDE 0.4 MG/1
CAPSULE ORAL
Qty: 60 CAPSULE | Refills: 5 | Status: SHIPPED | OUTPATIENT
Start: 2022-08-31 | End: 2022-09-12 | Stop reason: SDUPTHER

## 2022-08-31 RX ORDER — OMEPRAZOLE 40 MG/1
CAPSULE, DELAYED RELEASE ORAL
Qty: 30 CAPSULE | Refills: 5 | Status: SHIPPED | OUTPATIENT
Start: 2022-08-31 | End: 2022-09-12 | Stop reason: SDUPTHER

## 2022-08-31 RX ORDER — DICYCLOMINE HYDROCHLORIDE 10 MG/1
CAPSULE ORAL
Qty: 120 CAPSULE | Refills: 5 | Status: SHIPPED | OUTPATIENT
Start: 2022-08-31 | End: 2022-09-12 | Stop reason: ALTCHOICE

## 2022-09-12 ENCOUNTER — TELEMEDICINE (OUTPATIENT)
Dept: FAMILY MEDICINE CLINIC | Facility: CLINIC | Age: 62
End: 2022-09-12
Payer: COMMERCIAL

## 2022-09-12 DIAGNOSIS — E55.9 VITAMIN D DEFICIENCY: ICD-10-CM

## 2022-09-12 DIAGNOSIS — R10.13 EPIGASTRIC BURNING SENSATION: ICD-10-CM

## 2022-09-12 DIAGNOSIS — S13.9XXD NECK SPRAIN, SUBSEQUENT ENCOUNTER: ICD-10-CM

## 2022-09-12 DIAGNOSIS — M17.12 PATELLOFEMORAL ARTHRITIS OF LEFT KNEE: ICD-10-CM

## 2022-09-12 DIAGNOSIS — Z76.0 MEDICATION REFILL: ICD-10-CM

## 2022-09-12 DIAGNOSIS — R97.20 ELEVATED PSA: ICD-10-CM

## 2022-09-12 DIAGNOSIS — R09.82 POST-NASAL DRIP: ICD-10-CM

## 2022-09-12 DIAGNOSIS — R10.32 LLQ ABDOMINAL PAIN: ICD-10-CM

## 2022-09-12 DIAGNOSIS — K21.9 GASTROESOPHAGEAL REFLUX DISEASE: ICD-10-CM

## 2022-09-12 DIAGNOSIS — N40.0 ENLARGED PROSTATE ON RECTAL EXAMINATION: ICD-10-CM

## 2022-09-12 DIAGNOSIS — R53.83 FATIGUE, UNSPECIFIED TYPE: ICD-10-CM

## 2022-09-12 PROCEDURE — 99213 OFFICE O/P EST LOW 20 MIN: CPT | Performed by: FAMILY MEDICINE

## 2022-09-12 RX ORDER — ASPIRIN 81 MG/1
81 TABLET ORAL DAILY
Qty: 30 TABLET | Refills: 5 | Status: SHIPPED | OUTPATIENT
Start: 2022-09-12

## 2022-09-12 RX ORDER — OMEPRAZOLE 40 MG/1
40 CAPSULE, DELAYED RELEASE ORAL DAILY
Qty: 30 CAPSULE | Refills: 5 | Status: SHIPPED | OUTPATIENT
Start: 2022-09-12

## 2022-09-12 RX ORDER — METHOCARBAMOL 500 MG/1
500 TABLET, FILM COATED ORAL 3 TIMES DAILY PRN
Qty: 60 TABLET | Refills: 2 | Status: SHIPPED | OUTPATIENT
Start: 2022-09-12

## 2022-09-12 RX ORDER — TAMSULOSIN HYDROCHLORIDE 0.4 MG/1
0.8 CAPSULE ORAL
Qty: 60 CAPSULE | Refills: 5 | Status: SHIPPED | OUTPATIENT
Start: 2022-09-12

## 2022-09-12 RX ORDER — FEXOFENADINE HCL 180 MG/1
180 TABLET ORAL DAILY
Qty: 90 TABLET | Refills: 3 | Status: SHIPPED | OUTPATIENT
Start: 2022-09-12

## 2022-09-12 RX ORDER — TIMOLOL MALEATE 5 MG/ML
1 SOLUTION/ DROPS OPHTHALMIC DAILY
Qty: 15 ML | Refills: 5 | Status: SHIPPED | OUTPATIENT
Start: 2022-09-12

## 2022-09-12 RX ORDER — AVOBENZONE, HOMOSALATE, OCTISALATE, OCTOCRYLENE 30; 100; 50; 25 MG/ML; MG/ML; MG/ML; MG/ML
2000 SPRAY TOPICAL DAILY
Qty: 180 TABLET | Refills: 3 | Status: SHIPPED | OUTPATIENT
Start: 2022-09-12

## 2022-09-12 RX ORDER — FAMOTIDINE 40 MG/1
20 TABLET, FILM COATED ORAL
Qty: 90 TABLET | Refills: 3 | Status: SHIPPED | OUTPATIENT
Start: 2022-09-12

## 2022-09-12 RX ORDER — LANOLIN ALCOHOL/MO/W.PET/CERES
1000 CREAM (GRAM) TOPICAL DAILY
Qty: 90 TABLET | Refills: 3 | Status: SHIPPED | OUTPATIENT
Start: 2022-09-12

## 2022-10-19 ENCOUNTER — TELEPHONE (OUTPATIENT)
Dept: OBGYN CLINIC | Facility: HOSPITAL | Age: 62
End: 2022-10-19

## 2022-10-19 NOTE — TELEPHONE ENCOUNTER
We can use same day slot tomorrow afternoon if no athletes are added     Otherwise we can give his name to Stephanie Mann to put on cancellation list

## 2022-10-19 NOTE — TELEPHONE ENCOUNTER
Caller: Sukhjinder Mac    Doctor: Shankar Kim    Reason for call: Patient calling in asking about sooner appointment for his knees  His left kneecap is about 7-8 5/10 pain every day and it is getting worse  He is scheduled for 10/27 and I advised I did not have anything available in [place] to offer him a sooner appointment  Patient is concerned that he would have to go to ED for his pain and does not want to do this  He is currently using Biofreeze  Should he be using an ice pack? Is there any way he could be seen sooner? Patient thinks he should have had injections at his last appointment in April       Call back#: [phone]

## 2022-10-20 ENCOUNTER — OFFICE VISIT (OUTPATIENT)
Dept: OBGYN CLINIC | Facility: CLINIC | Age: 62
End: 2022-10-20
Payer: COMMERCIAL

## 2022-10-20 VITALS
HEIGHT: 70 IN | SYSTOLIC BLOOD PRESSURE: 136 MMHG | HEART RATE: 99 BPM | WEIGHT: 209 LBS | BODY MASS INDEX: 29.92 KG/M2 | DIASTOLIC BLOOD PRESSURE: 78 MMHG

## 2022-10-20 DIAGNOSIS — M23.92 INTERNAL DERANGEMENT OF LEFT KNEE: Primary | ICD-10-CM

## 2022-10-20 PROCEDURE — 99213 OFFICE O/P EST LOW 20 MIN: CPT | Performed by: ORTHOPAEDIC SURGERY

## 2022-10-20 NOTE — PROGRESS NOTES
Assessment/Plan:  1  Internal derangement of left knee  MRI knee left  wo contrast    Ambulatory Referral to Physical Therapy     Scribe Attestation    I,:  Ramila Brandt Call am acting as a scribe while in the presence of the attending physician :       I,:  Brittany Pearson, DO personally performed the services described in this documentation    as scribed in my presence :             Opal Cesar has been experiencing knee pain for nearly 6 months now  He has significant tenderness over the medial joint line with a positive Praveen's and mechanical symptoms of locking  I would like Opal Cesar to have an MRI of the left knee questioning a medial meniscus tear  Should a tear be found he may require surgical intervention thus more data is needed  In addition to the MRI I would like him to begin physical therapy to help improve his range of motion and gait  Once the MRI has been completed and a report has been provided by Radiology he will return to see me  If no tear is found he will continue with physical therapy and I may consider a steroid injection  He was amenable to this plan of care  I will see him back after the MRI has been completed  Subjective:   Almas Hutton is a 58 y o  male who presents for evaluation of his left knee  I initially saw Opal Tali in April for left knee pain and diagnosed with patellofemoral osteoarthritis and prescribed physical therapy  He was concurrently seen Dr George Gregg for chronic back pain  He states he had injections for his back which help resolve his back pain  He was in therapy for both his back and his knee and did have a resolution of his symptoms  Unfortunately his knee pain has returned  Opal Cesar believes his pain originated following descending a steep hill  Opal Pain describes his pain as a persistent moderate ache about the medial aspect of the left knee  He states he is unable to fully extend the knee  He states the knee will tend to lock on him several times per day    He will apply Biofreeze and has been using a cane for ambulating assistance  Hannah Payne is concerned because 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 and his knee pain is limiting his efforts  Review of Systems   Constitutional: Negative for chills, fever and unexpected weight change  HENT: Negative for hearing loss, nosebleeds and sore throat  Eyes: Negative for pain, redness and visual disturbance  Respiratory: Negative for cough, shortness of breath and wheezing  Cardiovascular: Negative for chest pain, palpitations and leg swelling  Gastrointestinal: Negative for abdominal pain, nausea and vomiting  Endocrine: Negative for polyphagia and polyuria  Genitourinary: Negative for dysuria and hematuria  Musculoskeletal:        See HPI   Skin: Negative for rash and wound  Neurological: Negative for dizziness, numbness and headaches  Psychiatric/Behavioral: Negative for decreased concentration and suicidal ideas  The patient is not nervous/anxious  Past Medical History:   Diagnosis Date   • Abdominal pain 10/24/2018   • Amnesia memory loss    • Anxiety    • Chronic fatigue    • Disc degeneration, lumbar    • Esophageal reflux    • GERD (gastroesophageal reflux disease)    • Glaucoma    • Hiatal hernia    • Increased pressure in the eye     both eyes; denies glaucoma    • Internal hemorrhoids    • Lumbar disc herniation with radiculopathy    • Memory loss    • Pain in both feet 6/18/2018   • Personal history of colonic polyps        Past Surgical History:   Procedure Laterality Date   • COLONOSCOPY N/A 4/25/2016    Procedure: COLONOSCOPY;  Surgeon: Roopa Cano MD;  Location: [place] [address] GI LAB;   Service:    • DENTAL SURGERY      ALL TEETH PULLED   • EPIDURAL BLOCK INJECTION     • EPIDURAL BLOCK INJECTION Right 12/28/2016    Procedure: BLOCK / INJECTION EPIDURAL STEROID TRANSFORAMINAL  L4-5;  Surgeon: Jean Pyle MD;  Location: [place] [address] MAIN OR;  Service:    • EPIDURAL BLOCK INJECTION Right 2017    Procedure: BLOCK / INJECTION EPIDURAL STEROID TRANSFORAMINAL L4-5 RIGHT;  Surgeon: Nena Morris MD;  Location: [place] [address] MAIN OR;  Service: Pain Management    • EPIDURAL BLOCK INJECTION Left 5/10/2018    Procedure: Lt L4 L5 Tfesi ([id number]); Surgeon: Nena Morris MD;  Location: [facility] MAIN OR;  Service: Pain Management    • EPIDURAL BLOCK INJECTION Right 3/7/2019    Procedure: L4 L5 Transforaminal Epidural Steroid Injection ([id number]); Surgeon: Nena Morris MD;  Location: [facility] MAIN OR;  Service: Pain Management    • EPIDURAL BLOCK INJECTION Left 2022    Procedure: L4 L5  TRANSFORAMINAL epidural steroid injection ([id number]); Surgeon: Nena Morris MD;  Location: [facility] MAIN OR;  Service: Pain Management    • ESOPHAGOGASTRODUODENOSCOPY N/A 2016    Procedure: ESOPHAGOGASTRODUODENOSCOPY (EGD); Surgeon: Nuha Vigil MD;  Location: [facility] GI LAB;   Service:    • FL INJECTION LEFT ELBOW (NON ARTHROGRAM)  2022   • NOSE SURGERY         Family History   Problem Relation Age of Onset   • Stomach cancer Maternal Grandmother    • Hypertension Mother    • Diabetes Mother    • Cataracts Father    • Skin cancer Father    • Stroke Father         2X   • Aneurysm Father    • No Known Problems Sister    • No Known Problems Brother    • Throat cancer Brother        Social History     Occupational History   • Not on file   Tobacco Use   • Smoking status: Former Smoker     Packs/day: 0 50     Years: 19 00     Pack years: 9 50     Types: Cigarettes     Quit date:      Years since quittin 8   • Smokeless tobacco: Never Used   Vaping Use   • Vaping Use: Never used   Substance and Sexual Activity   • Alcohol use: No   • Drug use: No   • Sexual activity: Not Currently         Current Outpatient Medications:   •  aspirin (ECOTRIN LOW STRENGTH) 81 mg EC tablet, Take 1 tablet (81 mg total) by mouth daily, Disp: 30 tablet, Rfl: 5  •  Cholecalciferol (RA Vitamin D-3) 25 MCG (1000 UT) tablet, Take 2 tablets (2,000 Units total) by mouth daily, Disp: 180 tablet, Rfl: 3  •  Diclofenac Sodium (VOLTAREN) 1 %, Apply 2 g topically 4 (four) times a day, Disp: 2 g, Rfl: 2  •  famotidine (PEPCID) 40 MG tablet, Take 0 5 tablets (20 mg total) by mouth daily at bedtime, Disp: 90 tablet, Rfl: 3  •  fexofenadine (RA Allergy Relief) 180 MG tablet, Take 1 tablet (180 mg total) by mouth daily, Disp: 90 tablet, Rfl: 3  •  methocarbamol (ROBAXIN) 500 mg tablet, Take 1 tablet (500 mg total) by mouth 3 (three) times a day as needed for muscle spasms, Disp: 60 tablet, Rfl: 2  •  omeprazole (PriLOSEC) 40 MG capsule, Take 1 capsule (40 mg total) by mouth daily, Disp: 30 capsule, Rfl: 5  •  tamsulosin (FLOMAX) 0 4 mg, Take 2 capsules (0 8 mg total) by mouth daily at bedtime, Disp: 60 capsule, Rfl: 5  •  timolol (TIMOPTIC) 0 5 % ophthalmic solution, Apply 1 drop to eye daily, Disp: 15 mL, Rfl: 5  •  vitamin B-12 (VITAMIN B-12) 1,000 mcg tablet, Take 1 tablet (1,000 mcg total) by mouth daily, Disp: 90 tablet, Rfl: 3    Allergies   Allergen Reactions   • Gabapentin Other (See Comments)     OUT OF BODY PSYCHOSIS, MEMORY LOSS, AMNESIA   • Other Cough     HAYFEVER, SEASONAL, ALFALFA       Objective:  Vitals:    10/20/22 1123   BP: 136/78   Pulse: 99       Left Knee Exam     Range of Motion   Extension:  -10 abnormal   Flexion: 110     Tests   Praveen:  Medial - positive Lateral - negative  Varus: negative Valgus: negative  Lachman:  Anterior - negative      Drawer:  Anterior - negative         Other   Erythema: absent  Sensation: normal  Swelling: mild  Effusion: no effusion present          Observations   Left Knee   Negative for effusion  Physical Exam  Vitals reviewed  Constitutional:       Appearance: He is well-developed  HENT:      Head: Normocephalic and atraumatic  Eyes:      General:         Right eye: No discharge  Left eye: No discharge        Conjunctiva/sclera: Conjunctivae normal    Cardiovascular: Rate and Rhythm: Regular rhythm  Pulmonary:      Effort: Pulmonary effort is normal  No respiratory distress  Musculoskeletal:      Cervical back: Normal range of motion and neck supple  Left knee: No effusion  Instability Tests: Medial Praveen test positive  Lateral Praveen test negative  Skin:     General: Skin is warm and dry  Neurological:      Mental Status: He is alert and oriented to person, place, and time  Psychiatric:         Behavior: Behavior normal          I have personally reviewed pertinent films in PACS and my interpretation is as follows:  X-rays of the left knee taken in April of 2022 were reviewed and demonstrate mild patellofemoral osteoarthritis with small osteophytes arising off the superior and inferior poles of the patella  The medial and lateral compartments are well preserved  There is no evidence of fracture or other osseous abnormality  This document was created using speech voice recognition software  Grammatical errors, random word insertions, pronoun errors, and incomplete sentences are an occasional consequence of this system due to software limitations, ambient noise, and hardware issues  Any formal questions or concerns about content, text, or information contained within the body of this dictation should be directly addressed to the provider for clarification

## 2022-10-27 NOTE — PROGRESS NOTES
Virtual Regular Visit    Verification of patient location:    Patient is located in the following state in which I hold an active license [place]      Assessment/Plan:    Problem List Items Addressed This Visit        Other    Elevated PSA    Relevant Medications    tamsulosin (FLOMAX) 0 4 mg    LLQ abdominal pain      Other Visit Diagnoses     Fatigue, unspecified type        Relevant Medications    vitamin B-12 (VITAMIN B-12) 1,000 mcg tablet    Medication refill        Relevant Medications    timolol (TIMOPTIC) 0 5 % ophthalmic solution    aspirin (ECOTRIN LOW STRENGTH) 81 mg EC tablet    Enlarged prostate on rectal examination        Relevant Medications    tamsulosin (FLOMAX) 0 4 mg    Gastroesophageal reflux disease        Relevant Medications    famotidine (PEPCID) 40 MG tablet    omeprazole (PriLOSEC) 40 MG capsule    Post-nasal drip        Relevant Medications    fexofenadine (RA Allergy Relief) 180 MG tablet    Neck sprain, subsequent encounter        Relevant Medications    methocarbamol (ROBAXIN) 500 mg tablet    Epigastric burning sensation        Relevant Medications    omeprazole (PriLOSEC) 40 MG capsule    Vitamin D deficiency        Relevant Medications    Cholecalciferol (RA Vitamin D-3) 25 MCG (1000 UT) tablet    Patellofemoral arthritis of left knee        Relevant Medications    Diclofenac Sodium (VOLTAREN) 1 %               Reason for visit is   Chief Complaint   Patient presents with   • Virtual Brief Visit   • Virtual Regular Visit        Encounter provider Berta Osorio DO    Provider located at [address] [place] [id number]      Recent Visits  No visits were found meeting these conditions  Showing recent visits within past 7 days and meeting all other requirements  Future Appointments  No visits were found meeting these conditions    Showing future appointments within next 150 days and meeting all other requirements       The patient was identified by name and date of birth  Barbara Culver was informed that this is a telemedicine visit and that the visit is being conducted through telephone  He acknowledged consent and understanding of privacy and security of the video platform  The patient has agreed to participate and understands they can discontinue the visit at any time  Patient is aware this is a billable service  Dewayne Hart is a 58 y o  male who schedules his appointments same days his friend  Today the joint together on a telehealth request  No acute concerns or complaints apart from medication refills  No recent labs for this patient to discuss  Calls taken in the back office close to other patients and accessible only to staff  Only other patients with ability to hear his personal information was the friend he has appointments with  Past Medical History:   Diagnosis Date   • Abdominal pain 10/24/2018   • Amnesia memory loss    • Anxiety    • Chronic fatigue    • Disc degeneration, lumbar    • Esophageal reflux    • GERD (gastroesophageal reflux disease)    • Glaucoma    • Hiatal hernia    • Increased pressure in the eye     both eyes; denies glaucoma    • Internal hemorrhoids    • Lumbar disc herniation with radiculopathy    • Memory loss    • Pain in both feet 6/18/2018   • Personal history of colonic polyps        Past Surgical History:   Procedure Laterality Date   • COLONOSCOPY N/A 4/25/2016    Procedure: COLONOSCOPY;  Surgeon: Bozena Porter MD;  Location: [facility] GI LAB;   Service:    • DENTAL SURGERY      ALL TEETH PULLED   • EPIDURAL BLOCK INJECTION     • EPIDURAL BLOCK INJECTION Right 12/28/2016    Procedure: BLOCK / INJECTION EPIDURAL STEROID TRANSFORAMINAL  L4-5;  Surgeon: Emilio Van MD;  Location: [facility] MAIN OR;  Service:    • EPIDURAL BLOCK INJECTION Right 11/16/2017    Procedure: BLOCK / INJECTION EPIDURAL STEROID TRANSFORAMINAL L4-5 RIGHT;  Surgeon: Neri Freitas Ama Whitaker MD;  Location: [facility] MAIN OR;  Service: Pain Management    • EPIDURAL BLOCK INJECTION Left 5/10/2018    Procedure: Lt L4 L5 Tfesi ([id number]); Surgeon: Adeline Chairez MD;  Location: [facility] MAIN OR;  Service: Pain Management    • EPIDURAL BLOCK INJECTION Right 3/7/2019    Procedure: L4 L5 Transforaminal Epidural Steroid Injection ([id number]); Surgeon: Adeline Chairez MD;  Location: [facility] MAIN OR;  Service: Pain Management    • EPIDURAL BLOCK INJECTION Left 5/12/2022    Procedure: L4 L5  TRANSFORAMINAL epidural steroid injection ([id number]); Surgeon: Adeline Chairez MD;  Location: [facility] MAIN OR;  Service: Pain Management    • ESOPHAGOGASTRODUODENOSCOPY N/A 4/25/2016    Procedure: ESOPHAGOGASTRODUODENOSCOPY (EGD); Surgeon: Tawnya Pizano MD;  Location: [facility] GI LAB;   Service:    • FL INJECTION LEFT ELBOW (NON ARTHROGRAM)  5/12/2022   • NOSE SURGERY         Current Outpatient Medications   Medication Sig Dispense Refill   • aspirin (ECOTRIN LOW STRENGTH) 81 mg EC tablet Take 1 tablet (81 mg total) by mouth daily 30 tablet 5   • Cholecalciferol (RA Vitamin D-3) 25 MCG (1000 UT) tablet Take 2 tablets (2,000 Units total) by mouth daily 180 tablet 3   • Diclofenac Sodium (VOLTAREN) 1 % Apply 2 g topically 4 (four) times a day 2 g 2   • famotidine (PEPCID) 40 MG tablet Take 0 5 tablets (20 mg total) by mouth daily at bedtime 90 tablet 3   • fexofenadine (RA Allergy Relief) 180 MG tablet Take 1 tablet (180 mg total) by mouth daily 90 tablet 3   • methocarbamol (ROBAXIN) 500 mg tablet Take 1 tablet (500 mg total) by mouth 3 (three) times a day as needed for muscle spasms 60 tablet 2   • omeprazole (PriLOSEC) 40 MG capsule Take 1 capsule (40 mg total) by mouth daily 30 capsule 5   • tamsulosin (FLOMAX) 0 4 mg Take 2 capsules (0 8 mg total) by mouth daily at bedtime 60 capsule 5   • timolol (TIMOPTIC) 0 5 % ophthalmic solution Apply 1 drop to eye daily 15 mL 5   • vitamin B-12 (VITAMIN B-12) 1,000 mcg tablet Take 1 tablet (1,000 mcg total) by mouth daily 90 tablet 3     No current facility-administered medications for this visit  Allergies   Allergen Reactions   • Gabapentin Other (See Comments)     OUT OF BODY PSYCHOSIS, MEMORY LOSS, AMNESIA   • Other Cough     HAYFEVER, SEASONAL, ALFALFA       TeleHealth Exam    There were no vitals filed for this visit  Patient had full voice was able speaking complete sentences showing no signs of respiratory distress  Visit was done by telephone unable to assess color skin turgor or any other components of physical exam such is increased work of breathing heart rate etcetera  I spent 10   minutes directly with the patient during this visit

## 2022-11-22 ENCOUNTER — HOSPITAL ENCOUNTER (OUTPATIENT)
Dept: RADIOLOGY | Facility: HOSPITAL | Age: 62
Discharge: HOME | End: 2022-11-22
Attending: ORTHOPAEDIC SURGERY

## 2022-11-22 DIAGNOSIS — M23.92 INTERNAL DERANGEMENT OF LEFT KNEE: ICD-10-CM

## 2022-11-29 ENCOUNTER — OFFICE VISIT (OUTPATIENT)
Dept: OBGYN CLINIC | Facility: CLINIC | Age: 62
End: 2022-11-29

## 2022-11-29 VITALS
HEART RATE: 93 BPM | BODY MASS INDEX: 29.92 KG/M2 | SYSTOLIC BLOOD PRESSURE: 146 MMHG | DIASTOLIC BLOOD PRESSURE: 88 MMHG | WEIGHT: 209 LBS | HEIGHT: 70 IN

## 2022-11-29 DIAGNOSIS — S83.242A ACUTE MEDIAL MENISCUS TEAR OF LEFT KNEE, INITIAL ENCOUNTER: Primary | ICD-10-CM

## 2022-11-29 NOTE — PROGRESS NOTES
Assessment/Plan:  1  Acute medial meniscus tear of left knee, initial encounter  Ambulatory referral to [address] [facility] has an MRI demonstrating a medial meniscus tear of the medial portion of the knee  This correlates with his severe pain on exam   He continues to have mechanical symptoms within the knee  I recommended having him see Dr Radha Hughes to discuss surgical versus nonsurgical treatment options  Since he does not seem to have significant degenerative changes in his knee I recommended considering arthroscopic surgery however he will discuss all treatment options with Dr Radha Hughes and move forward with treatment appropriately  Subjective:   Aixa Malik is a 58 y o  male who presents to the office for follow-up for left-sided knee pain  He was last in the office 1 month ago with left-sided knee pain dating back to April of this past year  He had negative x-rays around that time  This pain has persisted over the past several months and has not improved with conservative measures  At last office visit he was having increased pain and some swelling in the knee especially with lot of activity  There was some swelling and difficulty fully extending the knee and mechanical symptoms of locking or reported  We sent him for an MRI any returns for results today  He states he feels similar pain over the medial aspect of the knee  Review of Systems   Constitutional: Negative for chills, fever and unexpected weight change  HENT: Negative for hearing loss, nosebleeds and sore throat  Eyes: Negative for pain, redness and visual disturbance  Respiratory: Negative for cough, shortness of breath and wheezing  Cardiovascular: Negative for chest pain, palpitations and leg swelling  Gastrointestinal: Negative for abdominal pain, nausea and vomiting  Endocrine: Negative for polyphagia and polyuria  Genitourinary: Negative for dysuria and hematuria     Musculoskeletal:        See HPI   Skin: Negative for rash and wound  Neurological: Negative for dizziness, numbness and headaches  Psychiatric/Behavioral: Negative for decreased concentration and suicidal ideas  The patient is not nervous/anxious  Past Medical History:   Diagnosis Date   • Abdominal pain 10/24/2018   • Amnesia memory loss    • Anxiety    • Chronic fatigue    • Disc degeneration, lumbar    • Esophageal reflux    • GERD (gastroesophageal reflux disease)    • Glaucoma    • Hiatal hernia    • Increased pressure in the eye     both eyes; denies glaucoma    • Internal hemorrhoids    • Lumbar disc herniation with radiculopathy    • Memory loss    • Pain in both feet 6/18/2018   • Personal history of colonic polyps        Past Surgical History:   Procedure Laterality Date   • COLONOSCOPY N/A 4/25/2016    Procedure: COLONOSCOPY;  Surgeon: Chai Howard MD;  Location: [facility] GI LAB; Service:    • DENTAL SURGERY      ALL TEETH PULLED   • EPIDURAL BLOCK INJECTION     • EPIDURAL BLOCK INJECTION Right 12/28/2016    Procedure: BLOCK / INJECTION EPIDURAL STEROID TRANSFORAMINAL  L4-5;  Surgeon: Tiny Romberg, MD;  Location: [facility] MAIN OR;  Service:    • EPIDURAL BLOCK INJECTION Right 11/16/2017    Procedure: BLOCK / INJECTION EPIDURAL STEROID TRANSFORAMINAL L4-5 RIGHT;  Surgeon: Tiny Romberg, MD;  Location: [facility] MAIN OR;  Service: Pain Management    • EPIDURAL BLOCK INJECTION Left 5/10/2018    Procedure: Lt L4 L5 Tfesi ([id number]); Surgeon: Tiny Romberg, MD;  Location: [facility] MAIN OR;  Service: Pain Management    • EPIDURAL BLOCK INJECTION Right 3/7/2019    Procedure: L4 L5 Transforaminal Epidural Steroid Injection ([id number]); Surgeon: Tiny Romberg, MD;  Location: [facility] MAIN OR;  Service: Pain Management    • EPIDURAL BLOCK INJECTION Left 5/12/2022    Procedure: L4 L5  TRANSFORAMINAL epidural steroid injection ([id number]);   Surgeon: Tiny Romberg, MD;  Location: [facility] MAIN OR;  Service: Pain Management    • ESOPHAGOGASTRODUODENOSCOPY N/A 2016    Procedure: ESOPHAGOGASTRODUODENOSCOPY (EGD); Surgeon: Bela Gale MD;  Location: [facility] GI LAB;   Service:    • FL INJECTION LEFT ELBOW (NON ARTHROGRAM)  2022   • NOSE SURGERY         Family History   Problem Relation Age of Onset   • Stomach cancer Maternal Grandmother    • Hypertension Mother    • Diabetes Mother    • Cataracts Father    • Skin cancer Father    • Stroke Father         2X   • Aneurysm Father    • No Known Problems Sister    • No Known Problems Brother    • Throat cancer Brother        Social History     Occupational History   • Not on file   Tobacco Use   • Smoking status: Former     Packs/day: 0 50     Years: 19 00     Pack years: 9 50     Types: Cigarettes     Quit date:      Years since quittin 9   • Smokeless tobacco: Never   Vaping Use   • Vaping Use: Never used   Substance and Sexual Activity   • Alcohol use: No   • Drug use: No   • Sexual activity: Not Currently         Current Outpatient Medications:   •  aspirin (ECOTRIN LOW STRENGTH) 81 mg EC tablet, Take 1 tablet (81 mg total) by mouth daily, Disp: 30 tablet, Rfl: 5  •  Cholecalciferol (RA Vitamin D-3) 25 MCG (1000 UT) tablet, Take 2 tablets (2,000 Units total) by mouth daily, Disp: 180 tablet, Rfl: 3  •  Diclofenac Sodium (VOLTAREN) 1 %, Apply 2 g topically 4 (four) times a day, Disp: 2 g, Rfl: 2  •  famotidine (PEPCID) 40 MG tablet, Take 0 5 tablets (20 mg total) by mouth daily at bedtime, Disp: 90 tablet, Rfl: 3  •  fexofenadine (RA Allergy Relief) 180 MG tablet, Take 1 tablet (180 mg total) by mouth daily, Disp: 90 tablet, Rfl: 3  •  methocarbamol (ROBAXIN) 500 mg tablet, Take 1 tablet (500 mg total) by mouth 3 (three) times a day as needed for muscle spasms, Disp: 60 tablet, Rfl: 2  •  omeprazole (PriLOSEC) 40 MG capsule, Take 1 capsule (40 mg total) by mouth daily, Disp: 30 capsule, Rfl: 5  •  tamsulosin (FLOMAX) 0 4 mg, Take 2 capsules (0 8 mg total) by mouth daily at bedtime, Disp: 60 capsule, Rfl: 5  •  timolol (TIMOPTIC) 0 5 % ophthalmic solution, Apply 1 drop to eye daily, Disp: 15 mL, Rfl: 5  •  vitamin B-12 (VITAMIN B-12) 1,000 mcg tablet, Take 1 tablet (1,000 mcg total) by mouth daily, Disp: 90 tablet, Rfl: 3    Allergies   Allergen Reactions   • Gabapentin Other (See Comments)     OUT OF BODY PSYCHOSIS, MEMORY LOSS, AMNESIA   • Other Cough     HAYFEVER, SEASONAL, ALFALFA       Objective:  Vitals:    11/29/22 1112   BP: 146/88   Pulse: 93       Left Knee Exam     Tenderness   The patient is experiencing tenderness in the medial joint line  Range of Motion   Extension: normal   Flexion:  140 abnormal     Tests   Praveen:  Medial - positive Lateral - negative  Varus: negative Valgus: negative    Other   Erythema: absent  Sensation: normal  Pulse: present  Swelling: mild  Effusion: effusion present          Observations   Left Knee   Positive for effusion  Physical Exam  Vitals and nursing note reviewed  Constitutional:       Appearance: He is well-developed  HENT:      Head: Normocephalic and atraumatic  Eyes:      General: No scleral icterus  Extraocular Movements: Extraocular movements intact  Conjunctiva/sclera: Conjunctivae normal    Cardiovascular:      Rate and Rhythm: Normal rate  Pulmonary:      Effort: Pulmonary effort is normal  No respiratory distress  Musculoskeletal:      Cervical back: Normal range of motion and neck supple  Left knee: Effusion present  Instability Tests: Medial Praveen test positive  Lateral Praveen test negative  Comments: As noted in HPI   Skin:     General: Skin is warm and dry  Neurological:      Mental Status: He is alert and oriented to person, place, and time  Psychiatric:         Behavior: Behavior normal          I have personally reviewed pertinent films in PACS and my interpretation is as follows:  MRI of the left knee demonstrates a small medial meniscus tear    Minimal degenerative changes  This document was created using speech voice recognition software  Grammatical errors, random word insertions, pronoun errors, and incomplete sentences are an occasional consequence of this system due to software limitations, ambient noise, and hardware issues  Any formal questions or concerns about content, text, or information contained within the body of this dictation should be directly addressed to the provider for clarification

## 2022-12-13 ENCOUNTER — OFFICE VISIT (OUTPATIENT)
Dept: FAMILY MEDICINE CLINIC | Facility: CLINIC | Age: 62
End: 2022-12-13

## 2022-12-13 VITALS
WEIGHT: 219.56 LBS | SYSTOLIC BLOOD PRESSURE: 130 MMHG | RESPIRATION RATE: 21 BRPM | OXYGEN SATURATION: 99 % | HEIGHT: 71 IN | BODY MASS INDEX: 30.74 KG/M2 | HEART RATE: 84 BPM | TEMPERATURE: 99.3 F | DIASTOLIC BLOOD PRESSURE: 80 MMHG

## 2022-12-13 DIAGNOSIS — R79.89 ELEVATED TSH: ICD-10-CM

## 2022-12-13 DIAGNOSIS — K76.0 FATTY LIVER: ICD-10-CM

## 2022-12-13 DIAGNOSIS — Z11.59 NEED FOR HEPATITIS C SCREENING TEST: ICD-10-CM

## 2022-12-13 DIAGNOSIS — N40.0 ENLARGED PROSTATE ON RECTAL EXAMINATION: ICD-10-CM

## 2022-12-13 DIAGNOSIS — Z76.0 MEDICATION REFILL: ICD-10-CM

## 2022-12-13 DIAGNOSIS — E55.9 VITAMIN D DEFICIENCY: ICD-10-CM

## 2022-12-13 DIAGNOSIS — Z23 ENCOUNTER FOR IMMUNIZATION: ICD-10-CM

## 2022-12-13 DIAGNOSIS — Z11.4 ENCOUNTER FOR SCREENING FOR HIV: ICD-10-CM

## 2022-12-13 DIAGNOSIS — R53.83 FATIGUE, UNSPECIFIED TYPE: ICD-10-CM

## 2022-12-13 DIAGNOSIS — S13.9XXD NECK SPRAIN, SUBSEQUENT ENCOUNTER: ICD-10-CM

## 2022-12-13 DIAGNOSIS — R09.82 POST-NASAL DRIP: ICD-10-CM

## 2022-12-13 DIAGNOSIS — E66.9 OBESITY (BMI 30.0-34.9): ICD-10-CM

## 2022-12-13 DIAGNOSIS — M17.12 PATELLOFEMORAL ARTHRITIS OF LEFT KNEE: ICD-10-CM

## 2022-12-13 DIAGNOSIS — Z00.00 ANNUAL PHYSICAL EXAM: Primary | ICD-10-CM

## 2022-12-13 DIAGNOSIS — K21.9 GASTROESOPHAGEAL REFLUX DISEASE: ICD-10-CM

## 2022-12-13 DIAGNOSIS — R97.20 ELEVATED PSA: ICD-10-CM

## 2022-12-13 RX ORDER — FEXOFENADINE HCL 180 MG/1
180 TABLET ORAL DAILY
Qty: 90 TABLET | Refills: 3 | Status: SHIPPED | OUTPATIENT
Start: 2022-12-13

## 2022-12-13 RX ORDER — ASPIRIN 81 MG/1
81 TABLET ORAL DAILY
Qty: 30 TABLET | Refills: 5 | Status: SHIPPED | OUTPATIENT
Start: 2022-12-13

## 2022-12-13 RX ORDER — FAMOTIDINE 40 MG/1
20 TABLET, FILM COATED ORAL
Qty: 90 TABLET | Refills: 3 | Status: SHIPPED | OUTPATIENT
Start: 2022-12-13

## 2022-12-13 RX ORDER — METHOCARBAMOL 500 MG/1
500 TABLET, FILM COATED ORAL 3 TIMES DAILY PRN
Qty: 60 TABLET | Refills: 2 | Status: SHIPPED | OUTPATIENT
Start: 2022-12-13

## 2022-12-13 RX ORDER — LANOLIN ALCOHOL/MO/W.PET/CERES
1000 CREAM (GRAM) TOPICAL DAILY
Qty: 90 TABLET | Refills: 3 | Status: SHIPPED | OUTPATIENT
Start: 2022-12-13

## 2022-12-13 RX ORDER — AVOBENZONE, HOMOSALATE, OCTISALATE, OCTOCRYLENE 30; 100; 50; 25 MG/ML; MG/ML; MG/ML; MG/ML
2000 SPRAY TOPICAL DAILY
Qty: 180 TABLET | Refills: 3 | Status: SHIPPED | OUTPATIENT
Start: 2022-12-13

## 2022-12-13 RX ORDER — TAMSULOSIN HYDROCHLORIDE 0.4 MG/1
0.8 CAPSULE ORAL
Qty: 60 CAPSULE | Refills: 5 | Status: SHIPPED | OUTPATIENT
Start: 2022-12-13

## 2022-12-13 NOTE — PATIENT INSTRUCTIONS
Wellness Visit for Adults   AMBULATORY CARE:   A wellness visit  is when you see your healthcare provider to get screened for health problems  Your healthcare provider will also give you advice on how to stay healthy  Write down your questions so you remember to ask them  Ask your healthcare provider how often you should have a wellness visit  What happens at a wellness visit:  Your healthcare provider will ask about your health, and your family history of health problems  This includes high blood pressure, heart disease, and cancer  He or she will ask if you have symptoms that concern you, if you smoke, and about your mood  You may also be asked about your intake of medicines, supplements, food, and alcohol  Any of the following may be done:  · Your weight  will be checked  Your height may also be checked so your body mass index (BMI) can be calculated  Your BMI shows if you are at a healthy weight  · Your blood pressure  and heart rate will be checked  Your temperature may also be checked  · Blood and urine tests  may be done  Blood tests may be done to check your cholesterol levels  Abnormal cholesterol levels increase your risk for heart disease and stroke  You may also need a blood or urine test to check for diabetes if you are at increased risk  Urine tests may be done to look for signs of an infection or kidney disease  · A physical exam  includes checking your heartbeat and lungs with a stethoscope  Your healthcare provider may also check your skin to look for sun damage  · Screening tests  may be recommended  A screening test is done to check for diseases that may not cause symptoms  The screening tests you may need depend on your age, gender, family history, and lifestyle habits  For example, colorectal screening may be recommended if you are 48years old or older  Screening tests you need if you are a woman:   · A Pap smear  is used to screen for cervical cancer   Pap smears are usually done every 3 to 5 years depending on your age  You may need them more often if you have had abnormal Pap smear test results in the past  Ask your healthcare provider how often you should have a Pap smear  · A mammogram  is an x-ray of your breasts to screen for breast cancer  Experts recommend mammograms every 2 years starting at age 48 years  You may need a mammogram at age 52 years or younger if you have an increased risk for breast cancer  Talk to your healthcare provider about when you should start having mammograms and how often you need them  Vaccines you may need:   · Get an influenza vaccine  every year  The influenza vaccine protects you from the flu  Several types of viruses cause the flu  The viruses change over time, so new vaccines are made each year  · Get a tetanus-diphtheria (Td) booster vaccine  every 10 years  This vaccine protects you against tetanus and diphtheria  Tetanus is a severe infection that may cause painful muscle spasms and lockjaw  Diphtheria is a severe bacterial infection that causes a thick covering in the back of your mouth and throat  · Get a human papillomavirus (HPV) vaccine  if you are female and aged 23 to 32 or male 23 to 24 and never received it  This vaccine protects you from HPV infection  HPV is the most common infection spread by sexual contact  HPV may also cause vaginal, penile, and anal cancers  · Get a pneumococcal vaccine  if you are aged 72 years or older  The pneumococcal vaccine is an injection given to protect you from pneumococcal disease  Pneumococcal disease is an infection caused by pneumococcal bacteria  The infection may cause pneumonia, meningitis, or an ear infection  · Get a shingles vaccine  if you are 60 or older, even if you have had shingles before  The shingles vaccine is an injection to protect you from the varicella-zoster virus  This is the same virus that causes chickenpox   Shingles is a painful rash that develops in people who had chickenpox or have been exposed to the virus  How to eat healthy:  My Plate is a model for planning healthy meals  It shows the types and amounts of foods that should go on your plate  Fruits and vegetables make up about half of your plate, and grains and protein make up the other half  A serving of dairy is included on the side of your plate  The amount of calories and serving sizes you need depends on your age, gender, weight, and height  Examples of healthy foods are listed below:  · Eat a variety of vegetables  such as dark green, red, and orange vegetables  You can also include canned vegetables low in sodium (salt) and frozen vegetables without added butter or sauces  · Eat a variety of fresh fruits , canned fruit in 100% juice, frozen fruit, and dried fruit  · Include whole grains  At least half of the grains you eat should be whole grains  Examples include whole-wheat bread, wheat pasta, brown rice, and whole-grain cereals such as oatmeal     · Eat a variety of protein foods such as seafood (fish and shellfish), lean meat, and poultry without skin (turkey and chicken)  Examples of lean meats include pork leg, shoulder, or tenderloin, and beef round, sirloin, tenderloin, and extra lean ground beef  Other protein foods include eggs and egg substitutes, beans, peas, soy products, nuts, and seeds  · Choose low-fat dairy products such as skim or 1% milk or low-fat yogurt, cheese, and cottage cheese  · Limit unhealthy fats  such as butter, hard margarine, and shortening  Exercise:  Exercise at least 30 minutes per day on most days of the week  Some examples of exercise include walking, biking, dancing, and swimming  You can also fit in more physical activity by taking the stairs instead of the elevator or parking farther away from stores  Include muscle strengthening activities 2 days each week  Regular exercise provides many health benefits   It helps you manage your weight, and decreases your risk for type 2 diabetes, heart disease, stroke, and high blood pressure  Exercise can also help improve your mood  Ask your healthcare provider about the best exercise plan for you  General health and safety guidelines:   · Do not smoke  Nicotine and other chemicals in cigarettes and cigars can cause lung damage  Ask your healthcare provider for information if you currently smoke and need help to quit  E-cigarettes or smokeless tobacco still contain nicotine  Talk to your healthcare provider before you use these products  · Limit alcohol  A drink of alcohol is 12 ounces of beer, 5 ounces of wine, or 1½ ounces of liquor  · Lose weight, if needed  Being overweight increases your risk of certain health conditions  These include heart disease, high blood pressure, type 2 diabetes, and certain types of cancer  · Protect your skin  Do not sunbathe or use tanning beds  Use sunscreen with a SPF 15 or higher  Apply sunscreen at least 15 minutes before you go outside  Reapply sunscreen every 2 hours  Wear protective clothing, hats, and sunglasses when you are outside  · Drive safely  Always wear your seatbelt  Make sure everyone in your car wears a seatbelt  A seatbelt can save your life if you are in an accident  Do not use your cell phone when you are driving  This could distract you and cause an accident  Pull over if you need to make a call or send a text message  · Practice safe sex  Use latex condoms if are sexually active and have more than one partner  Your healthcare provider may recommend screening tests for sexually transmitted infections (STIs)  · Wear helmets, lifejackets, and protective gear  Always wear a helmet when you ride a bike or motorcycle, go skiing, or play sports that could cause a head injury  Wear protective equipment when you play sports  Wear a lifejacket when you are on a boat or doing water sports      © Copyright [employer] 2022 Information is for End User's use only and may not be sold, redistributed or otherwise used for commercial purposes  All illustrations and images included in CareNotes® are the copyrighted property of A D A M [employer] Inc  or Bari Devine  The above information is an  only  It is not intended as medical advice for individual conditions or treatments  Talk to your doctor, nurse or pharmacist before following any medical regimen to see if it is safe and effective for you  Obesity   AMBULATORY CARE:   Obesity  means your body mass index (BMI) is greater than 30  Your healthcare provider will use your height and weight to measure your BMI  The risks of obesity include  many health problems, including injuries or physical disability  · Diabetes (high blood sugar level)    · High blood pressure or high cholesterol    · Heart disease    · Stroke    · Gallbladder or liver disease    · Cancer of the colon, breast, prostate, liver, or kidney    · Sleep apnea    · Arthritis or gout    Screening  is done to check for health conditions before you have signs or symptoms  If you are 28to 79years old, your blood sugar level may be checked every 3 years for signs of prediabetes or diabetes  Your healthcare provider will check your blood pressure at each visit  High blood pressure can lead to a stroke or other problems  Your provider may check for signs of heart disease, cancer, or other health problems  Seek care immediately if:   · You have a severe headache, confusion, or difficulty speaking  · You have weakness on one side of your body  · You have chest pain, sweating, or shortness of breath  Call your doctor if:   · You have symptoms of gallbladder or liver disease, such as pain in your upper abdomen  · You have knee or hip pain and discomfort while walking  · You have symptoms of diabetes, such as intense hunger and thirst, and frequent urination      · You have symptoms of sleep apnea, such as snoring or daytime sleepiness  · You have questions or concerns about your condition or care  Treatment for obesity  focuses on helping you lose weight to improve your health  Even a small decrease in BMI can reduce the risk for many health problems  Your healthcare provider will help you set a weight-loss goal   · Lifestyle changes  are the first step in treating obesity  These include making healthy food choices and getting regular physical activity  Your healthcare provider may suggest a weight-loss program that involves coaching, education, and therapy  · Medicine  may help you lose weight when it is used with a healthy foods and physical activity  · Surgery  can help you lose weight if you are very obese and have other health problems  There are several types of weight-loss surgery  Ask your healthcare provider for more information  Tips for safe weight loss:   · Set small, realistic goals  An example of a small goal is to walk for 20 minutes 5 days a week  Anther goal is to lose 5% of your body weight  · Tell friends, family members, and coworkers about your goals  and ask for their support  Ask a friend to lose weight with you, or join a weight-loss support group  · Identify foods or triggers that may cause you to overeat , and find ways to avoid them  Remove tempting high-calorie foods from your home and workplace  Place a bowl of fresh fruit on your kitchen counter  If stress causes you to eat, then find other ways to cope with stress  A counselor or therapist may be able to help you  · Keep a diary to track what you eat and drink  Also write down how many minutes of physical activity you do each day  Weigh yourself once a week and record it in your diary  Eating changes: You will need to eat 500 to 1,000 fewer calories each day than you currently eat to lose 1 to 2 pounds a week  The following changes will help you cut calories:  · Eat smaller portions    Use small plates, no larger than 9 inches in diameter  Fill your plate half full of fruits and vegetables  Measure your food using measuring cups until you know what a serving size looks like  · Eat 3 meals and 1 or 2 snacks each day  Plan your meals in advance  Lukas Veloz and eat at home most of the time  Eat slowly  Do not skip meals  Skipping meals can lead to overeating later in the day  This can make it harder for you to lose weight  Talk with a dietitian to help you make a meal plan and schedule that is right for you  · Eat fruits and vegetables at every meal   They are low in calories and high in fiber, which makes you feel full  Do not add butter, margarine, or cream sauce to vegetables  Use herbs to season steamed vegetables  · Eat less fat and fewer fried foods  Eat more baked or grilled chicken and fish  These protein sources are lower in calories and fat than red meat  Limit fast food  Dress your salads with olive oil and vinegar instead of bottled dressing  · Limit the amount of sugar you eat  Do not drink sugary beverages  Limit alcohol  Activity changes:  Physical activity is good for your body in many ways  It helps you burn calories and build strong muscles  It decreases stress and depression, and improves your mood  It can also help you sleep better  Talk to your healthcare provider before you begin an exercise program   · Exercise for at least 30 minutes 5 days a week  Start slowly  Set aside time each day for physical activity that you enjoy and that is convenient for you  It is best to do both weight training and an activity that increases your heart rate, such as walking, bicycling, or swimming  · Find ways to be more active  Do yard work and housecleaning  Walk up the stairs instead of using elevators  Spend your leisure time going to events that require walking, such as outdoor festivals or fairs  This extra physical activity can help you lose weight and keep it off         Follow up with your doctor as directed: You may need to meet with a dietitian  Write down your questions so you remember to ask them during your visits  © Copyright [employer] 2022 Information is for End User's use only and may not be sold, redistributed or otherwise used for commercial purposes  All illustrations and images included in CareNotes® are the copyrighted property of A D A M [employer] Inc  or Bari Chakraborty   The above information is an  only  It is not intended as medical advice for individual conditions or treatments  Talk to your doctor, nurse or pharmacist before following any medical regimen to see if it is safe and effective for you  Wellness Visit for Adults   AMBULATORY CARE:   A wellness visit  is when you see your healthcare provider to get screened for health problems  Your healthcare provider will also give you advice on how to stay healthy  Write down your questions so you remember to ask them  Ask your healthcare provider how often you should have a wellness visit  What happens at a wellness visit:  Your healthcare provider will ask about your health, and your family history of health problems  This includes high blood pressure, heart disease, and cancer  He or she will ask if you have symptoms that concern you, if you smoke, and about your mood  You may also be asked about your intake of medicines, supplements, food, and alcohol  Any of the following may be done:  · Your weight  will be checked  Your height may also be checked so your body mass index (BMI) can be calculated  Your BMI shows if you are at a healthy weight  · Your blood pressure  and heart rate will be checked  Your temperature may also be checked  · Blood and urine tests  may be done  Blood tests may be done to check your cholesterol levels  Abnormal cholesterol levels increase your risk for heart disease and stroke  You may also need a blood or urine test to check for diabetes if you are at increased risk   Urine tests may be done to look for signs of an infection or kidney disease  · A physical exam  includes checking your heartbeat and lungs with a stethoscope  Your healthcare provider may also check your skin to look for sun damage  · Screening tests  may be recommended  A screening test is done to check for diseases that may not cause symptoms  The screening tests you may need depend on your age, gender, family history, and lifestyle habits  For example, colorectal screening may be recommended if you are 48years old or older  Screening tests you need if you are a woman:   · A Pap smear  is used to screen for cervical cancer  Pap smears are usually done every 3 to 5 years depending on your age  You may need them more often if you have had abnormal Pap smear test results in the past  Ask your healthcare provider how often you should have a Pap smear  · A mammogram  is an x-ray of your breasts to screen for breast cancer  Experts recommend mammograms every 2 years starting at age 48 years  You may need a mammogram at age 52 years or younger if you have an increased risk for breast cancer  Talk to your healthcare provider about when you should start having mammograms and how often you need them  Vaccines you may need:   · Get an influenza vaccine  every year  The influenza vaccine protects you from the flu  Several types of viruses cause the flu  The viruses change over time, so new vaccines are made each year  · Get a tetanus-diphtheria (Td) booster vaccine  every 10 years  This vaccine protects you against tetanus and diphtheria  Tetanus is a severe infection that may cause painful muscle spasms and lockjaw  Diphtheria is a severe bacterial infection that causes a thick covering in the back of your mouth and throat  · Get a human papillomavirus (HPV) vaccine  if you are female and aged 23 to 32 or male 23 to 24 and never received it  This vaccine protects you from HPV infection   HPV is the most common infection spread by sexual contact  HPV may also cause vaginal, penile, and anal cancers  · Get a pneumococcal vaccine  if you are aged 72 years or older  The pneumococcal vaccine is an injection given to protect you from pneumococcal disease  Pneumococcal disease is an infection caused by pneumococcal bacteria  The infection may cause pneumonia, meningitis, or an ear infection  · Get a shingles vaccine  if you are 60 or older, even if you have had shingles before  The shingles vaccine is an injection to protect you from the varicella-zoster virus  This is the same virus that causes chickenpox  Shingles is a painful rash that develops in people who had chickenpox or have been exposed to the virus  How to eat healthy:  My Plate is a model for planning healthy meals  It shows the types and amounts of foods that should go on your plate  Fruits and vegetables make up about half of your plate, and grains and protein make up the other half  A serving of dairy is included on the side of your plate  The amount of calories and serving sizes you need depends on your age, gender, weight, and height  Examples of healthy foods are listed below:  · Eat a variety of vegetables  such as dark green, red, and orange vegetables  You can also include canned vegetables low in sodium (salt) and frozen vegetables without added butter or sauces  · Eat a variety of fresh fruits , canned fruit in 100% juice, frozen fruit, and dried fruit  · Include whole grains  At least half of the grains you eat should be whole grains  Examples include whole-wheat bread, wheat pasta, brown rice, and whole-grain cereals such as oatmeal     · Eat a variety of protein foods such as seafood (fish and shellfish), lean meat, and poultry without skin (turkey and chicken)  Examples of lean meats include pork leg, shoulder, or tenderloin, and beef round, sirloin, tenderloin, and extra lean ground beef   Other protein foods include eggs and egg substitutes, beans, peas, soy products, nuts, and seeds  · Choose low-fat dairy products such as skim or 1% milk or low-fat yogurt, cheese, and cottage cheese  · Limit unhealthy fats  such as butter, hard margarine, and shortening  Exercise:  Exercise at least 30 minutes per day on most days of the week  Some examples of exercise include walking, biking, dancing, and swimming  You can also fit in more physical activity by taking the stairs instead of the elevator or parking farther away from stores  Include muscle strengthening activities 2 days each week  Regular exercise provides many health benefits  It helps you manage your weight, and decreases your risk for type 2 diabetes, heart disease, stroke, and high blood pressure  Exercise can also help improve your mood  Ask your healthcare provider about the best exercise plan for you  General health and safety guidelines:   · Do not smoke  Nicotine and other chemicals in cigarettes and cigars can cause lung damage  Ask your healthcare provider for information if you currently smoke and need help to quit  E-cigarettes or smokeless tobacco still contain nicotine  Talk to your healthcare provider before you use these products  · Limit alcohol  A drink of alcohol is 12 ounces of beer, 5 ounces of wine, or 1½ ounces of liquor  · Lose weight, if needed  Being overweight increases your risk of certain health conditions  These include heart disease, high blood pressure, type 2 diabetes, and certain types of cancer  · Protect your skin  Do not sunbathe or use tanning beds  Use sunscreen with a SPF 15 or higher  Apply sunscreen at least 15 minutes before you go outside  Reapply sunscreen every 2 hours  Wear protective clothing, hats, and sunglasses when you are outside  · Drive safely  Always wear your seatbelt  Make sure everyone in your car wears a seatbelt  A seatbelt can save your life if you are in an accident   Do not use your cell phone when you are driving  This could distract you and cause an accident  Pull over if you need to make a call or send a text message  · Practice safe sex  Use latex condoms if are sexually active and have more than one partner  Your healthcare provider may recommend screening tests for sexually transmitted infections (STIs)  · Wear helmets, lifejackets, and protective gear  Always wear a helmet when you ride a bike or motorcycle, go skiing, or play sports that could cause a head injury  Wear protective equipment when you play sports  Wear a lifejacket when you are on a boat or doing water sports  © Copyright [employer] 2022 Information is for End User's use only and may not be sold, redistributed or otherwise used for commercial purposes  All illustrations and images included in CareNotes® are the copyrighted property of [employer]  or Bari Devine  The above information is an  only  It is not intended as medical advice for individual conditions or treatments  Talk to your doctor, nurse or pharmacist before following any medical regimen to see if it is safe and effective for you

## 2022-12-13 NOTE — PROGRESS NOTES
[address] [facility]    NAME: Lian Gracia  AGE: 58 y o  SEX: male  : 1960     DATE: 2022     Assessment and Plan:     Problem List Items Addressed This Visit        Digestive    Fatty liver    Relevant Orders    Comprehensive metabolic panel       Other    Elevated PSA    Relevant Medications    tamsulosin (FLOMAX) 0 4 mg   Other Visit Diagnoses     Annual physical exam    -  Primary    Obesity (BMI 30 0-34  9)        Encounter for immunization        Relevant Orders    influenza vaccine, quadrivalent, recombinant, PF, 0 5 mL, for patients 18 yr+ (FLUBLOK) (Completed)    Need for hepatitis C screening test        Relevant Orders    Hepatitis C Antibody ([facility])    Medication refill        Relevant Medications    aspirin (ECOTRIN LOW STRENGTH) 81 mg EC tablet    Vitamin D deficiency        Relevant Medications    Cholecalciferol (RA Vitamin D-3) 25 MCG (1000 UT) tablet    Patellofemoral arthritis of left knee        Relevant Medications    Diclofenac Sodium (VOLTAREN) 1 %    Gastroesophageal reflux disease        Relevant Medications    famotidine (PEPCID) 40 MG tablet    Post-nasal drip        Relevant Medications    fexofenadine (RA Allergy Relief) 180 MG tablet    Neck sprain, subsequent encounter        Relevant Medications    methocarbamol (ROBAXIN) 500 mg tablet    Enlarged prostate on rectal examination        Relevant Medications    tamsulosin (FLOMAX) 0 4 mg    Fatigue, unspecified type        Relevant Medications    vitamin B-12 (VITAMIN B-12) 1,000 mcg tablet    Other Relevant Orders    CBC and differential    Elevated TSH        Relevant Orders    TSH, 3rd generation with Free T4 reflex    Encounter for screening for HIV        Relevant Orders    Human Immunodeficiency Virus 1/2 Antigen / Antibody ( Fourth Generation) with Reflex Testing          Immunizations and preventive care screenings were discussed with patient today  Appropriate education was printed on patient's after visit summary  Discussed risks and benefits of prostate cancer screening  We discussed the controversial history of PSA screening for prostate cancer in the [place] as well as the risk of over detection and over treatment of prostate cancer by way of PSA screening  The patient understands that PSA blood testing is an imperfect way to screen for prostate cancer and that elevated PSA levels in the blood may also be caused by infection, inflammation, prostatic trauma or manipulation, urological procedures, or by benign prostatic enlargement  The role of the digital rectal examination in prostate cancer screening was also discussed and I discussed with him that there is large interobserver variability in the findings of digital rectal examination  Counseling:  Alcohol/drug use: discussed moderation in alcohol intake, the recommendations for healthy alcohol use, and avoidance of illicit drug use  Dental Health: discussed importance of regular tooth brushing, flossing, and dental visits  Injury prevention: discussed safety/seat belts, safety helmets, smoke detectors, carbon dioxide detectors, and smoking near bedding or upholstery  Sexual health: discussed sexually transmitted diseases, partner selection, use of condoms, avoidance of unintended pregnancy, and contraceptive alternatives  · Exercise: the importance of regular exercise/physical activity was discussed  Recommend exercise 3-5 times per week for at least 30 minutes  Depression Screening and Follow-up Plan: Patient's depression screening was positive with a PHQ-2 score of 3  Their PHQ-9 score was 15  Patient declines further evaluation by mental health professional and/or medications  Brief counseling provided  Will re-evaluate at next office visit  No follow-ups on file       Chief Complaint:     Chief Complaint   Patient presents with   • Physical Exam History of Present Illness:     Adult Annual Physical   Patient here for a comprehensive physical exam  The patient reports problems - left knee pain, sees ortho tomorrow  Diet and Physical Activity  · Diet/Nutrition: well balanced diet  · Exercise: no formal exercise  Depression Screening  PHQ-2/9 Depression Screening    Little interest or pleasure in doing things: 1 - several days  Feeling down, depressed, or hopeless: 2 - more than half the days  Trouble falling or staying asleep, or sleeping too much: 3 - nearly every day  Feeling tired or having little energy: 3 - nearly every day  Poor appetite or overeatin - several days  Feeling bad about yourself - or that you are a failure or have let yourself or your family down: 0 - not at all  Trouble concentrating on things, such as reading the newspaper or watching television: 2 - more than half the days  Moving or speaking so slowly that other people could have noticed  Or the opposite - being so fidgety or restless that you have been moving around a lot more than usual: 3 - nearly every day  Thoughts that you would be better off dead, or of hurting yourself in some way: 0 - not at all  PHQ-2 Score: 3  PHQ-2 Interpretation: POSITIVE depression screen  PHQ-9 Score: 15   PHQ-9 Interpretation: Moderately severe depression        General Health  · Sleep: gets 4-6 hours of sleep on average  · Hearing: had hearing aids, but hasn't worn in yeats, overdue to follow up with audiology  · Vision: wears glasses  · Dental: no dental visits for >1 year   Health  · Symptoms include: urinary frequency , follows with urology     Review of Systems:     Review of Systems   Constitutional: Positive for fatigue  Negative for chills and fever  Respiratory: Negative for shortness of breath  Cardiovascular: Negative for chest pain  Gastrointestinal: Negative for diarrhea, nausea and vomiting  Endocrine: Positive for polyuria     Genitourinary: Positive for frequency  Musculoskeletal: Positive for arthralgias (left knee)  Psychiatric/Behavioral: Negative for suicidal ideas  Past Medical History:     Past Medical History:   Diagnosis Date   • Abdominal pain 10/24/2018   • Amnesia memory loss    • Anxiety    • Chronic fatigue    • Disc degeneration, lumbar    • Esophageal reflux    • GERD (gastroesophageal reflux disease)    • Glaucoma    • Hiatal hernia    • Increased pressure in the eye     both eyes; denies glaucoma    • Internal hemorrhoids    • Lumbar disc herniation with radiculopathy    • Memory loss    • Pain in both feet 6/18/2018   • Personal history of colonic polyps       Past Surgical History:     Past Surgical History:   Procedure Laterality Date   • COLONOSCOPY N/A 4/25/2016    Procedure: COLONOSCOPY;  Surgeon: Iraida Pyle MD;  Location: [facility] GI LAB; Service:    • DENTAL SURGERY      ALL TEETH PULLED   • EPIDURAL BLOCK INJECTION     • EPIDURAL BLOCK INJECTION Right 12/28/2016    Procedure: BLOCK / INJECTION EPIDURAL STEROID TRANSFORAMINAL  L4-5;  Surgeon: Zonia Ayala MD;  Location: [facility] MAIN OR;  Service:    • EPIDURAL BLOCK INJECTION Right 11/16/2017    Procedure: BLOCK / INJECTION EPIDURAL STEROID TRANSFORAMINAL L4-5 RIGHT;  Surgeon: Zonia Ayala MD;  Location: [facility] MAIN OR;  Service: Pain Management    • EPIDURAL BLOCK INJECTION Left 5/10/2018    Procedure: Lt L4 L5 Tfesi (73123,[id number]); Surgeon: Zonia Ayala MD;  Location: [facility] MAIN OR;  Service: Pain Management    • EPIDURAL BLOCK INJECTION Right 3/7/2019    Procedure: L4 L5 Transforaminal Epidural Steroid Injection ([id number]); Surgeon: Zonia Ayala MD;  Location: [facility] MAIN OR;  Service: Pain Management    • EPIDURAL BLOCK INJECTION Left 5/12/2022    Procedure: L4 L5  TRANSFORAMINAL epidural steroid injection ([id number]);   Surgeon: Zonia Ayala MD;  Location: [facility] MAIN OR;  Service: Pain Management    • ESOPHAGOGASTRODUODENOSCOPY N/A 4/25/2016    Procedure: ESOPHAGOGASTRODUODENOSCOPY (EGD); Surgeon: Parker Del Rio MD;  Location: [facility] GI LAB;   Service:    • FL INJECTION LEFT ELBOW (NON ARTHROGRAM)  2022   • NOSE SURGERY        Family History:     Family History   Problem Relation Age of Onset   • Stomach cancer Maternal Grandmother    • Hypertension Mother    • Diabetes Mother    • Cataracts Father    • Skin cancer Father    • Stroke Father         2X   • Aneurysm Father    • No Known Problems Sister    • No Known Problems Brother    • Throat cancer Brother       Social History:     Social History     Socioeconomic History   • Marital status: Single     Spouse name: None   • Number of children: None   • Years of education: None   • Highest education level: None   Occupational History   • None   Tobacco Use   • Smoking status: Former     Packs/day: 0 50     Years: 19 00     Pack years: 9 50     Types: Cigarettes     Quit date:      Years since quittin 9   • Smokeless tobacco: Never   Vaping Use   • Vaping Use: Never used   Substance and Sexual Activity   • Alcohol use: No   • Drug use: No   • Sexual activity: Not Currently   Other Topics Concern   • None   Social History Narrative   • None     Social Determinants of Health     Financial Resource Strain: Not on file   Food Insecurity: Not on file   Transportation Needs: Not on file   Physical Activity: Not on file   Stress: Not on file   Social Connections: Not on file   Intimate Partner Violence: Not on file   Housing Stability: Not on file      Current Medications:     Current Outpatient Medications   Medication Sig Dispense Refill   • aspirin (ECOTRIN LOW STRENGTH) 81 mg EC tablet Take 1 tablet (81 mg total) by mouth daily 30 tablet 5   • Cholecalciferol (RA Vitamin D-3) 25 MCG (1000 UT) tablet Take 2 tablets (2,000 Units total) by mouth daily 180 tablet 3   • Diclofenac Sodium (VOLTAREN) 1 % Apply 2 g topically 4 (four) times a day 2 g 2   • famotidine (PEPCID) 40 MG tablet Take 0 5 tablets (20 mg total) by mouth daily at bedtime 90 tablet 3   • fexofenadine (RA Allergy Relief) 180 MG tablet Take 1 tablet (180 mg total) by mouth daily 90 tablet 3   • methocarbamol (ROBAXIN) 500 mg tablet Take 1 tablet (500 mg total) by mouth 3 (three) times a day as needed for muscle spasms 60 tablet 2   • omeprazole (PriLOSEC) 40 MG capsule Take 1 capsule (40 mg total) by mouth daily 30 capsule 5   • tamsulosin (FLOMAX) 0 4 mg Take 2 capsules (0 8 mg total) by mouth daily at bedtime 60 capsule 5   • vitamin B-12 (VITAMIN B-12) 1,000 mcg tablet Take 1 tablet (1,000 mcg total) by mouth daily 90 tablet 3     No current facility-administered medications for this visit  Allergies: Allergies   Allergen Reactions   • Gabapentin Other (See Comments)     OUT OF BODY PSYCHOSIS, MEMORY LOSS, AMNESIA   • Other Cough     HAYFEVER, SEASONAL, ALFALFA      Physical Exam:     /80 (BP Location: Right arm, Patient Position: Sitting, Cuff Size: Standard)   Pulse 84   Temp 99 3 °F (37 4 °C) (Temporal)   Resp 21   Ht 5' 11" (1 803 m)   Wt 99 6 kg (219 lb 9 oz)   SpO2 99%   BMI 30 62 kg/m²     Physical Exam  Constitutional:       General: He is not in acute distress  Appearance: He is not ill-appearing, toxic-appearing or diaphoretic  HENT:      Head: Normocephalic  Right Ear: Tympanic membrane, ear canal and external ear normal  There is no impacted cerumen  Left Ear: Tympanic membrane, ear canal and external ear normal  There is no impacted cerumen  Mouth/Throat:      Mouth: Mucous membranes are moist       Pharynx: Oropharynx is clear  No oropharyngeal exudate or posterior oropharyngeal erythema  Cardiovascular:      Rate and Rhythm: Normal rate and regular rhythm  Pulmonary:      Effort: Pulmonary effort is normal       Breath sounds: Normal breath sounds  Musculoskeletal:      Right lower leg: No edema  Left lower leg: No edema     Neurological:      Mental Status: He is alert and oriented to person, place, and time            Gabi Solano MD  [address]

## 2022-12-13 NOTE — PROGRESS NOTES
[address] [facility] PRACTICE    NAME: Vasiliy Solano  AGE: 58 y o  SEX: male  : 1960     DATE: 2022     Assessment and Plan:     Problem List Items Addressed This Visit    None  Visit Diagnoses     Annual physical exam    -  Primary    Obesity (BMI 30 0-34  9)              Immunizations and preventive care screenings were discussed with patient today  Appropriate education was printed on patient's after visit summary  Discussed risks and benefits of prostate cancer screening  We discussed the controversial history of PSA screening for prostate cancer in the [place] as well as the risk of over detection and over treatment of prostate cancer by way of PSA screening  The patient understands that PSA blood testing is an imperfect way to screen for prostate cancer and that elevated PSA levels in the blood may also be caused by infection, inflammation, prostatic trauma or manipulation, urological procedures, or by benign prostatic enlargement  The role of the digital rectal examination in prostate cancer screening was also discussed and I discussed with him that there is large interobserver variability in the findings of digital rectal examination  Counseling:  Alcohol/drug use: discussed moderation in alcohol intake, the recommendations for healthy alcohol use, and avoidance of illicit drug use  Dental Health: discussed importance of regular tooth brushing, flossing, and dental visits  Injury prevention: discussed safety/seat belts, safety helmets, smoke detectors, carbon dioxide detectors, and smoking near bedding or upholstery  Sexual health: discussed sexually transmitted diseases, partner selection, use of condoms, avoidance of unintended pregnancy, and contraceptive alternatives  · Exercise: the importance of regular exercise/physical activity was discussed  Recommend exercise 3-5 times per week for at least 30 minutes  Depression Screening and Follow-up Plan: Patient's depression screening was positive with a PHQ-2 score of 3  Their PHQ-9 score was 15  No follow-ups on file  Chief Complaint:     No chief complaint on file  History of Present Illness:     Adult Annual Physical   Patient here for a comprehensive physical exam  The patient reports problems - left knee pain  Diet and Physical Activity  · Diet/Nutrition: well balanced diet  · Exercise: no formal exercise  Depression Screening  PHQ-2/9 Depression Screening    Little interest or pleasure in doing things: 1 - several days  Feeling down, depressed, or hopeless: 2 - more than half the days  Trouble falling or staying asleep, or sleeping too much: 3 - nearly every day  Feeling tired or having little energy: 3 - nearly every day  Poor appetite or overeatin - several days  Feeling bad about yourself - or that you are a failure or have let yourself or your family down: 0 - not at all  Trouble concentrating on things, such as reading the newspaper or watching television: 2 - more than half the days  Moving or speaking so slowly that other people could have noticed  Or the opposite - being so fidgety or restless that you have been moving around a lot more than usual: 3 - nearly every day  Thoughts that you would be better off dead, or of hurting yourself in some way: 0 - not at all  PHQ-2 Score: 3  PHQ-2 Interpretation: POSITIVE depression screen  PHQ-9 Score: 15   PHQ-9 Interpretation: Moderately severe depression        General Health  · Sleep: gets 4-6 hours of sleep on average  · Hearing: overdue to follow up with audiology, had hearing aids in past but doesn't use them now  · Vision: wears glasses  · Dental: no dental visits for >1 year          Health  · Symptoms include: urinary frequency     Review of Systems:     Review of Systems   Past Medical History:     Past Medical History:   Diagnosis Date   • Abdominal pain 10/24/2018   • Amnesia memory loss    • Anxiety    • Chronic fatigue    • Disc degeneration, lumbar    • Esophageal reflux    • GERD (gastroesophageal reflux disease)    • Glaucoma    • Hiatal hernia    • Increased pressure in the eye     both eyes; denies glaucoma    • Internal hemorrhoids    • Lumbar disc herniation with radiculopathy    • Memory loss    • Pain in both feet 6/18/2018   • Personal history of colonic polyps       Past Surgical History:     Past Surgical History:   Procedure Laterality Date   • COLONOSCOPY N/A 4/25/2016    Procedure: COLONOSCOPY;  Surgeon: Morgan Rodriguez MD;  Location: [facility] GI LAB; Service:    • DENTAL SURGERY      ALL TEETH PULLED   • EPIDURAL BLOCK INJECTION     • EPIDURAL BLOCK INJECTION Right 12/28/2016    Procedure: BLOCK / INJECTION EPIDURAL STEROID TRANSFORAMINAL  L4-5;  Surgeon: Lissa Martin MD;  Location: [facility] MAIN OR;  Service:    • EPIDURAL BLOCK INJECTION Right 11/16/2017    Procedure: BLOCK / INJECTION EPIDURAL STEROID TRANSFORAMINAL L4-5 RIGHT;  Surgeon: Lissa Martin MD;  Location: [facility] MAIN OR;  Service: Pain Management    • EPIDURAL BLOCK INJECTION Left 5/10/2018    Procedure: Lt L4 L5 Tfesi ([id number],[id number]); Surgeon: Lissa Martin MD;  Location: [facility] MAIN OR;  Service: Pain Management    • EPIDURAL BLOCK INJECTION Right 3/7/2019    Procedure: L4 L5 Transforaminal Epidural Steroid Injection ([id number]); Surgeon: Lissa Martin MD;  Location: [facility] MAIN OR;  Service: Pain Management    • EPIDURAL BLOCK INJECTION Left 5/12/2022    Procedure: L4 L5  TRANSFORAMINAL epidural steroid injection ([id number]); Surgeon: Lissa Martin MD;  Location: [facility] MAIN OR;  Service: Pain Management    • ESOPHAGOGASTRODUODENOSCOPY N/A 4/25/2016    Procedure: ESOPHAGOGASTRODUODENOSCOPY (EGD); Surgeon: Morgan Rodriguez MD;  Location: [facility] GI LAB;   Service:    • FL INJECTION LEFT ELBOW (NON ARTHROGRAM)  5/12/2022   • NOSE SURGERY        Family History:     Family History   Problem Relation Age of Onset   • Stomach cancer Maternal Grandmother    • Hypertension Mother    • Diabetes Mother    • Cataracts Father    • Skin cancer Father    • Stroke Father         2X   • Aneurysm Father    • No Known Problems Sister    • No Known Problems Brother    • Throat cancer Brother       Social History:     Social History     Socioeconomic History   • Marital status: Single     Spouse name: None   • Number of children: None   • Years of education: None   • Highest education level: None   Occupational History   • None   Tobacco Use   • Smoking status: Former     Packs/day: 0 50     Years: 19 00     Pack years: 9 50     Types: Cigarettes     Quit date:      Years since quittin 9   • Smokeless tobacco: Never   Vaping Use   • Vaping Use: Never used   Substance and Sexual Activity   • Alcohol use: No   • Drug use: No   • Sexual activity: Not Currently   Other Topics Concern   • None   Social History Narrative   • None     Social Determinants of Health     Financial Resource Strain: Not on file   Food Insecurity: Not on file   Transportation Needs: Not on file   Physical Activity: Not on file   Stress: Not on file   Social Connections: Not on file   Intimate Partner Violence: Not on file   Housing Stability: Not on file      Current Medications:     Current Outpatient Medications   Medication Sig Dispense Refill   • aspirin (ECOTRIN LOW STRENGTH) 81 mg EC tablet Take 1 tablet (81 mg total) by mouth daily 30 tablet 5   • Cholecalciferol (RA Vitamin D-3) 25 MCG (1000 UT) tablet Take 2 tablets (2,000 Units total) by mouth daily 180 tablet 3   • Diclofenac Sodium (VOLTAREN) 1 % Apply 2 g topically 4 (four) times a day 2 g 2   • famotidine (PEPCID) 40 MG tablet Take 0 5 tablets (20 mg total) by mouth daily at bedtime 90 tablet 3   • fexofenadine (RA Allergy Relief) 180 MG tablet Take 1 tablet (180 mg total) by mouth daily 90 tablet 3   • methocarbamol (ROBAXIN) 500 mg tablet Take 1 tablet (500 mg total) by mouth 3 (three) times a day as needed for muscle spasms 60 tablet 2   • omeprazole (PriLOSEC) 40 MG capsule Take 1 capsule (40 mg total) by mouth daily 30 capsule 5   • tamsulosin (FLOMAX) 0 4 mg Take 2 capsules (0 8 mg total) by mouth daily at bedtime 60 capsule 5   • vitamin B-12 (VITAMIN B-12) 1,000 mcg tablet Take 1 tablet (1,000 mcg total) by mouth daily 90 tablet 3   • timolol (TIMOPTIC) 0 5 % ophthalmic solution Apply 1 drop to eye daily (Patient not taking: Reported on 2022) 15 mL 5     No current facility-administered medications for this visit  Allergies: Allergies   Allergen Reactions   • Gabapentin Other (See Comments)     OUT OF BODY PSYCHOSIS, MEMORY LOSS, AMNESIA   • Other Cough     HAYFEVER, SEASONAL, ALFALFA      Physical Exam:     /80 (BP Location: Right arm, Patient Position: Sitting, Cuff Size: Standard)   Pulse 84   Temp 99 3 °F (37 4 °C) (Temporal)   Resp 21   Ht 5' 11" (1 803 m)   Wt 99 6 kg (219 lb 9 oz)   SpO2 99%   BMI 30 62 kg/m²     Physical Exam     Macarena Strong MD  [address]    NAME: Gael Guerra  AGE: 58 y o  SEX: male  : 1960     DATE: 2022     Assessment and Plan:     Problem List Items Addressed This Visit        Digestive    Fatty liver    Relevant Orders    Comprehensive metabolic panel       Other    Elevated PSA    Relevant Medications    tamsulosin (FLOMAX) 0 4 mg   Other Visit Diagnoses     Annual physical exam    -  Primary    Obesity (BMI 30 0-34  9)        Encounter for immunization        Relevant Orders    influenza vaccine, quadrivalent, recombinant, PF, 0 5 mL, for patients 18 yr+ (FLUBLOK) (Completed)    Need for hepatitis C screening test        Relevant Orders    Hepatitis C Antibody ([facility])    Medication refill        Relevant Medications    aspirin (ECOTRIN LOW STRENGTH) 81 mg EC tablet    Vitamin D deficiency Relevant Medications    Cholecalciferol (RA Vitamin D-3) 25 MCG (1000 UT) tablet    Patellofemoral arthritis of left knee        Relevant Medications    Diclofenac Sodium (VOLTAREN) 1 %    Gastroesophageal reflux disease        Relevant Medications    famotidine (PEPCID) 40 MG tablet    Post-nasal drip        Relevant Medications    fexofenadine (RA Allergy Relief) 180 MG tablet    Neck sprain, subsequent encounter        Relevant Medications    methocarbamol (ROBAXIN) 500 mg tablet    Enlarged prostate on rectal examination        Relevant Medications    tamsulosin (FLOMAX) 0 4 mg    Fatigue, unspecified type        Relevant Medications    vitamin B-12 (VITAMIN B-12) 1,000 mcg tablet    Other Relevant Orders    CBC and differential    Elevated TSH        Relevant Orders    TSH, 3rd generation with Free T4 reflex    Encounter for screening for HIV        Relevant Orders    Human Immunodeficiency Virus 1/2 Antigen / Antibody ( Fourth Generation) with Reflex Testing          Immunizations and preventive care screenings were discussed with patient today  Appropriate education was printed on patient's after visit summary  Discussed risks and benefits of prostate cancer screening  We discussed the controversial history of PSA screening for prostate cancer in the [place] as well as the risk of over detection and over treatment of prostate cancer by way of PSA screening  The patient understands that PSA blood testing is an imperfect way to screen for prostate cancer and that elevated PSA levels in the blood may also be caused by infection, inflammation, prostatic trauma or manipulation, urological procedures, or by benign prostatic enlargement  The role of the digital rectal examination in prostate cancer screening was also discussed and I discussed with him that there is large interobserver variability in the findings of digital rectal examination      Counseling:  Alcohol/drug use: discussed moderation in alcohol intake, the recommendations for healthy alcohol use, and avoidance of illicit drug use  Dental Health: discussed importance of regular tooth brushing, flossing, and dental visits  Injury prevention: discussed safety/seat belts, safety helmets, smoke detectors, carbon dioxide detectors, and smoking near bedding or upholstery  Sexual health: discussed sexually transmitted diseases, partner selection, use of condoms, avoidance of unintended pregnancy, and contraceptive alternatives  · Exercise: the importance of regular exercise/physical activity was discussed  Recommend exercise 3-5 times per week for at least 30 minutes  Depression Screening and Follow-up Plan: Patient's depression screening was positive with a PHQ-2 score of 3  Their PHQ-9 score was 15  Patient declines further evaluation by mental health professional and/or medications  Brief counseling provided  Will re-evaluate at next office visit  Return in 1 year (on 2023)  Chief Complaint:     Chief Complaint   Patient presents with   • Physical Exam      History of Present Illness:     Adult Annual Physical   Patient here for a comprehensive physical exam  The patient reports problems - left knee pain, sees ortho tomorrow  Diet and Physical Activity  · Diet/Nutrition: well balanced diet  · Exercise: no formal exercise        Depression Screening  PHQ-2/9 Depression Screening    Little interest or pleasure in doing things: 1 - several days  Feeling down, depressed, or hopeless: 2 - more than half the days  Trouble falling or staying asleep, or sleeping too much: 3 - nearly every day  Feeling tired or having little energy: 3 - nearly every day  Poor appetite or overeatin - several days  Feeling bad about yourself - or that you are a failure or have let yourself or your family down: 0 - not at all  Trouble concentrating on things, such as reading the newspaper or watching television: 2 - more than half the days  Moving or speaking so slowly that other people could have noticed  Or the opposite - being so fidgety or restless that you have been moving around a lot more than usual: 3 - nearly every day  Thoughts that you would be better off dead, or of hurting yourself in some way: 0 - not at all  PHQ-2 Score: 3  PHQ-2 Interpretation: POSITIVE depression screen  PHQ-9 Score: 15   PHQ-9 Interpretation: Moderately severe depression        General Health  · Sleep: gets 4-6 hours of sleep on average  · Hearing: overdue to see audiologist, has had hearing aides in the past  · Vision: wears glasses  · Dental: no dental visits for >1 year   Health  · Symptoms include: urinary frequency follows with urology     Review of Systems:     Review of Systems   Constitutional: Positive for fatigue  Negative for chills and fever  HENT: Negative for congestion  Respiratory: Negative for shortness of breath and wheezing  Cardiovascular: Negative for chest pain  Gastrointestinal: Negative for diarrhea, nausea and vomiting  Endocrine: Positive for polyuria  Genitourinary: Positive for frequency  Musculoskeletal: Positive for arthralgias (left knee)  Psychiatric/Behavioral: Negative for suicidal ideas  Past Medical History:     Past Medical History:   Diagnosis Date   • Abdominal pain 10/24/2018   • Amnesia memory loss    • Anxiety    • Chronic fatigue    • Disc degeneration, lumbar    • Esophageal reflux    • GERD (gastroesophageal reflux disease)    • Glaucoma    • Hiatal hernia    • Increased pressure in the eye     both eyes; denies glaucoma    • Internal hemorrhoids    • Lumbar disc herniation with radiculopathy    • Memory loss    • Pain in both feet 6/18/2018   • Personal history of colonic polyps       Past Surgical History:     Past Surgical History:   Procedure Laterality Date   • COLONOSCOPY N/A 4/25/2016    Procedure: COLONOSCOPY;  Surgeon: Yoandy Hdz MD;  Location: [facility] GI LAB;   Service: • DENTAL SURGERY      ALL TEETH PULLED   • EPIDURAL BLOCK INJECTION     • EPIDURAL BLOCK INJECTION Right 12/28/2016    Procedure: BLOCK / INJECTION EPIDURAL STEROID TRANSFORAMINAL  L4-5;  Surgeon: Lida Phan MD;  Location: [place] [address] MAIN OR;  Service:    • EPIDURAL BLOCK INJECTION Right 11/16/2017    Procedure: BLOCK / INJECTION EPIDURAL STEROID TRANSFORAMINAL L4-5 RIGHT;  Surgeon: Lida Phan MD;  Location: [place] [address] MAIN OR;  Service: Pain Management    • EPIDURAL BLOCK INJECTION Left 5/10/2018    Procedure: Lt L4 L5 Tfesi ([id number]); Surgeon: Lida Phan MD;  Location: [facility] MAIN OR;  Service: Pain Management    • EPIDURAL BLOCK INJECTION Right 3/7/2019    Procedure: L4 L5 Transforaminal Epidural Steroid Injection ([id number]); Surgeon: Lida Phan MD;  Location: [facility] MAIN OR;  Service: Pain Management    • EPIDURAL BLOCK INJECTION Left 5/12/2022    Procedure: L4 L5  TRANSFORAMINAL epidural steroid injection ([id number]); Surgeon: Lida Phan MD;  Location: [facility] MAIN OR;  Service: Pain Management    • ESOPHAGOGASTRODUODENOSCOPY N/A 4/25/2016    Procedure: ESOPHAGOGASTRODUODENOSCOPY (EGD); Surgeon: Skyler Pathak MD;  Location: [facility] GI LAB;   Service:    • FL INJECTION LEFT ELBOW (NON ARTHROGRAM)  5/12/2022   • NOSE SURGERY        Family History:     Family History   Problem Relation Age of Onset   • Stomach cancer Maternal Grandmother    • Hypertension Mother    • Diabetes Mother    • Cataracts Father    • Skin cancer Father    • Stroke Father         2X   • Aneurysm Father    • No Known Problems Sister    • No Known Problems Brother    • Throat cancer Brother       Social History:     Social History     Socioeconomic History   • Marital status: Single     Spouse name: None   • Number of children: None   • Years of education: None   • Highest education level: None   Occupational History   • None   Tobacco Use   • Smoking status: Former     Packs/day: 0 50     Years: 19 00     Pack years: 9 50     Types: Cigarettes     Quit date: 0     Years since quittin 9   • Smokeless tobacco: Never   Vaping Use   • Vaping Use: Never used   Substance and Sexual Activity   • Alcohol use: No   • Drug use: No   • Sexual activity: Not Currently   Other Topics Concern   • None   Social History Narrative   • None     Social Determinants of Health     Financial Resource Strain: Not on file   Food Insecurity: Not on file   Transportation Needs: Not on file   Physical Activity: Not on file   Stress: Not on file   Social Connections: Not on file   Intimate Partner Violence: Not on file   Housing Stability: Not on file      Current Medications:     Current Outpatient Medications   Medication Sig Dispense Refill   • aspirin (ECOTRIN LOW STRENGTH) 81 mg EC tablet Take 1 tablet (81 mg total) by mouth daily 30 tablet 5   • Cholecalciferol (RA Vitamin D-3) 25 MCG (1000 UT) tablet Take 2 tablets (2,000 Units total) by mouth daily 180 tablet 3   • Diclofenac Sodium (VOLTAREN) 1 % Apply 2 g topically 4 (four) times a day 2 g 2   • famotidine (PEPCID) 40 MG tablet Take 0 5 tablets (20 mg total) by mouth daily at bedtime 90 tablet 3   • fexofenadine (RA Allergy Relief) 180 MG tablet Take 1 tablet (180 mg total) by mouth daily 90 tablet 3   • methocarbamol (ROBAXIN) 500 mg tablet Take 1 tablet (500 mg total) by mouth 3 (three) times a day as needed for muscle spasms 60 tablet 2   • omeprazole (PriLOSEC) 40 MG capsule Take 1 capsule (40 mg total) by mouth daily 30 capsule 5   • tamsulosin (FLOMAX) 0 4 mg Take 2 capsules (0 8 mg total) by mouth daily at bedtime 60 capsule 5   • vitamin B-12 (VITAMIN B-12) 1,000 mcg tablet Take 1 tablet (1,000 mcg total) by mouth daily 90 tablet 3     No current facility-administered medications for this visit  Allergies:      Allergies   Allergen Reactions   • Gabapentin Other (See Comments)     OUT OF BODY PSYCHOSIS, MEMORY LOSS, AMNESIA   • Other Cough     HAYFEVER, SEASONAL, ALFALFA Physical Exam:     /80 (BP Location: Right arm, Patient Position: Sitting, Cuff Size: Standard)   Pulse 84   Temp 99 3 °F (37 4 °C) (Temporal)   Resp 21   Ht 5' 11" (1 803 m)   Wt 99 6 kg (219 lb 9 oz)   SpO2 99%   BMI 30 62 kg/m²     Physical Exam  Constitutional:       General: He is not in acute distress  Appearance: He is not ill-appearing, toxic-appearing or diaphoretic  HENT:      Head: Normocephalic  Right Ear: Tympanic membrane, ear canal and external ear normal  There is no impacted cerumen  Left Ear: Tympanic membrane, ear canal and external ear normal  There is no impacted cerumen  Mouth/Throat:      Mouth: Mucous membranes are moist       Pharynx: Oropharynx is clear  No oropharyngeal exudate or posterior oropharyngeal erythema  Neck:      Comments: No palpable thyroid mass  Cardiovascular:      Rate and Rhythm: Normal rate and regular rhythm  Pulses: Normal pulses  Heart sounds: Normal heart sounds  Pulmonary:      Effort: Pulmonary effort is normal       Breath sounds: Normal breath sounds  Abdominal:      General: Bowel sounds are normal  There is no distension  Palpations: There is no mass  Tenderness: There is no abdominal tenderness  There is no guarding or rebound  Hernia: No hernia is present  Musculoskeletal:      Cervical back: Neck supple  Right lower leg: No edema  Left lower leg: No edema  Lymphadenopathy:      Cervical: No cervical adenopathy  Neurological:      Mental Status: He is alert and oriented to person, place, and time     Psychiatric:         Mood and Affect: Mood normal          Behavior: Behavior normal           Dang Benson MD  [address] [place] [address]

## 2022-12-14 ENCOUNTER — OFFICE VISIT (OUTPATIENT)
Dept: OBGYN CLINIC | Facility: CLINIC | Age: 62
End: 2022-12-14

## 2022-12-14 ENCOUNTER — TELEPHONE (OUTPATIENT)
Dept: OBGYN CLINIC | Facility: CLINIC | Age: 62
End: 2022-12-14

## 2022-12-14 VITALS
SYSTOLIC BLOOD PRESSURE: 152 MMHG | DIASTOLIC BLOOD PRESSURE: 78 MMHG | TEMPERATURE: 98.7 F | HEIGHT: 70 IN | WEIGHT: 210 LBS | BODY MASS INDEX: 30.06 KG/M2 | HEART RATE: 103 BPM

## 2022-12-14 DIAGNOSIS — S83.242A ACUTE MEDIAL MENISCUS TEAR OF LEFT KNEE, INITIAL ENCOUNTER: Primary | ICD-10-CM

## 2022-12-14 RX ORDER — DEXAMETHASONE SODIUM PHOSPHATE 100 MG/10ML
40 INJECTION INTRAMUSCULAR; INTRAVENOUS
Status: COMPLETED | OUTPATIENT
Start: 2022-12-14 | End: 2022-12-14

## 2022-12-14 RX ADMIN — DEXAMETHASONE SODIUM PHOSPHATE 40 MG: 100 INJECTION INTRAMUSCULAR; INTRAVENOUS at 13:21

## 2022-12-14 NOTE — TELEPHONE ENCOUNTER
Caller: Self    Doctor:  Gino    Reason for call: Please send AVS to patients home address    Call back#: [phone]

## 2022-12-14 NOTE — PROGRESS NOTES
Assessment/Plan:  1  Acute medial meniscus tear of left knee, initial encounter  Ambulatory referral to Orthopedic Surgery    Large joint arthrocentesis    Ambulatory Referral to Physical Therapy        Scribe Attestation    I,:  Karina Braga am acting as a scribe while in the presence of the attending physician :       I,:  Fela Laguna MD personally performed the services described in this documentation    as scribed in my presence :         Quang Cartagena upon exam today demonstrates signs and symptoms of a medial meniscus tear of the left knee  He has medial joint line tenderness and lacks full knee extension due to pain  He is stable in all 4 planes of the knee  I did review his MRI which displays a small horizontal medial meniscus tear, consistent with his presentation  I discussed at length his various treatment options such as physical therapy, injections, and surgery  Since the tear appears to be small, it is my opinion to treat him nonoperatively at this time  He has elected to try a steroid injection and attend PT  Risks and possible benefits of the injection were explained and he verbalized understanding  He tolerated the injection well with no complications today  He may follow up on an as-needed basis  If he fails to find relief of symptoms over the next 3-4 months, we may discuss surgery at that time  Large joint arthrocentesis  Universal Protocol:  Risks and benefits: risks, benefits and alternatives were discussed  Time out: Immediately prior to procedure a "time out" was called to verify the correct patient, procedure, equipment, support staff and site/side marked as required    Timeout called at: 12/14/2022 1:06 PM   Patient understanding: patient states understanding of the procedure being performed  Site marked: the operative site was marked  Patient identity confirmed: verbally with patient    Supporting Documentation  Indications: pain   Procedure Details  Location: knee -   Needle size: 22 G  Ultrasound guidance: no  Approach: anterolateral  Medications administered: 40 mg dexamethasone 100 mg/10 mL (4 mL 0 5% ropivacaine HCL 5mg/mL)    Patient tolerance: patient tolerated the procedure well with no immediate complications  Dressing:  Sterile dressing applied        Subjective:   Vasiliy Solano is a 58 y o  male who presents for initial evaluation of his left knee  He is referred from Dr Jay Means for possible surgical consultation  He has had knee pain since September that has not resolved  He has received epidural injections for a few years in his low back which helped pain for a while  He also has gone to physical therapy for arthritis and noticed some improvements  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  He complains of difficulty and pain performing his duties while caring for his patient  He manages to do his job but notices he has to takes things slower to avoid aggravating his knee  By the end of the day, he denotes increased pain and swelling in the knee  He has not had previous injections in his knee  He does use a cane for ambulation assistance  Review of Systems   Constitutional: Negative for chills, fever and unexpected weight change  HENT: Negative for nosebleeds and sore throat  Eyes: Negative for pain, redness and visual disturbance  Respiratory: Negative for cough, shortness of breath and wheezing  Cardiovascular: Negative for chest pain, palpitations and leg swelling  Gastrointestinal: Negative for nausea and vomiting  Endocrine: Negative for polydipsia and polyuria  Genitourinary: Negative for dysuria and hematuria  Musculoskeletal: Positive for arthralgias, gait problem and joint swelling  See HPI   Skin: Negative for rash and wound  Neurological: Negative for dizziness, numbness and headaches  Psychiatric/Behavioral: Negative for decreased concentration  The patient is not nervous/anxious            Past Medical History:   Diagnosis Date   • Abdominal pain 10/24/2018   • Amnesia memory loss    • Anxiety    • Chronic fatigue    • Disc degeneration, lumbar    • Esophageal reflux    • GERD (gastroesophageal reflux disease)    • Glaucoma    • Hiatal hernia    • Increased pressure in the eye     both eyes; denies glaucoma    • Internal hemorrhoids    • Lumbar disc herniation with radiculopathy    • Memory loss    • Pain in both feet 6/18/2018   • Personal history of colonic polyps        Past Surgical History:   Procedure Laterality Date   • COLONOSCOPY N/A 4/25/2016    Procedure: COLONOSCOPY;  Surgeon: Manju Pisano MD;  Location: [facility] GI LAB; Service:    • DENTAL SURGERY      ALL TEETH PULLED   • EPIDURAL BLOCK INJECTION     • EPIDURAL BLOCK INJECTION Right 12/28/2016    Procedure: BLOCK / INJECTION EPIDURAL STEROID TRANSFORAMINAL  L4-5;  Surgeon: Anirudh Maguire MD;  Location: [facility] MAIN OR;  Service:    • EPIDURAL BLOCK INJECTION Right 11/16/2017    Procedure: BLOCK / INJECTION EPIDURAL STEROID TRANSFORAMINAL L4-5 RIGHT;  Surgeon: Anirudh Maguire MD;  Location: [facility] MAIN OR;  Service: Pain Management    • EPIDURAL BLOCK INJECTION Left 5/10/2018    Procedure: Lt L4 L5 Tfesi ([id number]); Surgeon: Anirudh Maguire MD;  Location: [facility] MAIN OR;  Service: Pain Management    • EPIDURAL BLOCK INJECTION Right 3/7/2019    Procedure: L4 L5 Transforaminal Epidural Steroid Injection ([id number]); Surgeon: Anirudh Maguire MD;  Location: [facility] MAIN OR;  Service: Pain Management    • EPIDURAL BLOCK INJECTION Left 5/12/2022    Procedure: L4 L5  TRANSFORAMINAL epidural steroid injection ([id number]); Surgeon: Anirudh Maguire MD;  Location: [facility] MAIN OR;  Service: Pain Management    • ESOPHAGOGASTRODUODENOSCOPY N/A 4/25/2016    Procedure: ESOPHAGOGASTRODUODENOSCOPY (EGD); Surgeon: Manju Pisano MD;  Location: [facility] GI LAB;   Service:    • FL INJECTION LEFT ELBOW (NON ARTHROGRAM)  5/12/2022   • NOSE SURGERY         Family History Problem Relation Age of Onset   • Stomach cancer Maternal Grandmother    • Hypertension Mother    • Diabetes Mother    • Cataracts Father    • Skin cancer Father    • Stroke Father         2X   • Aneurysm Father    • No Known Problems Sister    • No Known Problems Brother    • Throat cancer Brother        Social History     Occupational History   • Not on file   Tobacco Use   • Smoking status: Former     Packs/day: 0 50     Years: 19 00     Pack years: 9 50     Types: Cigarettes     Quit date:      Years since quittin 9   • Smokeless tobacco: Never   Vaping Use   • Vaping Use: Never used   Substance and Sexual Activity   • Alcohol use: No   • Drug use: No   • Sexual activity: Not Currently         Current Outpatient Medications:   •  aspirin (ECOTRIN LOW STRENGTH) 81 mg EC tablet, Take 1 tablet (81 mg total) by mouth daily, Disp: 30 tablet, Rfl: 5  •  Cholecalciferol (RA Vitamin D-3) 25 MCG (1000 UT) tablet, Take 2 tablets (2,000 Units total) by mouth daily, Disp: 180 tablet, Rfl: 3  •  Diclofenac Sodium (VOLTAREN) 1 %, Apply 2 g topically 4 (four) times a day, Disp: 2 g, Rfl: 2  •  famotidine (PEPCID) 40 MG tablet, Take 0 5 tablets (20 mg total) by mouth daily at bedtime, Disp: 90 tablet, Rfl: 3  •  fexofenadine (RA Allergy Relief) 180 MG tablet, Take 1 tablet (180 mg total) by mouth daily, Disp: 90 tablet, Rfl: 3  •  methocarbamol (ROBAXIN) 500 mg tablet, Take 1 tablet (500 mg total) by mouth 3 (three) times a day as needed for muscle spasms, Disp: 60 tablet, Rfl: 2  •  omeprazole (PriLOSEC) 40 MG capsule, Take 1 capsule (40 mg total) by mouth daily, Disp: 30 capsule, Rfl: 5  •  tamsulosin (FLOMAX) 0 4 mg, Take 2 capsules (0 8 mg total) by mouth daily at bedtime, Disp: 60 capsule, Rfl: 5  •  vitamin B-12 (VITAMIN B-12) 1,000 mcg tablet, Take 1 tablet (1,000 mcg total) by mouth daily, Disp: 90 tablet, Rfl: 3    Allergies   Allergen Reactions   • Gabapentin Other (See Comments)     OUT OF BODY PSYCHOSIS, MEMORY LOSS, AMNESIA   • Other Cough     HAYFEVER, SEASONAL, ALFALFA       Objective:  Vitals:    12/14/22 1224   BP: 152/78   Pulse: 103   Temp: 98 7 °F (37 1 °C)       Left Knee Exam     Tenderness   The patient is experiencing tenderness in the medial joint line  Range of Motion   Extension: -10   Flexion: 120     Tests   Varus: negative Valgus: negative  Drawer:  Anterior - negative     Posterior - negative    Other   Sensation: normal  Pulse: present  Swelling: mild            Physical Exam  HENT:      Head: Normocephalic and atraumatic  Eyes:      General:         Right eye: No discharge  Left eye: No discharge  Conjunctiva/sclera: Conjunctivae normal       Pupils: Pupils are equal, round, and reactive to light  Cardiovascular:      Rate and Rhythm: Normal rate  Pulmonary:      Effort: Pulmonary effort is normal  No respiratory distress  Musculoskeletal:         General: Swelling and tenderness present  Cervical back: Normal range of motion and neck supple  Left knee: Swelling present  Decreased range of motion  Tenderness present over the medial joint line  Comments: See HPI   Skin:     General: Skin is warm and dry  Neurological:      Mental Status: He is alert and oriented to person, place, and time  I have personally reviewed pertinent films in PACS and my interpretation is as follows:  MRI of the left knee demonstrates a small horizontal medial meniscus tear  The lateral meniscus is intact  This document was created using speech voice recognition software  Grammatical errors, random word insertions, pronoun errors, and incomplete sentences are an occasional consequence of this system due to software limitations, ambient noise, and hardware issues  Any formal questions or concerns about content, text, or information contained within the body of this dictation should be directly addressed to the provider for clarification

## 2022-12-20 ENCOUNTER — TELEPHONE (OUTPATIENT)
Dept: OBGYN CLINIC | Facility: HOSPITAL | Age: 62
End: 2022-12-20

## 2022-12-20 NOTE — TELEPHONE ENCOUNTER
Caller: Sarwat Rudd     Doctor: Gino     Reason for call: Patient is wondering if he should still go to PT  Please advise       Call back#: [phone]- Can leave a message

## 2022-12-20 NOTE — TELEPHONE ENCOUNTER
Advised patient that it look likes per the [facility] 12/14 Dr Omid Simmons did say he could participate in PT  Patient did advise he was not sure if he would participate in formal physical therapy at this time

## 2023-03-27 ENCOUNTER — TELEPHONE (OUTPATIENT)
Dept: FAMILY MEDICINE CLINIC | Facility: CLINIC | Age: 63
End: 2023-03-27

## 2023-03-27 NOTE — TELEPHONE ENCOUNTER
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

## 2023-03-28 NOTE — TELEPHONE ENCOUNTER
Called pt to let him know letter is written  The dr did only mention pts diagnosis- not anything else  Reached   Left msg

## 2023-05-07 DIAGNOSIS — R10.13 EPIGASTRIC BURNING SENSATION: ICD-10-CM

## 2023-05-08 RX ORDER — OMEPRAZOLE 40 MG/1
CAPSULE, DELAYED RELEASE ORAL
Qty: 30 CAPSULE | Refills: 5 | Status: SHIPPED | OUTPATIENT
Start: 2023-05-08

## 2023-06-01 LAB
ALBUMIN SERPL-MCNC: 4.6 G/DL (ref 3.8–4.8)
ALBUMIN/GLOB SERPL: 2.3 {RATIO} (ref 1.2–2.2)
ALP SERPL-CCNC: 73 IU/L (ref 44–121)
ALT SERPL-CCNC: 22 IU/L (ref 0–44)
AST SERPL-CCNC: 19 IU/L (ref 0–40)
BASOPHILS # BLD AUTO: 0 X10E3/UL (ref 0–0.2)
BASOPHILS NFR BLD AUTO: 1 %
BILIRUB SERPL-MCNC: 0.5 MG/DL (ref 0–1.2)
BUN SERPL-MCNC: 15 MG/DL (ref 8–27)
BUN/CREAT SERPL: 16 (ref 10–24)
CALCIUM SERPL-MCNC: 9 MG/DL (ref 8.6–10.2)
CHLORIDE SERPL-SCNC: 102 MMOL/L (ref 96–106)
CO2 SERPL-SCNC: 25 MMOL/L (ref 20–29)
CREAT SERPL-MCNC: 0.93 MG/DL (ref 0.76–1.27)
EGFR: 93 ML/MIN/1.73
EOSINOPHIL # BLD AUTO: 0.1 X10E3/UL (ref 0–0.4)
EOSINOPHIL NFR BLD AUTO: 2 %
ERYTHROCYTE [DISTWIDTH] IN BLOOD BY AUTOMATED COUNT: 13.3 % (ref 11.6–15.4)
GLOBULIN SER-MCNC: 2 G/DL (ref 1.5–4.5)
GLUCOSE SERPL-MCNC: 112 MG/DL (ref 70–99)
HCT VFR BLD AUTO: 44.2 % (ref 37.5–51)
HCV AB S/CO SERPL IA: NON REACTIVE
HGB BLD-MCNC: 15.5 G/DL (ref 13–17.7)
HIV 1+2 AB+HIV1 P24 AG SERPL QL IA: NON REACTIVE
IMM GRANULOCYTES # BLD: 0 X10E3/UL (ref 0–0.1)
IMM GRANULOCYTES NFR BLD: 0 %
LYMPHOCYTES # BLD AUTO: 1 X10E3/UL (ref 0.7–3.1)
LYMPHOCYTES NFR BLD AUTO: 16 %
MCH RBC QN AUTO: 29.8 PG (ref 26.6–33)
MCHC RBC AUTO-ENTMCNC: 35.1 G/DL (ref 31.5–35.7)
MCV RBC AUTO: 85 FL (ref 79–97)
MONOCYTES # BLD AUTO: 0.4 X10E3/UL (ref 0.1–0.9)
MONOCYTES NFR BLD AUTO: 7 %
NEUTROPHILS # BLD AUTO: 4.8 X10E3/UL (ref 1.4–7)
NEUTROPHILS NFR BLD AUTO: 74 %
PLATELET # BLD AUTO: 234 X10E3/UL (ref 150–450)
POTASSIUM SERPL-SCNC: 4.4 MMOL/L (ref 3.5–5.2)
PROT SERPL-MCNC: 6.6 G/DL (ref 6–8.5)
RBC # BLD AUTO: 5.21 X10E6/UL (ref 4.14–5.8)
SL AMB T4, FREE (DIRECT): 1.32 NG/DL (ref 0.82–1.77)
SODIUM SERPL-SCNC: 142 MMOL/L (ref 134–144)
TSH SERPL DL<=0.005 MIU/L-ACNC: 5.28 UIU/ML (ref 0.45–4.5)
WBC # BLD AUTO: 6.4 X10E3/UL (ref 3.4–10.8)

## 2023-06-08 DIAGNOSIS — M17.12 PATELLOFEMORAL ARTHRITIS OF LEFT KNEE: ICD-10-CM

## 2023-06-12 ENCOUNTER — TELEPHONE (OUTPATIENT)
Dept: FAMILY MEDICINE CLINIC | Facility: CLINIC | Age: 63
End: 2023-06-12

## 2023-06-16 ENCOUNTER — OFFICE VISIT (OUTPATIENT)
Dept: OBGYN CLINIC | Facility: CLINIC | Age: 63
End: 2023-06-16
Payer: COMMERCIAL

## 2023-06-16 ENCOUNTER — TELEPHONE (OUTPATIENT)
Dept: OBGYN CLINIC | Facility: CLINIC | Age: 63
End: 2023-06-16

## 2023-06-16 ENCOUNTER — APPOINTMENT (OUTPATIENT)
Dept: RADIOLOGY | Facility: CLINIC | Age: 63
End: 2023-06-16
Payer: COMMERCIAL

## 2023-06-16 ENCOUNTER — TELEPHONE (OUTPATIENT)
Dept: OBGYN CLINIC | Facility: HOSPITAL | Age: 63
End: 2023-06-16

## 2023-06-16 VITALS
SYSTOLIC BLOOD PRESSURE: 120 MMHG | WEIGHT: 210 LBS | DIASTOLIC BLOOD PRESSURE: 76 MMHG | HEIGHT: 70 IN | HEART RATE: 90 BPM | BODY MASS INDEX: 30.06 KG/M2

## 2023-06-16 DIAGNOSIS — M25.562 LEFT KNEE PAIN, UNSPECIFIED CHRONICITY: ICD-10-CM

## 2023-06-16 DIAGNOSIS — M17.12 ARTHRITIS OF LEFT KNEE: ICD-10-CM

## 2023-06-16 DIAGNOSIS — S83.242A ACUTE MEDIAL MENISCUS TEAR OF LEFT KNEE, INITIAL ENCOUNTER: Primary | ICD-10-CM

## 2023-06-16 DIAGNOSIS — S83.242A ACUTE MEDIAL MENISCUS TEAR OF LEFT KNEE, INITIAL ENCOUNTER: ICD-10-CM

## 2023-06-16 PROCEDURE — 73564 X-RAY EXAM KNEE 4 OR MORE: CPT

## 2023-06-16 PROCEDURE — 99213 OFFICE O/P EST LOW 20 MIN: CPT | Performed by: ORTHOPAEDIC SURGERY

## 2023-06-16 NOTE — TELEPHONE ENCOUNTER
Caller: patient    Doctor: Jasmina Edgar    Reason for call: pt called asking should he start PT before or after he gets his knee brace    Call back#: [phone]

## 2023-06-16 NOTE — PROGRESS NOTES
Ortho Sports Medicine New Patient Visit     Assesment:   58 y o  male with left knee OA and degenerative left meniscus tear    Plan: We had a long discussion regarding left knee degenerative joint disease including options for treatment  I recommended starting with conservative management options  We also discussed at length that given his symptoms and following the exam, I do not recommend having a surgery to address his meniscus tear  In particular, I do not believe that surgery would be the best next step as his pain is more consistent with osteoarthritis and that it aches when he is at rest and he feels stiff and that it loosens up with activity and he does not get any sharp pain or locking episodes  I provided a referral to physical therapy as I do believe this will help with strength and mobility, and may improved symptoms significantly  We discussed options for injections including corticosteroids, viscosupplementation, and biologics including risks and benefits of each  He would like to hold off on injections at this time  I also recommend he uses an  brace  I believe this will be helpful as he does have some valgus instability due to his chondral wear on the medial compartment  Continue using voltaren gel as needed  He will return to the office in 8 weeks for recheck  Chief Complaint   Patient presents with   • Left Knee - Pain     History of Present Illness: The patient is a 58 y o  male who presents for initial evaluation of left knee pain  Patient reports he was previously being treated by Dr Brianna Adame, pain starting 4/2022 when he was told he had a medial and lateral meniscus tear  He had a steroid injection 12/2022 but had another injury several days later  He notes he gets a lot of swelling intermittently  His pain is worst at rest and during sleep with an achy sensation  It actually loosens up and feels better with motion and activity    He does not have any true locking episodes  He does have some medial instability from time to time with certain steps  Denies any numbness or tingling  Knee Surgical History:  None    Past Medical, Social and Family History:  Past Medical History:   Diagnosis Date   • Abdominal pain 10/24/2018   • Amnesia memory loss    • Anxiety    • Chronic fatigue    • Disc degeneration, lumbar    • Esophageal reflux    • GERD (gastroesophageal reflux disease)    • Glaucoma    • Hiatal hernia    • Increased pressure in the eye     both eyes; denies glaucoma    • Internal hemorrhoids    • Lumbar disc herniation with radiculopathy    • Memory loss    • Pain in both feet 6/18/2018   • Personal history of colonic polyps      Past Surgical History:   Procedure Laterality Date   • COLONOSCOPY N/A 4/25/2016    Procedure: COLONOSCOPY;  Surgeon: Teresa Conway MD;  Location: [facility] GI LAB; Service:    • DENTAL SURGERY      ALL TEETH PULLED   • EPIDURAL BLOCK INJECTION     • EPIDURAL BLOCK INJECTION Right 12/28/2016    Procedure: BLOCK / INJECTION EPIDURAL STEROID TRANSFORAMINAL  L4-5;  Surgeon: Emery Johnson MD;  Location: [facility] MAIN OR;  Service:    • EPIDURAL BLOCK INJECTION Right 11/16/2017    Procedure: BLOCK / INJECTION EPIDURAL STEROID TRANSFORAMINAL L4-5 RIGHT;  Surgeon: Emery Johnson MD;  Location: [facility] MAIN OR;  Service: Pain Management    • EPIDURAL BLOCK INJECTION Left 5/10/2018    Procedure: Lt L4 L5 Tfesi ([id number]); Surgeon: Emery Johnson MD;  Location: [facility] MAIN OR;  Service: Pain Management    • EPIDURAL BLOCK INJECTION Right 3/7/2019    Procedure: L4 L5 Transforaminal Epidural Steroid Injection ([id number]); Surgeon: Emery Johnson MD;  Location: [facility] MAIN OR;  Service: Pain Management    • EPIDURAL BLOCK INJECTION Left 5/12/2022    Procedure: L4 L5  TRANSFORAMINAL epidural steroid injection ([id number]);   Surgeon: Emery Johnson MD;  Location: [facility] MAIN OR;  Service: Pain Management    • ESOPHAGOGASTRODUODENOSCOPY N/A 4/25/2016 Procedure: ESOPHAGOGASTRODUODENOSCOPY (EGD); Surgeon: Liz Irby MD;  Location: [facility] GI LAB; Service:    • FL INJECTION LEFT ELBOW (NON ARTHROGRAM)  2022   • NOSE SURGERY       Allergies   Allergen Reactions   • Gabapentin Other (See Comments)     OUT OF BODY PSYCHOSIS, MEMORY LOSS, AMNESIA   • Other Cough     HAYFEVER, SEASONAL, ALFALFA     Current Outpatient Medications on File Prior to Visit   Medication Sig Dispense Refill   • aspirin (ECOTRIN LOW STRENGTH) 81 mg EC tablet Take 1 tablet (81 mg total) by mouth daily 30 tablet 5   • Cholecalciferol (RA Vitamin D-3) 25 MCG (1000 UT) tablet Take 2 tablets (2,000 Units total) by mouth daily 180 tablet 3   • Diclofenac Sodium (VOLTAREN) 1 % apply 2 grams topically four times a day 100 g 1   • famotidine (PEPCID) 40 MG tablet Take 0 5 tablets (20 mg total) by mouth daily at bedtime 90 tablet 3   • fexofenadine (RA Allergy Relief) 180 MG tablet Take 1 tablet (180 mg total) by mouth daily 90 tablet 3   • methocarbamol (ROBAXIN) 500 mg tablet Take 1 tablet (500 mg total) by mouth 3 (three) times a day as needed for muscle spasms 60 tablet 2   • omeprazole (PriLOSEC) 40 MG capsule take 1 capsule by mouth once daily 30 capsule 5   • tamsulosin (FLOMAX) 0 4 mg Take 2 capsules (0 8 mg total) by mouth daily at bedtime 60 capsule 5   • vitamin B-12 (VITAMIN B-12) 1,000 mcg tablet Take 1 tablet (1,000 mcg total) by mouth daily 90 tablet 3     No current facility-administered medications on file prior to visit       Social History     Socioeconomic History   • Marital status: Single     Spouse name: Not on file   • Number of children: Not on file   • Years of education: Not on file   • Highest education level: Not on file   Occupational History   • Not on file   Tobacco Use   • Smoking status: Former     Packs/day: 0 50     Years: 19 00     Total pack years: 9 50     Types: Cigarettes     Quit date: 0     Years since quittin 4   • Smokeless tobacco: Never "  Vaping Use   • Vaping Use: Never used   Substance and Sexual Activity   • Alcohol use: No   • Drug use: No   • Sexual activity: Not Currently   Other Topics Concern   • Not on file   Social History Narrative   • Not on file     Social Determinants of Health     Financial Resource Strain: Not on file   Food Insecurity: Not on file   Transportation Needs: Not on file   Physical Activity: Not on file   Stress: Not on file   Social Connections: Not on file   Intimate Partner Violence: Not on file   Housing Stability: Not on file     I have reviewed the past medical, surgical, social and family history, medications and allergies as documented in the EMR  Review of systems: ROS is negative other than that noted in the HPI  Constitutional: Negative for fatigue and fever  HENT: Negative for sore throat  Respiratory: Negative for shortness of breath  Cardiovascular: Negative for chest pain  Gastrointestinal: Negative for abdominal pain  Endocrine: Negative for cold intolerance and heat intolerance  Genitourinary: Negative for flank pain  Musculoskeletal: Negative for back pain  Skin: Negative for rash  Allergic/Immunologic: Negative for immunocompromised state  Neurological: Negative for dizziness  intact  Psychiatric/Behavioral: Negative for agitation  Physical Exam:    Blood pressure 120/76, pulse 90, height 5' 10\" (1 778 m), weight 95 3 kg (210 lb)  General/Constitutional: NAD, well developed, well nourished  HENT: Normocephalic, atraumatic  CV: Intact distal pulses, regular rate  Resp: No respiratory distress or labored breathing  Lymphatic: No lymphadenopathy palpated  Neuro: Alert and Oriented x 3, no focal deficits  Psych: Normal mood, normal affect  Skin: Warm, dry, no rashes, no erythema    Knee Exam:   No significant skin lesions or deformity  Range of motion from full extension to 125 flexion  Limited patellar mobility    Mild medial joint line tenderness Knee is stable to varus " stress, Lachman, and posterior drawer  There is medial compartment gapping/instability with valgus stress with a firm endpoint  Neurovascularly intact distally  Small effusion  Knee Imaging  XR left knee x-ray reviewed and interpreted 6/16/23:  No fractures  Moderate osteoarthritis in medial compartment     MRI from 2022 reviewed and interpreted     Small horizontal tear body of the medial meniscus      Mild mucoid degeneration of the anterior cruciate ligament      Mild cartilage thinning weightbearing portion of the medial femoral tibial compartment    Minimal thinning of the patellar articular cartilage

## 2023-06-19 ENCOUNTER — TELEPHONE (OUTPATIENT)
Dept: FAMILY MEDICINE CLINIC | Facility: CLINIC | Age: 63
End: 2023-06-19

## 2023-06-20 DIAGNOSIS — S13.9XXD NECK SPRAIN, SUBSEQUENT ENCOUNTER: ICD-10-CM

## 2023-06-20 RX ORDER — METHOCARBAMOL 500 MG/1
TABLET, FILM COATED ORAL
Qty: 60 TABLET | Refills: 2 | Status: SHIPPED | OUTPATIENT
Start: 2023-06-20

## 2023-07-07 DIAGNOSIS — M17.12 PATELLOFEMORAL ARTHRITIS OF LEFT KNEE: ICD-10-CM

## 2023-07-20 DIAGNOSIS — Z76.0 MEDICATION REFILL: ICD-10-CM

## 2023-07-25 RX ORDER — ASPIRIN 81 MG/1
81 TABLET, COATED ORAL DAILY
Qty: 30 TABLET | Refills: 5 | Status: SHIPPED | OUTPATIENT
Start: 2023-07-25

## 2023-07-26 ENCOUNTER — TELEPHONE (OUTPATIENT)
Dept: FAMILY MEDICINE CLINIC | Facility: CLINIC | Age: 63
End: 2023-07-26

## 2023-07-26 NOTE — TELEPHONE ENCOUNTER
Triage nurse    Patient has water bublles blisters left right knee cap, out side the knew cap,     Question is when it burst what can I put on.:  neosporing or any thing else    [phone] cell    Manjeet Vincent called I have an apt at 3:20pm to offer him but because it was 2:39pm he said nobody called me before today I can not go there because the heat. He will wait until 8-4 Friday  He has an apt with dr. Anthony Navarrete he has all busy week with wife  he will wait.

## 2023-07-31 ENCOUNTER — TELEPHONE (OUTPATIENT)
Dept: OBGYN CLINIC | Facility: CLINIC | Age: 63
End: 2023-07-31

## 2023-07-31 NOTE — TELEPHONE ENCOUNTER
Lila emanuel and he request a call from Dr. Ernestine Sosa regarding his knee and knee pain. He has the soonest [place] appt 8/11 .  He is in a lot of pain and would like to speak with

## 2023-07-31 NOTE — TELEPHONE ENCOUNTER
Sarah Means stopped in with concerns about his knee brace. Please call him to discuss.     Thank you

## 2023-08-11 ENCOUNTER — OFFICE VISIT (OUTPATIENT)
Dept: OBGYN CLINIC | Facility: CLINIC | Age: 63
End: 2023-08-11
Payer: COMMERCIAL

## 2023-08-11 VITALS
HEART RATE: 100 BPM | DIASTOLIC BLOOD PRESSURE: 72 MMHG | BODY MASS INDEX: 30.06 KG/M2 | HEIGHT: 70 IN | WEIGHT: 210 LBS | SYSTOLIC BLOOD PRESSURE: 106 MMHG

## 2023-08-11 DIAGNOSIS — M17.12 PRIMARY OSTEOARTHRITIS OF LEFT KNEE: Primary | ICD-10-CM

## 2023-08-11 PROCEDURE — 99213 OFFICE O/P EST LOW 20 MIN: CPT | Performed by: ORTHOPAEDIC SURGERY

## 2023-08-11 PROCEDURE — 20610 DRAIN/INJ JOINT/BURSA W/O US: CPT | Performed by: ORTHOPAEDIC SURGERY

## 2023-08-11 RX ORDER — BUPIVACAINE HYDROCHLORIDE 5 MG/ML
3.5 INJECTION, SOLUTION PERINEURAL
Status: COMPLETED | OUTPATIENT
Start: 2023-08-11 | End: 2023-08-11

## 2023-08-11 RX ORDER — TRIAMCINOLONE ACETONIDE 40 MG/ML
40 INJECTION, SUSPENSION INTRA-ARTICULAR; INTRAMUSCULAR
Status: COMPLETED | OUTPATIENT
Start: 2023-08-11 | End: 2023-08-11

## 2023-08-11 RX ADMIN — BUPIVACAINE HYDROCHLORIDE 3.5 ML: 5 INJECTION, SOLUTION PERINEURAL at 11:45

## 2023-08-11 RX ADMIN — TRIAMCINOLONE ACETONIDE 40 MG: 40 INJECTION, SUSPENSION INTRA-ARTICULAR; INTRAMUSCULAR at 11:45

## 2023-08-11 NOTE — PROGRESS NOTES
Ortho Sports Medicine New Patient Visit     Assesment:   61 y.o. male with left primary knee osteoarthritis    Plan: We had a long discussion regarding left knee degenerative joint disease and reviewed non-operative treatment options, such as physical therapy, medial  brace, voltaren gel, ibuprofen, steroid injections, and viscosupplementation injections. I believe a referral to PT will help with strength and mobility as well as improve symptoms. The patient is interested in restarting PT and eventually getting a HEP to remain as active as possible. He can continue wearing the medial  brace for support and stability. He can try wearing a compression sleeve to help prevent skin irritation and sliding of the brace. Voltaren gel can be applied to the painful area on the knee and ibuprofen can be taken as needed. We discussed the options for injections, including the risks, benefits, and alternatives. We explained that getting multiple steroid injections in close proximity can be detrimental to tissues, but a single injection is usually effective in providing some degree of pain relief. After reviewing the benefits, risks, and alternatives, the patient elected to proceed with a steroid injection today. Post-injection instructions were provided. We also discussed attempting Visco injections if he does not obtain adequate long-term relief from the CSI. Follow Up: 8 weeks for recheck following PT    Chief Complaint   Patient presents with   • Left Knee - Follow-up     History of Present Illness: The patient is a 61 y.o. male who presents for follow up of left knee primary osteoarthritis. 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 The patient localizes pain along the medial aspect of the knee that feels very stiff during the night and upon waking, then improves with motion.  He also notes intermittent swelling that fluctuates as he increases activity. He denies new injury, weakness, locking episodes, instability, and numbness/tingling. He uses OTC medications and voltaren gel as needed. June Rodriguez has been wearing the medial  brace for some activities about twice per week, but reports some skin irritation from where it pushes laterally against the knee. The patient has not started PT, but is going to set up sessions to work with them and then obtain a HEP as this helped him symptomatically in the past. He received a steroid injection into the left knee on 12/14/22, which provided some relief. He has not had Visco injections in the past.     Knee Surgical History:  None    Past Medical, Social and Family History:  Past Medical History:   Diagnosis Date   • Abdominal pain 10/24/2018   • Amnesia memory loss    • Anxiety    • Chronic fatigue    • Disc degeneration, lumbar    • Esophageal reflux    • GERD (gastroesophageal reflux disease)    • Glaucoma    • Hiatal hernia    • Increased pressure in the eye     both eyes; denies glaucoma    • Internal hemorrhoids    • Lumbar disc herniation with radiculopathy    • Memory loss    • Pain in both feet 6/18/2018   • Personal history of colonic polyps      Past Surgical History:   Procedure Laterality Date   • COLONOSCOPY N/A 4/25/2016    Procedure: COLONOSCOPY;  Surgeon: Reji Gates MD;  Location: [address] [place] [address] GI LAB; Service:    • DENTAL SURGERY      ALL TEETH PULLED   • EPIDURAL BLOCK INJECTION     • EPIDURAL BLOCK INJECTION Right 12/28/2016    Procedure: BLOCK / INJECTION EPIDURAL STEROID TRANSFORAMINAL  L4-5;  Surgeon: Jovanni Woo MD;  Location: [address] [place] [address] MAIN OR;  Service:    • EPIDURAL BLOCK INJECTION Right 11/16/2017    Procedure: BLOCK / INJECTION EPIDURAL STEROID TRANSFORAMINAL L4-5 RIGHT;  Surgeon: Jovanni Woo MD;  Location: [address] [place] [address] MAIN OR;  Service: Pain Management    • EPIDURAL BLOCK INJECTION Left 5/10/2018    Procedure: Lt L4 L5 Tfesi ([id number]);   Surgeon: Demetria Zepeda Romeo Gomez MD;  Location: [address] [place] [address] [facility];  Service: Pain Management    • EPIDURAL BLOCK INJECTION Right 3/7/2019    Procedure: L4 L5 Transforaminal Epidural Steroid Injection ([id number]); Surgeon: Dedra Velasquez MD;  Location: [facility] MAIN OR;  Service: Pain Management    • EPIDURAL BLOCK INJECTION Left 5/12/2022    Procedure: L4 L5  TRANSFORAMINAL epidural steroid injection ([id number]); Surgeon: Dedra Velasquez MD;  Location: [facility] MAIN OR;  Service: Pain Management    • ESOPHAGOGASTRODUODENOSCOPY N/A 4/25/2016    Procedure: ESOPHAGOGASTRODUODENOSCOPY (EGD); Surgeon: Arjun Mcginnis MD;  Location: [facility] GI LAB;   Service:    • FL INJECTION LEFT ELBOW (NON ARTHROGRAM)  5/12/2022   • NOSE SURGERY       Allergies   Allergen Reactions   • Gabapentin Other (See Comments)     OUT OF BODY PSYCHOSIS, MEMORY LOSS, AMNESIA   • Other Cough     HAYFEVER, SEASONAL, ALFALFA     Current Outpatient Medications on File Prior to Visit   Medication Sig Dispense Refill   • Aspirin Low Dose 81 MG EC tablet take 1 tablet by mouth once daily 30 tablet 5   • Cholecalciferol (RA Vitamin D-3) 25 MCG (1000 UT) tablet Take 2 tablets (2,000 Units total) by mouth daily 180 tablet 3   • Diclofenac Sodium (VOLTAREN) 1 % apply 2 grams topically four times a day 100 g 1   • famotidine (PEPCID) 40 MG tablet Take 0.5 tablets (20 mg total) by mouth daily at bedtime 90 tablet 3   • fexofenadine (RA Allergy Relief) 180 MG tablet Take 1 tablet (180 mg total) by mouth daily 90 tablet 3   • methocarbamol (ROBAXIN) 500 mg tablet take 1 tablet by mouth three times a day if needed for muscle spasm 60 tablet 2   • omeprazole (PriLOSEC) 40 MG capsule take 1 capsule by mouth once daily 30 capsule 5   • tamsulosin (FLOMAX) 0.4 mg Take 2 capsules (0.8 mg total) by mouth daily at bedtime 60 capsule 5   • vitamin B-12 (VITAMIN B-12) 1,000 mcg tablet Take 1 tablet (1,000 mcg total) by mouth daily 90 tablet 3     No current facility-administered medications on file prior to visit. Social History     Socioeconomic History   • Marital status: Single     Spouse name: Not on file   • Number of children: Not on file   • Years of education: Not on file   • Highest education level: Not on file   Occupational History   • Not on file   Tobacco Use   • Smoking status: Former     Packs/day: 0.50     Years: 19.00     Total pack years: 9.50     Types: Cigarettes     Quit date: 0     Years since quittin.6   • Smokeless tobacco: Never   Vaping Use   • Vaping Use: Never used   Substance and Sexual Activity   • Alcohol use: No   • Drug use: No   • Sexual activity: Not Currently   Other Topics Concern   • Not on file   Social History Narrative   • Not on file     Social Determinants of Health     Financial Resource Strain: Not on file   Food Insecurity: Not on file   Transportation Needs: Not on file   Physical Activity: Not on file   Stress: Not on file   Social Connections: Not on file   Intimate Partner Violence: Not on file   Housing Stability: Not on file     I have reviewed the past medical, surgical, social and family history, medications and allergies as documented in the EMR. Review of systems: ROS is negative other than that noted in the HPI. Constitutional: Negative for fatigue and fever. HENT: Negative for sore throat. Respiratory: Negative for shortness of breath. Cardiovascular: Negative for chest pain. Gastrointestinal: Negative for abdominal pain. Endocrine: Negative for cold intolerance and heat intolerance. Genitourinary: Negative for flank pain. Musculoskeletal: Negative for back pain. Skin: Negative for rash. Allergic/Immunologic: Negative for immunocompromised state. Neurological: Negative for dizziness. intact  Psychiatric/Behavioral: Negative for agitation. Physical Exam:    Blood pressure 106/72, pulse 100, height 5' 10" (1.778 m), weight 95.3 kg (210 lb).     General/Constitutional: NAD, well developed, well nourished  HENT: Normocephalic, atraumatic  CV: Intact distal pulses, regular rate  Resp: No respiratory distress or labored breathing  Lymphatic: No lymphadenopathy palpated  Neuro: Alert and Oriented x 3, no focal deficits  Psych: Normal mood, normal affect  Skin: Warm, dry, no rashes, no erythema    Knee Exam:   No significant skin lesions or deformity. Small joint effusion Range of motion from 0-130. Limited patellar mobility. Mild medial joint line tenderness. No lateral joint line tenderness. Knee is stable to varus stress, Lachman, and posterior drawer. There is medial compartment gapping/instability with valgus stress with a firm endpoint. Neurovascularly intact distally. Knee Imaging    XR left knee x-ray reviewed and interpreted 6/16/23, which showed no acute osseous abnormalities. Moderate medial DJD. Patella well-centered on the trochlea. MRI from 2022 reviewed and interpreted with IMPRESSION:     Small horizontal tear body of the medial meniscus. Mild mucoid degeneration of the anterior cruciate ligament. Mild cartilage thinning weightbearing portion of the medial femoral tibial compartment. Minimal thinning of the patellar articular cartilage. Large joint arthrocentesis: L knee  Universal Protocol:  Consent: Verbal consent obtained. Risks and benefits: risks, benefits and alternatives were discussed  Consent given by: patient  Timeout called at: 8/11/2023 12:08 PM.  Patient understanding: patient states understanding of the procedure being performed  Patient consent: the patient's understanding of the procedure matches consent given  Site marked: the operative site was marked  Radiology Images displayed and confirmed.  If images not available, report reviewed: imaging studies available  Patient identity confirmed: verbally with patient    Supporting Documentation  Indications: pain   Procedure Details  Location: knee - L knee  Needle size: 22 G  Ultrasound guidance: no  Approach: anterolateral  Medications administered: 40 mg triamcinolone acetonide 40 mg/mL; 3.5 mL bupivacaine 0.5 %    Patient tolerance: patient tolerated the procedure well with no immediate complications      4 mL 7.0% bupivacaine + 1 mL Kenalog

## 2023-08-22 DIAGNOSIS — S13.9XXD NECK SPRAIN, SUBSEQUENT ENCOUNTER: ICD-10-CM

## 2023-08-24 ENCOUNTER — TELEPHONE (OUTPATIENT)
Dept: FAMILY MEDICINE CLINIC | Facility: CLINIC | Age: 63
End: 2023-08-24

## 2023-08-24 DIAGNOSIS — E03.9 HYPOTHYROIDISM, UNSPECIFIED TYPE: Primary | ICD-10-CM

## 2023-08-24 RX ORDER — LEVOTHYROXINE SODIUM 0.05 MG/1
50 TABLET ORAL
Qty: 90 TABLET | Refills: 0 | Status: SHIPPED | OUTPATIENT
Start: 2023-08-24

## 2023-08-24 RX ORDER — METHOCARBAMOL 500 MG/1
TABLET, FILM COATED ORAL
Qty: 60 TABLET | Refills: 2 | Status: SHIPPED | OUTPATIENT
Start: 2023-08-24

## 2023-08-24 NOTE — TELEPHONE ENCOUNTER
Spoke with patient, discussed high TSH, starting on thyroxine 50 mcg daily.   Patient will need repeat TSH in 6-8 weeks

## 2023-08-25 ENCOUNTER — TELEPHONE (OUTPATIENT)
Dept: FAMILY MEDICINE CLINIC | Facility: CLINIC | Age: 63
End: 2023-08-25

## 2023-08-25 NOTE — TELEPHONE ENCOUNTER
Patient called and requested refill sent to [facility]. He has 2 pills left. Dicyclomine HCl 10 mg take 1 capsule by mouth four times a day before meals and at bedtime    This medication was discontinued by Dr Melita Forte 9/2022, patient is very concerned. Patient has been taking what he has left and went to refill at pharmacy when he learned about this change that never should have occurred.

## 2023-08-29 NOTE — TELEPHONE ENCOUNTER
Patient is calling to check the status of Rx request. Dycyclomine or further explanation if he should or should not be taking medication. Patient states he has been taking Rx for some time but took his last dose on 8/25/23. Patient denies symptoms or pain at this time from being w/o meds. He reports this medication was D/c during his hospitalization 12/2022 . Patient reports Rx initially being prescribed by Dr. Oumar Medina (GI). Patient is aware of upcoming appointment w/ PCP however he would like to discuss before his appointment 10/24/23  Patient reports speaking w/ PCP last week regarding recent labs and forgot to bring up during that time.

## 2023-09-11 ENCOUNTER — TELEPHONE (OUTPATIENT)
Dept: FAMILY MEDICINE CLINIC | Facility: CLINIC | Age: 63
End: 2023-09-11

## 2023-09-11 DIAGNOSIS — K58.9 IRRITABLE BOWEL SYNDROME, UNSPECIFIED TYPE: Primary | ICD-10-CM

## 2023-09-11 RX ORDER — DICYCLOMINE HYDROCHLORIDE 10 MG/1
10 CAPSULE ORAL
Qty: 120 CAPSULE | Refills: 2 | Status: SHIPPED | OUTPATIENT
Start: 2023-09-11 | End: 2023-12-10

## 2023-09-11 NOTE — TELEPHONE ENCOUNTER
Restarting Bentyl 10 mg 4 times daily per patient request.  Was started by GI Dr. Magnolia Mason for IBS. Medication had recently been discontinued by another practitioner for unknown reasons.   Patient follows with GI

## 2023-10-16 ENCOUNTER — TELEPHONE (OUTPATIENT)
Dept: FAMILY MEDICINE CLINIC | Facility: CLINIC | Age: 63
End: 2023-10-16

## 2023-10-16 DIAGNOSIS — E03.9 HYPOTHYROIDISM, UNSPECIFIED TYPE: Primary | ICD-10-CM

## 2023-10-16 NOTE — TELEPHONE ENCOUNTER
Just spoke to patient. Added on some blood work for before his visit on 10.24. Advised him to go to the ED if he has any symptoms of chest pain. Thank you.

## 2023-10-16 NOTE — TELEPHONE ENCOUNTER
Patient is calling to inform PCP of some new symptoms that he has noticed in the past 6 weeks. States he is  uncomfortable that he is experiencing some lightheadedness when waking up in the morning and cloudy feeling at times. Patient states he feels a little unsteady on his feet as well. Patient describes a "Aura the surrounds his head" he has also noticed some right lower back pain above kidney along with discomfort on left side chest near nipple area. Patient does have upcoming appointment with PCP. Advised patient to go to ED for evaluation for any chest discomfort. He is unsure if side effect of medication he recently started Levothyroxine 50mcg.       Patient can be reached at [phone]          Routing to Triage Provider;    PM-Mee

## 2023-10-22 DIAGNOSIS — S13.9XXD NECK SPRAIN, SUBSEQUENT ENCOUNTER: ICD-10-CM

## 2023-10-23 RX ORDER — METHOCARBAMOL 500 MG/1
TABLET, FILM COATED ORAL
Qty: 60 TABLET | Refills: 2 | Status: SHIPPED | OUTPATIENT
Start: 2023-10-23

## 2023-10-24 ENCOUNTER — OFFICE VISIT (OUTPATIENT)
Dept: FAMILY MEDICINE CLINIC | Facility: CLINIC | Age: 63
End: 2023-10-24
Payer: COMMERCIAL

## 2023-10-24 VITALS
BODY MASS INDEX: 30.71 KG/M2 | SYSTOLIC BLOOD PRESSURE: 128 MMHG | HEART RATE: 87 BPM | RESPIRATION RATE: 16 BRPM | OXYGEN SATURATION: 98 % | WEIGHT: 214 LBS | DIASTOLIC BLOOD PRESSURE: 78 MMHG

## 2023-10-24 DIAGNOSIS — Z80.0 FAMILY HISTORY OF THROAT CANCER: ICD-10-CM

## 2023-10-24 DIAGNOSIS — R05.3 CHRONIC COUGH: ICD-10-CM

## 2023-10-24 DIAGNOSIS — E03.9 HYPOTHYROIDISM, UNSPECIFIED TYPE: ICD-10-CM

## 2023-10-24 DIAGNOSIS — Z23 ENCOUNTER FOR IMMUNIZATION: Primary | ICD-10-CM

## 2023-10-24 DIAGNOSIS — T78.40XS ALLERGY, SEQUELA: ICD-10-CM

## 2023-10-24 PROCEDURE — 99213 OFFICE O/P EST LOW 20 MIN: CPT

## 2023-10-24 PROCEDURE — 90686 IIV4 VACC NO PRSV 0.5 ML IM: CPT

## 2023-10-24 PROCEDURE — 90471 IMMUNIZATION ADMIN: CPT

## 2023-10-24 RX ORDER — FLUTICASONE PROPIONATE 50 MCG
1 SPRAY, SUSPENSION (ML) NASAL DAILY
Qty: 15.8 ML | Refills: 0 | Status: SHIPPED | OUTPATIENT
Start: 2023-10-24

## 2023-10-24 NOTE — PROGRESS NOTES
[facility] Office visit    Assessment/Plan:     1. Encounter for immunization  -     influenza vaccine, quadrivalent, 0.5 mL, preservative-free, for adult and pediatric patients 6 mos+ (CECELIA, [address], FLULAVAL, FLUZONE)    2. Allergy, sequela  -     fluticasone (FLONASE) 50 mcg/act nasal spray; 1 spray into each nostril daily    3. Family history of throat cancer  -     Ambulatory Referral to Otolaryngology; Future    4. Chronic cough  Hx of smoking, normal exam, etiology unclear    -     Complete PFT with post bronchodilator; Future  -     XR chest pa & lateral; Future; Expected date: 10/24/2023    5. Hypothyroidism, unspecified type     On levothyroxine 50 mcg, past TSH elevated, asymptomatic    - advised to repeat TSH lab, already ordered     Return in about 3 months (around 1/24/2024) for OVL, follow up chronci conditions. Subjective:   RAYMOND Bowman is a 61 y.o. male who presents with concern for a chronic cough. He is a former smoker, 1/2 ppd for 19 years. He reports a family hx of throat cancer and would like to be evaluated for that. Review of Systems   Constitutional:  Negative for chills and fever. HENT:  Negative for congestion and rhinorrhea. Respiratory:  Positive for cough. Negative for shortness of breath and wheezing. Cardiovascular:  Negative for chest pain and palpitations. Gastrointestinal:  Negative for diarrhea, nausea and vomiting. Objective:     /78   Pulse 87   Resp 16   Wt 97.1 kg (214 lb)   SpO2 98%   BMI 30.71 kg/m²      Physical Exam  Constitutional:       General: He is not in acute distress. Appearance: He is obese. He is not ill-appearing, toxic-appearing or diaphoretic. HENT:      Head: Normocephalic. Mouth/Throat:      Mouth: Mucous membranes are moist.      Pharynx: Oropharynx is clear. No oropharyngeal exudate or posterior oropharyngeal erythema.    Cardiovascular:      Rate and Rhythm: Normal rate and regular rhythm. Heart sounds: Normal heart sounds. No murmur heard. Pulmonary:      Effort: Pulmonary effort is normal. No respiratory distress. Breath sounds: Normal breath sounds. No stridor. No wheezing, rhonchi or rales. Neurological:      Mental Status: He is alert.           ** Please Note: This note has been constructed using a voice recognition system **     Vishal Lynn MD  11/05/23  3:09 AM

## 2023-10-30 DIAGNOSIS — R10.13 EPIGASTRIC BURNING SENSATION: ICD-10-CM

## 2023-10-30 RX ORDER — OMEPRAZOLE 40 MG/1
CAPSULE, DELAYED RELEASE ORAL
Qty: 30 CAPSULE | Refills: 5 | Status: SHIPPED | OUTPATIENT
Start: 2023-10-30

## 2023-11-04 DIAGNOSIS — M17.12 PATELLOFEMORAL ARTHRITIS OF LEFT KNEE: ICD-10-CM

## 2023-11-06 ENCOUNTER — OFFICE VISIT (OUTPATIENT)
Dept: OTOLARYNGOLOGY | Facility: CLINIC | Age: 63
End: 2023-11-06
Payer: COMMERCIAL

## 2023-11-06 VITALS — WEIGHT: 210 LBS | BODY MASS INDEX: 30.06 KG/M2 | TEMPERATURE: 97.6 F | HEIGHT: 70 IN

## 2023-11-06 DIAGNOSIS — Z80.0 FAMILY HISTORY OF THROAT CANCER: ICD-10-CM

## 2023-11-06 PROCEDURE — 99203 OFFICE O/P NEW LOW 30 MIN: CPT | Performed by: STUDENT IN AN ORGANIZED HEALTH CARE EDUCATION/TRAINING PROGRAM

## 2023-11-06 PROCEDURE — 31575 DIAGNOSTIC LARYNGOSCOPY: CPT | Performed by: STUDENT IN AN ORGANIZED HEALTH CARE EDUCATION/TRAINING PROGRAM

## 2023-11-06 NOTE — PROGRESS NOTES
[facility]  [facility]  [facility] Otolaryngology  Otolaryngology -- Head and Neck Surgery New Patient Visit  River Carrington is a 61 y.o. who presents with a chief complaint of chronic cough. Patient is concerned about a chronic cough. Has been productive lately. Scheduled to see pulmonology. He also reports a dry mouth and nose. Former smoker, quit in [address] [facility] [address] [address]. Used to be a social drinker, stopped in 2011. Has hx hypothyroidism on levothyroxine. Felt "off' on dose of 50 mcg. He also had dentures and a history of hearing loss being worked up for hearing aids. Does report a shooting pain in his head when he yawns. Denies trouble swallowing. Review of systems: Pertinent review of systems documented in the HPI. 10 point ROS documented in a separate note, as necessary. Results reviewed; images from any scan have been personally reviewed: The past medical, surgical, social and family history have been reviewed as documented in today's record. Past Medical History:   Diagnosis Date    Abdominal pain 10/24/2018    Amnesia memory loss     Anxiety     Chronic fatigue     Disc degeneration, lumbar     Esophageal reflux     GERD (gastroesophageal reflux disease)     Glaucoma     Hiatal hernia     Increased pressure in the eye     both eyes; denies glaucoma     Internal hemorrhoids     Lumbar disc herniation with radiculopathy     Memory loss     Pain in both feet 6/18/2018    Personal history of colonic polyps      Past Surgical History:   Procedure Laterality Date    COLONOSCOPY N/A 4/25/2016    Procedure: COLONOSCOPY;  Surgeon: Jannie Retana MD;  Location: [address] [place] [address] GI LAB;   Service:     DENTAL SURGERY      ALL TEETH PULLED    EPIDURAL BLOCK INJECTION      EPIDURAL BLOCK INJECTION Right 12/28/2016    Procedure: BLOCK / INJECTION EPIDURAL STEROID TRANSFORAMINAL  L4-5;  Surgeon: Cliff Nguyen MD;  Location: [facility] MAIN OR;  Service:     EPIDURAL BLOCK INJECTION Right 11/16/2017    Procedure: BLOCK / INJECTION EPIDURAL STEROID TRANSFORAMINAL L4-5 RIGHT;  Surgeon: Mary Carmen Morales MD;  Location: [address] [place] [address] [facility]STITUTE MAIN OR;  Service: Pain Management     EPIDURAL BLOCK INJECTION Left 5/10/2018    Procedure: Lt L4 L5 Tfesi ([id number]); Surgeon: Mary Carmen Morales MD;  Location: [facility] MAIN OR;  Service: Pain Management     EPIDURAL BLOCK INJECTION Right 3/7/2019    Procedure: L4 L5 Transforaminal Epidural Steroid Injection ([id number]); Surgeon: Mary Carmen Morales MD;  Location: [facility] MAIN OR;  Service: Pain Management     EPIDURAL BLOCK INJECTION Left 5/12/2022    Procedure: L4 L5  TRANSFORAMINAL epidural steroid injection ([id number]); Surgeon: Mary Carmen Morales MD;  Location: [facility] MAIN OR;  Service: Pain Management     ESOPHAGOGASTRODUODENOSCOPY N/A 4/25/2016    Procedure: ESOPHAGOGASTRODUODENOSCOPY (EGD); Surgeon: Sarah Lindsay MD;  Location: [facility] GI LAB;   Service:     FL INJECTION LEFT ELBOW (NON ARTHROGRAM)  5/12/2022    NOSE SURGERY       Family History   Problem Relation Age of Onset    Stomach cancer Maternal Grandmother     Hypertension Mother     Diabetes Mother     Cataracts Father     Skin cancer Father     Stroke Father         2X    Aneurysm Father     No Known Problems Sister     No Known Problems Brother     Throat cancer Brother      Current Outpatient Medications on File Prior to Visit   Medication Sig Dispense Refill    Aspirin Low Dose 81 MG EC tablet take 1 tablet by mouth once daily 30 tablet 5    Cholecalciferol (RA Vitamin D-3) 25 MCG (1000 UT) tablet Take 2 tablets (2,000 Units total) by mouth daily 180 tablet 3    Diclofenac Sodium (VOLTAREN) 1 % apply 2 grams topically four times a day 100 g 1    dicyclomine (BENTYL) 10 mg capsule Take 1 capsule (10 mg total) by mouth 4 (four) times a day (before meals and at bedtime) 120 capsule 2    famotidine (PEPCID) 40 MG tablet Take 0.5 tablets (20 mg total) by mouth daily at bedtime 90 tablet 3    fexofenadine (RA Allergy Relief) 180 MG tablet Take 1 tablet (180 mg total) by mouth daily 90 tablet 3    fluticasone (FLONASE) 50 mcg/act nasal spray 1 spray into each nostril daily 15.8 mL 0    methocarbamol (ROBAXIN) 500 mg tablet take 1 tablet by mouth three times a day if needed for muscle spasm 60 tablet 2    omeprazole (PriLOSEC) 40 MG capsule take 1 capsule by mouth once daily 30 capsule 5    tamsulosin (FLOMAX) 0.4 mg Take 2 capsules (0.8 mg total) by mouth daily at bedtime 60 capsule 5    vitamin B-12 (VITAMIN B-12) 1,000 mcg tablet Take 1 tablet (1,000 mcg total) by mouth daily 90 tablet 3    levothyroxine (Euthyrox) 50 mcg tablet Take 1 tablet (50 mcg total) by mouth daily in the early morning (Patient not taking: Reported on 11/6/2023) 90 tablet 0    [DISCONTINUED] Diclofenac Sodium (VOLTAREN) 1 % apply 2 grams topically four times a day 100 g 1     No current facility-administered medications on file prior to visit. Physical exam:   Temp 97.6 °F (36.4 °C) (Temporal)   Ht 5' 10" (1.778 m)   Wt 95.3 kg (210 lb)   BMI 30.13 kg/m²   Head: Atraumatic, no visible scalp lesions, parotid and submandibular salivary glands non-tender to palpation and without masses bilaterally. Neck:  No visible or palpable cervical lesions or lymphadenopathy, thyroid gland is normal in size and symmetry and without masses, normal laryngeal elevation with swallowing. Ears:    Right ear :  Auricle normal in appearance, mastoid prominence non-tender, external auditory canal clear. Tympanic membranes intact. Left ear :  Auricle normal in appearance, mastoid prominence non-tender, external auditory canal clear . Tympanic membranes intact. Nose/Sinuses:  External appearance unremarkable, no maxillary or frontal sinus tenderness to palpation bilaterally.  Anterior rhinoscopy reveals:   Oral Cavity:  Moist mucus membranes, gums and dentition unremarkable, no oral mucosal masses or lesions, floor of mouth soft, tongue mobile without masses or lesions. Oropharynx:  Base of tongue soft and without masses, tonsils bilaterally unremarkable, soft palate mucosa unremarkable. Eyes:  Extra-ocular movements intact, pupils equally round and reactive to light and accommodation, the lids and conjunctivae are normal in appearance. Constitutional:  Well developed, well nourished and groomed, in no acute distress. Cardiovascular:  Normal rate and rhythm, no palpable thrills, no jugulovenous distension observed. Respiratory:  Normal respiratory effort without evidence of retractions or use of accessory muscles. Neurologic:  Cranial nerves II-XII intact bilaterally. Abdomen: Soft and lax  Extremities: No bruises   Psychiatric:  Alert and oriented to time, place and person. Procedures    Flexible laryngoscopy performed:  Fiberoptic scope advanced through left nare, findings:  Nasal cavity: Septum deviated, no polyps or mucopus. Nasopharynx: unremarkable, no masses or lesions, eustachian tube orifi and Fossae of Rosenmuller unremakable. Oropharynx: mucosa moist, no masses or lesions, tongue base, lateral and posterior walls normal  Larynx: True vocal folds mobile, no masses or lesions, widely patent glottic chink, arytenoids erythema and evidence of PND. Hypopharynx: Pyriform sinuses clear without masses or pooling. Post-cricoid area unremarkable. Assessment:   1. Family history of throat cancer  Ambulatory Referral to Otolaryngology        Orders  No orders of the defined types were placed in this encounter. Discussion/Plan:  Chronic cough   The patient has a normal physical exam and endoscopy preformed today. Not on ACE inhibitors. Cough may represent early asthma or COPD. Patient has pulmonology follow up scheduled for 11/9. Agree with this consultation. Plan to follow up in 6 months if symptoms do not improve.

## 2023-11-09 ENCOUNTER — HOSPITAL ENCOUNTER (OUTPATIENT)
Dept: PULMONOLOGY | Facility: HOSPITAL | Age: 63
End: 2023-11-09
Payer: COMMERCIAL

## 2023-11-09 ENCOUNTER — HOSPITAL ENCOUNTER (OUTPATIENT)
Dept: RADIOLOGY | Facility: HOSPITAL | Age: 63
Discharge: HOME | End: 2023-11-09
Payer: COMMERCIAL

## 2023-11-09 DIAGNOSIS — R05.3 CHRONIC COUGH: ICD-10-CM

## 2023-11-09 DIAGNOSIS — E03.9 HYPOTHYROIDISM, UNSPECIFIED TYPE: ICD-10-CM

## 2023-11-09 PROCEDURE — 94760 N-INVAS EAR/PLS OXIMETRY 1: CPT

## 2023-11-09 PROCEDURE — 94727 GAS DIL/WSHOT DETER LNG VOL: CPT

## 2023-11-09 PROCEDURE — 94727 GAS DIL/WSHOT DETER LNG VOL: CPT | Performed by: INTERNAL MEDICINE

## 2023-11-09 PROCEDURE — 94060 EVALUATION OF WHEEZING: CPT | Performed by: INTERNAL MEDICINE

## 2023-11-09 PROCEDURE — 94060 EVALUATION OF WHEEZING: CPT

## 2023-11-09 PROCEDURE — 94729 DIFFUSING CAPACITY: CPT | Performed by: INTERNAL MEDICINE

## 2023-11-09 PROCEDURE — 71046 X-RAY EXAM CHEST 2 VIEWS: CPT

## 2023-11-09 PROCEDURE — 94729 DIFFUSING CAPACITY: CPT

## 2023-11-09 RX ORDER — LEVOTHYROXINE SODIUM 0.05 MG/1
50 TABLET ORAL
Qty: 90 TABLET | Refills: 0 | Status: SHIPPED | OUTPATIENT
Start: 2023-11-09

## 2023-11-09 RX ORDER — ALBUTEROL SULFATE 2.5 MG/3ML
2.5 SOLUTION RESPIRATORY (INHALATION) ONCE
Status: COMPLETED | OUTPATIENT
Start: 2023-11-09 | End: 2023-11-09

## 2023-11-09 RX ADMIN — ALBUTEROL SULFATE 2.5 MG: 2.5 SOLUTION RESPIRATORY (INHALATION) at 16:50

## 2023-11-12 ENCOUNTER — TELEPHONE (OUTPATIENT)
Dept: FAMILY MEDICINE CLINIC | Facility: CLINIC | Age: 63
End: 2023-11-12

## 2023-11-12 DIAGNOSIS — R06.02 SOB (SHORTNESS OF BREATH) ON EXERTION: Primary | ICD-10-CM

## 2023-11-12 NOTE — TELEPHONE ENCOUNTER
Spoke with patient, notified him of normal PFTs and chest x-ray. He reports continued shortness of breath with exertion including walking up a flight of steps or over 1 block. I discussed plan for cardiology work-up including referral to see Dr. Татьяна Clemente and an echocardiogram, orders placed. Reminded him to complete TSH blood work.

## 2023-11-13 ENCOUNTER — TELEPHONE (OUTPATIENT)
Dept: FAMILY MEDICINE CLINIC | Facility: CLINIC | Age: 63
End: 2023-11-13

## 2023-11-13 ENCOUNTER — TELEPHONE (OUTPATIENT)
Dept: CARDIOLOGY CLINIC | Facility: CLINIC | Age: 63
End: 2023-11-13

## 2023-11-13 NOTE — TELEPHONE ENCOUNTER
If they have ordered echo he does not need to wait he can be scheduled to be seen.   And I do not remember anybody talking to me regarding this patient

## 2023-11-13 NOTE — TELEPHONE ENCOUNTER
Pt said he is being referred by [facility] and they have spoke with Dr. Scott Piña yesterday. They have also ordered an echo for this patient. Should he have the echo prior to scheduling an appointment with Dr. Scott Piña? Please advise.

## 2023-11-13 NOTE — TELEPHONE ENCOUNTER
NICANOR on appt line:    Hi, this is Cooper Akers calling for [employer]. He needs to talk to someone about Doctor Mariam Cook ordering him an echocardiogram and to call Doctor Jackson's office. He called. They said he needed the echocardiogram first. Please return this call [phone] and ask for [facility]. Thank you so much. Have a nice day. Bye. Spoke to pt - advised that the echo has been ordered and it is in his chart and that he can call central scheduling.

## 2023-11-29 DIAGNOSIS — K58.9 IRRITABLE BOWEL SYNDROME, UNSPECIFIED TYPE: ICD-10-CM

## 2023-12-01 DIAGNOSIS — R09.82 POST-NASAL DRIP: ICD-10-CM

## 2023-12-01 RX ORDER — FEXOFENADINE HCL 180 MG/1
180 TABLET ORAL DAILY
Qty: 90 TABLET | Refills: 3 | Status: SHIPPED | OUTPATIENT
Start: 2023-12-01

## 2023-12-05 DIAGNOSIS — R53.83 FATIGUE, UNSPECIFIED TYPE: ICD-10-CM

## 2023-12-05 RX ORDER — DICYCLOMINE HYDROCHLORIDE 10 MG/1
CAPSULE ORAL
Qty: 120 CAPSULE | Refills: 2 | Status: SHIPPED | OUTPATIENT
Start: 2023-12-05

## 2023-12-05 RX ORDER — LANOLIN ALCOHOL/MO/W.PET/CERES
1000 CREAM (GRAM) TOPICAL DAILY
Qty: 90 TABLET | Refills: 3 | Status: SHIPPED | OUTPATIENT
Start: 2023-12-05

## 2023-12-13 DIAGNOSIS — K21.9 GASTROESOPHAGEAL REFLUX DISEASE: ICD-10-CM

## 2023-12-13 RX ORDER — FAMOTIDINE 40 MG/1
20 TABLET, FILM COATED ORAL
Qty: 90 TABLET | Refills: 3 | Status: SHIPPED | OUTPATIENT
Start: 2023-12-13

## 2023-12-16 DIAGNOSIS — S13.9XXD NECK SPRAIN, SUBSEQUENT ENCOUNTER: ICD-10-CM

## 2023-12-19 DIAGNOSIS — R97.20 ELEVATED PSA: ICD-10-CM

## 2023-12-19 DIAGNOSIS — N40.0 ENLARGED PROSTATE ON RECTAL EXAMINATION: ICD-10-CM

## 2023-12-19 RX ORDER — TAMSULOSIN HYDROCHLORIDE 0.4 MG/1
0.8 CAPSULE ORAL
Qty: 60 CAPSULE | Refills: 5 | Status: SHIPPED | OUTPATIENT
Start: 2023-12-19

## 2023-12-19 NOTE — TELEPHONE ENCOUNTER
Confirmed pharmacy  Advised refill requests can take 48-72 hrs  Pt only has a few days of medications left

## 2023-12-24 RX ORDER — METHOCARBAMOL 500 MG/1
TABLET, FILM COATED ORAL
Qty: 60 TABLET | Refills: 2 | Status: SHIPPED | OUTPATIENT
Start: 2023-12-24

## 2023-12-27 ENCOUNTER — OFFICE VISIT (OUTPATIENT)
Dept: FAMILY MEDICINE CLINIC | Facility: CLINIC | Age: 63
End: 2023-12-27
Payer: COMMERCIAL

## 2023-12-27 VITALS
TEMPERATURE: 98.9 F | HEIGHT: 70 IN | SYSTOLIC BLOOD PRESSURE: 137 MMHG | HEART RATE: 98 BPM | WEIGHT: 214 LBS | OXYGEN SATURATION: 97 % | RESPIRATION RATE: 16 BRPM | BODY MASS INDEX: 30.64 KG/M2 | DIASTOLIC BLOOD PRESSURE: 79 MMHG

## 2023-12-27 DIAGNOSIS — E03.9 HYPOTHYROIDISM, UNSPECIFIED TYPE: ICD-10-CM

## 2023-12-27 DIAGNOSIS — R60.9 EDEMA, UNSPECIFIED TYPE: Primary | ICD-10-CM

## 2023-12-27 DIAGNOSIS — R06.02 SOB (SHORTNESS OF BREATH) ON EXERTION: ICD-10-CM

## 2023-12-27 DIAGNOSIS — K58.9 IRRITABLE BOWEL SYNDROME, UNSPECIFIED TYPE: ICD-10-CM

## 2023-12-27 PROCEDURE — 93000 ELECTROCARDIOGRAM COMPLETE: CPT | Performed by: NURSE PRACTITIONER

## 2023-12-27 PROCEDURE — 99214 OFFICE O/P EST MOD 30 MIN: CPT | Performed by: NURSE PRACTITIONER

## 2023-12-27 RX ORDER — SPIRONOLACTONE 25 MG/1
25 TABLET ORAL DAILY
Qty: 30 TABLET | Refills: 5 | Status: SHIPPED | OUTPATIENT
Start: 2023-12-27

## 2023-12-27 NOTE — PROGRESS NOTES
"Assessment/Plan       Diagnoses and all orders for this visit:    Edema, unspecified type  -     Lipid panel; Future  -     spironolactone (ALDACTONE) 25 mg tablet; Take 1 tablet (25 mg total) by mouth daily  -     Lipid panel      Hypothyroidism, unspecified type  -     TSH, 3rd generation; Future  -     TSH, 3rd generationation    SOB (shortness of breath) on exertion  -     POCT ECG  -     CBC and differential; Future  -     Comprehensive metabolic panel; Future  -     CBC and differential  -     Comprehensive metabolic panel        Subjective:      Patient ID: Jerry Lopez is a 63 y.o. male.    Patient presented today for an EKG he stated is for a follow up due to a doctor telling him he should have one to check his heart.  His EKG shows normal rhythm.  Jerry has edema of his B/L LE and has not been taking his thyroid medication.  He has an upcoming appointment in January.  Started on spironolactone for LE edema.  Labs ordered and patient instructed to get labs done prior to follow up appointment so MD can use the labs for next visit.          The following portions of the patient's history were reviewed and updated as appropriate: allergies, current medications, past family history, past medical history, past surgical history, and problem list.      Review of Systems   Constitutional: Negative.    HENT: Negative.     Eyes: Negative.    Respiratory: Negative.     Cardiovascular:  Positive for leg swelling.   Gastrointestinal: Negative.    Endocrine: Negative.    Genitourinary: Negative.    Musculoskeletal: Negative.    Skin: Negative.    Allergic/Immunologic: Negative.    Neurological: Negative.    Hematological: Negative.    Psychiatric/Behavioral: Negative.           Objective:      /79 (BP Location: Left arm, Patient Position: Sitting, Cuff Size: Adult)   Pulse 98   Temp 98.9 °F (37.2 °C) (Tympanic)   Resp 16   Ht 5' 10\" (1.778 m)   Wt 97.1 kg (214 lb)   SpO2 97%   BMI 30.71 kg/m²          " Physical Exam  Constitutional:       General: He is not in acute distress.     Appearance: Normal appearance. He is obese. He is not ill-appearing, toxic-appearing or diaphoretic.   HENT:      Head: Normocephalic and atraumatic.      Right Ear: External ear normal.      Left Ear: External ear normal.      Nose: Nose normal. No congestion.      Mouth/Throat:      Mouth: Mucous membranes are moist.      Pharynx: Oropharynx is clear.   Eyes:      General:         Right eye: No discharge.         Left eye: No discharge.      Conjunctiva/sclera: Conjunctivae normal.   Cardiovascular:      Rate and Rhythm: Normal rate and regular rhythm.      Pulses: Normal pulses.   Pulmonary:      Effort: No respiratory distress.      Breath sounds: No wheezing, rhonchi or rales.   Abdominal:      General: Bowel sounds are normal.      Palpations: Abdomen is soft.   Musculoskeletal:         General: Normal range of motion.      Cervical back: Normal range of motion. No rigidity.      Right lower leg: Edema present.      Left lower leg: Edema present.      Comments: Plus 2 pitting edema B/L   Skin:     General: Skin is warm and dry.   Neurological:      General: No focal deficit present.      Mental Status: He is alert and oriented to person, place, and time.      Gait: Gait normal.   Psychiatric:         Mood and Affect: Mood normal.         Behavior: Behavior normal.         Thought Content: Thought content normal.         Judgment: Judgment normal.

## 2023-12-28 ENCOUNTER — TELEPHONE (OUTPATIENT)
Dept: FAMILY MEDICINE CLINIC | Facility: CLINIC | Age: 63
End: 2023-12-28

## 2023-12-28 NOTE — TELEPHONE ENCOUNTER
Patient called in having questions about his After Visit Summary from 12/27/23 w/ Tuesday. Patient is very concerned about the things listed under ,Today's Visit in right hand corner. He states he does have short term memory lost but he is sure he didn't hear anything about Elevated PSA, which concerns him. Patient requested Tuesday to call him when she can to go over AVS at [phone] If this # goes to  (which is in Kiswahili)  please call alternate # [phone]. Patient wants to also know about EKG results as well. Adv I would TT Tuesday and route this message directly to her.     Tuesday please contact patient to discuss. Thank You

## 2023-12-29 ENCOUNTER — TELEPHONE (OUTPATIENT)
Dept: FAMILY MEDICINE CLINIC | Facility: CLINIC | Age: 63
End: 2023-12-29

## 2024-01-03 PROBLEM — R06.02 SOB (SHORTNESS OF BREATH) ON EXERTION: Status: ACTIVE | Noted: 2024-01-03

## 2024-01-03 PROBLEM — R60.9 EDEMA: Status: ACTIVE | Noted: 2024-01-03

## 2024-01-03 PROBLEM — K58.9 IRRITABLE BOWEL SYNDROME: Status: ACTIVE | Noted: 2024-01-03

## 2024-01-03 PROBLEM — E03.9 HYPOTHYROIDISM: Status: ACTIVE | Noted: 2024-01-03

## 2024-01-03 PROBLEM — R10.13 EPIGASTRIC BURNING SENSATION: Status: ACTIVE | Noted: 2024-01-03

## 2024-01-10 DIAGNOSIS — E55.9 VITAMIN D DEFICIENCY: ICD-10-CM

## 2024-01-10 RX ORDER — MELATONIN
Qty: 180 TABLET | Refills: 3 | Status: SHIPPED | OUTPATIENT
Start: 2024-01-10

## 2024-01-11 LAB
ALBUMIN SERPL-MCNC: 4.8 G/DL (ref 3.9–4.9)
ALBUMIN/GLOB SERPL: 2.7 {RATIO} (ref 1.2–2.2)
ALP SERPL-CCNC: 72 IU/L (ref 44–121)
ALT SERPL-CCNC: 21 IU/L (ref 0–44)
AST SERPL-CCNC: 19 IU/L (ref 0–40)
BASOPHILS # BLD AUTO: 0 X10E3/UL (ref 0–0.2)
BASOPHILS NFR BLD AUTO: 0 %
BILIRUB SERPL-MCNC: 0.6 MG/DL (ref 0–1.2)
BUN SERPL-MCNC: 14 MG/DL (ref 8–27)
BUN/CREAT SERPL: 16 (ref 10–24)
CALCIUM SERPL-MCNC: 9.4 MG/DL (ref 8.6–10.2)
CHLORIDE SERPL-SCNC: 102 MMOL/L (ref 96–106)
CHOLEST SERPL-MCNC: 183 MG/DL (ref 100–199)
CHOLEST/HDLC SERPL: 2.5 RATIO (ref 0–5)
CO2 SERPL-SCNC: 23 MMOL/L (ref 20–29)
CREAT SERPL-MCNC: 0.9 MG/DL (ref 0.76–1.27)
EGFR: 96 ML/MIN/1.73
EOSINOPHIL # BLD AUTO: 0.1 X10E3/UL (ref 0–0.4)
EOSINOPHIL NFR BLD AUTO: 1 %
ERYTHROCYTE [DISTWIDTH] IN BLOOD BY AUTOMATED COUNT: 12.7 % (ref 11.6–15.4)
GLOBULIN SER-MCNC: 1.8 G/DL (ref 1.5–4.5)
GLUCOSE SERPL-MCNC: 113 MG/DL (ref 70–99)
HCT VFR BLD AUTO: 42.9 % (ref 37.5–51)
HDLC SERPL-MCNC: 72 MG/DL
HGB BLD-MCNC: 15 G/DL (ref 13–17.7)
IMM GRANULOCYTES # BLD: 0 X10E3/UL (ref 0–0.1)
IMM GRANULOCYTES NFR BLD: 0 %
LDLC SERPL CALC-MCNC: 101 MG/DL (ref 0–99)
LYMPHOCYTES # BLD AUTO: 0.9 X10E3/UL (ref 0.7–3.1)
LYMPHOCYTES NFR BLD AUTO: 12 %
MCH RBC QN AUTO: 29.9 PG (ref 26.6–33)
MCHC RBC AUTO-ENTMCNC: 35 G/DL (ref 31.5–35.7)
MCV RBC AUTO: 86 FL (ref 79–97)
MONOCYTES # BLD AUTO: 0.4 X10E3/UL (ref 0.1–0.9)
MONOCYTES NFR BLD AUTO: 6 %
NEUTROPHILS # BLD AUTO: 5.9 X10E3/UL (ref 1.4–7)
NEUTROPHILS NFR BLD AUTO: 81 %
PLATELET # BLD AUTO: 233 X10E3/UL (ref 150–450)
POTASSIUM SERPL-SCNC: 4.7 MMOL/L (ref 3.5–5.2)
PROT SERPL-MCNC: 6.6 G/DL (ref 6–8.5)
RBC # BLD AUTO: 5.01 X10E6/UL (ref 4.14–5.8)
SL AMB VLDL CHOLESTEROL CALC: 10 MG/DL (ref 5–40)
SODIUM SERPL-SCNC: 142 MMOL/L (ref 134–144)
TRIGL SERPL-MCNC: 52 MG/DL (ref 0–149)
TSH SERPL DL<=0.005 MIU/L-ACNC: 3.17 UIU/ML (ref 0.45–4.5)
WBC # BLD AUTO: 7.3 X10E3/UL (ref 3.4–10.8)

## 2024-01-16 NOTE — PROGRESS NOTES
Consultation - Cardiology Office  [facility].    Jerry Lopez 63 y.o. male MRN: [id number]  : 1960  Unit/Bed#:  Encounter: [id number]      ASSESSMENT:  Patient had shortness of breath and CP in oct 2023    Obesity, BMI 30.85    Edema of lower extremities,  Much improved/almost resolved with spironolactone    Hypothyroidism    Left knee pain and balance problems at times.  States that he can walk on a treadmill      RECOMMENDATIONS:  Echocardiogram ordered by Dr. Cesar Sarah on 2023  Echocardiogram reordered  Exercise stress test  Weight loss strategies  Compression stockings if lower extremity edema recurs          Thank you for your consultation.  If you have any question please call me at [phone]      Primary Care Physician Requesting Consult: Cesar Phillips MD      Reason for Consult / Principal Problem: Chest pain and shortness of breath/dyspnea on exertion        HPI :     Jerry Lopez is a 63 y.o. year old male who was referred by primary care doctor for chest pain that occurred in 2023 and dyspnea on exertion.  An echocardiogram was ordered by the primary physician but could not be done because of lack of adequate paperwork.  I am reordering an echocardiogram and a stress test.  Patient states that he is able to walk on a treadmill although he uses a cane sometimes for balance and for left knee pain.  If he is unable to do exercise stress test, will consider pharmacologic nuclear stress test      Review of Systems   Respiratory:  Positive for shortness of breath (Dyspnea on exertion).    Cardiovascular:  Positive for chest pain.   Musculoskeletal:  Positive for arthralgias.   All other systems reviewed and are negative.        Historical Information   Past Medical History:   Diagnosis Date    Abdominal pain 10/24/2018    Amnesia memory loss     Anxiety     Chronic fatigue     Disc degeneration, lumbar     Esophageal reflux     GERD (gastroesophageal  reflux disease)     Glaucoma     Hiatal hernia     Increased pressure in the eye     both eyes; denies glaucoma     Internal hemorrhoids     Lumbar disc herniation with radiculopathy     Memory loss     Pain in both feet 6/18/2018    Personal history of colonic polyps      Past Surgical History:   Procedure Laterality Date    COLONOSCOPY N/A 4/25/2016    Procedure: COLONOSCOPY;  Surgeon: Storm Melton MD;  Location: [facility] GI LAB;  Service:     DENTAL SURGERY      ALL TEETH PULLED    EPIDURAL BLOCK INJECTION      EPIDURAL BLOCK INJECTION Right 12/28/2016    Procedure: BLOCK / INJECTION EPIDURAL STEROID TRANSFORAMINAL  L4-5;  Surgeon: Altaf Buckley MD;  Location: [facility] MAIN OR;  Service:     EPIDURAL BLOCK INJECTION Right 11/16/2017    Procedure: BLOCK / INJECTION EPIDURAL STEROID TRANSFORAMINAL L4-5 RIGHT;  Surgeon: Altaf Buckley MD;  Location: [facility] MAIN OR;  Service: Pain Management     EPIDURAL BLOCK INJECTION Left 5/10/2018    Procedure: Lt L4 L5 Tfesi ([id number]);  Surgeon: Altaf Buckley MD;  Location: [facility] MAIN OR;  Service: Pain Management     EPIDURAL BLOCK INJECTION Right 3/7/2019    Procedure: L4 L5 Transforaminal Epidural Steroid Injection ([id number]);  Surgeon: Altaf Buckley MD;  Location: [facility] MAIN OR;  Service: Pain Management     EPIDURAL BLOCK INJECTION Left 5/12/2022    Procedure: L4 L5  TRANSFORAMINAL epidural steroid injection ([id number]);  Surgeon: Altaf Buckley MD;  Location: [facility] MAIN OR;  Service: Pain Management     ESOPHAGOGASTRODUODENOSCOPY N/A 4/25/2016    Procedure: ESOPHAGOGASTRODUODENOSCOPY (EGD);  Surgeon: Storm Melton MD;  Location: [facility] GI LAB;  Service:     FL INJECTION LEFT ELBOW (NON ARTHROGRAM)  5/12/2022    NOSE SURGERY       Social History     Substance and Sexual Activity   Alcohol Use No     Social History     Substance and Sexual Activity   Drug Use No     Social History     Tobacco Use   Smoking Status Former    Current packs/day: 0.00    Average  packs/day: 0.5 packs/day for 19.0 years (9.5 ttl pk-yrs)    Types: Cigarettes    Start date:     Quit date:     Years since quittin.0   Smokeless Tobacco Never     Family History:   Family History   Problem Relation Age of Onset    Stomach cancer Maternal Grandmother     Hypertension Mother     Diabetes Mother     Cataracts Father     Skin cancer Father     Stroke Father         2X    Aneurysm Father     No Known Problems Sister     No Known Problems Brother     Throat cancer Brother        Meds/Allergies     Allergies   Allergen Reactions    Gabapentin Other (See Comments)     OUT OF BODY PSYCHOSIS, MEMORY LOSS, AMNESIA    Other Cough     HAYFEVER, SEASONAL, ALFALFA       Current Outpatient Medications:     Aspirin Low Dose 81 MG EC tablet, take 1 tablet by mouth once daily, Disp: 30 tablet, Rfl: 5    cholecalciferol (VITAMIN D3) 1,000 units tablet, take 2 tablets (2,000 UNITS TOTAL) by mouth daily, Disp: 180 tablet, Rfl: 3    Diclofenac Sodium (VOLTAREN) 1 %, apply 2 grams topically four times a day, Disp: 100 g, Rfl: 1    dicyclomine (BENTYL) 10 mg capsule, take 1 capsule by mouth four times a day before meals and at bedtime, Disp: 120 capsule, Rfl: 2    famotidine (PEPCID) 40 MG tablet, TAKE 1/2 TABLET BY MOUTH DAILY AT BEDTIME, Disp: 90 tablet, Rfl: 3    fexofenadine (ALLEGRA) 180 MG tablet, take 1 tablet by mouth once daily, Disp: 90 tablet, Rfl: 3    methocarbamol (ROBAXIN) 500 mg tablet, take 1 tablet by mouth three times a day if needed for muscle spasm, Disp: 60 tablet, Rfl: 2    omeprazole (PriLOSEC) 40 MG capsule, take 1 capsule by mouth once daily, Disp: 30 capsule, Rfl: 5    spironolactone (ALDACTONE) 25 mg tablet, Take 1 tablet (25 mg total) by mouth daily, Disp: 30 tablet, Rfl: 5    tamsulosin (FLOMAX) 0.4 mg, Take 2 capsules (0.8 mg total) by mouth daily at bedtime, Disp: 60 capsule, Rfl: 5    vitamin B-12 (VITAMIN B-12) 1,000 mcg tablet, TAKE 1 TABLET BY MOUTH DAILY, Disp: 90 tablet,  "Rfl: 3    levothyroxine 50 mcg tablet, take 1 tablet by mouth daily IN THE EARLY MORNING (Patient not taking: Reported on 12/27/2023), Disp: 90 tablet, Rfl: 0    Vitals: Blood pressure 128/70, pulse 91, height 5' 10\" (1.778 m), weight 97.5 kg (215 lb), SpO2 98%.    Body mass index is 30.85 kg/m².  Vitals:    01/17/24 1021   Weight: 97.5 kg (215 lb)     BP Readings from Last 3 Encounters:   01/17/24 128/70   12/27/23 137/79   10/24/23 128/78       Physical Exam  PHYSICAL EXAMINATION:  Neurologic:  Alert & oriented x 3, no new focal deficits, Not in any acute distress,  Constitutional: Obese  Eyes:  Pupil equal and reacting to light, conjunctiva normal, No JVP, No LNP   HENT:  Atraumatic, oropharynx moist, Neck- normal range of motion, no tenderness,  Neck supple   Respiratory:  Bilateral air entry, mostly clear to auscultation  Cardiovascular: S1-S2 regular rhythm  GI:  Soft, nondistended, normal bowel sounds, nontender, no hepatosplenomegaly appreciated.  Musculoskeletal: no deformities.   Skin:  Well hydrated, no rash   Lymphatic:  No lymphadenopathy noted   Extremities:  No significant edema    Diagnostic Studies Review Cardio:      EKG: Sinus rhythm with short LA interval, heart rate 91/min    Cardiac testing:   No results found for this or any previous visit.          Imaging:  Chest X-Ray:   XR chest pa & lateral    Result Date: 11/10/2023  Impression Clear lungs. No significant change from prior. Electronically signed: 11/10/2023 03:39 PM Gurpreet Kyle MD      CT-scan of the chest:     No CTA results available for this patient.  Lab Review   Lab Results   Component Value Date    WBC 7.3 01/10/2024    HGB 15.0 01/10/2024    HCT 42.9 01/10/2024    MCV 86 01/10/2024    RDW 12.7 01/10/2024     01/10/2024     BMP:  Lab Results   Component Value Date    SODIUM 142 01/10/2024    K 4.7 01/10/2024     01/10/2024    CO2 23 01/10/2024    BUN 14 01/10/2024    CREATININE 0.90 01/10/2024    GLUC 113 (H) " "01/10/2024    CALCIUM 8.7 05/19/2018    EGFR 96 01/10/2024     LFT:  Lab Results   Component Value Date    AST 19 01/10/2024    ALT 21 01/10/2024    ALKPHOS 88 05/19/2018    TP 6.6 01/10/2024    ALB 4.8 01/10/2024      No results found for: \"[id number]\"  No components found for: \"TSH3\"  Lab Results   Component Value Date    HGBA1C 5.6 03/07/2022     Lipid Profile:   Lab Results   Component Value Date    CHOLESTEROL 183 01/10/2024    HDL 72 01/10/2024    LDLCALC 101 (H) 01/10/2024    TRIG 52 01/10/2024     Lab Results   Component Value Date    CHOLESTEROL 183 01/10/2024    CHOLESTEROL 189 03/07/2022     Lab Results   Component Value Date    TROPONINI <0.02 05/19/2018     No results found for: \"NTBNP\"   Recent Results (from the past 672 hour(s))   CBC and differential    Collection Time: 01/10/24 10:53 AM   Result Value Ref Range    White Blood Cell Count 7.3 3.4 - 10.8 x10E3/uL    Red Blood Cell Count 5.01 4.14 - 5.80 x10E6/uL    Hemoglobin 15.0 13.0 - 17.7 g/dL    HCT 42.9 37.5 - 51.0 %    MCV 86 79 - 97 fL    MCH 29.9 26.6 - 33.0 pg    MCHC 35.0 31.5 - 35.7 g/dL    RDW 12.7 11.6 - 15.4 %    Platelet Count 233 150 - 450 x10E3/uL    Neutrophils 81 Not Estab. %    Lymphocytes 12 Not Estab. %    Monocytes 6 Not Estab. %    Eosinophils 1 Not Estab. %    Basophils PCT 0 Not Estab. %    Neutrophils (Absolute) 5.9 1.4 - 7.0 x10E3/uL    Lymphocytes (Absolute) 0.9 0.7 - 3.1 x10E3/uL    Monocytes (Absolute) 0.4 0.1 - 0.9 x10E3/uL    Eosinophils (Absolute) 0.1 0.0 - 0.4 x10E3/uL    Basophils ABS 0.0 0.0 - 0.2 x10E3/uL    Immature Granulocytes 0 Not Estab. %    Immature Granulocytes (Absolute) 0.0 0.0 - 0.1 x10E3/uL   Comprehensive metabolic panel    Collection Time: 01/10/24 10:53 AM   Result Value Ref Range    Glucose, Random 113 (H) 70 - 99 mg/dL    BUN 14 8 - 27 mg/dL    Creatinine 0.90 0.76 - 1.27 mg/dL    eGFR 96 >59 mL/min/1.73    SL AMB BUN/CREATININE RATIO 16 10 - 24    Sodium 142 134 - 144 mmol/L    Potassium 4.7 " "3.5 - 5.2 mmol/L    Chloride 102 96 - 106 mmol/L    CO2 23 20 - 29 mmol/L    CALCIUM 9.4 8.6 - 10.2 mg/dL    Protein, Total 6.6 6.0 - 8.5 g/dL    Albumin 4.8 3.9 - 4.9 g/dL    Globulin, Total 1.8 1.5 - 4.5 g/dL    Albumin/Globulin Ratio 2.7 (H) 1.2 - 2.2    TOTAL BILIRUBIN 0.6 0.0 - 1.2 mg/dL    Alk Phos Isoenzymes 72 44 - 121 IU/L    AST 19 0 - 40 IU/L    ALT 21 0 - 44 IU/L   TSH, 3rd generation    Collection Time: 01/10/24 10:53 AM   Result Value Ref Range    TSH 3.170 0.450 - 4.500 uIU/mL   Lipid panel    Collection Time: 01/10/24 10:53 AM   Result Value Ref Range    Cholesterol, Total 183 100 - 199 mg/dL    Triglycerides 52 0 - 149 mg/dL    HDL 72 >39 mg/dL    VLDL Cholesterol Calculated 10 5 - 40 mg/dL    LDL Calculated 101 (H) 0 - 99 mg/dL    T. Chol/HDL Ratio 2.5 0.0 - 5.0 ratio           Dr. Cachorro Abarca MD, FACC      \"This note has been constructed using a voice recognition system.Therefore there may be syntax, spelling, and/or grammatical errors. Please call if you have any questions. \"  "

## 2024-01-17 ENCOUNTER — CONSULT (OUTPATIENT)
Dept: CARDIOLOGY CLINIC | Facility: CLINIC | Age: 64
End: 2024-01-17
Payer: COMMERCIAL

## 2024-01-17 VITALS
SYSTOLIC BLOOD PRESSURE: 128 MMHG | DIASTOLIC BLOOD PRESSURE: 70 MMHG | WEIGHT: 215 LBS | OXYGEN SATURATION: 98 % | BODY MASS INDEX: 30.78 KG/M2 | HEIGHT: 70 IN | HEART RATE: 91 BPM

## 2024-01-17 DIAGNOSIS — R06.02 SOB (SHORTNESS OF BREATH) ON EXERTION: ICD-10-CM

## 2024-01-17 DIAGNOSIS — R60.9 EDEMA, UNSPECIFIED TYPE: ICD-10-CM

## 2024-01-17 DIAGNOSIS — R07.2 PRECORDIAL PAIN: Primary | ICD-10-CM

## 2024-01-17 PROCEDURE — 99243 OFF/OP CNSLTJ NEW/EST LOW 30: CPT | Performed by: INTERNAL MEDICINE

## 2024-01-17 PROCEDURE — 93000 ELECTROCARDIOGRAM COMPLETE: CPT | Performed by: INTERNAL MEDICINE

## 2024-01-31 ENCOUNTER — HOSPITAL ENCOUNTER (OUTPATIENT)
Dept: NON INVASIVE DIAGNOSTICS | Facility: HOSPITAL | Age: 64
Discharge: HOME | End: 2024-01-31
Attending: INTERNAL MEDICINE
Payer: COMMERCIAL

## 2024-01-31 ENCOUNTER — TELEPHONE (OUTPATIENT)
Dept: CARDIOLOGY CLINIC | Facility: CLINIC | Age: 64
End: 2024-01-31

## 2024-01-31 VITALS
WEIGHT: 215 LBS | HEIGHT: 70 IN | SYSTOLIC BLOOD PRESSURE: 128 MMHG | HEART RATE: 83 BPM | DIASTOLIC BLOOD PRESSURE: 70 MMHG | BODY MASS INDEX: 30.78 KG/M2

## 2024-01-31 DIAGNOSIS — R07.2 PRECORDIAL PAIN: ICD-10-CM

## 2024-01-31 DIAGNOSIS — R60.9 EDEMA, UNSPECIFIED TYPE: ICD-10-CM

## 2024-01-31 DIAGNOSIS — R06.02 SOB (SHORTNESS OF BREATH) ON EXERTION: ICD-10-CM

## 2024-01-31 LAB
AORTIC ROOT: 3.5 CM
APICAL FOUR CHAMBER EJECTION FRACTION: 62 %
AV LVOT PEAK GRADIENT: 5 MMHG
AV PEAK GRADIENT: 6 MMHG
BSA FOR ECHO PROCEDURE: 2.15 M2
DOP CALC LVOT AREA: 2.83 CM2
DOP CALC LVOT DIAMETER: 1.9 CM
E WAVE DECELERATION TIME: 213 MS
E/A RATIO: 0.72
FRACTIONAL SHORTENING: 36 (ref 28–44)
INTERVENTRICULAR SEPTUM IN DIASTOLE (PARASTERNAL SHORT AXIS VIEW): 1.1 CM
INTERVENTRICULAR SEPTUM: 1.1 CM (ref 0.6–1.1)
LAAS-AP2: 23.3 CM2
LAAS-AP4: 21.6 CM2
LEFT ATRIUM SIZE: 3.1 CM
LEFT ATRIUM VOLUME (MOD BIPLANE): 72 ML
LEFT ATRIUM VOLUME INDEX (MOD BIPLANE): 33.5 ML/M2
LEFT INTERNAL DIMENSION IN SYSTOLE: 2.7 CM (ref 2.1–4)
LEFT VENTRICULAR INTERNAL DIMENSION IN DIASTOLE: 4.2 CM (ref 3.5–6)
LEFT VENTRICULAR POSTERIOR WALL IN END DIASTOLE: 1 CM
LEFT VENTRICULAR STROKE VOLUME: 52 ML
LVSV (TEICH): 52 ML
MAX HR PERCENT: 83 %
MAX HR: 131 BPM
MV E'TISSUE VEL-LAT: 12 CM/S
MV E'TISSUE VEL-SEP: 8 CM/S
MV PEAK A VEL: 0.74 M/S
MV PEAK E VEL: 53 CM/S
MV STENOSIS PRESSURE HALF TIME: 62 MS
MV VALVE AREA P 1/2 METHOD: 3.55
RATE PRESSURE PRODUCT: NORMAL
RIGHT ATRIUM AREA SYSTOLE A4C: 17.6 CM2
RIGHT VENTRICLE ID DIMENSION: 3.9 CM
SL CV LEFT ATRIUM LENGTH A2C: 5.4 CM
SL CV LV EF: 60
SL CV PED ECHO LEFT VENTRICLE DIASTOLIC VOLUME (MOD BIPLANE) 2D: 79 ML
SL CV PED ECHO LEFT VENTRICLE SYSTOLIC VOLUME (MOD BIPLANE) 2D: 28 ML
SL CV STRESS RECOVERY BP: NORMAL MMHG
SL CV STRESS RECOVERY HR: 97 BPM
SL CV STRESS RECOVERY O2 SAT: 98 %
STRESS ANGINA INDEX: 0
STRESS BASELINE BP: NORMAL MMHG
STRESS BASELINE HR: 86 BPM
STRESS O2 SAT REST: 99 %
STRESS PEAK HR: 130 BPM
STRESS POST ESTIMATED WORKLOAD: 6.4 METS
STRESS POST EXERCISE DUR MIN: 3 MIN
STRESS POST EXERCISE DUR SEC: 48 SEC
STRESS POST O2 SAT PEAK: 98 %
STRESS POST PEAK BP: 152 MMHG
TRICUSPID ANNULAR PLANE SYSTOLIC EXCURSION: 1.7 CM

## 2024-01-31 PROCEDURE — 93016 CV STRESS TEST SUPVJ ONLY: CPT | Performed by: INTERNAL MEDICINE

## 2024-01-31 PROCEDURE — 93306 TTE W/DOPPLER COMPLETE: CPT | Performed by: INTERNAL MEDICINE

## 2024-01-31 PROCEDURE — 93306 TTE W/DOPPLER COMPLETE: CPT

## 2024-01-31 PROCEDURE — 93017 CV STRESS TEST TRACING ONLY: CPT

## 2024-01-31 PROCEDURE — 93018 CV STRESS TEST I&R ONLY: CPT | Performed by: INTERNAL MEDICINE

## 2024-01-31 NOTE — TELEPHONE ENCOUNTER
----- Message from Cachorro Abarca MD sent at 1/31/2024  3:20 PM EST -----  Please call and inform the patient that the Echocardiogram showed normal pumping function of the heart.    No significant valve abnormality was seen.

## 2024-01-31 NOTE — TELEPHONE ENCOUNTER
----- Message from Cachorro Abarca MD sent at 1/31/2024  3:21 PM EST -----  Please inform the patient that the stress test showed NO evidence of any significant blockage in the blood vessels of your heart although the patient was not able to walk long enough on the treadmill.  Will discuss further at next scheduled office visit.

## 2024-02-01 LAB
CHEST PAIN STATEMENT: NORMAL
MAX DIASTOLIC BP: 84 MMHG
MAX PREDICTED HEART RATE: 157 BPM
PROTOCOL NAME: NORMAL
REASON FOR TERMINATION: NORMAL
STRESS POST EXERCISE DUR MIN: 3 MIN
STRESS POST EXERCISE DUR SEC: 48 SEC
STRESS POST PEAK HR: 131 BPM
STRESS POST PEAK SYSTOLIC BP: 152 MMHG
TARGET HR FORMULA: NORMAL

## 2024-02-07 ENCOUNTER — OFFICE VISIT (OUTPATIENT)
Age: 64
End: 2024-02-07
Payer: COMMERCIAL

## 2024-02-07 VITALS — RESPIRATION RATE: 17 BRPM | HEIGHT: 70 IN | WEIGHT: 215 LBS | BODY MASS INDEX: 30.78 KG/M2

## 2024-02-07 DIAGNOSIS — I70.209 PERIPHERAL ARTERIOSCLEROSIS (HCC): Primary | ICD-10-CM

## 2024-02-07 DIAGNOSIS — M79.672 PAIN IN BOTH FEET: ICD-10-CM

## 2024-02-07 DIAGNOSIS — B35.1 ONYCHOMYCOSIS: ICD-10-CM

## 2024-02-07 DIAGNOSIS — M79.671 PAIN IN BOTH FEET: ICD-10-CM

## 2024-02-07 PROCEDURE — 11721 DEBRIDE NAIL 6 OR MORE: CPT | Performed by: PODIATRIST

## 2024-02-07 NOTE — PROGRESS NOTES
Assessment/Plan:  Metatarsalgia.  Hammertoe formation.  Pes planus.  Pain.  Mycosis of nail.  Paronychia.     Plan.  Chart reviewed.  Care notes reviewed.   Foot exam performed.  All nails debrided.  Nails debrided mechanically with nail nipper without pain or complication.  Patient will benefit from orthotics.    He will wear these daily.  Aftercare instruction given.  Return for follow-up.       Subjective:   Patient only has mild pain in his feet with ambulation.  He has known radiculopathy.  Patient complains of pain when he wear shoes.  No history of trauma.                Past Medical History:   Diagnosis Date    Amnesia memory loss      Anxiety      Chronic fatigue      Disc degeneration, lumbar      Esophageal reflux      GERD (gastroesophageal reflux disease)      Glaucoma      Hiatal hernia      Increased pressure in the eye       both eyes; denies glaucoma     Internal hemorrhoids      Lumbar disc herniation with radiculopathy      Memory loss      Personal history of colonic polyps                     Past Surgical History:   Procedure Laterality Date    COLONOSCOPY N/A 4/25/2016     Procedure: COLONOSCOPY;  Surgeon: Storm Melton MD;  Location: [facility] GI LAB;  Service:     DENTAL SURGERY         ALL TEETH PULLED    EPIDURAL BLOCK INJECTION        EPIDURAL BLOCK INJECTION Right 12/28/2016     Procedure: BLOCK / INJECTION EPIDURAL STEROID TRANSFORAMINAL  L4-5;  Surgeon: Altaf Buckley MD;  Location: [facility] MAIN OR;  Service:     EPIDURAL BLOCK INJECTION Right 11/16/2017     Procedure: BLOCK / INJECTION EPIDURAL STEROID TRANSFORAMINAL L4-5 RIGHT;  Surgeon: Altaf Buckley MD;  Location: [facility] MAIN OR;  Service: Pain Management     EPIDURAL BLOCK INJECTION Left 5/10/2018     Procedure: Lt L4 L5 Tfesi ([id number]);  Surgeon: Altaf Buckley MD;  Location: [facility] MAIN OR;  Service: Pain Management     ESOPHAGOGASTRODUODENOSCOPY N/A 4/25/2016     Procedure: ESOPHAGOGASTRODUODENOSCOPY (EGD);  Surgeon: Storm  MD Geronimo;  Location: [facility] GI LAB;  Service:     NOSE SURGERY                       Allergies   Allergen Reactions    Gabapentin Other (See Comments)       OUT OF BODY PSYCHOSIS, MEMORY LOSS, AMNESIA    Other Cough       HAYFEVER, SEASONAL, ALFALFA            Current Outpatient Prescriptions:     aspirin 81 MG tablet, Take 81 mg by mouth daily., Disp: , Rfl:     omeprazole (PriLOSEC) 20 mg delayed release capsule, Take 1 capsule (20 mg total) by mouth daily, Disp: 30 capsule, Rfl: 3    ranitidine (ZANTAC) 300 MG tablet, Take 1 tablet (300 mg total) by mouth daily at bedtime, Disp: 30 tablet, Rfl: 3    tamsulosin (FLOMAX) 0.4 mg, Take 0.4 mg by mouth daily with dinner, Disp: , Rfl:     timolol (BETIMOL) 0.5 % ophthalmic solution, Administer 1 drop to both eyes 2 (two) times a day., Disp: , Rfl:     timolol (TIMOPTIC) 0.5 % ophthalmic solution, INSTILL 1 DROP INTO BOTH EYES TWO TIMES A DAY, Disp: , Rfl: 0    citalopram (CeleXA) 10 mg tablet, Take 3 tablets (30 mg total) by mouth daily for 90 days, Disp: 270 tablet, Rfl: 0    gabapentin (NEURONTIN) 300 mg capsule, Take 2 capsules (600 mg total) by mouth 3 (three) times a day, Disp: 90 capsule, Rfl: 3    gabapentin (NEURONTIN) 600 MG tablet, , Disp: , Rfl: 0           Patient Active Problem List   Diagnosis    Chronic pain syndrome    Chronic low back pain    Intervertebral disc disorder with radiculopathy of lumbar region    Spondylosis of lumbar region without myelopathy or radiculopathy    Abnormal chromosomal analysis    Adult-onset obesity    Anxiety    Elevated PSA    Enlarged prostate on rectal examination    Gastroesophageal reflux disease without esophagitis    Lumbar disc herniation with radiculopathy    Myofascial pain syndrome    Chronic fatigue    Memory loss    Glaucoma    Neuritis or radiculitis due to rupture of lumbar intervertebral disc    Low back pain             Patient ID: Jerry Lopez is a 63 y.o. male.     Objective:      Foot Exam      General  General Appearance: appears stated age and healthy   Orientation: alert and oriented to person, place, and time   Affect: appropriate   Gait: antalgic         Right Foot/Ankle      Inspection and Palpation  Ecchymosis: none  Tenderness: bony tenderness and metatarsals   Swelling: metatarsals   Arch: pes planus  Hammertoes: fourth toe and fifth toe  Hallux valgus: no  Hallux limitus: yes  Skin Exam: callus;      Neurovascular  Dorsalis pedis: 2+  Posterior tibial: 2+  Saphenous nerve sensation: diminished  Tibial nerve sensation: diminished  Superficial peroneal nerve sensation: diminished  Deep peroneal nerve sensation: diminished  Sural nerve sensation: diminished  Achilles reflex: 1+     Muscle Strength  Ankle dorsiflexion: 4+     Tests  Lateral squeeze: positive      Comments  4th and 5th toes bilateral are in a abducto varus position. Pinch callus formation of 4th and 5th toes.     Left Foot/Ankle       Inspection and Palpation  Ecchymosis: none  Tenderness: bony tenderness and metatarsals   Swelling: metatarsals   Arch: pes planus  Hammertoes: fourth toe and fifth toe  Hallux valgus: no  Hallux limitus: yes  Skin Exam: callus;      Neurovascular  Dorsalis pedis: 2+  Posterior tibial: 2+  Saphenous nerve sensation: diminished  Tibial nerve sensation: diminished  Superficial peroneal nerve sensation: diminished  Deep peroneal nerve sensation: diminished  Sural nerve sensation: diminished  Achilles reflex: 1+     Muscle Strength  Ankle dorsiflexion: 4+     Tests  Too many toes: positive      Comments  Patient is maximally pronated in stance and gait.  There is transverse deviation of the DIPJ 4th toe bilateral.  Physical Exam   Cardiovascular:   Pulses:       Dorsalis pedis pulses are 2+ on the right side, and 2+ on the left side.        Posterior tibial pulses are 2+ on the right side, and 2+ on the left side.   Musculoskeletal:        Right foot: There is bony tenderness.        Left foot: There is  bony tenderness.   Feet:   Right Foot:   Skin Integrity: Positive for callus.   Left Foot:   Skin Integrity: Positive for callus.   Neurological:   Reflex Scores:       Achilles reflexes are 1+ on the right side and 1+ on the left side.

## 2024-02-12 ENCOUNTER — TELEPHONE (OUTPATIENT)
Age: 64
End: 2024-02-12

## 2024-02-12 NOTE — TELEPHONE ENCOUNTER
Caller: Patient    Doctor: Jonel    Reason for call: Patient scheduled appointment w/ Dr Remy 2/15 seeking injection for left knee.   Patient would like to know if the visit will entail a full consult, or if it would be a shorter visit to address desired injection.    Please call patient back to advise.    Call back#: [phone]

## 2024-02-15 ENCOUNTER — OFFICE VISIT (OUTPATIENT)
Dept: OBGYN CLINIC | Facility: CLINIC | Age: 64
End: 2024-02-15
Payer: COMMERCIAL

## 2024-02-15 VITALS
DIASTOLIC BLOOD PRESSURE: 82 MMHG | SYSTOLIC BLOOD PRESSURE: 130 MMHG | HEIGHT: 70 IN | BODY MASS INDEX: 30.78 KG/M2 | WEIGHT: 215 LBS | HEART RATE: 106 BPM

## 2024-02-15 DIAGNOSIS — M17.12 PRIMARY OSTEOARTHRITIS OF LEFT KNEE: Primary | ICD-10-CM

## 2024-02-15 DIAGNOSIS — S13.9XXD NECK SPRAIN, SUBSEQUENT ENCOUNTER: ICD-10-CM

## 2024-02-15 PROCEDURE — 20610 DRAIN/INJ JOINT/BURSA W/O US: CPT

## 2024-02-15 PROCEDURE — 99212 OFFICE O/P EST SF 10 MIN: CPT | Performed by: ORTHOPAEDIC SURGERY

## 2024-02-15 RX ADMIN — BUPIVACAINE HYDROCHLORIDE 4 ML: 5 INJECTION, SOLUTION PERINEURAL at 11:15

## 2024-02-15 RX ADMIN — TRIAMCINOLONE ACETONIDE 40 MG: 40 INJECTION, SUSPENSION INTRA-ARTICULAR; INTRAMUSCULAR at 11:15

## 2024-02-15 NOTE — PROGRESS NOTES
Large joint arthrocentesis: L knee  Universal Protocol:  Consent: Verbal consent obtained.  Risks and benefits: risks, benefits and alternatives were discussed  Consent given by: patient  Timeout called at: 2/15/2024 11:33 AM.  Patient understanding: patient states understanding of the procedure being performed  Patient consent: the patient's understanding of the procedure matches consent given  Site marked: the operative site was marked  Radiology Images displayed and confirmed. If images not available, report reviewed: imaging studies available  Patient identity confirmed: verbally with patient  Supporting Documentation  Indications: pain   Procedure Details  Location: knee - L knee  Needle size: 22 G  Ultrasound guidance: no  Approach: anterolateral  Medications administered: 40 mg triamcinolone acetonide 40 mg/mL; 4 mL bupivacaine 0.5 %    Patient tolerance: patient tolerated the procedure well with no immediate complications  Dressing:  Sterile dressing applied      Jerry presents for a left knee CSI injection today for left primary knee osteoarthritis. The patient states that he got nearly 4.5 months of full symptomatic relief following his initial CSI performed on 8/11/23. Since then, pain has gradually increasing and is currently a 6-7/10 today. The patient denies new injury/trauma and numbness/tingling. He states he is working on losing weight and walks daily. The patient tolerated the procedure as detailed above well without any acute complications or difficulties. Post-injection instructions were provided and all questions were answered. We will plan to follow up with Jerry as needed.

## 2024-02-16 RX ORDER — METHOCARBAMOL 500 MG/1
TABLET, FILM COATED ORAL
Qty: 60 TABLET | Refills: 2 | Status: SHIPPED | OUTPATIENT
Start: 2024-02-16

## 2024-02-19 RX ORDER — TRIAMCINOLONE ACETONIDE 40 MG/ML
40 INJECTION, SUSPENSION INTRA-ARTICULAR; INTRAMUSCULAR
Status: COMPLETED | OUTPATIENT
Start: 2024-02-15 | End: 2024-02-15

## 2024-02-19 RX ORDER — BUPIVACAINE HYDROCHLORIDE 5 MG/ML
4 INJECTION, SOLUTION PERINEURAL
Status: COMPLETED | OUTPATIENT
Start: 2024-02-15 | End: 2024-02-15

## 2024-02-22 DIAGNOSIS — K58.9 IRRITABLE BOWEL SYNDROME, UNSPECIFIED TYPE: ICD-10-CM

## 2024-02-22 RX ORDER — DICYCLOMINE HYDROCHLORIDE 10 MG/1
CAPSULE ORAL
Qty: 120 CAPSULE | Refills: 2 | Status: SHIPPED | OUTPATIENT
Start: 2024-02-22

## 2024-02-28 ENCOUNTER — OFFICE VISIT (OUTPATIENT)
Dept: PAIN MEDICINE | Facility: CLINIC | Age: 64
End: 2024-02-28
Payer: COMMERCIAL

## 2024-02-28 ENCOUNTER — APPOINTMENT (OUTPATIENT)
Dept: RADIOLOGY | Facility: CLINIC | Age: 64
End: 2024-02-28
Payer: COMMERCIAL

## 2024-02-28 VITALS
SYSTOLIC BLOOD PRESSURE: 124 MMHG | WEIGHT: 209 LBS | BODY MASS INDEX: 29.99 KG/M2 | DIASTOLIC BLOOD PRESSURE: 71 MMHG | HEART RATE: 103 BPM

## 2024-02-28 DIAGNOSIS — R53.82 CHRONIC FATIGUE: ICD-10-CM

## 2024-02-28 DIAGNOSIS — M47.816 SPONDYLOSIS OF LUMBAR REGION WITHOUT MYELOPATHY OR RADICULOPATHY: Primary | ICD-10-CM

## 2024-02-28 DIAGNOSIS — M47.816 SPONDYLOSIS OF LUMBAR REGION WITHOUT MYELOPATHY OR RADICULOPATHY: ICD-10-CM

## 2024-02-28 DIAGNOSIS — M51.16 LUMBAR DISC HERNIATION WITH RADICULOPATHY: ICD-10-CM

## 2024-02-28 PROCEDURE — 99214 OFFICE O/P EST MOD 30 MIN: CPT | Performed by: STUDENT IN AN ORGANIZED HEALTH CARE EDUCATION/TRAINING PROGRAM

## 2024-02-28 PROCEDURE — 72100 X-RAY EXAM L-S SPINE 2/3 VWS: CPT

## 2024-02-28 NOTE — PROGRESS NOTES
Pain Medicine Follow-Up Note    Assessment:  1. Spondylosis of lumbar region without myelopathy or radiculopathy    2. Lumbar disc herniation with radiculopathy    3. Chronic fatigue      Patient is a pleasant 63-year-old male who presents as a follow-up visit after last being seen in May 2022 for acute on chronic low back pain.  At that time patient underwent a left L4-L5 transforaminal epidural steroid injection by Dr. Nagel which provided significant improvement in pain and function for over a year.  Since last visit patient's symptoms are worse rating his pain as a 6-8.5 out of 10 on numeric rating scale.  The pain is worse in the evening and at night and the pain is intermittent, occurring 30-60% of the time.  The quality pain is sharp, shooting primarily in his bilateral axial low back.    On further discussion with patient, he states his symptoms are different than prior as his symptoms were worse on the left side in the past but now his pain is more so on the right side. On exam patient with TTP in midline lumbar spine, bilateral lumbar paraspinal muscles, +SLR on the right and positive lumbar facet loading. Patient currently taking robaxin and aleve with great benefit. Patient has not done any recent physical therapy.   Plan:  Ambulatory referral to PT  Xray lumbar spine  Continue robaxin 500 mg TID prn  Continue naproxen 220 mg BID prn  Follow up in six weeks to consider MRI of lumbar spine if symptoms persist   Orders Placed This Encounter   Procedures   • X-ray lumbar spine 2 or 3 views     Standing Status:   Future     Number of Occurrences:   1     Standing Expiration Date:   2/28/2028     Scheduling Instructions:      Bring along any outside films relating to this procedure.         • Ambulatory referral to Physical Therapy     Standing Status:   Future     Standing Expiration Date:   2/28/2025     Referral Priority:   Routine     Referral Type:   Physical Therapy     Referral Reason:   Specialty  Services Required     Requested Specialty:   Physical Therapy     Number of Visits Requested:   1     Expiration Date:   2/28/2025       No orders of the defined types were placed in this encounter.      My impressions and treatment recommendations were discussed in detail with the patient who verbalized understanding and had no further questions.        Follow-up is planned in six weeks time or sooner as warranted.  Discharge instructions were provided. I personally saw and examined the patient and I agree with the above discussed plan of care.    History of Present Illness:    Jerry Lopez is a 63 y.o. male who presents to [facility] for interval re-evaluation of the above stated pain complaints. The patient has a past medical and chronic pain history as outlined in the assessment section. He was last seen on 5/2022.    Patient is a pleasant 63-year-old male who presents as a follow-up visit after last being seen in May 2022 for acute on chronic low back pain.  At that time patient underwent a left L4-L5 transforaminal epidural steroid injection by Dr. Nagel which provided significant improvement in pain and function for over a year.  Since last visit patient's symptoms are worse rating his pain as a 6-8.5 out of 10 on numeric rating scale.  The pain is worse in the evening and at night and the pain is intermittent, occurring 30-60% of the time.  The quality pain is sharp, shooting primarily in his bilateral axial low back.      Other than as stated above, the patient denies any interval changes in medications, medical condition, mental condition, symptoms, or allergies since the last office visit.         Review of Systems:    Review of Systems   Constitutional:  Negative for unexpected weight change.   HENT:  Negative for ear pain.    Eyes:  Negative for visual disturbance.   Respiratory:  Negative for shortness of breath and wheezing.    Gastrointestinal:  Negative for abdominal  pain.   Musculoskeletal:  Positive for back pain, gait problem and joint swelling.        Pull in lower back   Neurological:  Positive for weakness, light-headedness and headaches. Negative for numbness.   Psychiatric/Behavioral:  Positive for decreased concentration and sleep disturbance.         Memory loss,          Past Medical History:   Diagnosis Date   • Abdominal pain 10/24/2018   • Amnesia memory loss    • Anxiety    • Chronic fatigue    • Disc degeneration, lumbar    • Esophageal reflux    • GERD (gastroesophageal reflux disease)    • Glaucoma    • Hiatal hernia    • Increased pressure in the eye     both eyes; denies glaucoma    • Internal hemorrhoids    • Lumbar disc herniation with radiculopathy    • Memory loss    • Pain in both feet 6/18/2018   • Personal history of colonic polyps        Past Surgical History:   Procedure Laterality Date   • COLONOSCOPY N/A 4/25/2016    Procedure: COLONOSCOPY;  Surgeon: Storm Melton MD;  Location: [facility] GI LAB;  Service:    • DENTAL SURGERY      ALL TEETH PULLED   • EPIDURAL BLOCK INJECTION     • EPIDURAL BLOCK INJECTION Right 12/28/2016    Procedure: BLOCK / INJECTION EPIDURAL STEROID TRANSFORAMINAL  L4-5;  Surgeon: Altaf Buckley MD;  Location: [facility] MAIN OR;  Service:    • EPIDURAL BLOCK INJECTION Right 11/16/2017    Procedure: BLOCK / INJECTION EPIDURAL STEROID TRANSFORAMINAL L4-5 RIGHT;  Surgeon: Altaf Buckley MD;  Location: [facility] MAIN OR;  Service: Pain Management    • EPIDURAL BLOCK INJECTION Left 5/10/2018    Procedure: Lt L4 L5 Tfesi ([id number]);  Surgeon: Altaf Buckley MD;  Location: [facility] MAIN OR;  Service: Pain Management    • EPIDURAL BLOCK INJECTION Right 3/7/2019    Procedure: L4 L5 Transforaminal Epidural Steroid Injection ([id number]);  Surgeon: Altaf Buckley MD;  Location: [facility] MAIN OR;  Service: Pain Management    • EPIDURAL BLOCK INJECTION Left 5/12/2022    Procedure: L4 L5  TRANSFORAMINAL epidural steroid injection ([id number]);   Surgeon: Altaf Buckley MD;  Location: [facility] MAIN OR;  Service: Pain Management    • ESOPHAGOGASTRODUODENOSCOPY N/A 2016    Procedure: ESOPHAGOGASTRODUODENOSCOPY (EGD);  Surgeon: Storm Melton MD;  Location: [facility] GI LAB;  Service:    • FL INJECTION LEFT ELBOW (NON ARTHROGRAM)  2022   • NOSE SURGERY         Family History   Problem Relation Age of Onset   • Stomach cancer Maternal Grandmother    • Hypertension Mother    • Diabetes Mother    • Cataracts Father    • Skin cancer Father    • Stroke Father         2X   • Aneurysm Father    • No Known Problems Sister    • No Known Problems Brother    • Throat cancer Brother        Social History     Occupational History   • Not on file   Tobacco Use   • Smoking status: Former     Current packs/day: 0.00     Average packs/day: 0.5 packs/day for 19.0 years (9.5 ttl pk-yrs)     Types: Cigarettes     Start date:      Quit date:      Years since quittin.1   • Smokeless tobacco: Never   Vaping Use   • Vaping status: Never Used   Substance and Sexual Activity   • Alcohol use: No   • Drug use: No   • Sexual activity: Not Currently         Current Outpatient Medications:   •  Aspirin Low Dose 81 MG EC tablet, take 1 tablet by mouth once daily, Disp: 30 tablet, Rfl: 5  •  cholecalciferol (VITAMIN D3) 1,000 units tablet, take 2 tablets (2,000 UNITS TOTAL) by mouth daily, Disp: 180 tablet, Rfl: 3  •  Diclofenac Sodium (VOLTAREN) 1 %, apply 2 grams topically four times a day, Disp: 100 g, Rfl: 1  •  dicyclomine (BENTYL) 10 mg capsule, take 1 capsule by mouth four times a day before meals and at bedtime, Disp: 120 capsule, Rfl: 2  •  famotidine (PEPCID) 40 MG tablet, TAKE 1/2 TABLET BY MOUTH DAILY AT BEDTIME, Disp: 90 tablet, Rfl: 3  •  fexofenadine (ALLEGRA) 180 MG tablet, take 1 tablet by mouth once daily, Disp: 90 tablet, Rfl: 3  •  levothyroxine 50 mcg tablet, take 1 tablet by mouth daily IN THE EARLY MORNING, Disp: 90 tablet, Rfl: 0  •  methocarbamol  (ROBAXIN) 500 mg tablet, take 1 tablet by mouth three times a day if needed for muscle spasm, Disp: 60 tablet, Rfl: 2  •  omeprazole (PriLOSEC) 40 MG capsule, take 1 capsule by mouth once daily, Disp: 30 capsule, Rfl: 5  •  spironolactone (ALDACTONE) 25 mg tablet, Take 1 tablet (25 mg total) by mouth daily, Disp: 30 tablet, Rfl: 5  •  tamsulosin (FLOMAX) 0.4 mg, Take 2 capsules (0.8 mg total) by mouth daily at bedtime, Disp: 60 capsule, Rfl: 5  •  vitamin B-12 (VITAMIN B-12) 1,000 mcg tablet, TAKE 1 TABLET BY MOUTH DAILY, Disp: 90 tablet, Rfl: 3    Allergies   Allergen Reactions   • Gabapentin Other (See Comments)     OUT OF BODY PSYCHOSIS, MEMORY LOSS, AMNESIA   • Other Cough     HAYFEVER, SEASONAL, ALFALFA       Physical Exam:    /71   Pulse 103   Wt 94.8 kg (209 lb)   BMI 29.99 kg/m²     Constitutional:normal, well developed, well nourished, alert, in no distress and non-toxic and no overt pain behavior.  Eyes:anicteric  HEENT:grossly intact  Neck:supple, symmetric, trachea midline and no masses   Pulmonary:even and unlabored  Cardiovascular:No edema or pitting edema present  Skin:Normal without rashes or lesions and well hydrated  Psychiatric:Mood and affect appropriate  Neurologic:Cranial Nerves II-XII grossly intact  Musculoskeletal:normal gait. TTP in midline lumbar spine. +SLR on the right. +Lumbar facet loading bilaterally.       Imaging    No orders to display         Orders Placed This Encounter   Procedures   • X-ray lumbar spine 2 or 3 views   • Ambulatory referral to Physical Therapy

## 2024-02-29 ENCOUNTER — TELEPHONE (OUTPATIENT)
Dept: PAIN MEDICINE | Facility: CLINIC | Age: 64
End: 2024-02-29

## 2024-02-29 NOTE — TELEPHONE ENCOUNTER
----- Message from Rajat Remy MD sent at 2/29/2024  8:04 AM EST -----  Patient with mild scoliosis and multilevel disc disease worse at L4-L5, concordant with his previous symptoms. No change in management at this time.

## 2024-03-04 ENCOUNTER — TELEPHONE (OUTPATIENT)
Age: 64
End: 2024-03-04

## 2024-03-04 NOTE — TELEPHONE ENCOUNTER
Dr. Cardona    Patient brought a form for medical clearance form    Please give him a call when is completed or give him a call if you need to have an apt,.    Thanks    [phone]

## 2024-03-12 NOTE — TELEPHONE ENCOUNTER
Vm on provider line:         Hi, good afternoon. My name is Mariano and I'm calling from [employer] in [place]. We have a mutual patient, Jerry WOODY His YOB: 1960 and he was in the office recently and had submitted a medical clearance form to be filled out by Doctor Cesar Ragsdale. I'm not sure if I pronounce that correctly. Anyway, that I think that was about a week ago and I just wanted to follow up on that and see if I could have that fax back. I need this so that I can order the hearing aids from Medicaid and takes about 3 weeks. This patient's father just passed over the weekend, so I'd like to get him at least to be hearing soon. If you could give me a call back, the number is [phone]. Thank you.    Dr Warner- please address form. Today is day 8.    General Abdominal pain Abdominal Pain, N/V/D

## 2024-03-13 NOTE — TELEPHONE ENCOUNTER
Called Marvin back to inform form is not yet complete & advise of turn around time of 7-10 days. Dr should be completing form asap.     Spoke with Marvin. Relayed info. He understood and apologized for his tone in previous call.

## 2024-03-14 NOTE — TELEPHONE ENCOUNTER
"Called Edmundo to obtain fax#. He provided [phone]. Faxed completed form.     Placed in \"to be scanned\" bin     "

## 2024-04-01 DIAGNOSIS — Z76.0 MEDICATION REFILL: ICD-10-CM

## 2024-04-01 RX ORDER — ASPIRIN 81 MG/1
81 TABLET, COATED ORAL DAILY
Qty: 30 TABLET | Refills: 5 | Status: SHIPPED | OUTPATIENT
Start: 2024-04-01

## 2024-04-02 DIAGNOSIS — E03.9 HYPOTHYROIDISM, UNSPECIFIED TYPE: ICD-10-CM

## 2024-04-02 RX ORDER — LEVOTHYROXINE SODIUM 0.05 MG/1
50 TABLET ORAL
Qty: 90 TABLET | Refills: 0 | Status: SHIPPED | OUTPATIENT
Start: 2024-04-02

## 2024-04-19 DIAGNOSIS — R10.13 EPIGASTRIC BURNING SENSATION: ICD-10-CM

## 2024-04-19 DIAGNOSIS — S13.9XXD NECK SPRAIN, SUBSEQUENT ENCOUNTER: ICD-10-CM

## 2024-04-19 RX ORDER — OMEPRAZOLE 40 MG/1
CAPSULE, DELAYED RELEASE ORAL
Qty: 30 CAPSULE | Refills: 5 | Status: SHIPPED | OUTPATIENT
Start: 2024-04-19

## 2024-04-21 RX ORDER — METHOCARBAMOL 500 MG/1
TABLET, FILM COATED ORAL
Qty: 60 TABLET | Refills: 0 | Status: SHIPPED | OUTPATIENT
Start: 2024-04-21

## 2024-05-09 DIAGNOSIS — S13.9XXD NECK SPRAIN, SUBSEQUENT ENCOUNTER: ICD-10-CM

## 2024-05-13 RX ORDER — METHOCARBAMOL 500 MG/1
500 TABLET, FILM COATED ORAL 3 TIMES DAILY PRN
Qty: 60 TABLET | Refills: 0 | Status: SHIPPED | OUTPATIENT
Start: 2024-05-13

## 2024-05-15 DIAGNOSIS — E55.9 VITAMIN D DEFICIENCY: ICD-10-CM

## 2024-05-15 RX ORDER — MELATONIN
Qty: 180 TABLET | Refills: 3 | Status: SHIPPED | OUTPATIENT
Start: 2024-05-15

## 2024-05-18 DIAGNOSIS — K58.9 IRRITABLE BOWEL SYNDROME, UNSPECIFIED TYPE: ICD-10-CM

## 2024-05-21 RX ORDER — DICYCLOMINE HYDROCHLORIDE 10 MG/1
CAPSULE ORAL
Qty: 120 CAPSULE | Refills: 2 | Status: SHIPPED | OUTPATIENT
Start: 2024-05-21

## 2024-06-07 ENCOUNTER — OFFICE VISIT (OUTPATIENT)
Age: 64
End: 2024-06-07

## 2024-06-07 VITALS
WEIGHT: 207.1 LBS | BODY MASS INDEX: 29.65 KG/M2 | DIASTOLIC BLOOD PRESSURE: 81 MMHG | TEMPERATURE: 98.4 F | HEART RATE: 114 BPM | SYSTOLIC BLOOD PRESSURE: 138 MMHG | RESPIRATION RATE: 18 BRPM | HEIGHT: 70 IN | OXYGEN SATURATION: 98 %

## 2024-06-07 DIAGNOSIS — R41.3 EPISODE OF MEMORY LOSS: ICD-10-CM

## 2024-06-07 DIAGNOSIS — E78.5 DYSLIPIDEMIA: ICD-10-CM

## 2024-06-07 DIAGNOSIS — G45.4 TGA (TRANSIENT GLOBAL AMNESIA): Primary | ICD-10-CM

## 2024-06-07 DIAGNOSIS — R06.02 SOB (SHORTNESS OF BREATH) ON EXERTION: ICD-10-CM

## 2024-06-07 LAB
GLUCOSE SERPL-MCNC: 98 MG/DL (ref 65–140)
SL AMB POCT HEMOGLOBIN AIC: 5.3 (ref ?–6.5)

## 2024-06-07 PROCEDURE — 83036 HEMOGLOBIN GLYCOSYLATED A1C: CPT | Performed by: FAMILY MEDICINE

## 2024-06-07 PROCEDURE — 99213 OFFICE O/P EST LOW 20 MIN: CPT | Performed by: FAMILY MEDICINE

## 2024-06-07 RX ORDER — ATORVASTATIN CALCIUM 20 MG/1
20 TABLET, FILM COATED ORAL DAILY
Qty: 90 TABLET | Refills: 2 | Status: SHIPPED | OUTPATIENT
Start: 2024-06-07

## 2024-06-07 NOTE — PROGRESS NOTES
[facility] Office visit    Assessment/Plan:     1. TGA (transient global amnesia)  Known documented years long history of brief episodes of amnesia, already worked up by neurology with no medications recommended, but has been lost to follow-up since 2021, advised to reestablish with neurology.  -     Ambulatory Referral to Neurology; Future  -     Fingerstick Glucose (POCT)  -     POCT hemoglobin A1c  2. Episode of memory loss  -     Ambulatory Referral to Neurology; Future  -     Fingerstick Glucose (POCT)  -     POCT hemoglobin A1c  3. SOB (shortness of breath) on exertion  Chronic, denies associated chest pain, lungs clear to auscultation, recent PFTs showed mild restriction of lung volumes, otherwise normal.  Cardiology workup in process, recently had stress test that was inconclusive due to exercise intolerance during testing.  Advised to continue following up with cardiology    -     Ambulatory Referral to Cardiology; Future  -     Fingerstick Glucose (POCT)  -     POCT hemoglobin A1c  4. Dyslipidemia  -     atorvastatin (LIPITOR) 20 mg tablet; Take 1 tablet (20 mg total) by mouth daily        Return in 3 months (on 9/7/2024) for Annual physical.       Subjective:   RAYMOND Lopez is a 63 y.o. male, he reports continued shortness of breath on exertion without chest pain.  He also reports another episode of temporary amnesia while he was preparing food in the kitchen about a month ago and could not recall what he was doing, episode resolved in less than 5 minutes.  He had a similar episode about 6 months ago.  He denies any associated symptoms such as dysarthria, numbness or weakness.  These episodes have been going on for years, and they were evaluated by a neurologist in 2021, no medication treatment recommended at that time, he has since been lost to follow-up to neurology.      Review of Systems   Respiratory:  Positive for shortness of breath. Negative for chest tightness and wheezing.   "  Cardiovascular:  Negative for chest pain.   Neurological:  Negative for dizziness, speech difficulty and numbness.   Psychiatric/Behavioral:  Positive for confusion (transient episode <5 min last month).         Objective:     /81 (BP Location: Right arm, Patient Position: Sitting, Cuff Size: Adult)   Pulse (!) 114   Temp 98.4 °F (36.9 °C) (Tympanic)   Resp 18   Ht 5' 10\" (1.778 m)   Wt 93.9 kg (207 lb 1.6 oz)   SpO2 98%   BMI 29.72 kg/m²      Physical Exam  Constitutional:       General: He is not in acute distress.     Appearance: He is not ill-appearing, toxic-appearing or diaphoretic.   HENT:      Head: Normocephalic.   Cardiovascular:      Rate and Rhythm: Normal rate and regular rhythm.      Heart sounds: Normal heart sounds. No murmur heard.  Pulmonary:      Effort: Pulmonary effort is normal. No respiratory distress.      Breath sounds: Normal breath sounds.   Neurological:      General: No focal deficit present.      Mental Status: He is alert and oriented to person, place, and time.      Cranial Nerves: No cranial nerve deficit.      Comments: Normal neuroexam, cranial nerves grossly intact, strength and sensation fully intact in upper and lower extremities   Psychiatric:         Mood and Affect: Mood normal.         Behavior: Behavior normal.          ** Please Note: This note has been constructed using a voice recognition system **     Cesar Phillips MD  06/09/24  10:39 AM    "

## 2024-06-13 DIAGNOSIS — R60.9 EDEMA, UNSPECIFIED TYPE: ICD-10-CM

## 2024-06-13 RX ORDER — SPIRONOLACTONE 25 MG/1
25 TABLET ORAL DAILY
Qty: 30 TABLET | Refills: 5 | Status: SHIPPED | OUTPATIENT
Start: 2024-06-13

## 2024-06-17 DIAGNOSIS — E03.9 HYPOTHYROIDISM, UNSPECIFIED TYPE: ICD-10-CM

## 2024-06-17 DIAGNOSIS — E55.9 VITAMIN D DEFICIENCY: ICD-10-CM

## 2024-06-17 DIAGNOSIS — R53.83 FATIGUE, UNSPECIFIED TYPE: ICD-10-CM

## 2024-06-17 DIAGNOSIS — S13.9XXD NECK SPRAIN, SUBSEQUENT ENCOUNTER: ICD-10-CM

## 2024-06-17 DIAGNOSIS — R97.20 ELEVATED PSA: ICD-10-CM

## 2024-06-17 DIAGNOSIS — R10.13 EPIGASTRIC BURNING SENSATION: ICD-10-CM

## 2024-06-17 DIAGNOSIS — K58.9 IRRITABLE BOWEL SYNDROME, UNSPECIFIED TYPE: ICD-10-CM

## 2024-06-17 DIAGNOSIS — N40.0 ENLARGED PROSTATE ON RECTAL EXAMINATION: ICD-10-CM

## 2024-06-17 DIAGNOSIS — K21.9 GASTROESOPHAGEAL REFLUX DISEASE: ICD-10-CM

## 2024-06-17 DIAGNOSIS — Z76.0 MEDICATION REFILL: ICD-10-CM

## 2024-06-17 DIAGNOSIS — M17.12 PATELLOFEMORAL ARTHRITIS OF LEFT KNEE: ICD-10-CM

## 2024-06-17 DIAGNOSIS — R60.9 EDEMA, UNSPECIFIED TYPE: ICD-10-CM

## 2024-06-17 DIAGNOSIS — R09.82 POST-NASAL DRIP: ICD-10-CM

## 2024-06-18 RX ORDER — FEXOFENADINE HCL 180 MG/1
180 TABLET ORAL DAILY
Qty: 90 TABLET | Refills: 0 | Status: SHIPPED | OUTPATIENT
Start: 2024-06-18 | End: 2024-09-16

## 2024-06-18 RX ORDER — TAMSULOSIN HYDROCHLORIDE 0.4 MG/1
0.8 CAPSULE ORAL
Qty: 60 CAPSULE | Refills: 5 | Status: SHIPPED | OUTPATIENT
Start: 2024-06-18

## 2024-06-18 RX ORDER — LEVOTHYROXINE SODIUM 0.05 MG/1
50 TABLET ORAL
Qty: 90 TABLET | Refills: 0 | Status: SHIPPED | OUTPATIENT
Start: 2024-06-18

## 2024-06-18 RX ORDER — MELATONIN
2000 DAILY
Qty: 180 TABLET | Refills: 0 | Status: SHIPPED | OUTPATIENT
Start: 2024-06-18 | End: 2024-09-16

## 2024-06-18 RX ORDER — METHOCARBAMOL 500 MG/1
500 TABLET, FILM COATED ORAL 3 TIMES DAILY PRN
Qty: 60 TABLET | Refills: 0 | OUTPATIENT
Start: 2024-06-18

## 2024-06-18 RX ORDER — DICYCLOMINE HYDROCHLORIDE 10 MG/1
10 CAPSULE ORAL
Qty: 120 CAPSULE | Refills: 0 | Status: SHIPPED | OUTPATIENT
Start: 2024-06-18 | End: 2024-07-18

## 2024-06-18 RX ORDER — SPIRONOLACTONE 25 MG/1
25 TABLET ORAL DAILY
Qty: 30 TABLET | Refills: 5 | OUTPATIENT
Start: 2024-06-18

## 2024-06-18 RX ORDER — ASPIRIN 81 MG/1
81 TABLET ORAL DAILY
Qty: 90 TABLET | Refills: 0 | Status: SHIPPED | OUTPATIENT
Start: 2024-06-18 | End: 2024-09-16

## 2024-06-18 RX ORDER — LANOLIN ALCOHOL/MO/W.PET/CERES
1000 CREAM (GRAM) TOPICAL DAILY
Qty: 90 TABLET | Refills: 0 | Status: SHIPPED | OUTPATIENT
Start: 2024-06-18 | End: 2024-09-16

## 2024-06-18 RX ORDER — FAMOTIDINE 40 MG/1
20 TABLET, FILM COATED ORAL
Qty: 45 TABLET | Refills: 0 | Status: SHIPPED | OUTPATIENT
Start: 2024-06-18 | End: 2024-09-16

## 2024-06-18 RX ORDER — OMEPRAZOLE 40 MG/1
40 CAPSULE, DELAYED RELEASE ORAL DAILY
Qty: 90 CAPSULE | Refills: 0 | Status: SHIPPED | OUTPATIENT
Start: 2024-06-18 | End: 2024-09-16

## 2024-07-02 ENCOUNTER — TELEPHONE (OUTPATIENT)
Age: 64
End: 2024-07-02

## 2024-07-02 NOTE — TELEPHONE ENCOUNTER
Patient is wanting a referral for a nuclear stress test. He is not wanting a stress test on the treadmill. He said that this was discussed at his last office visit with Dr. Alana Montilla, can you please advise?     Once there is an update, please let me know so I can notify patient. [phone]

## 2024-07-11 ENCOUNTER — TELEPHONE (OUTPATIENT)
Dept: NEUROLOGY | Facility: CLINIC | Age: 64
End: 2024-07-11

## 2024-07-12 ENCOUNTER — TELEPHONE (OUTPATIENT)
Age: 64
End: 2024-07-12

## 2024-07-12 DIAGNOSIS — S13.9XXD NECK SPRAIN, SUBSEQUENT ENCOUNTER: ICD-10-CM

## 2024-07-12 NOTE — TELEPHONE ENCOUNTER
Caller: Jerry Lopez    [facility]    #: [phone]    Escalation: Appointment Patient states the office blocks out a 30  min block for him and Paula Patiño [birth year] to be seen for nail care. I cannot adjust the times. Please return call and schedule. Thank you

## 2024-07-15 RX ORDER — METHOCARBAMOL 500 MG/1
500 TABLET, FILM COATED ORAL 3 TIMES DAILY PRN
Qty: 60 TABLET | Refills: 0 | Status: SHIPPED | OUTPATIENT
Start: 2024-07-15

## 2024-07-25 ENCOUNTER — TELEPHONE (OUTPATIENT)
Dept: NEUROLOGY | Facility: CLINIC | Age: 64
End: 2024-07-25

## 2024-07-25 ENCOUNTER — OFFICE VISIT (OUTPATIENT)
Age: 64
End: 2024-07-25

## 2024-07-25 VITALS
HEART RATE: 95 BPM | WEIGHT: 205.6 LBS | DIASTOLIC BLOOD PRESSURE: 75 MMHG | HEIGHT: 70 IN | BODY MASS INDEX: 29.43 KG/M2 | RESPIRATION RATE: 17 BRPM | TEMPERATURE: 98.3 F | OXYGEN SATURATION: 96 % | SYSTOLIC BLOOD PRESSURE: 110 MMHG

## 2024-07-25 DIAGNOSIS — F32.A ANXIETY AND DEPRESSION: ICD-10-CM

## 2024-07-25 DIAGNOSIS — G45.4 TGA (TRANSIENT GLOBAL AMNESIA): ICD-10-CM

## 2024-07-25 DIAGNOSIS — N40.0 ENLARGED PROSTATE: ICD-10-CM

## 2024-07-25 DIAGNOSIS — F41.9 ANXIETY AND DEPRESSION: ICD-10-CM

## 2024-07-25 DIAGNOSIS — Z00.00 ANNUAL PHYSICAL EXAM: Primary | ICD-10-CM

## 2024-07-25 DIAGNOSIS — F43.10 PTSD (POST-TRAUMATIC STRESS DISORDER): ICD-10-CM

## 2024-07-25 DIAGNOSIS — J30.89 ALLERGIC RHINITIS DUE TO OTHER ALLERGIC TRIGGER, UNSPECIFIED SEASONALITY: ICD-10-CM

## 2024-07-25 DIAGNOSIS — R06.02 SOB (SHORTNESS OF BREATH) ON EXERTION: ICD-10-CM

## 2024-07-25 DIAGNOSIS — R45.7 CAREGIVER STRESS SYNDROME: ICD-10-CM

## 2024-07-25 PROBLEM — F43.89 CAREGIVER STRESS SYNDROME: Status: ACTIVE | Noted: 2024-07-25

## 2024-07-25 PROBLEM — J30.9 ALLERGIC RHINITIS: Status: ACTIVE | Noted: 2024-07-25

## 2024-07-25 PROCEDURE — 99396 PREV VISIT EST AGE 40-64: CPT | Performed by: FAMILY MEDICINE

## 2024-07-25 PROCEDURE — 99214 OFFICE O/P EST MOD 30 MIN: CPT | Performed by: FAMILY MEDICINE

## 2024-07-25 RX ORDER — FLUTICASONE PROPIONATE 50 MCG
1 SPRAY, SUSPENSION (ML) NASAL DAILY
Qty: 15.8 ML | Refills: 3 | Status: SHIPPED | OUTPATIENT
Start: 2024-07-25

## 2024-07-25 NOTE — ASSESSMENT & PLAN NOTE
Allergic rhinitis secondary to environmental allergies. Patient was educated to take Allegra at night rather than during day to decrease daytime drowsiness. Patient was also prescribed Flonase to use every AM to help alleviate his congestion, sinus pressure, postnasal drip. Previously prescribed but pt never used. Educated on maintenance medication vs prn

## 2024-07-25 NOTE — ASSESSMENT & PLAN NOTE
Patient has PMH of PTSD after serving in the  as well as stress from caring for his partner who is sick. He also reports loss of brother, father, sister in law, and  in past year. He expresses desire for talk therapy. Referral to psych services placed as well as referral to social work to help facilitate establishing with a therapist. Pt reports he does not mind the long wait as he has methods of coping

## 2024-07-25 NOTE — PROGRESS NOTES
Adult Annual Physical  Name: Jerry Lopez      : 1960      MRN: [id number]  Encounter Provider: Eva Pena MD  Encounter Date: 2024   Encounter department: [facility]    Assessment & Plan   1. Annual physical exam  2. Anxiety and depression  Assessment & Plan:  Patient has PMH of PTSD after serving in the  as well as stress from caring for his partner who is sick. He also reports loss of brother, father, sister in law, and  in past year. He expresses desire for talk therapy. Referral to psych services placed as well as referral to social work to help facilitate establishing with a therapist. Pt reports he does not mind the long wait as he has methods of coping   Orders:  -     Ambulatory referral to Psych Services; Future  -     Ambulatory Referral to Social Work Care Management Program; Future  3. Caregiver stress syndrome  Assessment & Plan:  Referral to psych and  placed to establish with a therapist. Also will have cleaning service coming to the home to help alleviate some of his responsibilities.  Orders:  -     Ambulatory referral to Psych Services; Future  -     Ambulatory Referral to Social Work Care Management Program; Future  4. PTSD (post-traumatic stress disorder)  -     Ambulatory referral to Psych Services; Future  -     Ambulatory Referral to Social Work Care Management Program; Future  5. Allergic rhinitis due to other allergic trigger, unspecified seasonality  Assessment & Plan:  Allergic rhinitis secondary to environmental allergies. Patient was educated to take Allegra at night rather than during day to decrease daytime drowsiness. Patient was also prescribed Flonase to use every AM to help alleviate his congestion, sinus pressure, postnasal drip. Previously prescribed but pt never used. Educated on maintenance medication vs prn    Orders:  -     fluticasone (FLONASE) 50 mcg/act nasal spray; 1 spray into each nostril  daily  6. TGA (transient global amnesia)  Assessment & Plan:  Pt aware of neurology appt tmrw   7. Enlarged prostate  Assessment & Plan:  Pt follows with urology  8. SOB (shortness of breath) on exertion  Assessment & Plan:  Also mild swelling in LE taking spironolactone. Pt has work up with cardiology and upcoming appt in August. Nuclear stress test as he did not tolerate exercise stress test. Echo reviewed    Immunizations and preventive care screenings were discussed with patient today. Appropriate education was printed on patient's after visit summary.    Counseling:  Alcohol/drug use: discussed moderation in alcohol intake, the recommendations for healthy alcohol use, and avoidance of illicit drug use.  Dental Health: discussed importance of regular tooth brushing, flossing, and dental visits.  Injury prevention: discussed safety/seat belts, safety helmets, smoke detectors, carbon dioxide detectors, and smoking near bedding or upholstery.  Sexual health: discussed sexually transmitted diseases, partner selection, use of condoms, avoidance of unintended pregnancy, and contraceptive alternatives.  Exercise: the importance of regular exercise/physical activity was discussed. Recommend exercise 3-5 times per week for at least 30 minutes.   Immunizations: Pt reports 4 doses of COVID vaccine even though only 3 in system and 2 doses of Zoster which is not documented. Reports he got those at [facility]. Will provide vaccination card at next visit or contact [facility]. Did  on RSV vaccination which he can obtain at pharmacy.   Screenings: Pt reports he may be due for endoscopy or colonoscopy and has upcoming appt with GI. Per chart review colonoscopy is next due in 4/2026. Information relayed to patient.     BMI Counseling: Body mass index is 29.5 kg/m². The BMI is above normal. Nutrition recommendations include decreasing portion sizes, encouraging healthy choices of fruits and vegetables, decreasing fast food  intake and limiting drinks that contain sugar. Exercise recommendations include moderate physical activity 150 minutes/week, exercising 3-5 times per week and strength training exercises. Rationale for BMI follow-up plan is due to patient being overweight or obese.     Depression Screening and Follow-up Plan: Patient was screened for depression during today's encounter. They screened negative with a PHQ-2 score of 2.        History of Present Illness     Adult Annual Physical:  Patient presents for annual physical. 63 yo male presenting for annual physical .     Diet and Physical Activity:  - Diet/Nutrition: well balanced diet. limits junk food, frozen veggies, no fruit, mostly white meat, 3 cups of coffee daily, occasional soda  - Exercise: 3-4 times a week on average.    Depression Screening:  - PHQ-2 Score: 2    General Health:  - Sleep: 4-6 hours of sleep on average. wakes up after 3-4 hrs to urinate, difficulty falling back asleep  - Hearing: decreased hearing left ear and decreased hearing bilateral ears. b/l hearing aids, left worse than right  - Vision: wears glasses. lasik in next few months for both eyes  - Dental: no dental visits for > 1 year. wears dentures, last dentis appointment 10 + years ago     Health:  - History of STDs: no.   - Urinary symptoms: urinary frequency.     Advanced Care Planning:  - Has an advanced directive?: no    - Has a durable medical POA?: no    - ACP document given to patient?: no      Review of Systems   Constitutional:  Positive for fatigue.        Pt reports chronic fatigue post TIA   HENT:  Positive for congestion and sinus pressure. Negative for ear pain.         Attributes sinus pressure to seasonal allergies, Reports fatigue from this med without dizziness.   Eyes:  Negative for pain and discharge.        Episodic pressure/pulling sensation of left eye. Sees ophthalmology. LASIX for b/l eyes in the next few months.   Respiratory:  Positive for shortness of breath.      "    10 pack yr hx quit at 38 y/o  Seen by Ent and cardiology for SOB   Has nuclear stress test scheduled this fall   Cardiovascular:  Negative for chest pain.   Gastrointestinal:  Negative for constipation, diarrhea, nausea and vomiting.   Genitourinary:  Positive for frequency. Negative for dysuria.        Gets up 2x nightly to urinate. Sees urology.   Allergic/Immunologic: Positive for environmental allergies.   Neurological:  Negative for dizziness and weakness.   Psychiatric/Behavioral:          Patient reports depression and anxiety. Denies suicidal thoughts or plans. No hx of suicide attempts.         Objective     /75 (BP Location: Left arm, Patient Position: Sitting, Cuff Size: Adult)   Pulse 95   Temp 98.3 °F (36.8 °C) (Tympanic)   Resp 17   Ht 5' 10\" (1.778 m)   Wt 93.3 kg (205 lb 9.6 oz)   SpO2 96%   BMI 29.50 kg/m²     Physical Exam  Vitals reviewed.   Constitutional:       General: He is not in acute distress.  HENT:      Head: Normocephalic and atraumatic.      Right Ear: Tympanic membrane normal.      Left Ear: Tympanic membrane normal.      Mouth/Throat:      Mouth: Mucous membranes are moist.   Cardiovascular:      Rate and Rhythm: Normal rate and regular rhythm.      Heart sounds: Normal heart sounds.   Pulmonary:      Effort: Pulmonary effort is normal.      Breath sounds: Normal breath sounds.   Abdominal:      Palpations: Abdomen is soft.      Tenderness: There is no abdominal tenderness.   Skin:     General: Skin is warm and dry.      Findings: No rash.   Neurological:      Mental Status: He is alert.      Gait: Gait abnormal (ambulates with cane).   Psychiatric:         Thought Content: Thought content does not include suicidal ideation. Thought content does not include suicidal plan.           Eva Pena MD  [facility]  Date: 7/25/2024 Time: 2:45 PM     "

## 2024-07-25 NOTE — ASSESSMENT & PLAN NOTE
Also mild swelling in LE taking spironolactone. Pt has work up with cardiology and upcoming appt in August. Nuclear stress test as he did not tolerate exercise stress test. Echo reviewed

## 2024-07-25 NOTE — ASSESSMENT & PLAN NOTE
Referral to psych and  placed to establish with a therapist. Also will have cleaning service coming to the home to help alleviate some of his responsibilities.

## 2024-07-26 ENCOUNTER — CONSULT (OUTPATIENT)
Dept: NEUROLOGY | Facility: CLINIC | Age: 64
End: 2024-07-26
Payer: COMMERCIAL

## 2024-07-26 VITALS
WEIGHT: 206 LBS | SYSTOLIC BLOOD PRESSURE: 110 MMHG | BODY MASS INDEX: 28.84 KG/M2 | HEIGHT: 71 IN | HEART RATE: 94 BPM | DIASTOLIC BLOOD PRESSURE: 70 MMHG

## 2024-07-26 DIAGNOSIS — E55.9 VITAMIN D DEFICIENCY: Primary | ICD-10-CM

## 2024-07-26 DIAGNOSIS — E07.9 THYROID DISEASE: ICD-10-CM

## 2024-07-26 DIAGNOSIS — R41.3 EPISODE OF MEMORY LOSS: ICD-10-CM

## 2024-07-26 DIAGNOSIS — G45.4 TGA (TRANSIENT GLOBAL AMNESIA): ICD-10-CM

## 2024-07-26 DIAGNOSIS — R56.9 OBSERVED SEIZURE-LIKE ACTIVITY (HCC): ICD-10-CM

## 2024-07-26 PROCEDURE — 99417 PROLNG OP E/M EACH 15 MIN: CPT | Performed by: NURSE PRACTITIONER

## 2024-07-26 PROCEDURE — 99215 OFFICE O/P EST HI 40 MIN: CPT | Performed by: NURSE PRACTITIONER

## 2024-07-26 NOTE — PATIENT INSTRUCTIONS
Will get MRI of the Brain w/wo contrast for seizure protocol  Repeat EEG Prolonged >1hr study for transient memory loss  Referral for NeuroPsychology for memory loss/confusion with h/o PTSD, grief and care giver stress.  Labs with Vitamin D, B12, Folate, MMA and TSH  Continue with referral through [facility] re: PTSD and grief as well as care giver stress  Follow up with Neurology office in 4 months or sooner

## 2024-07-26 NOTE — PROGRESS NOTES
Patient ID: Jerry Lopez is a 64 y.o. male.    Assessment/Plan:       Diagnoses and all orders for this visit:    Vitamin D deficiency  Comments:  Vitamin D level  Continue with repletion daily  Orders:  -     Vitamin D 25 hydroxy; Future  -     Vitamin D 25 hydroxy    TGA (transient global amnesia)  Comments:  MRI of the Brain w/wo contrast Seizure protocol  continue with baby aspirin and atorvastatin  Orders:  -     Ambulatory Referral to Neurology  -     EEG Prolonged > 1 hour; Future    Episode of memory loss  Comments:  Continue with Behavioral Health eval re: PTSD and poss psychogenic memory changes  NeuroPsych eval  Vit. D, B12, Folate, MMA and TSH  Orders:  -     Ambulatory Referral to Neurology  -     Vitamin B12/Folate, Serum Panel; Future  -     Methylmalonic acid, serum; Future  -     EEG Prolonged > 1 hour; Future  -     Ambulatory referral to Neuropsychology; Future  -     Vitamin B12/Folate, Serum Panel  -     Methylmalonic acid, serum    Thyroid disease  Comments:  Follow up TSH  Orders:  -     TSH + Free T4; Future  -     TSH + Free T4    Observed seizure-like activity (HCC)  Comments:  Prolonged EEG re: prior abnl study  MRI of the Brain w/wo Seizure protocol  Orders:  -     EEG Prolonged > 1 hour; Future  -     MRI brain seizure wo and w contrast; Future       Will get MRI of the Brain w/wo contrast for seizure protocol  Repeat EEG Prolonged >1hr study for transient memory loss  Referral for NeuroPsychology for memory loss/confusion with h/o PTSD, grief and care giver stress.  Labs with Vitamin D, B12, Folate, MMA and TSH  Continue with referral through Behavioral Health Counseling re: PTSD and grief as well as care giver stress  Follow up with Neurology office in 4 months or sooner     Subjective/HPI:  Jerry Lopez is a 65yo male with PMH of MCI presents to the neurology office as a follow-up, his last appointment was 8/25/2021, he was seen by Dr. Cade.  In May 2018, patient became  argumentative, forgetful and  went to take shower, after 20 minutes he was walking in the hallway and could not understand simple conversations.  He was asked what day it was, kept repeating the same thing over and over.  He thought the president was President [place]ama.  He is not oriented to the month or the day.  He had memory disturbance lasting from 10:30 AM to 5 PM.  He Perseverating over same question.  Had CT of the brain which was negative.  Lab works were essentially normal.  He had ordered MRI of the brain with neuro quant which was normal.  His EEG revealed 2 separate incidents of temporal sharps requiring clinical correlation.  He had a repeat EEG done in June 2019 which was normal.  He had denied any seizure-like activity although reported mental fogginess.  He was placed on Exelon for his memory and did not see much difference.  He was referred to neuropsych testing which was completed in October 2019.  Stated reduced mild memory capacity and initial learning for nonverbal memory.  His overall score was at average level for his age.  He wanted more emphasis on the possibility of seizures.  He was advised to go to psychotherapy, was not going.  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  His job is straining and noted he would like to quit if he could.  MRI was normal, all reversible etiologies were ordered were normal as well except for low vitamin D which he was advised to take 2000 units daily.  He has a history of PTSD and notes a father and grandfather with history of dementia.  Patient reported to his PCP in June 2024, difficulties with shortness of breath without chest pain.  He had episodes of temporary amnesia while he was preparing food in the kitchen.  Cannot recall what he was doing.  Episode resolved in less than 5 minutes.  He had similar episodes 6 months prior to that.  He denied any associated symptoms such as dysarthria, numbness or weakness.  Patient's symptoms appear to be similar to  those of his symptoms back in 2021.  He reported to his primary care physician and onset of these since approximately December.  Patient did recent cardiology evaluation for shortness of breath and chest pain.  Had echocardiogram with an EF of 60%, left atrium is mildly dilated.  He does have a mobile septum aneurysm with normal right atrium.  Exercise stress test was negative for significant blockage however he was unable to walk long enough for the treadmill testing to be conclusive.  He was recommended nuclear stress test which has not yet scheduled.  Patient was also seen by Dr. Pena has a PCP recently, in addition to his reported symptoms, he also has history of PTSD and caregiver stress.  He was given referrals for behavioral health evaluation and treatment to manage these diagnoses.  Diagnostics that were reviewed.  2018 EEG was abnormal due to the presence of 2 independent epileptiform discharges in the left temporal region and 1 in the right temporal region at T4.  Abnormal findings that were to be clinically correlated.  Repeat EEG done in 2019 which was normal.  Patient was not placed on AEDs for his abnormal findings due to normal follow-up without recurrent symptoms.  MRI of the brain with without contrast neuro quant completed in 2018 which was normal.  Neuro quant study also within normal ranges.  Review of labs:  A1C 5.3  Chol 183    TSH 3.170  Vitamin D in 2018 was 24.6  Vitamin B12 in 2018 was 436  RPR was non reactive    Patient reports back to the neurology office today, just shy of 3-year follow-up.  Patient reports since his last evaluation in 2021, he had vitamin replacements and felt as though this did help to alleviate his symptoms.  He had not had any recurrent symptoms since just before Christmas of 2023.  Patient reported the 1 incident that he remembers the most to standing they are doing the dishes and had an odd sensation indicated as an out of body type experience.  He did not  know where he was or who he was.  He had difficulties thinking for a few minutes and was starting to wander in order to figure out where he was.  He saw his patient/client in the living room, she looked at him and spoke to him and he felt as though he started to snap out at that time.  He reported that it lasted approximately 4 to 5 minutes before he returned back to his baseline.  He did not recall hearing his clinic talk to him and or water running for the dishes, had no recollection of the course of events however was able to advise how he felt initially and his ambulation to and from the living room and how he came back to his baseline.  Patient did not continue to function with doing the dishes, did not last more than 4 to 5 minutes at a time, is less likely to be TGA however cannot completely rule out this possibility as this has been a diagnosis previously.  He is on baby aspirin and atorvastatin for vascular risk management.    Reviewed his prior symptoms from , felt as though he had no other episodes but had recollection of those events at that time.  Patient states that he does have history of PTSD for which she did not have adequate treatment.  He was in the midst of treatment but stopped it because he did not feel was necessary to continue.  He notes over  the past year, he has had multiple losses including and starting with his brother in 2023, he had difficulties with closure of this loss.  In 2024 he then lost his father but notes having  arrangements for him as well as his brother at the same time.  Felt he was able to get some closure with regards to their loss.  Shortly thereafter he lost his sister-in-law who was battling with brain cancer later lost his .  Patient feels he had significant losses over the course of the last year and has had some overwhelming changes as a result.  He reports that he tried to throw himself into his work and distract himself.  Patient  reports he does have referrals for behavioral health to deal with his PTSD and his grief.  He is planning to contact the psychiatric office locally in order to get therapies evaluated and started.  Patient also feels that he has some abnormal sleep patterns, gets about 4 to 5 hours of sleep at night.  He denies having sleep apnea, noted that he was evaluated previously and was told his studies were negative.  Patient denies having any significant medication changes, changes in diet or physical traumas.    Patient also endorses having some issues with his memory.  Notes having difficulties reading a book and remembering that he read the book.  He has difficulties recalling short-term information.  We did a MoCA test on him and he scored 25/30 during his evaluation today.  Blood pressure 110/70, heart rate 94.    Patient has had some of his blood work done to evaluate for reversible causes of memory changes.  He had been previously seen by neuropsychology, was referred back for follow-up evaluation given new onset these symptoms.  In addition to that we will repeat his EEG, get a prolonged study due to the recurrence of the symptoms and prior abnormal study.  Will get an MRI of the brain with without contrast seizure protocol in the event that he is having seizures and/or having any other additional structural changes.  Patient was encouraged to follow-up with behavioral health counseling to deal with his grief and or his PTSD.  The combination of these processes may be causing him some decreased confusion and or psychogenic behaviors.  This may be of benefit in helping reduce some of the symptoms.  Answered all patient's questions to the best my ability, will need to follow-up with his diagnostic studies.    Patient will follow-up in the outpatient neurology office in 4 months or sooner if needed.  He can contact the office if he has any additional episodes and/or changes.      The following portions of the patient's  history were reviewed and updated as appropriate: allergies, current medications, past family history, past medical history, past social history, past surgical history, and problem list.        Past Medical History:   Diagnosis Date    Abdominal pain 10/24/2018    Amnesia memory loss     Anxiety     Chronic fatigue     Disc degeneration, lumbar     Esophageal reflux     GERD (gastroesophageal reflux disease)     Glaucoma     Hiatal hernia     Increased pressure in the eye     both eyes; denies glaucoma     Internal hemorrhoids     Lumbar disc herniation with radiculopathy     Memory loss     Pain in both feet 6/18/2018    Personal history of colonic polyps        Past Surgical History:   Procedure Laterality Date    COLONOSCOPY N/A 4/25/2016    Procedure: COLONOSCOPY;  Surgeon: Storm Melton MD;  Location: [facility] GI LAB;  Service:     DENTAL SURGERY      ALL TEETH PULLED    EPIDURAL BLOCK INJECTION      EPIDURAL BLOCK INJECTION Right 12/28/2016    Procedure: BLOCK / INJECTION EPIDURAL STEROID TRANSFORAMINAL  L4-5;  Surgeon: Altaf Buckley MD;  Location: [facility] MAIN OR;  Service:     EPIDURAL BLOCK INJECTION Right 11/16/2017    Procedure: BLOCK / INJECTION EPIDURAL STEROID TRANSFORAMINAL L4-5 RIGHT;  Surgeon: Altaf Buckley MD;  Location: [facility] MAIN OR;  Service: Pain Management     EPIDURAL BLOCK INJECTION Left 5/10/2018    Procedure: Lt L4 L5 Tfesi ([id number],[id number]);  Surgeon: Altaf Buckley MD;  Location: [facility] MAIN OR;  Service: Pain Management     EPIDURAL BLOCK INJECTION Right 3/7/2019    Procedure: L4 L5 Transforaminal Epidural Steroid Injection ([id number]);  Surgeon: Altaf Buckley MD;  Location: [facility] MAIN OR;  Service: Pain Management     EPIDURAL BLOCK INJECTION Left 5/12/2022    Procedure: L4 L5  TRANSFORAMINAL epidural steroid injection ([id number]);  Surgeon: Altaf Buckley MD;  Location: [facility] MAIN OR;  Service: Pain Management     ESOPHAGOGASTRODUODENOSCOPY N/A 4/25/2016    Procedure:  ESOPHAGOGASTRODUODENOSCOPY (EGD);  Surgeon: Storm Melton MD;  Location: [facility] GI LAB;  Service:     FL INJECTION LEFT ELBOW (NON ARTHROGRAM)  2022    NOSE SURGERY         Social History     Socioeconomic History    Marital status: Single     Spouse name: None    Number of children: None    Years of education: None    Highest education level: None   Occupational History    None   Tobacco Use    Smoking status: Former     Current packs/day: 0.00     Average packs/day: 0.5 packs/day for 19.0 years (9.5 ttl pk-yrs)     Types: Cigarettes     Start date:      Quit date:      Years since quittin.5    Smokeless tobacco: Never   Vaping Use    Vaping status: Never Used   Substance and Sexual Activity    Alcohol use: No    Drug use: No    Sexual activity: Not Currently   Other Topics Concern    None   Social History Narrative    None     Social Determinants of Health     Financial Resource Strain: Low Risk  (2024)    Overall Financial Resource Strain (CARDIA)     Difficulty of Paying Living Expenses: Not hard at all   Food Insecurity: No Food Insecurity (2024)    Hunger Vital Sign     Worried About Running Out of Food in the Last Year: Never true     Ran Out of Food in the Last Year: Never true   Transportation Needs: No Transportation Needs (2024)    PRAPARE - Transportation     Lack of Transportation (Medical): No     Lack of Transportation (Non-Medical): No   Physical Activity: Not on file   Stress: Not on file   Social Connections: Not on file   Intimate Partner Violence: Not on file   Housing Stability: Low Risk  (2024)    Housing Stability Vital Sign     Unable to Pay for Housing in the Last Year: No     Number of Times Moved in the Last Year: 1     Homeless in the Last Year: No       Family History   Problem Relation Age of Onset    Stomach cancer Maternal Grandmother     Hypertension Mother     Diabetes Mother     Cataracts Father     Skin cancer Father     Stroke Father         2X     Aneurysm Father     No Known Problems Sister     No Known Problems Brother     Throat cancer Brother          Current Outpatient Medications:     aspirin (Aspirin Low Dose) 81 mg EC tablet, Take 1 tablet (81 mg total) by mouth daily, Disp: 90 tablet, Rfl: 0    atorvastatin (LIPITOR) 20 mg tablet, Take 1 tablet (20 mg total) by mouth daily, Disp: 90 tablet, Rfl: 2    cholecalciferol (VITAMIN D3) 1,000 units tablet, Take 2 tablets (2,000 Units total) by mouth daily, Disp: 180 tablet, Rfl: 0    Diclofenac Sodium (VOLTAREN) 1 %, Apply 2 g topically 4 (four) times a day, Disp: 240 g, Rfl: 0    dicyclomine (BENTYL) 10 mg capsule, Take 1 capsule (10 mg total) by mouth 4 (four) times a day (before meals and at bedtime), Disp: 120 capsule, Rfl: 0    famotidine (PEPCID) 40 MG tablet, Take 0.5 tablets (20 mg total) by mouth daily at bedtime, Disp: 45 tablet, Rfl: 0    fexofenadine (ALLEGRA) 180 MG tablet, Take 1 tablet (180 mg total) by mouth daily, Disp: 90 tablet, Rfl: 0    fluticasone (FLONASE) 50 mcg/act nasal spray, 1 spray into each nostril daily, Disp: 15.8 mL, Rfl: 3    levothyroxine 50 mcg tablet, Take 1 tablet (50 mcg total) by mouth daily in the early morning, Disp: 90 tablet, Rfl: 0    methocarbamol (ROBAXIN) 500 mg tablet, Take 1 tablet (500 mg total) by mouth 3 (three) times a day as needed for muscle spasms, Disp: 60 tablet, Rfl: 0    omeprazole (PriLOSEC) 40 MG capsule, Take 1 capsule (40 mg total) by mouth daily, Disp: 90 capsule, Rfl: 0    spironolactone (ALDACTONE) 25 mg tablet, take 1 tablet by mouth once daily, Disp: 30 tablet, Rfl: 5    tamsulosin (FLOMAX) 0.4 mg, Take 2 capsules (0.8 mg total) by mouth daily at bedtime, Disp: 60 capsule, Rfl: 5    vitamin B-12 (VITAMIN B-12) 1,000 mcg tablet, Take 1 tablet (1,000 mcg total) by mouth daily, Disp: 90 tablet, Rfl: 0    Allergies   Allergen Reactions    Gabapentin Other (See Comments)     OUT OF BODY PSYCHOSIS, MEMORY LOSS, AMNESIA    Other Cough      "HAYFEVER, SEASONAL, ALFALFA        Blood pressure 110/70, pulse 94, height 5' 11\" (1.803 m), weight 93.4 kg (206 lb).       Objective:    Blood pressure 110/70, pulse 94, height 5' 11\" (1.803 m), weight 93.4 kg (206 lb).    Physical Exam  Vitals reviewed.   Constitutional:       Appearance: Normal appearance. He is well-developed.   HENT:      Head: Normocephalic.      Right Ear: Hearing normal.      Left Ear: Hearing normal.      Nose: Nose normal.      Mouth/Throat:      Mouth: Mucous membranes are moist.   Eyes:      General: Lids are normal.      Extraocular Movements: Extraocular movements intact.      Conjunctiva/sclera: Conjunctivae normal.      Pupils: Pupils are equal, round, and reactive to light.   Pulmonary:      Effort: Pulmonary effort is normal. No respiratory distress.   Abdominal:      Palpations: Abdomen is soft.      Tenderness: There is no abdominal tenderness.   Musculoskeletal:         General: Normal range of motion.      Cervical back: Normal range of motion.   Skin:     General: Skin is warm and dry.   Neurological:      Mental Status: He is alert.      Motor: Motor strength is normal.     Coordination: Romberg sign negative.      Deep Tendon Reflexes: Reflexes are normal and symmetric.   Psychiatric:         Attention and Perception: Attention and perception normal.         Mood and Affect: Mood and affect normal.         Speech: Speech is tangential.         Behavior: Behavior normal. Behavior is cooperative.         Thought Content: Thought content normal.         Cognition and Memory: Cognition and memory normal.         Judgment: Judgment is impulsive.      Comments: + Altered awareness episodes         Neurological Exam  Mental Status  Alert. Oriented to person, place, time and situation. Memory is normal. Recent and remote memory are intact. Language is fluent with no aphasia. Attention and concentration are normal. Fund of knowledge is appropriate for level of education.    Cranial " Nerves  CN II: Visual acuity is normal. Visual fields full to confrontation.  CN III, IV, VI: Extraocular movements intact bilaterally. Normal lids and orbits bilaterally. Pupils equal round and reactive to light bilaterally.  CN V: Facial sensation is normal.  CN VII: Full and symmetric facial movement.  CN VIII:  Right: Hearing is normal.  Left: Hearing is normal.  CN IX, X: Palate elevates symmetrically. Normal gag reflex.  CN XI: Shoulder shrug strength is normal.  CN XII: Tongue midline without atrophy or fasciculations.    Motor  Normal muscle bulk throughout. Normal muscle tone. No abnormal involuntary movements. Strength is 5/5 throughout all four extremities.    Sensory  Light touch is normal in upper and lower extremities. Temperature is normal in upper and lower extremities. Vibration is normal in upper and lower extremities. Proprioception is normal in upper and lower extremities.     Reflexes  Deep tendon reflexes are 2+ and symmetric in all four extremities.    Right pathological reflexes: Patrick's absent.  Left pathological reflexes: Patrick's absent.    Coordination  Right: Finger-to-nose normal. Rapid alternating movement normal. Heel-to-shin normal.Left: Finger-to-nose normal. Rapid alternating movement normal. Heel-to-shin normal.    Gait  Casual gait is normal including stance, stride, and arm swing.Normal toe walking. Normal heel walking. Normal tandem gait. Romberg is absent. Able to rise from chair without using arms.        ROS:    Review of Systems   Constitutional:  Negative for chills and fever.   HENT:  Negative for ear pain and sore throat.    Eyes:  Negative for pain and visual disturbance.   Respiratory:  Negative for cough and shortness of breath.    Cardiovascular:  Negative for chest pain and palpitations.   Gastrointestinal:  Negative for abdominal pain and vomiting.   Genitourinary:  Negative for dysuria and hematuria.   Musculoskeletal:  Negative for arthralgias and back pain.    Skin:  Negative for color change and rash.   Neurological:  Negative for seizures and syncope.   Psychiatric/Behavioral:  Positive for decreased concentration and sleep disturbance.         Questionable transient memory changes/confusion with periods of altered awareness   All other systems reviewed and are negative.          ROS reviewed and d/w the pt.    I have spent a total time of 60 minutes in caring for this patient on the day of the visit/encounter including Diagnostic results, Risks and benefits of tx options, Instructions for management, Patient and family education, Impressions, Counseling / Coordination of care, Documenting in the medical record, Reviewing / ordering tests, medicine, procedures  , and Obtaining or reviewing history  .

## 2024-07-29 ENCOUNTER — TELEPHONE (OUTPATIENT)
Age: 64
End: 2024-07-29

## 2024-07-29 NOTE — TELEPHONE ENCOUNTER
Contacted patient in regards to Routine Referral in attempts to verify patient's needs of services and add patient to proper wait list. LVM for patient to contact intake dept  in regards to referral. 1st attempt.    If pt calls back, please verify [place] medicaid on file and offer extra resources if applicable.

## 2024-07-29 NOTE — TELEPHONE ENCOUNTER
Patient returning call from IC per prior note. Patient does have [facility] medicaid. Patient stated he will be checking with another facility [facility] to see if they take his coverage and call our office back if so. Writer did not place pt on wait list.

## 2024-07-31 DIAGNOSIS — R06.02 SHORTNESS OF BREATH: Primary | ICD-10-CM

## 2024-07-31 DIAGNOSIS — S13.9XXD NECK SPRAIN, SUBSEQUENT ENCOUNTER: ICD-10-CM

## 2024-08-02 RX ORDER — METHOCARBAMOL 500 MG/1
500 TABLET, FILM COATED ORAL 3 TIMES DAILY PRN
Qty: 60 TABLET | Refills: 0 | Status: SHIPPED | OUTPATIENT
Start: 2024-08-02

## 2024-08-09 NOTE — PROGRESS NOTES
Progress Note - Cardiology Office  [facility]    Jerry Lopez 64 y.o. male MRN: [id number]  : 1960  Encounter: [id number]      ASSESSMENT:  Dyspnea on exertion since 2023     Obesity, BMI 28.73     Edema of lower extremities,     Hypothyroidism     Left knee pain and balance problems at times.  Uses a cane    Stress test, 2024  Equivocal at 83% MPHR    TTE, 2024:  EF 60%, mildly increased wall thickness  Mild LAE  Mobile septal aneurysm    CVA in 2018  Affected pt's memory    PTSD        RECOMMENDATIONS:  Pharmacologic nuclear stress test  Patient is considering pulmonary evaluation.  He has also been advised by neurology to undergo repeat neurologic testing because of his memory loss etc.  Venous duplex with insufficiency testing for lower extremity edema  20-30 mm compression stockings to be worn regularly during the day and off at night  Continue aspirin, atorvastatin, spironolactone  Lasix 20 mg daily           Please call [phone] if any questions.    HPI :     Jerry Lopez is a 64 y.o. year old male who came for follow up.  He continues to complain of dyspnea on exertion.  His echocardiogram showed normal systolic function and EF of 60%.  His exercise stress test was equivocal since he only achieved 83% of his target heart rate.  We are going to do a Lexiscan to rule out myocardial ischemia.  He has mild to moderate lower extremity edema.  He has been on spironolactone.  I advised him to wear compression stockings regularly during the day and off at night and we are also going to do testing for lower extremity venous insufficiency.  I am also adding 20 mg of Lasix to his current 25 mg of spironolactone  Patient is considering pulmonary evaluation also  He also follows with neurology for history of PTSD, CVA, and memory loss    REVIEW OF SYSTEMS:  Review of Systems   Respiratory:  Positive for shortness of breath.    Cardiovascular:  Positive for leg  swelling.   Musculoskeletal:  Positive for arthralgias and gait problem.   All other systems reviewed and are negative.        Historical Information   Past Medical History:   Diagnosis Date    Abdominal pain 10/24/2018    Amnesia memory loss     Anxiety     Chronic fatigue     Disc degeneration, lumbar     Esophageal reflux     GERD (gastroesophageal reflux disease)     Glaucoma     Hiatal hernia     Increased pressure in the eye     both eyes; denies glaucoma     Internal hemorrhoids     Lumbar disc herniation with radiculopathy     Memory loss     Pain in both feet 6/18/2018    Personal history of colonic polyps      Past Surgical History:   Procedure Laterality Date    COLONOSCOPY N/A 4/25/2016    Procedure: COLONOSCOPY;  Surgeon: Storm Melton MD;  Location: [facility] GI LAB;  Service:     DENTAL SURGERY      ALL TEETH PULLED    EPIDURAL BLOCK INJECTION      EPIDURAL BLOCK INJECTION Right 12/28/2016    Procedure: BLOCK / INJECTION EPIDURAL STEROID TRANSFORAMINAL  L4-5;  Surgeon: Altaf Buckley MD;  Location: [facility] MAIN OR;  Service:     EPIDURAL BLOCK INJECTION Right 11/16/2017    Procedure: BLOCK / INJECTION EPIDURAL STEROID TRANSFORAMINAL L4-5 RIGHT;  Surgeon: Altaf Buckley MD;  Location: [facility] MAIN OR;  Service: Pain Management     EPIDURAL BLOCK INJECTION Left 5/10/2018    Procedure: Lt L4 L5 Tfesi (08561,20877);  Surgeon: Altaf Buckley MD;  Location: [facility] MAIN OR;  Service: Pain Management     EPIDURAL BLOCK INJECTION Right 3/7/2019    Procedure: L4 L5 Transforaminal Epidural Steroid Injection ([id number]);  Surgeon: Altaf Buckley MD;  Location: [facility] MAIN OR;  Service: Pain Management     EPIDURAL BLOCK INJECTION Left 5/12/2022    Procedure: L4 L5  TRANSFORAMINAL epidural steroid injection ([id number]);  Surgeon: Altaf Buckley MD;  Location: [facility] MAIN OR;  Service: Pain Management     ESOPHAGOGASTRODUODENOSCOPY N/A 4/25/2016    Procedure: ESOPHAGOGASTRODUODENOSCOPY (EGD);  Surgeon: Storm Melton  MD;  Location: [facility] GI LAB;  Service:     FL INJECTION LEFT ELBOW (NON ARTHROGRAM)  2022    NOSE SURGERY       Social History     Substance and Sexual Activity   Alcohol Use No     Social History     Substance and Sexual Activity   Drug Use No     Social History     Tobacco Use   Smoking Status Former    Current packs/day: 0.00    Average packs/day: 0.5 packs/day for 19.0 years (9.5 ttl pk-yrs)    Types: Cigarettes    Start date:     Quit date:     Years since quittin.6   Smokeless Tobacco Never     Family History:   Family History   Problem Relation Age of Onset    Stomach cancer Maternal Grandmother     Hypertension Mother     Diabetes Mother     Cataracts Father     Skin cancer Father     Stroke Father         2X    Aneurysm Father     No Known Problems Sister     No Known Problems Brother     Throat cancer Brother        Meds/Allergies     Allergies   Allergen Reactions    Gabapentin Other (See Comments)     OUT OF BODY PSYCHOSIS, MEMORY LOSS, AMNESIA    Other Cough     HAYFEVER, SEASONAL, ALFALFA       Current Outpatient Medications:     aspirin (Aspirin Low Dose) 81 mg EC tablet, take 1 tablet by mouth once daily, Disp: 30 tablet, Rfl: 0    atorvastatin (LIPITOR) 20 mg tablet, Take 1 tablet (20 mg total) by mouth daily, Disp: 90 tablet, Rfl: 2    cholecalciferol (VITAMIN D3) 1,000 units tablet, Take 2 tablets (2,000 Units total) by mouth daily, Disp: 180 tablet, Rfl: 0    famotidine (PEPCID) 40 MG tablet, Take 0.5 tablets (20 mg total) by mouth daily at bedtime, Disp: 45 tablet, Rfl: 0    fexofenadine (ALLEGRA) 180 MG tablet, Take 1 tablet (180 mg total) by mouth daily, Disp: 90 tablet, Rfl: 0    fluticasone (FLONASE) 50 mcg/act nasal spray, 1 spray into each nostril daily, Disp: 15.8 mL, Rfl: 3    levothyroxine 50 mcg tablet, Take 1 tablet (50 mcg total) by mouth daily in the early morning, Disp: 90 tablet, Rfl: 0    methocarbamol (ROBAXIN) 500 mg tablet, Take 1 tablet (500 mg total) by  mouth 3 (three) times a day as needed for muscle spasms, Disp: 60 tablet, Rfl: 0    omeprazole (PriLOSEC) 40 MG capsule, Take 1 capsule (40 mg total) by mouth daily, Disp: 90 capsule, Rfl: 0    spironolactone (ALDACTONE) 25 mg tablet, take 1 tablet by mouth once daily, Disp: 30 tablet, Rfl: 5    tamsulosin (FLOMAX) 0.4 mg, Take 2 capsules (0.8 mg total) by mouth daily at bedtime, Disp: 60 capsule, Rfl: 5    vitamin B-12 (VITAMIN B-12) 1,000 mcg tablet, Take 1 tablet (1,000 mcg total) by mouth daily, Disp: 90 tablet, Rfl: 0    Diclofenac Sodium (VOLTAREN) 1 %, Apply 2 g topically 4 (four) times a day, Disp: 240 g, Rfl: 0    dicyclomine (BENTYL) 10 mg capsule, Take 1 capsule (10 mg total) by mouth 4 (four) times a day (before meals and at bedtime), Disp: 120 capsule, Rfl: 0    Vitals: Blood pressure 132/76, pulse 97, weight 93.4 kg (206 lb), SpO2 96%.    Body mass index is 28.73 kg/m².  Vitals:    08/12/24 1401   Weight: 93.4 kg (206 lb)     BP Readings from Last 3 Encounters:   08/12/24 132/76   07/26/24 110/70   07/25/24 110/75       Physical Exam:  Physical Exam    Neurologic:  Alert & oriented x 3, no new focal deficits, Not in any acute distress,  Constitutional:  Well developed, well nourished, non-toxic appearance   Eyes:  Pupil equal and reacting to light, conjunctiva normal,   HENT:  Atraumatic, oropharynx moist, Neck- normal range of motion, no tenderness,  Neck supple, No JVP, No LNP   Respiratory:  Bilateral air entry, mostly clear to auscultation  Cardiovascular: S1-S2 regular with a I/VI systolic murmur   GI:  Soft, nondistended, normal bowel sounds, nontender, no hepatosplenomegaly appreciated.  Musculoskeletal:  No tenderness, no deformities.   Skin:  Well hydrated, no rash   Lymphatic:  No lymphadenopathy noted   Extremities: Mild to moderate lower extremity edema        Diagnostic Studies Review Cardio:      EKG: Sinus rhythm with PACs, heart rate 97/minute    Cardiac testing:     Results for orders  "placed during the hospital encounter of 01/31/24    Echo complete w/ contrast if indicated    Interpretation Summary    Left Ventricle: Left ventricular cavity size is normal. Wall thickness is mildly increased. The left ventricular ejection fraction is 60% by visual estimation. Systolic function is normal. Wall motion is normal. Diastolic function is normal for age.  Left atrial filling pressure is normal.    Left Atrium: The atrium is mildly dilated (35-41 mL/m2).    Atrial Septum: There is a mobile septal aneurysm.    Grossly normal valve function with no hemodynamically significant stenosis or regurgitation seen.    Right ventricular systolic pressure could not be assessed.      Imaging:  Chest X-Ray:   XR chest pa & lateral    Result Date: 11/10/2023  Impression Clear lungs. No significant change from prior. Electronically signed: 11/10/2023 03:39 PM Gurpreet Kyle MD      CT-scan of the chest:     No CTA results available for this patient.  Lab Review   Lab Results   Component Value Date    WBC 7.3 01/10/2024    HGB 15.0 01/10/2024    HCT 42.9 01/10/2024    MCV 86 01/10/2024    RDW 12.7 01/10/2024     01/10/2024     BMP:  Lab Results   Component Value Date    SODIUM 142 01/10/2024    K 4.7 01/10/2024     01/10/2024    CO2 23 01/10/2024    BUN 14 01/10/2024    CREATININE 0.90 01/10/2024    GLUC 113 (H) 01/10/2024    CALCIUM 8.7 05/19/2018    EGFR 96 01/10/2024     LFT:  Lab Results   Component Value Date    AST 19 01/10/2024    ALT 21 01/10/2024    ALKPHOS 88 05/19/2018    TP 6.6 01/10/2024    ALB 4.8 01/10/2024      No components found for: \"TSH3\"  No results found for: \"[id number]\"  Lab Results   Component Value Date    HGBA1C 5.3 06/07/2024     Lipid Profile:   Lab Results   Component Value Date    CHOLESTEROL 183 01/10/2024    HDL 72 01/10/2024    LDLCALC 101 (H) 01/10/2024    TRIG 52 01/10/2024     Lab Results   Component Value Date    CHOLESTEROL 183 01/10/2024    CHOLESTEROL 189 " "03/07/2022     Lab Results   Component Value Date    TROPONINI <0.02 05/19/2018     No results found for: \"NTBNP\"   No results found for this or any previous visit (from the past 672 hour(s)).          Dr[facility] Cachorro [facility]ca [facility]      \"This note has been constructed using a voice recognition system.Therefore there may be syntax, spelling, and/or grammatical errors. Please call if you have any questions. \"  "

## 2024-08-11 DIAGNOSIS — Z76.0 MEDICATION REFILL: ICD-10-CM

## 2024-08-12 ENCOUNTER — OFFICE VISIT (OUTPATIENT)
Dept: CARDIOLOGY CLINIC | Facility: CLINIC | Age: 64
End: 2024-08-12
Payer: COMMERCIAL

## 2024-08-12 VITALS
HEART RATE: 97 BPM | WEIGHT: 206 LBS | DIASTOLIC BLOOD PRESSURE: 76 MMHG | SYSTOLIC BLOOD PRESSURE: 132 MMHG | BODY MASS INDEX: 28.73 KG/M2 | OXYGEN SATURATION: 96 %

## 2024-08-12 DIAGNOSIS — R06.02 SOB (SHORTNESS OF BREATH) ON EXERTION: Primary | ICD-10-CM

## 2024-08-12 DIAGNOSIS — R60.0 EDEMA OF BOTH LEGS: ICD-10-CM

## 2024-08-12 PROCEDURE — 93000 ELECTROCARDIOGRAM COMPLETE: CPT | Performed by: INTERNAL MEDICINE

## 2024-08-12 PROCEDURE — 99214 OFFICE O/P EST MOD 30 MIN: CPT | Performed by: INTERNAL MEDICINE

## 2024-08-12 RX ORDER — ASPIRIN 81 MG/1
81 TABLET, COATED ORAL DAILY
Qty: 30 TABLET | Refills: 0 | Status: SHIPPED | OUTPATIENT
Start: 2024-08-12

## 2024-08-12 RX ORDER — FUROSEMIDE 20 MG
20 TABLET ORAL DAILY
Qty: 90 TABLET | Refills: 2 | Status: SHIPPED | OUTPATIENT
Start: 2024-08-12

## 2024-08-14 ENCOUNTER — PATIENT OUTREACH (OUTPATIENT)
Age: 64
End: 2024-08-14

## 2024-08-14 NOTE — PROGRESS NOTES
SWCM received referral from provider to assist patient with needs, [facility] T[facility] resources.    SWCM completed chart review. SWCM called patient to follow up and assist with needs. SWCM unable to reach patient. Left voice message requesting return call. Contact information provided.      SWCM will continue to follow up and remain available to assist as needed.    Addendum:    SWCM received return call from patient. SWCM introduced self, role, and reason for outreach.    Patient reports he is doing well. Patient discussed psychosocial issues adversely impacting patient: caregiver stress. SWCM provided emotional support.    SWCM and patient discussed [facility] T[facility] options. SWCM provided [facility] [phone]. Patient reports no other needs currently.    SWCM encouraged patient to call with questions/ needs. SWCM will remain available to assist as needed.

## 2024-08-19 DIAGNOSIS — S13.9XXD NECK SPRAIN, SUBSEQUENT ENCOUNTER: ICD-10-CM

## 2024-08-19 DIAGNOSIS — M47.816 SPONDYLOSIS OF LUMBAR REGION WITHOUT MYELOPATHY OR RADICULOPATHY: Primary | ICD-10-CM

## 2024-08-19 RX ORDER — METHOCARBAMOL 500 MG/1
500 TABLET, FILM COATED ORAL 3 TIMES DAILY PRN
Qty: 60 TABLET | Refills: 2 | Status: SHIPPED | OUTPATIENT
Start: 2024-08-19

## 2024-08-20 ENCOUNTER — HOSPITAL ENCOUNTER (OUTPATIENT)
Dept: RADIOLOGY | Facility: HOSPITAL | Age: 64
Discharge: HOME | End: 2024-08-20
Attending: INTERNAL MEDICINE
Payer: COMMERCIAL

## 2024-08-20 ENCOUNTER — HOSPITAL ENCOUNTER (OUTPATIENT)
Dept: NON INVASIVE DIAGNOSTICS | Facility: HOSPITAL | Age: 64
Discharge: HOME | End: 2024-08-20
Attending: INTERNAL MEDICINE

## 2024-08-20 ENCOUNTER — HOSPITAL ENCOUNTER (OUTPATIENT)
Dept: RADIOLOGY | Facility: HOSPITAL | Age: 64
Discharge: HOME | End: 2024-08-20
Attending: INTERNAL MEDICINE

## 2024-08-20 VITALS
RESPIRATION RATE: 20 BRPM | OXYGEN SATURATION: 99 % | DIASTOLIC BLOOD PRESSURE: 70 MMHG | SYSTOLIC BLOOD PRESSURE: 110 MMHG | HEART RATE: 86 BPM

## 2024-08-20 DIAGNOSIS — R06.02 SHORTNESS OF BREATH: ICD-10-CM

## 2024-08-20 LAB
CHEST PAIN STATEMENT: NORMAL
MAX DIASTOLIC BP: 80 MMHG
MAX HR PERCENT: 91 %
MAX HR: 142 BPM
MAX PREDICTED HEART RATE: 156 BPM
PROTOCOL NAME: NORMAL
RATE PRESSURE PRODUCT: NORMAL
SL CV REST NUCLEAR ISOTOPE DOSE: 10.67 MCI
SL CV STRESS NUCLEAR ISOTOPE DOSE: 31.8 MCI
SL CV STRESS RECOVERY BP: NORMAL MMHG
SL CV STRESS RECOVERY HR: 97 BPM
SL CV STRESS RECOVERY O2 SAT: 97 %
SL CV STRESS STAGE REACHED: 2
STRESS ANGINA INDEX: 0
STRESS BASELINE BP: NORMAL MMHG
STRESS BASELINE HR: 86 BPM
STRESS O2 SAT REST: 99 %
STRESS PEAK HR: 139 BPM
STRESS POST ESTIMATED WORKLOAD: 7 METS
STRESS POST EXERCISE DUR MIN: 5 MIN
STRESS POST EXERCISE DUR MIN: 5 MIN
STRESS POST EXERCISE DUR SEC: 44 SEC
STRESS POST EXERCISE DUR SEC: 44 SEC
STRESS POST O2 SAT PEAK: 99 %
STRESS POST PEAK BP: 200 MMHG
STRESS POST PEAK HR: 142 BPM
STRESS POST PEAK SYSTOLIC BP: 200 MMHG
TARGET HR FORMULA: NORMAL
TEST INDICATION: NORMAL

## 2024-08-20 PROCEDURE — 93016 CV STRESS TEST SUPVJ ONLY: CPT | Performed by: INTERNAL MEDICINE

## 2024-08-20 PROCEDURE — A9502 TC99M TETROFOSMIN: HCPCS

## 2024-08-20 PROCEDURE — 78452 HT MUSCLE IMAGE SPECT MULT: CPT

## 2024-08-20 PROCEDURE — 93017 CV STRESS TEST TRACING ONLY: CPT

## 2024-08-20 PROCEDURE — 93018 CV STRESS TEST I&R ONLY: CPT | Performed by: INTERNAL MEDICINE

## 2024-08-20 PROCEDURE — 78452 HT MUSCLE IMAGE SPECT MULT: CPT | Performed by: INTERNAL MEDICINE

## 2024-08-21 ENCOUNTER — TELEPHONE (OUTPATIENT)
Dept: CARDIOLOGY CLINIC | Facility: CLINIC | Age: 64
End: 2024-08-21

## 2024-08-21 NOTE — TELEPHONE ENCOUNTER
Patient will call back aware of date and time.  Will call back to give him the rest of the instrucitons

## 2024-08-21 NOTE — TELEPHONE ENCOUNTER
Patient scheduled for Monday Sept 9 at [facility] at 9am.   Lab work in cabinet will call patient

## 2024-08-23 ENCOUNTER — HOSPITAL ENCOUNTER (OUTPATIENT)
Dept: RADIOLOGY | Facility: HOSPITAL | Age: 64
Discharge: HOME | End: 2024-08-23
Payer: COMMERCIAL

## 2024-08-23 DIAGNOSIS — R56.9 OBSERVED SEIZURE-LIKE ACTIVITY (HCC): ICD-10-CM

## 2024-08-23 PROCEDURE — 70553 MRI BRAIN STEM W/O & W/DYE: CPT

## 2024-08-23 PROCEDURE — A9585 GADOBUTROL INJECTION: HCPCS | Performed by: NURSE PRACTITIONER

## 2024-08-23 RX ORDER — GADOBUTROL 604.72 MG/ML
9 INJECTION INTRAVENOUS
Status: COMPLETED | OUTPATIENT
Start: 2024-08-23 | End: 2024-08-23

## 2024-08-23 RX ADMIN — GADOBUTROL 9 ML: 604.72 INJECTION INTRAVENOUS at 14:11

## 2024-08-26 ENCOUNTER — TELEPHONE (OUTPATIENT)
Dept: NEUROLOGY | Facility: CLINIC | Age: 64
End: 2024-08-26

## 2024-08-26 NOTE — TELEPHONE ENCOUNTER
----- Message from EMBER Marquez sent at 8/23/2024  5:47 PM EDT -----  Please call the patient regarding hisl result.    MRI looks good, no acute strokes or significant changes noted.  No abnormal brain tissues to indicate areas of cause for seizure like events.      Please remind pt to have EEG scheduled.     Thanks  Venkatesh

## 2024-08-26 NOTE — TELEPHONE ENCOUNTER
Patient aware of results and expressed full understanding.     Patient has the EEG scheduled for Oct 9th

## 2024-09-06 ENCOUNTER — TELEPHONE (OUTPATIENT)
Age: 64
End: 2024-09-06

## 2024-09-06 ENCOUNTER — PATIENT OUTREACH (OUTPATIENT)
Age: 64
End: 2024-09-06

## 2024-09-06 NOTE — TELEPHONE ENCOUNTER
Caller: Jerry Lopez    Doctor: Jackson    Reason for call: Patient called to let Dr Paz know that he canceled his procedure for Monday 9/9/2024. He said he isn't feeling well and he apologizes for canceling.  He will call back next week to reschedule it.    Thank you    Call back#: [phone]

## 2024-09-06 NOTE — PROGRESS NOTES
SWCM completed chart review. SWCM called patient to follow up and assist with needs. SWCM unable to reach patient. Left voice message requesting return call. Contact information provided.      SWCM will continue to follow up and remain available to assist as needed.    Addendum:    SWCM received return call from patient. Patient reports not having opportunity to secure [facility] T[facility] intake appointment due to not having opportunity. Patient reports he plans on calling next week. Patient reports no other needs currently. SWCM encouraged patient to call with questions/ needs.SWCM closed case and removed self from care team.     SWCM will remain available to assist as needed.

## 2024-09-10 DIAGNOSIS — R10.13 EPIGASTRIC BURNING SENSATION: ICD-10-CM

## 2024-09-10 RX ORDER — OMEPRAZOLE 40 MG/1
40 CAPSULE, DELAYED RELEASE ORAL DAILY
Qty: 90 CAPSULE | Refills: 0 | Status: SHIPPED | OUTPATIENT
Start: 2024-09-10 | End: 2024-12-09

## 2024-09-16 ENCOUNTER — TELEPHONE (OUTPATIENT)
Age: 64
End: 2024-09-16

## 2024-09-16 DIAGNOSIS — R06.02 SOB (SHORTNESS OF BREATH) ON EXERTION: Primary | ICD-10-CM

## 2024-09-16 NOTE — TELEPHONE ENCOUNTER
Patient in office wanting a referral for a pulmonologist. He says he spoke with many doctors about this, including the cardiologist.    Dr. Montilla, please advise once it is complete so I can let the patient know.

## 2024-09-17 NOTE — TELEPHONE ENCOUNTER
Patient having increased dyspnea on exertion. Chronic cough evaluated with CXR 2023 which showed no obvious abnormalities. PFT's from 2023 showed normal spirometry with mild restriction on lung volumes, and no change with bronchodilator administration. Due to recent cardiac workup and ongoing dyspnea, patient requests to have pulmonary evaluation

## 2024-10-04 DIAGNOSIS — E03.9 HYPOTHYROIDISM, UNSPECIFIED TYPE: ICD-10-CM

## 2024-10-04 RX ORDER — LEVOTHYROXINE SODIUM 50 UG/1
50 TABLET ORAL
Qty: 90 TABLET | Refills: 0 | Status: SHIPPED | OUTPATIENT
Start: 2024-10-04

## 2024-10-08 ENCOUNTER — TELEPHONE (OUTPATIENT)
Dept: CARDIOLOGY CLINIC | Facility: CLINIC | Age: 64
End: 2024-10-08

## 2024-10-08 ENCOUNTER — CONSULT (OUTPATIENT)
Dept: PULMONOLOGY | Facility: MEDICAL CENTER | Age: 64
End: 2024-10-08
Payer: COMMERCIAL

## 2024-10-08 VITALS
HEART RATE: 85 BPM | DIASTOLIC BLOOD PRESSURE: 62 MMHG | RESPIRATION RATE: 12 BRPM | TEMPERATURE: 97.6 F | WEIGHT: 208.2 LBS | SYSTOLIC BLOOD PRESSURE: 118 MMHG | BODY MASS INDEX: 29.15 KG/M2 | OXYGEN SATURATION: 97 % | HEIGHT: 71 IN

## 2024-10-08 DIAGNOSIS — R60.0 BILATERAL LEG EDEMA: ICD-10-CM

## 2024-10-08 DIAGNOSIS — R06.02 SOB (SHORTNESS OF BREATH) ON EXERTION: Primary | ICD-10-CM

## 2024-10-08 DIAGNOSIS — R05.3 CHRONIC COUGH: ICD-10-CM

## 2024-10-08 PROCEDURE — 99204 OFFICE O/P NEW MOD 45 MIN: CPT | Performed by: INTERNAL MEDICINE

## 2024-10-08 NOTE — TELEPHONE ENCOUNTER
He is a patient of Dr. Abarca  discussed with him.  If you want to reschedule him.  He will know much better than me.

## 2024-10-08 NOTE — TELEPHONE ENCOUNTER
PATIENT HAD A CATH SCHEDULED AND CANCELED BECAUSE HE WAS NOT FEELING WELL. JUST FOLLOWING UP ON THIS PATIENT. SHOULD WE R/S AT THIS TIME?  DOES HE NEED TO BE SEEN AGAIN AT THE OFFICE ?

## 2024-10-08 NOTE — PROGRESS NOTES
Assessment/Plan:       Problem List Items Addressed This Visit          Surgery/Wound/Pain    Bilateral leg edema     Does have some mild bilateral leg edema has been having for the last several weeks.  Echocardiogram done in June showed no evidence of any obvious cardiac dysfunction.  Not sure if this is due to some venous insufficiency.  He was advised by his cardiologist to get venous compression stockings.  Blood work from January showed his creatinine to be normal and albumin was in normal range as well.  No sign of any kidney disease.            Other    Chronic cough     States he had a intermittent cough over past several months but I suspect this is may be due to postnasal drainage and not due to asthma or GERD.  I recommend he try over-the-counter steroid nasal spray and antihistamine.  Chest x-ray done on November was normal.  Also complete PFT done in November 2023 was normal.  He was assessed by ENT in November 2023 and had laryngoscopy.  There were no abnormalities of the vocal cords or glottic region.  He did have evidence of some postnasal drainage.  He is on an acid but this has not helped his mild cough.  Sounds like he has more throat clearing of postnasal drainage.         SOB (shortness of breath) on exertion - Primary     Sascha  has been having shortness of breath since around September 2023.  Also complains of some lower extremity weakness with exertion.  He had been scheduled for cardiac catheterization September night but had to cancel as he states he did not feel well that day.  He has not been rescheduled since then.    Complete PFT done November 9, 2023 was reviewed with him.  Lung volumes are as follows:    FVC    -  3.79 L    83%  FEV1  -  3.20 L     91%  FEV1/FVC% - 84%  RV - 1.72 L     74%  TLC - 5.39 L   76%    DLCO - 97%    There is only minimal restrictive impairment likely due to him being overweight.  No significant change after bronchodilator and diffusion capacity measurement  "is normal.    I do not believe his shortness of breath is due to any asthma or COPD.  Because of his feeling of lower extremity weakness with exertion and leg swelling it is possible he could have coronary disease causing his exertional shortness of breath.  I did tell him to contact his cardiologist Dr. Wilkerson to be rescheduled for this procedure.  He did have a nuclear stress test done on August 20 of this year which suggested some stress-induced myocardial ischemia involving anterior wall of his heart.  Echocardiogram that was done in January of this year was normal.  No evidence of any pulmonary artery hypertension or valvular heart disease    Ambulatory oximetry was done today.  His room air O2 saturation at rest was 96% and on room air after ambulating 250 feet lowest O2 saturation was 95%              Plan for follow up:    Return if symptoms worsen or fail to improve.  All questions are answered to the patient's satisfaction and understanding.  He verbalizes understanding.  He is encouraged to call with any further questions or concerns.    Portions of the record may have been created with voice recognition software.  Occasional wrong word or \"sound a like\" substitutions may have occurred due to the inherent limitations of voice recognition software.  Read the chart carefully and recognize, using context, where substitutions have occurred.a    Electronically Signed by Gelacio Norris DO    ______________________________________________________________________    Chief Complaint: shortness of breath       Patient ID: Jerry is a 64 y.o. y.o. male has a past medical history of Abdominal pain (10/24/2018), Amnesia memory loss, Anxiety, Chronic fatigue, Disc degeneration, lumbar, Esophageal reflux, GERD (gastroesophageal reflux disease), Glaucoma, Hiatal hernia, Increased pressure in the eye, Internal hemorrhoids, Lumbar disc herniation with radiculopathy, Memory loss, Pain in both feet (6/18/2018), and " Personal history of colonic polyps.    10/8/2024  Patient presents today for initial visit for shortness of breath    HPI    Jerry states he has been having some shortness of breath with exertion since around September-October 2023.  He does notice when he walks longer distance he feels like he has some shortness of breath sometimes he feels like he has some lower extremity weakness.  Does not have any wheezing.  He does get some cough and feeling that he has to clear his throat does not have any history of asthma.  He does have some chronic leg swelling and this is better now he is taking spironolactone with furosemide.  He is scheduled for venous Doppler studies of his lower extremities later today.  No history of DVT.    Jerry does follow with cardiologist Dr. Abarca.  He had echocardiogram done on January 31 of this year which showed normal LV systolic function with left ventricular ejection fraction of 60% and diastolic function.  Normal.  No evidence of any valvular heart disease.  Right ventricular stock function appeared to be normal.  He then had a nuclear stress test done on 8/20/2024 which reported some findings suggesting stress-induced myocardial ischemia.  There is a left ventricular perfusion defect that was small to medium size in the mid anterior wall region that was reversible he initially was scheduled for a cardiac catheterization September 9 but he states did not fill well that day and had to cancel.  He thought it was rescheduled for November however I contacted cardiac Cath Lab and he told me he is not on schedule.    He quit smoking 1998 and smoked a half a pack of cigarettes per day for 19 years.  He did have a complete pulmonary function test done November 9, 2023 which I reviewed with him which showed basically lung volumes to be normal.  There are some minimal decrease in TLC is 76% of predicted but this may be due to him being overweight.  Diffusion capacity measurement was normal at 97%  of predicted.  There was no significant change after bronchodilator    He does not get any chest pain with exertion.  Sometimes he does feel like his legs are heavy with activity.  He has been having leg swelling for past few months.  Does get some periodic cough but this appears to be more some postnasal drainage she gets.  He sometimes has to clear his throat from this.  Not having any gastric reflux symptoms.    He states he had a stroke in 2018 and ever since then he has had some memory impairment.  Does not have any weakness of his extremities.  On review of chart it appears that his problem is that he was walking hallway and also could not understand simple conversations and felt confused.  A CT of his brain done did not show any sign of stroke and MRI was unremarkable.  He had seen neurology nurse practitioner July of this year and had she ordered to eval for any possibility of seizure disorder and also neuropsychology consult.  Also has history of chronic fatigue syndrome.  Does get intermittent lower back pain and will take Robaxin when needed.  Is on levothyroxine 50 mcg daily for hypothyroidism he does have history of posttraumatic stress disorder.    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  He did serve in the [employer] for 8.5 years      Review of Systems   Constitutional:  Negative for chills, fever and unexpected weight change.   HENT:  Negative for congestion, rhinorrhea and sore throat.    Eyes:  Negative for discharge and redness.   Respiratory:  Positive for cough and shortness of breath. Negative for wheezing.    Cardiovascular:  Negative for chest pain, palpitations and leg swelling.   Gastrointestinal:  Negative for abdominal distention, abdominal pain and nausea.   Endocrine: Negative for polydipsia and polyphagia.   Genitourinary:  Negative for dysuria.   Musculoskeletal:  Negative for joint swelling and myalgias.   Skin:  Negative for  rash.   Neurological:  Negative for light-headedness.   Psychiatric/Behavioral:  Negative for agitation.        Social history: He reports that he quit smoking about 26 years ago. His smoking use included cigarettes. He started smoking about 45 years ago. He has a 9.5 pack-year smoking history. He has never used smokeless tobacco. He reports that he does not drink alcohol and does not use drugs.    Past surgical history:   Past Surgical History:   Procedure Laterality Date    COLONOSCOPY N/A 4/25/2016    Procedure: COLONOSCOPY;  Surgeon: Storm Melton MD;  Location: [facility] GI LAB;  Service:     DENTAL SURGERY      ALL TEETH PULLED    EPIDURAL BLOCK INJECTION      EPIDURAL BLOCK INJECTION Right 12/28/2016    Procedure: BLOCK / INJECTION EPIDURAL STEROID TRANSFORAMINAL  L4-5;  Surgeon: Altaf Buckley MD;  Location: [facility] MAIN OR;  Service:     EPIDURAL BLOCK INJECTION Right 11/16/2017    Procedure: BLOCK / INJECTION EPIDURAL STEROID TRANSFORAMINAL L4-5 RIGHT;  Surgeon: Altaf Buckley MD;  Location: [facility] MAIN OR;  Service: Pain Management     EPIDURAL BLOCK INJECTION Left 5/10/2018    Procedure: Lt L4 L5 Tfesi ([id number]);  Surgeon: Altaf Buckley MD;  Location: [facility] MAIN OR;  Service: Pain Management     EPIDURAL BLOCK INJECTION Right 3/7/2019    Procedure: L4 L5 Transforaminal Epidural Steroid Injection ([id number]);  Surgeon: Altaf Buckley MD;  Location: [facility] MAIN OR;  Service: Pain Management     EPIDURAL BLOCK INJECTION Left 5/12/2022    Procedure: L4 L5  TRANSFORAMINAL epidural steroid injection ([id number]);  Surgeon: Altaf Buckley MD;  Location: [facility] MAIN OR;  Service: Pain Management     ESOPHAGOGASTRODUODENOSCOPY N/A 4/25/2016    Procedure: ESOPHAGOGASTRODUODENOSCOPY (EGD);  Surgeon: Storm Melton MD;  Location: [facility] GI LAB;  Service:     FL INJECTION LEFT ELBOW (NON ARTHROGRAM)  5/12/2022    NOSE SURGERY       Family history:   Family History   Problem Relation Age of Onset    Stomach cancer  Maternal Grandmother     Hypertension Mother     Diabetes Mother     Cataracts Father     Skin cancer Father     Stroke Father         2X    Aneurysm Father     No Known Problems Sister     No Known Problems Brother     Throat cancer Brother        Immunization History   Administered Date(s) Administered    COVID-19 Moderna vac 6-11y or adult booster 50 mcg/0.5 mL 07/19/2022    COVID-19 PFIZER VACCINE 0.3 ML IM 04/08/2021, 04/29/2021, 11/04/2021    INFLUENZA 11/01/2003, 12/01/2004, 11/01/2005, 11/01/2006, 11/01/2007, 10/06/2008, 09/01/2009, 09/01/2010, 10/04/2011, 01/23/2013, 01/18/2021    Influenza Quadrivalent Preservative Free 3 years and older IM 02/08/2017, 12/05/2017    Influenza, injectable, quadrivalent, preservative free 0.5 mL 10/24/2023    Influenza, recombinant, quadrivalent,injectable, preservative free 01/03/2020, 12/13/2022    Tdap 03/25/2008, 01/03/2020    Zoster Vaccine Recombinant 03/20/2021, 10/04/2021     Current Outpatient Medications   Medication Sig Dispense Refill    aspirin (Aspirin Low Dose) 81 mg EC tablet take 1 tablet by mouth once daily 30 tablet 0    atorvastatin (LIPITOR) 20 mg tablet Take 1 tablet (20 mg total) by mouth daily 90 tablet 2    fluticasone (FLONASE) 50 mcg/act nasal spray 1 spray into each nostril daily 15.8 mL 3    furosemide (LASIX) 20 mg tablet Take 1 tablet (20 mg total) by mouth daily 90 tablet 2    levothyroxine 50 mcg tablet Take 1 tablet (50 mcg total) by mouth daily in the early morning 90 tablet 0    methocarbamol (ROBAXIN) 500 mg tablet Take 1 tablet (500 mg total) by mouth 3 (three) times a day as needed for muscle spasms 60 tablet 2    omeprazole (PriLOSEC) 40 MG capsule Take 1 capsule (40 mg total) by mouth daily 90 capsule 0    spironolactone (ALDACTONE) 25 mg tablet take 1 tablet by mouth once daily 30 tablet 5    tamsulosin (FLOMAX) 0.4 mg Take 2 capsules (0.8 mg total) by mouth daily at bedtime 60 capsule 5    cholecalciferol (VITAMIN D3) 1,000 units  "tablet Take 2 tablets (2,000 Units total) by mouth daily 180 tablet 0    Diclofenac Sodium (VOLTAREN) 1 % Apply 2 g topically 4 (four) times a day 240 g 0    dicyclomine (BENTYL) 10 mg capsule Take 1 capsule (10 mg total) by mouth 4 (four) times a day (before meals and at bedtime) 120 capsule 0    famotidine (PEPCID) 40 MG tablet Take 0.5 tablets (20 mg total) by mouth daily at bedtime 45 tablet 0    fexofenadine (ALLEGRA) 180 MG tablet Take 1 tablet (180 mg total) by mouth daily 90 tablet 0    vitamin B-12 (VITAMIN B-12) 1,000 mcg tablet Take 1 tablet (1,000 mcg total) by mouth daily 90 tablet 0     No current facility-administered medications for this visit.     Allergies: Gabapentin and Other    Objective:  Vitals:    10/08/24 1104   BP: 118/62   BP Location: Left arm   Patient Position: Sitting   Cuff Size: Extra-Large   Pulse: 85   Resp: 12   Temp: 97.6 °F (36.4 °C)   TempSrc: Temporal   SpO2: 97%   Weight: 94.4 kg (208 lb 3.2 oz)   Height: 5' 11\" (1.803 m)   Oxygen Therapy  SpO2: 97 %  .  Wt Readings from Last 3 Encounters:   10/08/24 94.4 kg (208 lb 3.2 oz)   08/12/24 93.4 kg (206 lb)   07/26/24 93.4 kg (206 lb)     Body mass index is 29.04 kg/m².    Physical Exam  Vitals reviewed.   Constitutional:       General: He is not in acute distress.     Appearance: Normal appearance. He is well-developed. He is obese.   HENT:      Head: Normocephalic.      Right Ear: External ear normal.      Left Ear: External ear normal.      Nose: Nose normal.      Mouth/Throat:      Mouth: Mucous membranes are moist.      Pharynx: Oropharynx is clear. No oropharyngeal exudate.      Comments: Mallampati score is 1  Eyes:      Conjunctiva/sclera: Conjunctivae normal.      Pupils: Pupils are equal, round, and reactive to light.   Neck:      Vascular: No JVD.      Trachea: No tracheal deviation.   Cardiovascular:      Rate and Rhythm: Normal rate and regular rhythm.      Heart sounds: Normal heart sounds.   Pulmonary:      Effort: " Pulmonary effort is normal.      Comments: Lung sounds are clear.  No wheezes, crackles or rhonchi  Abdominal:      General: There is no distension.      Palpations: Abdomen is soft.      Tenderness: There is no abdominal tenderness. There is no guarding.   Musculoskeletal:      Cervical back: Neck supple.      Comments: Has trace to +1 edema of both lower tibial regions.  No cyanosis or clubbing   Lymphadenopathy:      Cervical: No cervical adenopathy.   Skin:     General: Skin is warm and dry.      Findings: No rash.   Neurological:      General: No focal deficit present.      Mental Status: He is alert and oriented to person, place, and time.   Psychiatric:         Mood and Affect: Mood normal.         Behavior: Behavior normal.         Thought Content: Thought content normal.         Lab Review:   Lab Results   Component Value Date     06/07/2017    K 4.7 01/10/2024     01/10/2024    CO2 23 01/10/2024    BUN 14 01/10/2024    CREATININE 0.90 01/10/2024    CREATININE 0.87 05/19/2018    CREATININE 0.93 06/07/2017    GLUCOSE 110 (H) 06/07/2017    CALCIUM 8.7 05/19/2018    CALCIUM 9.3 06/07/2017     Lab Results   Component Value Date    WBC 7.3 01/10/2024    WBC 8.89 05/19/2018    WBC 6.1 06/07/2017    HGB 15.0 01/10/2024    HGB 14.8 05/19/2018    HGB 15.4 06/07/2017    HCT 42.9 01/10/2024    HCT 44.1 05/19/2018    HCT 43.9 06/07/2017    MCV 86 01/10/2024    MCV 87 05/19/2018    MCV 83 06/07/2017     01/10/2024     05/19/2018     06/07/2017       Diagnostics:  Results Review Statement: I personally reviewed the following image studies in PACS and associated radiology reports: chest xray. My interpretation of the radiology images/reports is: 11/9/2023 chest x-ray PA and lateral was reviewed.  Lung fields are clear and heart size is normal.

## 2024-10-08 NOTE — PATIENT INSTRUCTIONS
Will check schedule about your cardiac catheterization.  When that is completed and if this shows no coronary artery disease

## 2024-10-08 NOTE — ASSESSMENT & PLAN NOTE
States he had a intermittent cough over past several months but I suspect this is may be due to postnasal drainage and not due to asthma or GERD.  I recommend he try over-the-counter steroid nasal spray and antihistamine.  Chest x-ray done on November was normal.  Also complete PFT done in November 2023 was normal.  He was assessed by ENT in November 2023 and had laryngoscopy.  There were no abnormalities of the vocal cords or glottic region.  He did have evidence of some postnasal drainage.  He is on an acid but this has not helped his mild cough.  Sounds like he has more throat clearing of postnasal drainage.

## 2024-10-09 NOTE — TELEPHONE ENCOUNTER
Caller: Jerry    Doctor: Tevin    Reason for call: Patient calling to reschedule procedure.    Call back#: [phone]

## 2024-10-10 PROBLEM — R60.0 BILATERAL LEG EDEMA: Status: ACTIVE | Noted: 2024-10-10

## 2024-10-11 NOTE — ASSESSMENT & PLAN NOTE
Does have some mild bilateral leg edema has been having for the last several weeks.  Echocardiogram done in June showed no evidence of any obvious cardiac dysfunction.  Not sure if this is due to some venous insufficiency.  He was advised by his cardiologist to get venous compression stockings.  Blood work from January showed his creatinine to be normal and albumin was in normal range as well.  No sign of any kidney disease.

## 2024-10-11 NOTE — ASSESSMENT & PLAN NOTE
Sascha  has been having shortness of breath since around September 2023.  Also complains of some lower extremity weakness with exertion.  He had been scheduled for cardiac catheterization September night but had to cancel as he states he did not feel well that day.  He has not been rescheduled since then.    Complete PFT done November 9, 2023 was reviewed with him.  Lung volumes are as follows:    FVC    -  3.79 L    83%  FEV1  -  3.20 L     91%  FEV1/FVC% - 84%  RV - 1.72 L     74%  TLC - 5.39 L   76%    DLCO - 97%    There is only minimal restrictive impairment likely due to him being overweight.  No significant change after bronchodilator and diffusion capacity measurement is normal.    I do not believe his shortness of breath is due to any asthma or COPD.  Because of his feeling of lower extremity weakness with exertion and leg swelling it is possible he could have coronary disease causing his exertional shortness of breath.  I did tell him to contact his cardiologist Dr. Wilkerson to be rescheduled for this procedure.  He did have a nuclear stress test done on August 20 of this year which suggested some stress-induced myocardial ischemia involving anterior wall of his heart.  Echocardiogram that was done in January of this year was normal.  No evidence of any pulmonary artery hypertension or valvular heart disease    Ambulatory oximetry was done today.  His room air O2 saturation at rest was 96% and on room air after ambulating 250 feet lowest O2 saturation was 95%

## 2024-10-16 DIAGNOSIS — M47.816 SPONDYLOSIS OF LUMBAR REGION WITHOUT MYELOPATHY OR RADICULOPATHY: ICD-10-CM

## 2024-10-17 RX ORDER — METHOCARBAMOL 500 MG/1
500 TABLET, FILM COATED ORAL 3 TIMES DAILY PRN
Qty: 60 TABLET | Refills: 2 | Status: SHIPPED | OUTPATIENT
Start: 2024-10-17

## 2024-10-21 DIAGNOSIS — M17.12 PATELLOFEMORAL ARTHRITIS OF LEFT KNEE: ICD-10-CM

## 2024-10-29 PROBLEM — R94.39 ABNORMAL NUCLEAR STRESS TEST: Status: ACTIVE | Noted: 2024-10-29

## 2024-10-29 PROBLEM — E78.5 DYSLIPIDEMIA: Status: ACTIVE | Noted: 2024-10-29

## 2024-10-29 PROBLEM — E78.5 HYPERLIPIDEMIA: Status: ACTIVE | Noted: 2024-10-29

## 2024-10-29 PROBLEM — R06.09 DYSPNEA ON EXERTION: Status: ACTIVE | Noted: 2024-10-29

## 2024-11-01 ENCOUNTER — TELEPHONE (OUTPATIENT)
Dept: CARDIOLOGY CLINIC | Facility: CLINIC | Age: 64
End: 2024-11-01

## 2024-11-04 ENCOUNTER — TELEPHONE (OUTPATIENT)
Dept: NEUROLOGY | Facility: CLINIC | Age: 64
End: 2024-11-04

## 2024-11-06 DIAGNOSIS — Z76.0 MEDICATION REFILL: ICD-10-CM

## 2024-11-06 RX ORDER — ASPIRIN 81 MG/1
81 TABLET ORAL DAILY
Qty: 30 TABLET | Refills: 0 | Status: SHIPPED | OUTPATIENT
Start: 2024-11-06

## 2024-11-29 DIAGNOSIS — Z76.0 MEDICATION REFILL: ICD-10-CM

## 2024-11-29 RX ORDER — ASPIRIN 81 MG/1
81 TABLET ORAL DAILY
Qty: 30 TABLET | Refills: 0 | Status: SHIPPED | OUTPATIENT
Start: 2024-11-29

## 2024-12-03 DIAGNOSIS — R60.9 EDEMA, UNSPECIFIED TYPE: ICD-10-CM

## 2024-12-03 RX ORDER — SPIRONOLACTONE 25 MG/1
TABLET ORAL
Qty: 90 TABLET | Refills: 0 | Status: SHIPPED | OUTPATIENT
Start: 2024-12-03

## 2024-12-05 ENCOUNTER — TELEPHONE (OUTPATIENT)
Age: 64
End: 2024-12-05

## 2024-12-05 NOTE — TELEPHONE ENCOUNTER
Caller: Jerry Lopez    Doctor: Jackson/Tevin    Reason for call: Pt lost the paperwork for the compression socks and would like to come  reprinted copies to take to the local [facility].    Can you please call him to confirm.  He would like to stop at the office tomorrow - 12/06/24 if possible.    Call back#: [phone]

## 2024-12-09 DIAGNOSIS — M47.816 SPONDYLOSIS OF LUMBAR REGION WITHOUT MYELOPATHY OR RADICULOPATHY: ICD-10-CM

## 2024-12-09 RX ORDER — METHOCARBAMOL 500 MG/1
500 TABLET, FILM COATED ORAL 3 TIMES DAILY PRN
Qty: 60 TABLET | Refills: 2 | OUTPATIENT
Start: 2024-12-09

## 2024-12-11 DIAGNOSIS — E55.9 VITAMIN D DEFICIENCY: ICD-10-CM

## 2024-12-11 RX ORDER — CHOLECALCIFEROL (VITAMIN D3) 25 MCG
2000 TABLET ORAL DAILY
Qty: 180 TABLET | Refills: 0 | Status: SHIPPED | OUTPATIENT
Start: 2024-12-11 | End: 2025-03-11

## 2024-12-27 DIAGNOSIS — E03.9 HYPOTHYROIDISM, UNSPECIFIED TYPE: ICD-10-CM

## 2024-12-27 DIAGNOSIS — Z76.0 MEDICATION REFILL: ICD-10-CM

## 2024-12-27 RX ORDER — LEVOTHYROXINE SODIUM 50 UG/1
50 TABLET ORAL
Qty: 90 TABLET | Refills: 0 | Status: SHIPPED | OUTPATIENT
Start: 2024-12-27

## 2024-12-27 RX ORDER — ASPIRIN 81 MG/1
81 TABLET ORAL DAILY
Qty: 30 TABLET | Refills: 0 | Status: SHIPPED | OUTPATIENT
Start: 2024-12-27

## 2025-01-13 DIAGNOSIS — R09.82 POST-NASAL DRIP: ICD-10-CM

## 2025-01-13 RX ORDER — FEXOFENADINE HCL 180 MG/1
180 TABLET ORAL DAILY
Qty: 90 TABLET | Refills: 0 | Status: SHIPPED | OUTPATIENT
Start: 2025-01-13 | End: 2025-04-13

## 2025-01-16 ENCOUNTER — TELEPHONE (OUTPATIENT)
Age: 65
End: 2025-01-16

## 2025-01-16 NOTE — TELEPHONE ENCOUNTER
Patient calling about prescription for compression stockings. Patient was just in [place] office, given paperwork for compression stockings by clerical staff. Spoke with Mariela from [place] office, who verified instructions for patient to take paperwork to [facility] pharmacy. Patient verbalized understanding.

## 2025-01-27 ENCOUNTER — APPOINTMENT (OUTPATIENT)
Dept: LAB | Facility: CLINIC | Age: 65
End: 2025-01-27
Payer: COMMERCIAL

## 2025-01-27 ENCOUNTER — TRANSCRIBE ORDERS (OUTPATIENT)
Dept: LAB | Facility: CLINIC | Age: 65
End: 2025-01-27

## 2025-01-27 DIAGNOSIS — Z01.818 OTHER SPECIFIED PRE-OPERATIVE EXAMINATION: ICD-10-CM

## 2025-01-27 DIAGNOSIS — E55.9 AVITAMINOSIS D: ICD-10-CM

## 2025-01-27 DIAGNOSIS — E07.9 DISEASE OF THYROID GLAND: ICD-10-CM

## 2025-01-27 DIAGNOSIS — R97.20 ELEVATED PROSTATE SPECIFIC ANTIGEN (PSA): ICD-10-CM

## 2025-01-27 DIAGNOSIS — Z76.0 MEDICATION REFILL: ICD-10-CM

## 2025-01-27 DIAGNOSIS — R41.3 MEMORY LOSS: ICD-10-CM

## 2025-01-27 DIAGNOSIS — R94.39 ABNORMAL BALLISTOCARDIOGRAM: Primary | ICD-10-CM

## 2025-01-27 DIAGNOSIS — R94.39 ABNORMAL BALLISTOCARDIOGRAM: ICD-10-CM

## 2025-01-27 LAB
25(OH)D3 SERPL-MCNC: 35.1 NG/ML (ref 30–100)
ANION GAP SERPL CALCULATED.3IONS-SCNC: 6 MMOL/L (ref 4–13)
BUN SERPL-MCNC: 11 MG/DL (ref 5–25)
CALCIUM SERPL-MCNC: 9.4 MG/DL (ref 8.4–10.2)
CHLORIDE SERPL-SCNC: 102 MMOL/L (ref 96–108)
CO2 SERPL-SCNC: 31 MMOL/L (ref 21–32)
CREAT SERPL-MCNC: 0.83 MG/DL (ref 0.6–1.3)
ERYTHROCYTE [DISTWIDTH] IN BLOOD BY AUTOMATED COUNT: 13.5 % (ref 11.6–15.1)
FOLATE SERPL-MCNC: 12.9 NG/ML
GFR SERPL CREATININE-BSD FRML MDRD: 92 ML/MIN/1.73SQ M
GLUCOSE P FAST SERPL-MCNC: 118 MG/DL (ref 65–99)
HCT VFR BLD AUTO: 44.9 % (ref 36.5–49.3)
HGB BLD-MCNC: 15.4 G/DL (ref 12–17)
MCH RBC QN AUTO: 29.9 PG (ref 26.8–34.3)
MCHC RBC AUTO-ENTMCNC: 34.3 G/DL (ref 31.4–37.4)
MCV RBC AUTO: 87 FL (ref 82–98)
PLATELET # BLD AUTO: 245 THOUSANDS/UL (ref 149–390)
PMV BLD AUTO: 9.4 FL (ref 8.9–12.7)
POTASSIUM SERPL-SCNC: 4.4 MMOL/L (ref 3.5–5.3)
PSA SERPL-MCNC: 5.75 NG/ML (ref 0–4)
RBC # BLD AUTO: 5.15 MILLION/UL (ref 3.88–5.62)
SODIUM SERPL-SCNC: 139 MMOL/L (ref 135–147)
T4 FREE SERPL-MCNC: 0.96 NG/DL (ref 0.61–1.12)
TSH SERPL DL<=0.05 MIU/L-ACNC: 2.73 UIU/ML (ref 0.45–4.5)
VIT B12 SERPL-MCNC: 940 PG/ML (ref 180–914)
WBC # BLD AUTO: 7.14 THOUSAND/UL (ref 4.31–10.16)

## 2025-01-27 PROCEDURE — 36415 COLL VENOUS BLD VENIPUNCTURE: CPT

## 2025-01-27 PROCEDURE — 82607 VITAMIN B-12: CPT

## 2025-01-27 PROCEDURE — 84439 ASSAY OF FREE THYROXINE: CPT

## 2025-01-27 PROCEDURE — 80048 BASIC METABOLIC PNL TOTAL CA: CPT

## 2025-01-27 PROCEDURE — 82746 ASSAY OF FOLIC ACID SERUM: CPT

## 2025-01-27 PROCEDURE — 83918 ORGANIC ACIDS TOTAL QUANT: CPT

## 2025-01-27 PROCEDURE — 82306 VITAMIN D 25 HYDROXY: CPT

## 2025-01-27 PROCEDURE — 84443 ASSAY THYROID STIM HORMONE: CPT

## 2025-01-27 PROCEDURE — 85027 COMPLETE CBC AUTOMATED: CPT

## 2025-01-27 PROCEDURE — G0103 PSA SCREENING: HCPCS

## 2025-01-27 RX ORDER — ASPIRIN 81 MG/1
81 TABLET ORAL DAILY
Qty: 30 TABLET | Refills: 0 | Status: SHIPPED | OUTPATIENT
Start: 2025-01-27

## 2025-01-29 ENCOUNTER — OFFICE VISIT (OUTPATIENT)
Age: 65
End: 2025-01-29

## 2025-01-29 VITALS
WEIGHT: 208 LBS | BODY MASS INDEX: 29.12 KG/M2 | HEIGHT: 71 IN | HEART RATE: 92 BPM | RESPIRATION RATE: 21 BRPM | OXYGEN SATURATION: 96 % | DIASTOLIC BLOOD PRESSURE: 76 MMHG | SYSTOLIC BLOOD PRESSURE: 126 MMHG | TEMPERATURE: 98 F

## 2025-01-29 DIAGNOSIS — Z23 ENCOUNTER FOR IMMUNIZATION: ICD-10-CM

## 2025-01-29 DIAGNOSIS — R45.7 CAREGIVER STRESS SYNDROME: Primary | ICD-10-CM

## 2025-01-29 DIAGNOSIS — R06.02 SOB (SHORTNESS OF BREATH) ON EXERTION: ICD-10-CM

## 2025-01-29 PROCEDURE — 90471 IMMUNIZATION ADMIN: CPT | Performed by: FAMILY MEDICINE

## 2025-01-29 PROCEDURE — 90673 RIV3 VACCINE NO PRESERV IM: CPT | Performed by: FAMILY MEDICINE

## 2025-01-29 PROCEDURE — 99213 OFFICE O/P EST LOW 20 MIN: CPT | Performed by: FAMILY MEDICINE

## 2025-01-29 NOTE — ASSESSMENT & PLAN NOTE
And is primary caregiver to significant other  Notes he has to give around-the-clock care.  Has put off taking care of himself due to her multiple needs  Patient notes significant sadness and grief due to brother, father and mother's passing within the past year    Plan  Significant other was recently approved for a primary caregiver  Advised patient to follow-up with therapy (he was in agreeable)  If patient continues to have depressive symptoms may consider broaching the topic of antidepressant medications

## 2025-01-29 NOTE — ASSESSMENT & PLAN NOTE
Patient noted to have an episode of shortness of breath while in the waiting room  Vitals were all within normal limits and SpO2 remained above 95%  Patient will be following with cardiology for cardiac cath  Shortness of breath may have been a panic attack due to significant caregiver stress syndrome    Plan  Follow-up with cardiology for cardiac cath  PFT  Breathing exercises  Orders:    Complete PFT with post bronchodilator; Future

## 2025-01-29 NOTE — PROGRESS NOTES
Name: Jerry Lopez      : 1960      MRN: [id number]  Encounter Provider: Annette Andersen MD  Encounter Date: 2025   Encounter department: [facility]  :  Assessment & Plan  Caregiver stress syndrome  And is primary caregiver to significant other  Notes he has to give around-the-clock care.  Has put off taking care of himself due to her multiple needs  Patient notes significant sadness and grief due to brother, father and mother's passing within the past year    Plan  Significant other was recently approved for a primary caregiver  Advised patient to follow-up with therapy (he was in agreeable)  If patient continues to have depressive symptoms may consider broaching the topic of antidepressant medications       SOB (shortness of breath) on exertion  Patient noted to have an episode of shortness of breath while in the waiting room  Vitals were all within normal limits and SpO2 remained above 95%  Patient will be following with cardiology for cardiac cath  Shortness of breath may have been a panic attack due to significant caregiver stress syndrome    Plan  Follow-up with cardiology for cardiac cath  PFT  Breathing exercises  Orders:    Complete PFT with post bronchodilator; Future    Encounter for immunization  Flu vaccine provided in office  Orders:    influenza vaccine, recombinant, PF, 0.5 mL IM (Flublok)           History of Present Illness   Jerry is a 64-year-old male who presents to the office for shortness of breath.  While in the waiting room, he was noted to have an episode of shortness of breath.  Vitals were assessed and were all within normal limits including an SpO2 that remained above 95%.  After further conversation, he notes significant caregiver stress.  He is currently the sole care provider to his significant other and this has taken a toll on him both physically but mentally.  Additionally he notes significant sadness due to recent passing of  "his brother, father and mother within the past year.  He notes that his brother  after having throat cancer and undergoing nearly 200 chemo and radiation therapy treatments.  He notes that approximately 1 year ago his brother suffered from a significant nosebleed for which she was seen in the ED and 3 days later suffered a massive nosebleed and  at home.  He then notes that his father  approximately 1 month later from an acute CHF exacerbation.  6 months after his father's passing his mother suffered cardiac arrest while in surgery and  3 days later.  Patient notes that he is aware that he needs to go to therapy and he does feel better when he talks things allowed.  Patient is all requesting flu vaccine.      Review of Systems   Constitutional:  Negative for chills and fever.   HENT:  Negative for ear pain and sore throat.    Eyes:  Negative for pain and visual disturbance.   Respiratory:  Negative for cough and shortness of breath.    Cardiovascular:  Negative for chest pain and palpitations.   Gastrointestinal:  Negative for abdominal pain and vomiting.   Genitourinary:  Negative for dysuria and hematuria.   Musculoskeletal:  Negative for arthralgias and back pain.   Skin:  Negative for color change and rash.   Neurological:  Negative for seizures and syncope.   Psychiatric/Behavioral:  The patient is nervous/anxious.    All other systems reviewed and are negative.      Objective   /76 (BP Location: Right arm, Patient Position: Sitting, Cuff Size: Standard)   Pulse 92   Temp 98 °F (36.7 °C) (Oral)   Resp 21   Ht 5' 11\" (1.803 m)   Wt 94.3 kg (208 lb)   SpO2 96%   BMI 29.01 kg/m²      Physical Exam  Vitals and nursing note reviewed.   Constitutional:       General: He is not in acute distress.     Appearance: He is well-developed.   HENT:      Head: Normocephalic and atraumatic.      Right Ear: External ear normal.      Left Ear: External ear normal.      Nose: Nose normal.      " Mouth/Throat:      Mouth: Mucous membranes are moist.   Eyes:      Extraocular Movements: Extraocular movements intact.      Conjunctiva/sclera: Conjunctivae normal.   Cardiovascular:      Rate and Rhythm: Normal rate and regular rhythm.      Pulses: Normal pulses.      Heart sounds: Normal heart sounds. No murmur heard.     No friction rub. No gallop.   Pulmonary:      Effort: Pulmonary effort is normal. No respiratory distress.      Breath sounds: Normal breath sounds. No stridor. No wheezing, rhonchi or rales.   Chest:      Chest wall: No tenderness.   Abdominal:      Palpations: Abdomen is soft.      Tenderness: There is no abdominal tenderness.   Musculoskeletal:         General: No swelling.      Cervical back: Normal range of motion and neck supple.   Skin:     General: Skin is warm and dry.      Capillary Refill: Capillary refill takes less than 2 seconds.   Neurological:      Mental Status: He is alert.   Psychiatric:         Attention and Perception: Attention normal.         Mood and Affect: Mood is anxious and depressed.         Speech: Speech normal.         Behavior: Behavior normal.         Thought Content: Thought content normal.         Cognition and Memory: Cognition normal.         Judgment: Judgment normal.

## 2025-01-31 LAB — METHYLMALONATE SERPL-SCNC: 143 NMOL/L (ref 0–378)

## 2025-02-03 ENCOUNTER — TELEPHONE (OUTPATIENT)
Age: 65
End: 2025-02-03

## 2025-02-03 NOTE — TELEPHONE ENCOUNTER
Patient called in and requested a call from Dr. Titus to explain why he is being sent for anotherpft post bronchodilator . He is only willing to get it done with clarification .     [phone] or alt-[phone]  Please Review, Thank you

## 2025-02-06 NOTE — TELEPHONE ENCOUNTER
Patient called and adv that he didn't receive the detailed message. He would like a call today before 5pm to discuss the need for the test. He adv if he doesn't speak to anyone he will be canceling the appt. Please Advise

## 2025-02-07 ENCOUNTER — HOSPITAL ENCOUNTER (OUTPATIENT)
Dept: PULMONOLOGY | Facility: HOSPITAL | Age: 65
End: 2025-02-07
Payer: COMMERCIAL

## 2025-02-07 DIAGNOSIS — R06.02 SOB (SHORTNESS OF BREATH) ON EXERTION: ICD-10-CM

## 2025-02-07 PROCEDURE — 94726 PLETHYSMOGRAPHY LUNG VOLUMES: CPT

## 2025-02-07 PROCEDURE — 94726 PLETHYSMOGRAPHY LUNG VOLUMES: CPT | Performed by: INTERNAL MEDICINE

## 2025-02-07 PROCEDURE — 94729 DIFFUSING CAPACITY: CPT

## 2025-02-07 PROCEDURE — 94760 N-INVAS EAR/PLS OXIMETRY 1: CPT

## 2025-02-07 PROCEDURE — 94060 EVALUATION OF WHEEZING: CPT | Performed by: INTERNAL MEDICINE

## 2025-02-07 PROCEDURE — 94729 DIFFUSING CAPACITY: CPT | Performed by: INTERNAL MEDICINE

## 2025-02-07 PROCEDURE — 94060 EVALUATION OF WHEEZING: CPT

## 2025-02-07 RX ORDER — ALBUTEROL SULFATE 0.83 MG/ML
2.5 SOLUTION RESPIRATORY (INHALATION) ONCE
Status: COMPLETED | OUTPATIENT
Start: 2025-02-07 | End: 2025-02-07

## 2025-02-07 RX ADMIN — ALBUTEROL SULFATE 2.5 MG: 2.5 SOLUTION RESPIRATORY (INHALATION) at 11:47

## 2025-02-07 NOTE — TELEPHONE ENCOUNTER
Patient is calling to notify Dr. Titus that PFT has been completed. He would like to know to proceed.

## 2025-02-10 ENCOUNTER — RESULTS FOLLOW-UP (OUTPATIENT)
Age: 65
End: 2025-02-10

## 2025-02-13 ENCOUNTER — HOSPITAL ENCOUNTER (OUTPATIENT)
Dept: RADIOLOGY | Facility: HOSPITAL | Age: 65
Discharge: HOME | End: 2025-02-13
Attending: INTERNAL MEDICINE
Payer: COMMERCIAL

## 2025-02-13 DIAGNOSIS — R60.0 EDEMA OF BOTH LEGS: ICD-10-CM

## 2025-02-13 PROCEDURE — 93970 EXTREMITY STUDY: CPT

## 2025-02-13 PROCEDURE — 93970 EXTREMITY STUDY: CPT | Performed by: SURGERY

## 2025-02-14 ENCOUNTER — RESULTS FOLLOW-UP (OUTPATIENT)
Dept: CARDIOLOGY CLINIC | Facility: CLINIC | Age: 65
End: 2025-02-14

## 2025-02-14 NOTE — TELEPHONE ENCOUNTER
----- Message from Cachorro Abarca MD sent at 2/14/2025 11:54 AM EST -----  Please call and inform patient that the ultrasound of his leg did not show any blood clots

## 2025-02-14 NOTE — TELEPHONE ENCOUNTER
I called patient at number provided. This is Paula Patiño number - she is not on communication consent.   She gave me Jerry's number and call could not be completed.

## 2025-02-16 DIAGNOSIS — E78.5 DYSLIPIDEMIA: ICD-10-CM

## 2025-02-17 RX ORDER — ATORVASTATIN CALCIUM 20 MG/1
20 TABLET, FILM COATED ORAL DAILY
Qty: 90 TABLET | Refills: 2 | Status: SHIPPED | OUTPATIENT
Start: 2025-02-17

## 2025-02-21 DIAGNOSIS — Z76.0 MEDICATION REFILL: ICD-10-CM

## 2025-02-21 RX ORDER — ASPIRIN 81 MG/1
81 TABLET ORAL DAILY
Qty: 30 TABLET | Refills: 0 | Status: SHIPPED | OUTPATIENT
Start: 2025-02-21

## 2025-02-21 NOTE — TELEPHONE ENCOUNTER
VM left on RX line:    Also calling for Jerry KIRK date of birth 1960. Sees the same doctor. Looking for a refill on aspirin EC 81 milligrams. Please give us a call back again on phone number is [phone]. Thank you. Adam.

## 2025-02-25 DIAGNOSIS — R60.9 EDEMA, UNSPECIFIED TYPE: ICD-10-CM

## 2025-02-26 RX ORDER — SPIRONOLACTONE 25 MG/1
25 TABLET ORAL DAILY
Qty: 90 TABLET | Refills: 0 | Status: SHIPPED | OUTPATIENT
Start: 2025-02-26

## 2025-02-28 ENCOUNTER — APPOINTMENT (OUTPATIENT)
Dept: LAB | Facility: CLINIC | Age: 65
End: 2025-02-28
Payer: COMMERCIAL

## 2025-02-28 ENCOUNTER — TRANSCRIBE ORDERS (OUTPATIENT)
Dept: LAB | Facility: CLINIC | Age: 65
End: 2025-02-28

## 2025-02-28 DIAGNOSIS — R97.20 ABNORMAL PSA: ICD-10-CM

## 2025-02-28 DIAGNOSIS — R97.20 ABNORMAL PSA: Primary | ICD-10-CM

## 2025-02-28 PROCEDURE — 36415 COLL VENOUS BLD VENIPUNCTURE: CPT

## 2025-02-28 PROCEDURE — G0103 PSA SCREENING: HCPCS

## 2025-03-01 LAB — PSA SERPL-MCNC: 2.1 NG/ML (ref 0–4)

## 2025-03-03 DIAGNOSIS — E55.9 VITAMIN D DEFICIENCY: ICD-10-CM

## 2025-03-03 RX ORDER — CHOLECALCIFEROL (VITAMIN D3) 25 MCG
2000 TABLET ORAL DAILY
Qty: 180 TABLET | Refills: 0 | Status: SHIPPED | OUTPATIENT
Start: 2025-03-03 | End: 2025-06-01

## 2025-03-17 DIAGNOSIS — Z76.0 MEDICATION REFILL: ICD-10-CM

## 2025-03-17 DIAGNOSIS — E03.9 HYPOTHYROIDISM, UNSPECIFIED TYPE: ICD-10-CM

## 2025-03-17 RX ORDER — ASPIRIN 81 MG/1
81 TABLET ORAL DAILY
Qty: 30 TABLET | Refills: 0 | Status: SHIPPED | OUTPATIENT
Start: 2025-03-17

## 2025-03-17 RX ORDER — LEVOTHYROXINE SODIUM 50 UG/1
50 TABLET ORAL
Qty: 90 TABLET | Refills: 0 | Status: SHIPPED | OUTPATIENT
Start: 2025-03-17

## 2025-04-08 DIAGNOSIS — R09.82 POST-NASAL DRIP: ICD-10-CM

## 2025-04-08 DIAGNOSIS — R10.13 EPIGASTRIC BURNING SENSATION: ICD-10-CM

## 2025-04-08 RX ORDER — OMEPRAZOLE 40 MG/1
40 CAPSULE, DELAYED RELEASE ORAL DAILY
Qty: 90 CAPSULE | Refills: 0 | Status: SHIPPED | OUTPATIENT
Start: 2025-04-08 | End: 2025-07-07

## 2025-04-08 RX ORDER — FEXOFENADINE HCL 180 MG/1
180 TABLET ORAL DAILY
Qty: 90 TABLET | Refills: 0 | Status: SHIPPED | OUTPATIENT
Start: 2025-04-08 | End: 2025-07-07

## 2025-04-09 ENCOUNTER — HOSPITAL ENCOUNTER (OUTPATIENT)
Dept: RADIOLOGY | Age: 65
Discharge: HOME | End: 2025-04-09
Payer: COMMERCIAL

## 2025-04-09 DIAGNOSIS — R97.20 ELEVATED PROSTATE SPECIFIC ANTIGEN (PSA): ICD-10-CM

## 2025-04-09 DIAGNOSIS — K21.9 GASTROESOPHAGEAL REFLUX DISEASE: ICD-10-CM

## 2025-04-09 PROCEDURE — 72197 MRI PELVIS W/O & W/DYE: CPT

## 2025-04-09 PROCEDURE — A9585 GADOBUTROL INJECTION: HCPCS | Performed by: SPECIALIST

## 2025-04-09 PROCEDURE — 76377 3D RENDER W/INTRP POSTPROCES: CPT

## 2025-04-09 RX ORDER — FAMOTIDINE 40 MG/1
20 TABLET, FILM COATED ORAL
Qty: 45 TABLET | Refills: 0 | Status: SHIPPED | OUTPATIENT
Start: 2025-04-09 | End: 2025-07-08

## 2025-04-09 RX ORDER — GADOBUTROL 604.72 MG/ML
9 INJECTION INTRAVENOUS
Status: COMPLETED | OUTPATIENT
Start: 2025-04-09 | End: 2025-04-09

## 2025-04-09 RX ADMIN — GADOBUTROL 9 ML: 604.72 INJECTION INTRAVENOUS at 10:32

## 2025-04-10 ENCOUNTER — TRANSCRIBE ORDERS (OUTPATIENT)
Dept: LAB | Facility: CLINIC | Age: 65
End: 2025-04-10

## 2025-04-10 ENCOUNTER — APPOINTMENT (OUTPATIENT)
Dept: LAB | Facility: CLINIC | Age: 65
End: 2025-04-10
Payer: COMMERCIAL

## 2025-04-10 DIAGNOSIS — R94.39 ABNORMAL THALLIUM STRESS TEST: ICD-10-CM

## 2025-04-10 DIAGNOSIS — E55.9 VITAMIN D DEFICIENCY: ICD-10-CM

## 2025-04-10 DIAGNOSIS — Z01.818 PREOP TESTING: ICD-10-CM

## 2025-04-10 DIAGNOSIS — R41.3 EPISODE OF MEMORY LOSS: Primary | ICD-10-CM

## 2025-04-10 DIAGNOSIS — R41.3 EPISODE OF MEMORY LOSS: ICD-10-CM

## 2025-04-10 DIAGNOSIS — E07.9 THYROID DISEASE: ICD-10-CM

## 2025-04-10 LAB
BUN SERPL-MCNC: 14 MG/DL (ref 5–25)
CREAT SERPL-MCNC: 0.8 MG/DL (ref 0.6–1.3)
GFR SERPL CREATININE-BSD FRML MDRD: 94 ML/MIN/1.73SQ M

## 2025-04-10 PROCEDURE — 82565 ASSAY OF CREATININE: CPT

## 2025-04-10 PROCEDURE — 36415 COLL VENOUS BLD VENIPUNCTURE: CPT

## 2025-04-10 PROCEDURE — 84520 ASSAY OF UREA NITROGEN: CPT

## 2025-04-21 DIAGNOSIS — Z76.0 MEDICATION REFILL: ICD-10-CM

## 2025-04-21 RX ORDER — ASPIRIN 81 MG/1
81 TABLET ORAL DAILY
Qty: 30 TABLET | Refills: 0 | Status: SHIPPED | OUTPATIENT
Start: 2025-04-21

## 2025-05-20 ENCOUNTER — TELEPHONE (OUTPATIENT)
Age: 65
End: 2025-05-20

## 2025-05-20 NOTE — TELEPHONE ENCOUNTER
Patient is requesting all Rx's moving forward are to be sent to [facility] as [facility] will be closing. I have updated the information in the chart to reflect this information.

## 2025-05-21 DIAGNOSIS — R60.9 EDEMA, UNSPECIFIED TYPE: ICD-10-CM

## 2025-05-21 RX ORDER — SPIRONOLACTONE 25 MG/1
25 TABLET ORAL DAILY
Qty: 90 TABLET | Refills: 0 | Status: SHIPPED | OUTPATIENT
Start: 2025-05-21

## 2025-05-22 DIAGNOSIS — Z76.0 MEDICATION REFILL: ICD-10-CM

## 2025-05-22 RX ORDER — ASPIRIN 81 MG/1
81 TABLET ORAL DAILY
Qty: 30 TABLET | Refills: 0 | Status: SHIPPED | OUTPATIENT
Start: 2025-05-22

## 2025-05-22 NOTE — TELEPHONE ENCOUNTER
Vm left on rx line:    Hi, it's [facility] in [facility] calling for Jerry Kelly, date of birth 7/7/1966. Doctor Waldemar looking for a refund of aspirin and chair code at 81 milligrams taking one POQD. Please give us a call back again. Our phone number is [phone] Thank you.

## 2025-06-18 DIAGNOSIS — M17.12 PATELLOFEMORAL ARTHRITIS OF LEFT KNEE: ICD-10-CM

## 2025-06-18 DIAGNOSIS — E55.9 VITAMIN D DEFICIENCY: ICD-10-CM

## 2025-06-18 DIAGNOSIS — R53.83 FATIGUE, UNSPECIFIED TYPE: ICD-10-CM

## 2025-06-18 DIAGNOSIS — M47.816 SPONDYLOSIS OF LUMBAR REGION WITHOUT MYELOPATHY OR RADICULOPATHY: ICD-10-CM

## 2025-06-18 DIAGNOSIS — K58.9 IRRITABLE BOWEL SYNDROME, UNSPECIFIED TYPE: ICD-10-CM

## 2025-06-18 DIAGNOSIS — J30.89 ALLERGIC RHINITIS DUE TO OTHER ALLERGIC TRIGGER, UNSPECIFIED SEASONALITY: ICD-10-CM

## 2025-06-18 DIAGNOSIS — R60.0 EDEMA OF BOTH LEGS: ICD-10-CM

## 2025-06-18 DIAGNOSIS — E03.9 HYPOTHYROIDISM, UNSPECIFIED TYPE: ICD-10-CM

## 2025-06-18 DIAGNOSIS — R06.02 SOB (SHORTNESS OF BREATH) ON EXERTION: ICD-10-CM

## 2025-06-18 DIAGNOSIS — Z76.0 MEDICATION REFILL: ICD-10-CM

## 2025-06-18 RX ORDER — DICYCLOMINE HYDROCHLORIDE 10 MG/1
10 CAPSULE ORAL
Qty: 120 CAPSULE | Refills: 0 | Status: SHIPPED | OUTPATIENT
Start: 2025-06-18 | End: 2025-07-18

## 2025-06-18 RX ORDER — METHOCARBAMOL 500 MG/1
500 TABLET, FILM COATED ORAL 3 TIMES DAILY PRN
Qty: 60 TABLET | Refills: 2 | Status: SHIPPED | OUTPATIENT
Start: 2025-06-18

## 2025-06-18 RX ORDER — LEVOTHYROXINE SODIUM 50 UG/1
50 TABLET ORAL
Qty: 90 TABLET | Refills: 0 | Status: SHIPPED | OUTPATIENT
Start: 2025-06-18

## 2025-06-18 RX ORDER — ASPIRIN 81 MG/1
81 TABLET ORAL DAILY
Qty: 30 TABLET | Refills: 0 | Status: SHIPPED | OUTPATIENT
Start: 2025-06-18

## 2025-06-18 RX ORDER — FLUTICASONE PROPIONATE 50 MCG
1 SPRAY, SUSPENSION (ML) NASAL DAILY
Qty: 15.8 ML | Refills: 3 | Status: SHIPPED | OUTPATIENT
Start: 2025-06-18

## 2025-06-18 RX ORDER — FUROSEMIDE 20 MG/1
20 TABLET ORAL DAILY
Qty: 90 TABLET | Refills: 2 | Status: SHIPPED | OUTPATIENT
Start: 2025-06-18

## 2025-06-18 RX ORDER — LANOLIN ALCOHOL/MO/W.PET/CERES
1000 CREAM (GRAM) TOPICAL DAILY
Qty: 90 TABLET | Refills: 0 | Status: SHIPPED | OUTPATIENT
Start: 2025-06-18 | End: 2025-09-16

## 2025-06-18 RX ORDER — CHOLECALCIFEROL (VITAMIN D3) 25 MCG
2000 TABLET ORAL DAILY
Qty: 180 TABLET | Refills: 0 | Status: SHIPPED | OUTPATIENT
Start: 2025-06-18 | End: 2025-09-16

## 2025-07-18 DIAGNOSIS — N40.0 ENLARGED PROSTATE ON RECTAL EXAMINATION: ICD-10-CM

## 2025-07-18 DIAGNOSIS — R09.82 POST-NASAL DRIP: ICD-10-CM

## 2025-07-18 DIAGNOSIS — K58.9 IRRITABLE BOWEL SYNDROME, UNSPECIFIED TYPE: ICD-10-CM

## 2025-07-18 DIAGNOSIS — R97.20 ELEVATED PSA: ICD-10-CM

## 2025-07-18 DIAGNOSIS — R10.13 EPIGASTRIC BURNING SENSATION: ICD-10-CM

## 2025-07-18 RX ORDER — TAMSULOSIN HYDROCHLORIDE 0.4 MG/1
0.8 CAPSULE ORAL
Qty: 60 CAPSULE | Refills: 5 | Status: SHIPPED | OUTPATIENT
Start: 2025-07-18

## 2025-07-18 RX ORDER — FEXOFENADINE HCL 180 MG/1
180 TABLET ORAL DAILY
Qty: 90 TABLET | Refills: 0 | Status: SHIPPED | OUTPATIENT
Start: 2025-07-18 | End: 2025-10-16

## 2025-07-18 RX ORDER — DICYCLOMINE HYDROCHLORIDE 10 MG/1
10 CAPSULE ORAL
Qty: 120 CAPSULE | Refills: 0 | Status: SHIPPED | OUTPATIENT
Start: 2025-07-18 | End: 2025-08-17

## 2025-07-18 RX ORDER — OMEPRAZOLE 40 MG/1
40 CAPSULE, DELAYED RELEASE ORAL DAILY
Qty: 90 CAPSULE | Refills: 0 | Status: SHIPPED | OUTPATIENT
Start: 2025-07-18 | End: 2025-10-16

## 2025-07-24 ENCOUNTER — OFFICE VISIT (OUTPATIENT)
Age: 65
End: 2025-07-24

## 2025-07-24 VITALS
TEMPERATURE: 99.2 F | OXYGEN SATURATION: 97 % | WEIGHT: 209 LBS | HEART RATE: 88 BPM | RESPIRATION RATE: 20 BRPM | BODY MASS INDEX: 29.26 KG/M2 | HEIGHT: 71 IN | SYSTOLIC BLOOD PRESSURE: 122 MMHG | DIASTOLIC BLOOD PRESSURE: 76 MMHG

## 2025-07-24 DIAGNOSIS — M17.12 PATELLOFEMORAL ARTHRITIS OF LEFT KNEE: ICD-10-CM

## 2025-07-24 DIAGNOSIS — M43.6 NECK STIFFNESS: ICD-10-CM

## 2025-07-24 DIAGNOSIS — R26.2 AMBULATORY DYSFUNCTION: Primary | ICD-10-CM

## 2025-07-24 DIAGNOSIS — Z99.89 USE OF CANE AS AMBULATORY AID: ICD-10-CM

## 2025-07-24 PROCEDURE — 99214 OFFICE O/P EST MOD 30 MIN: CPT | Performed by: FAMILY MEDICINE

## 2025-07-24 NOTE — PROGRESS NOTES
"Name: Jerry Lopez      : 1960      MRN: [id number]  Encounter Provider: Eddie Gray MD  Encounter Date: 2025   Encounter department: [facility]  :    Physical Activity Assessment and Intervention:    Physical Activity patient handout reviewed with patient    Referral given to exercise professional      Tobacco and Toxic Substance Assessment and Intervention:     Alcohol and drug use screening performed       Assessment & Plan  Ambulatory dysfunction  Patient noted disturbance in gait and feeling of instability. Says has not previously been able to see PT due to caregiver role, but now has home health for her so more time available to pursue this now.  Orders:    Ambulatory Referral to Physical Therapy; Future    Neck stiffness  7/10 \"pulling\" pain and stiffness x7 days following a prior twisting movement. Improved with naproxen and cyclobenzaprine. Patient previously used Voltaren for knee pain, advised to use on neck as well. Advised use of hot or cold pad/source for pain relief.    Orders:    Ambulatory Referral to Physical Therapy; Future    XR spine cervical complete 4 or 5 vw non injury; Future    Diclofenac Sodium (VOLTAREN) 1 %; Apply 2 g topically if needed (Apply to affected area as needed for neck pain)          Falls Plan of Care: referral to physical therapy.     History of Present Illness   Patient is a 65 yr. old male, with PMH of Lumbar spondylosis, GERD, knee arthritis, sciatica, PTSD, anxiety, depression who presents with complaints of Neck pain and stiffness. Patient states about 2 weeks ago he felt a \"twinge\" in his neck as he was bending down and didn't think two much of it. About a week later, however, he woke up with swelling in the anterior aspect of the R neck with 7/10 pain. The pain has continued without improvement up until today, though the swelling has decreased since. He describes the pain as a \"pulling\" sensation that radiates up " "the back of his R skull and his scapular area with further radiation down the arm with movement above the head or behind his back. He has tried taking Aleve and flexeril and says those provided notable relief though it would always come back after a while. He also noted having to use a pillow to support his neck in a specific way to prevent the pain from coming.     Neck Pain   This is a new problem. The current episode started 1 to 4 weeks ago. The problem occurs constantly. The problem has been gradually improving. The pain is associated with a sleep position and a twisting injury. The pain is present in the anterior neck and right side. Quality: \"Pulling\" The pain is at a severity of 7/10. The pain is moderate. The symptoms are aggravated by twisting, stress and position. The pain is Same all the time. Pertinent negatives include no chest pain, fever, headaches, leg pain, numbness, tingling or trouble swallowing. He has tried neck support, NSAIDs and muscle relaxants for the symptoms. The treatment provided significant relief.   Review of Systems   Constitutional:  Positive for fatigue (Chronic). Negative for chills and fever.   HENT:  Negative for congestion, sneezing, sore throat and trouble swallowing.    Respiratory:  Negative for cough, chest tightness and shortness of breath.    Cardiovascular:  Positive for leg swelling. Negative for chest pain.   Gastrointestinal:  Negative for abdominal pain.   Musculoskeletal:  Positive for gait problem, neck pain and neck stiffness. Negative for back pain.   Neurological:  Negative for dizziness, tingling, facial asymmetry, light-headedness, numbness and headaches.       Objective   /76 (BP Location: Left arm, Patient Position: Sitting, Cuff Size: Adult)   Pulse 88   Temp 99.2 °F (37.3 °C)   Resp 20   Ht 5' 11\" (1.803 m)   Wt 94.8 kg (209 lb)   SpO2 97%   BMI 29.15 kg/m²      Physical Exam  Constitutional:       General: He is not in acute distress.     " Appearance: Normal appearance.   HENT:      Head: Normocephalic and atraumatic.     Eyes:      General: No scleral icterus.     Extraocular Movements: Extraocular movements intact.       Cardiovascular:      Rate and Rhythm: Normal rate and regular rhythm.      Heart sounds: Normal heart sounds. No murmur heard.     No friction rub. No gallop.   Pulmonary:      Effort: Pulmonary effort is normal. No respiratory distress.      Breath sounds: Normal breath sounds.     Musculoskeletal:         General: Normal range of motion.      Cervical back: Normal range of motion. Rigidity and tenderness present.      Comments: Pain with overhead movement and behind the back on R. Uneven gait.   Lymphadenopathy:      Cervical: No cervical adenopathy.     Skin:     General: Skin is warm and dry.     Neurological:      General: No focal deficit present.      Mental Status: He is alert and oriented to person, place, and time.     Psychiatric:         Mood and Affect: Mood normal.      Comments: Tangential     Administrative Statements   I have spent a total time of 60 minutes in caring for this patient on the day of the visit/encounter including Prognosis, Risks and benefits of tx options, Instructions for management, Patient and family education, Risk factor reductions, Impressions, Counseling / Coordination of care, Documenting in the medical record, Reviewing/placing orders in the medical record (including tests, medications, and/or procedures), Obtaining or reviewing history  , and Communicating with other healthcare professionals .

## 2025-07-24 NOTE — ASSESSMENT & PLAN NOTE
"7/10 \"pulling\" pain and stiffness x7 days following a prior twisting movement. Improved with naproxen and cyclobenzaprine. Patient previously used Voltaren for knee pain, advised to use on neck as well. Advised use of hot or cold pad/source for pain relief.    Orders:    Ambulatory Referral to Physical Therapy; Future    XR spine cervical complete 4 or 5 vw non injury; Future    Diclofenac Sodium (VOLTAREN) 1 %; Apply 2 g topically if needed (Apply to affected area as needed for neck pain)    "

## 2025-07-24 NOTE — ASSESSMENT & PLAN NOTE
Patient noted disturbance in gait and feeling of instability. Says has not previously been able to see PT due to caregiver role, but now has home health for her so more time available to pursue this now.  Orders:    Ambulatory Referral to Physical Therapy; Future

## 2025-07-30 ENCOUNTER — HOSPITAL ENCOUNTER (OUTPATIENT)
Dept: RADIOLOGY | Facility: HOSPITAL | Age: 65
Discharge: HOME | End: 2025-07-30
Payer: MEDICARE

## 2025-07-30 DIAGNOSIS — M43.6 NECK STIFFNESS: ICD-10-CM

## 2025-07-30 PROCEDURE — 72050 X-RAY EXAM NECK SPINE 4/5VWS: CPT

## 2025-08-01 ENCOUNTER — OFFICE VISIT (OUTPATIENT)
Age: 65
End: 2025-08-01

## 2025-08-01 VITALS
OXYGEN SATURATION: 96 % | DIASTOLIC BLOOD PRESSURE: 77 MMHG | SYSTOLIC BLOOD PRESSURE: 133 MMHG | BODY MASS INDEX: 29.26 KG/M2 | WEIGHT: 209 LBS | TEMPERATURE: 98 F | HEART RATE: 93 BPM | RESPIRATION RATE: 16 BRPM | HEIGHT: 71 IN

## 2025-08-01 DIAGNOSIS — M54.2 NECK PAIN: Primary | ICD-10-CM

## 2025-08-01 PROCEDURE — 99213 OFFICE O/P EST LOW 20 MIN: CPT | Performed by: FAMILY MEDICINE

## 2025-08-04 ENCOUNTER — TELEPHONE (OUTPATIENT)
Age: 65
End: 2025-08-04

## 2025-08-06 ENCOUNTER — TELEPHONE (OUTPATIENT)
Age: 65
End: 2025-08-06

## 2025-08-07 ENCOUNTER — EVALUATION (OUTPATIENT)
Dept: PHYSICAL THERAPY | Facility: CLINIC | Age: 65
End: 2025-08-07
Attending: FAMILY MEDICINE
Payer: MEDICARE

## 2025-08-07 DIAGNOSIS — R26.2 AMBULATORY DYSFUNCTION: Primary | ICD-10-CM

## 2025-08-07 DIAGNOSIS — M43.6 NECK STIFFNESS: ICD-10-CM

## 2025-08-07 PROCEDURE — 97110 THERAPEUTIC EXERCISES: CPT

## 2025-08-07 PROCEDURE — 97161 PT EVAL LOW COMPLEX 20 MIN: CPT

## 2025-08-13 ENCOUNTER — OFFICE VISIT (OUTPATIENT)
Dept: PHYSICAL THERAPY | Facility: CLINIC | Age: 65
End: 2025-08-13
Attending: FAMILY MEDICINE
Payer: MEDICARE

## 2025-08-19 ENCOUNTER — OFFICE VISIT (OUTPATIENT)
Dept: PHYSICAL THERAPY | Facility: CLINIC | Age: 65
End: 2025-08-19
Attending: FAMILY MEDICINE
Payer: MEDICARE

## 2025-08-19 DIAGNOSIS — R26.2 AMBULATORY DYSFUNCTION: ICD-10-CM

## 2025-08-19 DIAGNOSIS — M43.6 NECK STIFFNESS: Primary | ICD-10-CM

## 2025-08-19 PROCEDURE — 97110 THERAPEUTIC EXERCISES: CPT

## 2025-08-19 PROCEDURE — 97112 NEUROMUSCULAR REEDUCATION: CPT

## 2025-08-21 ENCOUNTER — OFFICE VISIT (OUTPATIENT)
Dept: PHYSICAL THERAPY | Facility: CLINIC | Age: 65
End: 2025-08-21
Attending: FAMILY MEDICINE
Payer: MEDICARE

## 2025-08-21 DIAGNOSIS — M43.6 NECK STIFFNESS: Primary | ICD-10-CM

## 2025-08-21 DIAGNOSIS — R26.2 AMBULATORY DYSFUNCTION: ICD-10-CM

## 2025-08-21 PROCEDURE — 97112 NEUROMUSCULAR REEDUCATION: CPT

## 2025-08-21 PROCEDURE — 97110 THERAPEUTIC EXERCISES: CPT

## 2025-08-21 PROCEDURE — 97140 MANUAL THERAPY 1/> REGIONS: CPT

## (undated) DEVICE — CHLORAPREP APPLICATOR TINTED 10.5ML ONE-STEP

## (undated) DEVICE — GLOVE SRG BIOGEL 7.5

## (undated) DEVICE — WIPES BABY PAMPERS SENSITIVE 36/PK

## (undated) DEVICE — NEEDLE SPINAL 22G X 5IN QUINCKE

## (undated) DEVICE — RADIOLOGY STERILE LABELS: Brand: CENTURION

## (undated) DEVICE — NEEDLE BLUNT 18 G X 1 1/2 W FILTER

## (undated) DEVICE — TOWEL SET X-RAY

## (undated) DEVICE — PLASTIC ADHESIVE BANDAGE: Brand: CURITY

## (undated) DEVICE — SMALL NEEDLE COUNTER NEST

## (undated) DEVICE — TRAY EPID CONT PERIFIX 18G X 3.5IN 5ML CLSD TIP DRAPE

## (undated) DEVICE — SKIN MARKER DUAL TIP WITH RULER CAP, FLEXIBLE RULER AND LABELS: Brand: DEVON

## (undated) DEVICE — SYRINGE 5ML LL